# Patient Record
Sex: FEMALE | Employment: OTHER | URBAN - METROPOLITAN AREA
[De-identification: names, ages, dates, MRNs, and addresses within clinical notes are randomized per-mention and may not be internally consistent; named-entity substitution may affect disease eponyms.]

---

## 2018-01-01 ENCOUNTER — HOSPITAL ENCOUNTER (EMERGENCY)
Age: 63
Discharge: HOME | End: 2018-09-27
Attending: EMERGENCY MEDICINE
Payer: COMMERCIAL

## 2018-01-01 ENCOUNTER — HOME CARE VISIT (OUTPATIENT)
Dept: HOME HEALTH SERVICES | Facility: HOME HEALTH | Age: 63
End: 2018-01-01
Payer: COMMERCIAL

## 2018-01-01 ENCOUNTER — APPOINTMENT (OUTPATIENT)
Dept: VASCULAR SURGERY | Age: 63
DRG: 253 | End: 2018-01-01
Attending: PHYSICIAN ASSISTANT
Payer: COMMERCIAL

## 2018-01-01 ENCOUNTER — HOSPICE ADMISSION (OUTPATIENT)
Dept: HOSPICE | Facility: HOSPICE | Age: 63
End: 2018-01-01

## 2018-01-01 ENCOUNTER — HOME CARE VISIT (OUTPATIENT)
Dept: SCHEDULING | Facility: HOME HEALTH | Age: 63
End: 2018-01-01
Payer: COMMERCIAL

## 2018-01-01 ENCOUNTER — APPOINTMENT (OUTPATIENT)
Dept: GENERAL RADIOLOGY | Age: 63
DRG: 176 | End: 2018-01-01
Attending: INTERNAL MEDICINE
Payer: COMMERCIAL

## 2018-01-01 ENCOUNTER — APPOINTMENT (OUTPATIENT)
Dept: GENERAL RADIOLOGY | Age: 63
DRG: 253 | End: 2018-01-01
Attending: PHYSICIAN ASSISTANT
Payer: COMMERCIAL

## 2018-01-01 ENCOUNTER — APPOINTMENT (OUTPATIENT)
Dept: VASCULAR SURGERY | Age: 63
DRG: 175 | End: 2018-01-01
Attending: INTERNAL MEDICINE
Payer: COMMERCIAL

## 2018-01-01 ENCOUNTER — APPOINTMENT (OUTPATIENT)
Dept: NUCLEAR MEDICINE | Age: 63
DRG: 253 | End: 2018-01-01
Attending: INTERNAL MEDICINE
Payer: COMMERCIAL

## 2018-01-01 ENCOUNTER — APPOINTMENT (OUTPATIENT)
Dept: NON INVASIVE DIAGNOSTICS | Age: 63
DRG: 253 | End: 2018-01-01
Attending: INTERNAL MEDICINE
Payer: COMMERCIAL

## 2018-01-01 ENCOUNTER — HOME CARE VISIT (OUTPATIENT)
Dept: HOSPICE | Facility: HOSPICE | Age: 63
End: 2018-01-01

## 2018-01-01 ENCOUNTER — APPOINTMENT (OUTPATIENT)
Dept: GENERAL RADIOLOGY | Age: 63
End: 2018-01-01
Attending: EMERGENCY MEDICINE
Payer: COMMERCIAL

## 2018-01-01 ENCOUNTER — APPOINTMENT (OUTPATIENT)
Dept: GENERAL RADIOLOGY | Age: 63
DRG: 175 | End: 2018-01-01
Attending: EMERGENCY MEDICINE
Payer: COMMERCIAL

## 2018-01-01 ENCOUNTER — APPOINTMENT (OUTPATIENT)
Dept: NON INVASIVE DIAGNOSTICS | Age: 63
DRG: 175 | End: 2018-01-01
Attending: INTERNAL MEDICINE
Payer: COMMERCIAL

## 2018-01-01 ENCOUNTER — HOME HEALTH ADMISSION (OUTPATIENT)
Dept: HOME HEALTH SERVICES | Facility: HOME HEALTH | Age: 63
End: 2018-01-01
Payer: COMMERCIAL

## 2018-01-01 ENCOUNTER — APPOINTMENT (OUTPATIENT)
Dept: INTERVENTIONAL RADIOLOGY/VASCULAR | Age: 63
DRG: 253 | End: 2018-01-01
Attending: SURGERY
Payer: COMMERCIAL

## 2018-01-01 ENCOUNTER — HOSPITAL ENCOUNTER (OUTPATIENT)
Dept: CT IMAGING | Age: 63
Discharge: HOME | End: 2018-08-21
Attending: OBSTETRICS & GYNECOLOGY

## 2018-01-01 ENCOUNTER — TELEPHONE (OUTPATIENT)
Dept: PULMONOLOGY | Age: 63
End: 2018-01-01

## 2018-01-01 ENCOUNTER — APPOINTMENT (OUTPATIENT)
Dept: INTERVENTIONAL RADIOLOGY/VASCULAR | Age: 63
DRG: 176 | End: 2018-01-01
Attending: PHYSICIAN ASSISTANT
Payer: COMMERCIAL

## 2018-01-01 ENCOUNTER — APPOINTMENT (OUTPATIENT)
Dept: GENERAL RADIOLOGY | Age: 63
DRG: 176 | End: 2018-01-01
Attending: HOSPITALIST
Payer: COMMERCIAL

## 2018-01-01 ENCOUNTER — HOSPITAL ENCOUNTER (EMERGENCY)
Age: 63
Discharge: HOME | End: 2018-09-01
Attending: EMERGENCY MEDICINE
Payer: COMMERCIAL

## 2018-01-01 ENCOUNTER — APPOINTMENT (OUTPATIENT)
Dept: MRI IMAGING | Age: 63
DRG: 253 | End: 2018-01-01
Attending: INTERNAL MEDICINE
Payer: COMMERCIAL

## 2018-01-01 ENCOUNTER — APPOINTMENT (OUTPATIENT)
Dept: CT IMAGING | Age: 63
DRG: 175 | End: 2018-01-01
Attending: EMERGENCY MEDICINE
Payer: COMMERCIAL

## 2018-01-01 ENCOUNTER — APPOINTMENT (OUTPATIENT)
Dept: GENERAL RADIOLOGY | Age: 63
DRG: 175 | End: 2018-01-01
Attending: RADIOLOGY
Payer: COMMERCIAL

## 2018-01-01 ENCOUNTER — HOSPITAL ENCOUNTER (INPATIENT)
Age: 63
LOS: 12 days | Discharge: HOME HEALTH | DRG: 253 | End: 2018-08-28
Attending: EMERGENCY MEDICINE | Admitting: HOSPITALIST
Payer: COMMERCIAL

## 2018-01-01 ENCOUNTER — APPOINTMENT (OUTPATIENT)
Dept: CT IMAGING | Age: 63
DRG: 253 | End: 2018-01-01
Attending: INTERNAL MEDICINE
Payer: COMMERCIAL

## 2018-01-01 ENCOUNTER — APPOINTMENT (OUTPATIENT)
Dept: GENERAL RADIOLOGY | Age: 63
DRG: 253 | End: 2018-01-01
Attending: INTERNAL MEDICINE
Payer: COMMERCIAL

## 2018-01-01 ENCOUNTER — APPOINTMENT (OUTPATIENT)
Dept: INTERVENTIONAL RADIOLOGY/VASCULAR | Age: 63
DRG: 176 | End: 2018-01-01
Attending: HOSPITALIST
Payer: COMMERCIAL

## 2018-01-01 ENCOUNTER — HOSPITAL ENCOUNTER (INPATIENT)
Age: 63
LOS: 6 days | Discharge: HOME HEALTH | DRG: 176 | End: 2018-09-25
Attending: INTERNAL MEDICINE | Admitting: INTERNAL MEDICINE
Payer: COMMERCIAL

## 2018-01-01 ENCOUNTER — APPOINTMENT (OUTPATIENT)
Dept: CT IMAGING | Age: 63
DRG: 253 | End: 2018-01-01
Attending: SPECIALIST
Payer: COMMERCIAL

## 2018-01-01 ENCOUNTER — APPOINTMENT (OUTPATIENT)
Dept: GENERAL RADIOLOGY | Age: 63
DRG: 180 | End: 2018-01-01
Attending: EMERGENCY MEDICINE
Payer: COMMERCIAL

## 2018-01-01 ENCOUNTER — TELEPHONE (OUTPATIENT)
Dept: CARDIOTHORACIC SURGERY | Age: 63
End: 2018-01-01

## 2018-01-01 ENCOUNTER — HOSPITAL ENCOUNTER (EMERGENCY)
Age: 63
Discharge: ANOTHER HOSPITAL | End: 2018-10-02
Attending: EMERGENCY MEDICINE
Payer: COMMERCIAL

## 2018-01-01 ENCOUNTER — APPOINTMENT (OUTPATIENT)
Dept: CT IMAGING | Age: 63
DRG: 175 | End: 2018-01-01
Attending: INTERNAL MEDICINE
Payer: COMMERCIAL

## 2018-01-01 ENCOUNTER — HOSPITAL ENCOUNTER (INPATIENT)
Age: 63
LOS: 3 days | DRG: 180 | End: 2018-10-05
Attending: EMERGENCY MEDICINE | Admitting: INTERNAL MEDICINE
Payer: COMMERCIAL

## 2018-01-01 ENCOUNTER — APPOINTMENT (OUTPATIENT)
Dept: ULTRASOUND IMAGING | Age: 63
DRG: 175 | End: 2018-01-01
Attending: INTERNAL MEDICINE
Payer: COMMERCIAL

## 2018-01-01 ENCOUNTER — APPOINTMENT (OUTPATIENT)
Dept: GENERAL RADIOLOGY | Age: 63
DRG: 253 | End: 2018-01-01
Attending: HOSPITALIST
Payer: COMMERCIAL

## 2018-01-01 ENCOUNTER — APPOINTMENT (OUTPATIENT)
Dept: CT IMAGING | Age: 63
End: 2018-01-01
Attending: EMERGENCY MEDICINE
Payer: COMMERCIAL

## 2018-01-01 ENCOUNTER — HOSPITAL ENCOUNTER (INPATIENT)
Age: 63
LOS: 3 days | Discharge: ANOTHER HOSPITAL | DRG: 175 | End: 2018-09-19
Attending: EMERGENCY MEDICINE | Admitting: HOSPITALIST
Payer: COMMERCIAL

## 2018-01-01 VITALS
WEIGHT: 184 LBS | OXYGEN SATURATION: 97 % | DIASTOLIC BLOOD PRESSURE: 72 MMHG | RESPIRATION RATE: 21 BRPM | TEMPERATURE: 98 F | BODY MASS INDEX: 28.82 KG/M2 | HEART RATE: 114 BPM | SYSTOLIC BLOOD PRESSURE: 142 MMHG

## 2018-01-01 VITALS
OXYGEN SATURATION: 100 % | RESPIRATION RATE: 14 BRPM | DIASTOLIC BLOOD PRESSURE: 83 MMHG | HEART RATE: 112 BPM | SYSTOLIC BLOOD PRESSURE: 131 MMHG | TEMPERATURE: 97.3 F

## 2018-01-01 VITALS
DIASTOLIC BLOOD PRESSURE: 71 MMHG | BODY MASS INDEX: 26.21 KG/M2 | HEIGHT: 67 IN | WEIGHT: 167 LBS | HEART RATE: 125 BPM | OXYGEN SATURATION: 99 % | RESPIRATION RATE: 25 BRPM | SYSTOLIC BLOOD PRESSURE: 129 MMHG | TEMPERATURE: 97.4 F

## 2018-01-01 VITALS
TEMPERATURE: 97.8 F | OXYGEN SATURATION: 99 % | SYSTOLIC BLOOD PRESSURE: 104 MMHG | HEART RATE: 93 BPM | DIASTOLIC BLOOD PRESSURE: 54 MMHG | RESPIRATION RATE: 17 BRPM

## 2018-01-01 VITALS
OXYGEN SATURATION: 98 % | BODY MASS INDEX: 27.25 KG/M2 | DIASTOLIC BLOOD PRESSURE: 60 MMHG | RESPIRATION RATE: 24 BRPM | SYSTOLIC BLOOD PRESSURE: 107 MMHG | HEART RATE: 92 BPM | WEIGHT: 174 LBS | TEMPERATURE: 96.8 F

## 2018-01-01 VITALS
WEIGHT: 176 LBS | DIASTOLIC BLOOD PRESSURE: 60 MMHG | RESPIRATION RATE: 19 BRPM | SYSTOLIC BLOOD PRESSURE: 110 MMHG | HEIGHT: 67 IN | BODY MASS INDEX: 27.62 KG/M2 | TEMPERATURE: 98.9 F | HEART RATE: 100 BPM | OXYGEN SATURATION: 100 %

## 2018-01-01 VITALS
TEMPERATURE: 97.9 F | WEIGHT: 187.3 LBS | BODY MASS INDEX: 29.34 KG/M2 | DIASTOLIC BLOOD PRESSURE: 60 MMHG | RESPIRATION RATE: 27 BRPM | OXYGEN SATURATION: 92 % | HEART RATE: 94 BPM | SYSTOLIC BLOOD PRESSURE: 116 MMHG

## 2018-01-01 VITALS — HEART RATE: 99 BPM | OXYGEN SATURATION: 98 % | SYSTOLIC BLOOD PRESSURE: 130 MMHG | DIASTOLIC BLOOD PRESSURE: 78 MMHG

## 2018-01-01 VITALS
OXYGEN SATURATION: 93 % | HEART RATE: 117 BPM | RESPIRATION RATE: 16 BRPM | SYSTOLIC BLOOD PRESSURE: 118 MMHG | DIASTOLIC BLOOD PRESSURE: 70 MMHG | TEMPERATURE: 97.4 F

## 2018-01-01 VITALS
SYSTOLIC BLOOD PRESSURE: 146 MMHG | DIASTOLIC BLOOD PRESSURE: 75 MMHG | OXYGEN SATURATION: 99 % | HEIGHT: 67 IN | BODY MASS INDEX: 26.84 KG/M2 | HEART RATE: 121 BPM | TEMPERATURE: 96.3 F | WEIGHT: 171 LBS | RESPIRATION RATE: 25 BRPM

## 2018-01-01 VITALS
OXYGEN SATURATION: 96 % | TEMPERATURE: 98.3 F | DIASTOLIC BLOOD PRESSURE: 64 MMHG | SYSTOLIC BLOOD PRESSURE: 110 MMHG | HEART RATE: 110 BPM

## 2018-01-01 VITALS
HEART RATE: 72 BPM | TEMPERATURE: 96.9 F | RESPIRATION RATE: 16 BRPM | DIASTOLIC BLOOD PRESSURE: 80 MMHG | OXYGEN SATURATION: 96 % | SYSTOLIC BLOOD PRESSURE: 129 MMHG

## 2018-01-01 VITALS
HEART RATE: 100 BPM | OXYGEN SATURATION: 95 % | SYSTOLIC BLOOD PRESSURE: 122 MMHG | RESPIRATION RATE: 14 BRPM | DIASTOLIC BLOOD PRESSURE: 78 MMHG | TEMPERATURE: 98.8 F

## 2018-01-01 VITALS
SYSTOLIC BLOOD PRESSURE: 112 MMHG | TEMPERATURE: 96.9 F | HEART RATE: 109 BPM | OXYGEN SATURATION: 97 % | RESPIRATION RATE: 18 BRPM | DIASTOLIC BLOOD PRESSURE: 75 MMHG

## 2018-01-01 VITALS
BODY MASS INDEX: 30.55 KG/M2 | OXYGEN SATURATION: 96 % | RESPIRATION RATE: 18 BRPM | HEART RATE: 115 BPM | TEMPERATURE: 99.4 F | DIASTOLIC BLOOD PRESSURE: 53 MMHG | WEIGHT: 194.67 LBS | HEIGHT: 67 IN | SYSTOLIC BLOOD PRESSURE: 101 MMHG

## 2018-01-01 VITALS — SYSTOLIC BLOOD PRESSURE: 115 MMHG | HEART RATE: 102 BPM | DIASTOLIC BLOOD PRESSURE: 65 MMHG | OXYGEN SATURATION: 97 %

## 2018-01-01 VITALS
DIASTOLIC BLOOD PRESSURE: 63 MMHG | TEMPERATURE: 96.3 F | SYSTOLIC BLOOD PRESSURE: 107 MMHG | RESPIRATION RATE: 14 BRPM | HEART RATE: 108 BPM | OXYGEN SATURATION: 98 %

## 2018-01-01 VITALS — SYSTOLIC BLOOD PRESSURE: 120 MMHG | HEART RATE: 98 BPM | DIASTOLIC BLOOD PRESSURE: 80 MMHG

## 2018-01-01 VITALS — DIASTOLIC BLOOD PRESSURE: 78 MMHG | SYSTOLIC BLOOD PRESSURE: 128 MMHG

## 2018-01-01 DIAGNOSIS — C34.90 MALIGNANT NEOPLASM OF LUNG, UNSPECIFIED LATERALITY, UNSPECIFIED PART OF LUNG (HCC): Primary | ICD-10-CM

## 2018-01-01 DIAGNOSIS — R06.00 DYSPNEA, UNSPECIFIED TYPE: Primary | ICD-10-CM

## 2018-01-01 DIAGNOSIS — R06.02 SOB (SHORTNESS OF BREATH): Primary | ICD-10-CM

## 2018-01-01 DIAGNOSIS — J90 PLEURAL EFFUSION: ICD-10-CM

## 2018-01-01 DIAGNOSIS — C34.91 PRIMARY MALIGNANT NEOPLASM OF RIGHT LUNG METASTATIC TO OTHER SITE (HCC): ICD-10-CM

## 2018-01-01 DIAGNOSIS — I31.39 PERICARDIAL EFFUSION: ICD-10-CM

## 2018-01-01 DIAGNOSIS — C34.90 LUNG CANCER (HCC): ICD-10-CM

## 2018-01-01 DIAGNOSIS — D64.9 ANEMIA, UNSPECIFIED TYPE: ICD-10-CM

## 2018-01-01 DIAGNOSIS — N17.9 ACUTE RENAL INJURY (HCC): ICD-10-CM

## 2018-01-01 DIAGNOSIS — I73.9 PAD (PERIPHERAL ARTERY DISEASE) (HCC): ICD-10-CM

## 2018-01-01 DIAGNOSIS — C34.90 PRIMARY MALIGNANT NEOPLASM OF LUNG METASTATIC TO OTHER SITE, UNSPECIFIED LATERALITY (HCC): ICD-10-CM

## 2018-01-01 DIAGNOSIS — E86.0 DEHYDRATION: ICD-10-CM

## 2018-01-01 DIAGNOSIS — Z86.79 HISTORY OF PERIPHERAL ARTERIAL DISEASE: ICD-10-CM

## 2018-01-01 DIAGNOSIS — R64 CACHEXIA (HCC): Primary | ICD-10-CM

## 2018-01-01 DIAGNOSIS — Z71.89 ADVANCED CARE PLANNING/COUNSELING DISCUSSION: ICD-10-CM

## 2018-01-01 DIAGNOSIS — R06.02 SHORTNESS OF BREATH: Primary | ICD-10-CM

## 2018-01-01 DIAGNOSIS — E87.1 HYPONATREMIA: ICD-10-CM

## 2018-01-01 DIAGNOSIS — J90 PLEURAL EFFUSION: Primary | ICD-10-CM

## 2018-01-01 DIAGNOSIS — I82.4Z2 ACUTE VENOUS EMBOLISM AND THROMBOSIS OF DEEP VESSELS OF DISTAL END OF LEFT LOWER EXTREMITY (HCC): Primary | ICD-10-CM

## 2018-01-01 DIAGNOSIS — R06.02 SOB (SHORTNESS OF BREATH): ICD-10-CM

## 2018-01-01 DIAGNOSIS — J90 PLEURAL EFFUSION ON LEFT: ICD-10-CM

## 2018-01-01 DIAGNOSIS — J96.20 ACUTE ON CHRONIC RESPIRATORY FAILURE, UNSPECIFIED WHETHER WITH HYPOXIA OR HYPERCAPNIA (HCC): ICD-10-CM

## 2018-01-01 DIAGNOSIS — R53.81 DEBILITY: ICD-10-CM

## 2018-01-01 DIAGNOSIS — I26.99 OTHER ACUTE PULMONARY EMBOLISM WITHOUT ACUTE COR PULMONALE (HCC): ICD-10-CM

## 2018-01-01 DIAGNOSIS — I82.402 LEG DVT (DEEP VENOUS THROMBOEMBOLISM), ACUTE, LEFT (HCC): Primary | ICD-10-CM

## 2018-01-01 LAB
ABO + RH BLD: NORMAL
ADENOSINE DEAMINASE PLR-CCNC: NORMAL U/L
ALBUMIN SERPL-MCNC: 1.7 G/DL (ref 3.4–5)
ALBUMIN SERPL-MCNC: 1.9 G/DL (ref 3.4–5)
ALBUMIN SERPL-MCNC: 1.9 G/DL (ref 3.4–5)
ALBUMIN SERPL-MCNC: 2 G/DL (ref 3.4–5)
ALBUMIN SERPL-MCNC: 2.4 G/DL (ref 3.4–5)
ALBUMIN/GLOB SERPL: 0.3 {RATIO} (ref 0.8–1.7)
ALP SERPL-CCNC: 267 U/L (ref 45–117)
ALP SERPL-CCNC: 279 U/L (ref 45–117)
ALP SERPL-CCNC: 339 U/L (ref 45–117)
ALP SERPL-CCNC: 387 U/L (ref 45–117)
ALP SERPL-CCNC: 488 U/L (ref 45–117)
ALT SERPL-CCNC: 16 U/L (ref 13–56)
ALT SERPL-CCNC: 17 U/L (ref 13–56)
ALT SERPL-CCNC: 38 U/L (ref 13–56)
ALT SERPL-CCNC: 46 U/L (ref 13–56)
ALT SERPL-CCNC: 49 U/L (ref 13–56)
AMORPH CRY URNS QL MICRO: ABNORMAL
ANA SER QL: NEGATIVE
ANION GAP SERPL CALC-SCNC: 10 MMOL/L (ref 3–18)
ANION GAP SERPL CALC-SCNC: 11 MMOL/L (ref 3–18)
ANION GAP SERPL CALC-SCNC: 11 MMOL/L (ref 3–18)
ANION GAP SERPL CALC-SCNC: 12 MMOL/L (ref 3–18)
ANION GAP SERPL CALC-SCNC: 12 MMOL/L (ref 3–18)
ANION GAP SERPL CALC-SCNC: 13 MMOL/L (ref 3–18)
ANION GAP SERPL CALC-SCNC: 15 MMOL/L (ref 3–18)
ANION GAP SERPL CALC-SCNC: 6 MMOL/L (ref 3–18)
ANION GAP SERPL CALC-SCNC: 7 MMOL/L (ref 3–18)
ANION GAP SERPL CALC-SCNC: 7 MMOL/L (ref 3–18)
ANION GAP SERPL CALC-SCNC: 8 MMOL/L (ref 3–18)
ANION GAP SERPL CALC-SCNC: 9 MMOL/L (ref 3–18)
APPEARANCE FLD: ABNORMAL
APPEARANCE FLD: ABNORMAL
APPEARANCE UR: ABNORMAL
APPEARANCE UR: ABNORMAL
APPEARANCE UR: CLEAR
APTT PPP: 28.2 SEC (ref 23–36.4)
APTT PPP: 32.9 SEC (ref 23–36.4)
APTT PPP: 35.4 SEC (ref 23–36.4)
APTT PPP: 38.7 SEC (ref 23–36.4)
APTT PPP: 64.3 SEC (ref 23–36.4)
APTT PPP: 71.9 SEC (ref 23–36.4)
APTT PPP: 72 SEC (ref 23–36.4)
APTT PPP: 74.7 SEC (ref 23–36.4)
APTT PPP: 78.9 SEC (ref 23–36.4)
APTT PPP: 92.5 SEC (ref 23–36.4)
APTT PPP: 93.5 SEC (ref 23–36.4)
ARTERIAL PATENCY WRIST A: YES
ARTERIAL PATENCY WRIST A: YES
AST SERPL-CCNC: 23 U/L (ref 15–37)
AST SERPL-CCNC: 25 U/L (ref 15–37)
AST SERPL-CCNC: 33 U/L (ref 15–37)
AST SERPL-CCNC: 46 U/L (ref 15–37)
AST SERPL-CCNC: 50 U/L (ref 15–37)
ATRIAL RATE: 104 BPM
ATRIAL RATE: 115 BPM
ATRIAL RATE: 129 BPM
BACTERIA SPEC CULT: ABNORMAL
BACTERIA SPEC CULT: NORMAL
BACTERIA URNS QL MICRO: ABNORMAL /HPF
BASE DEFICIT BLD-SCNC: 5 MMOL/L
BASE EXCESS BLD CALC-SCNC: 8 MMOL/L
BASOPHILS # BLD: 0 K/UL (ref 0–0.06)
BASOPHILS # BLD: 0 K/UL (ref 0–0.1)
BASOPHILS # BLD: 0.1 K/UL (ref 0–0.1)
BASOPHILS NFR BLD: 0 % (ref 0–2)
BASOPHILS NFR BLD: 0 % (ref 0–3)
BASOPHILS NFR BLD: 1 % (ref 0–2)
BDY SITE: ABNORMAL
BDY SITE: ABNORMAL
BILIRUB SERPL-MCNC: 0.6 MG/DL (ref 0.2–1)
BILIRUB SERPL-MCNC: 0.7 MG/DL (ref 0.2–1)
BILIRUB SERPL-MCNC: 0.7 MG/DL (ref 0.2–1)
BILIRUB SERPL-MCNC: 0.8 MG/DL (ref 0.2–1)
BILIRUB SERPL-MCNC: 0.8 MG/DL (ref 0.2–1)
BILIRUB UR QL: ABNORMAL
BILIRUB UR QL: ABNORMAL
BILIRUB UR QL: NEGATIVE
BLD PROD TYP BPU: NORMAL
BLOOD GROUP ANTIBODIES SERPL: NORMAL
BNP SERPL-MCNC: 1278 PG/ML (ref 0–900)
BNP SERPL-MCNC: 1954 PG/ML (ref 0–900)
BPU ID: NORMAL
BUN SERPL-MCNC: 10 MG/DL (ref 7–18)
BUN SERPL-MCNC: 11 MG/DL (ref 7–18)
BUN SERPL-MCNC: 11 MG/DL (ref 7–18)
BUN SERPL-MCNC: 12 MG/DL (ref 7–18)
BUN SERPL-MCNC: 12 MG/DL (ref 7–18)
BUN SERPL-MCNC: 13 MG/DL (ref 7–18)
BUN SERPL-MCNC: 14 MG/DL (ref 7–18)
BUN SERPL-MCNC: 14 MG/DL (ref 7–18)
BUN SERPL-MCNC: 15 MG/DL (ref 7–18)
BUN SERPL-MCNC: 15 MG/DL (ref 7–18)
BUN SERPL-MCNC: 23 MG/DL (ref 7–18)
BUN SERPL-MCNC: 25 MG/DL (ref 7–18)
BUN SERPL-MCNC: 7 MG/DL (ref 7–18)
BUN SERPL-MCNC: 9 MG/DL (ref 7–18)
BUN/CREAT SERPL: 10 (ref 12–20)
BUN/CREAT SERPL: 11 (ref 12–20)
BUN/CREAT SERPL: 12 (ref 12–20)
BUN/CREAT SERPL: 13 (ref 12–20)
BUN/CREAT SERPL: 14 (ref 12–20)
BUN/CREAT SERPL: 14 (ref 12–20)
BUN/CREAT SERPL: 15 (ref 12–20)
BUN/CREAT SERPL: 16 (ref 12–20)
BUN/CREAT SERPL: 17 (ref 12–20)
BUN/CREAT SERPL: 18 (ref 12–20)
BUN/CREAT SERPL: 19 (ref 12–20)
BUN/CREAT SERPL: 20 (ref 12–20)
BUN/CREAT SERPL: 30 (ref 12–20)
CA-I SERPL-SCNC: 1.08 MMOL/L (ref 1.12–1.32)
CA-I SERPL-SCNC: 1.11 MMOL/L (ref 1.12–1.32)
CA-I SERPL-SCNC: 1.12 MMOL/L (ref 1.12–1.32)
CA-I SERPL-SCNC: 1.15 MMOL/L (ref 1.12–1.32)
CA-I SERPL-SCNC: 1.15 MMOL/L (ref 1.12–1.32)
CA-I SERPL-SCNC: 1.16 MMOL/L (ref 1.12–1.32)
CA-I SERPL-SCNC: 1.16 MMOL/L (ref 1.12–1.32)
CA-I SERPL-SCNC: 1.17 MMOL/L (ref 1.12–1.32)
CA-I SERPL-SCNC: 1.18 MMOL/L (ref 1.12–1.32)
CA-I SERPL-SCNC: 1.18 MMOL/L (ref 1.12–1.32)
CALCIUM SERPL-MCNC: 10.3 MG/DL (ref 8.5–10.1)
CALCIUM SERPL-MCNC: 8.4 MG/DL (ref 8.5–10.1)
CALCIUM SERPL-MCNC: 8.6 MG/DL (ref 8.5–10.1)
CALCIUM SERPL-MCNC: 8.7 MG/DL (ref 8.5–10.1)
CALCIUM SERPL-MCNC: 8.7 MG/DL (ref 8.5–10.1)
CALCIUM SERPL-MCNC: 8.8 MG/DL (ref 8.5–10.1)
CALCIUM SERPL-MCNC: 8.8 MG/DL (ref 8.5–10.1)
CALCIUM SERPL-MCNC: 8.9 MG/DL (ref 8.5–10.1)
CALCIUM SERPL-MCNC: 9 MG/DL (ref 8.5–10.1)
CALCIUM SERPL-MCNC: 9.1 MG/DL (ref 8.5–10.1)
CALCIUM SERPL-MCNC: 9.1 MG/DL (ref 8.5–10.1)
CALCIUM SERPL-MCNC: 9.2 MG/DL (ref 8.5–10.1)
CALCIUM SERPL-MCNC: 9.3 MG/DL (ref 8.5–10.1)
CALCIUM SERPL-MCNC: 9.7 MG/DL (ref 8.5–10.1)
CALCULATED P AXIS, ECG09: 50 DEGREES
CALCULATED P AXIS, ECG09: 55 DEGREES
CALCULATED P AXIS, ECG09: 57 DEGREES
CALCULATED R AXIS, ECG10: 24 DEGREES
CALCULATED R AXIS, ECG10: 4 DEGREES
CALCULATED R AXIS, ECG10: 48 DEGREES
CALCULATED T AXIS, ECG11: 48 DEGREES
CALCULATED T AXIS, ECG11: 64 DEGREES
CALCULATED T AXIS, ECG11: 80 DEGREES
CALLED TO:,BCALL1: NORMAL
CHLORIDE SERPL-SCNC: 100 MMOL/L (ref 100–108)
CHLORIDE SERPL-SCNC: 101 MMOL/L (ref 100–108)
CHLORIDE SERPL-SCNC: 102 MMOL/L (ref 100–108)
CHLORIDE SERPL-SCNC: 103 MMOL/L (ref 100–108)
CHLORIDE SERPL-SCNC: 91 MMOL/L (ref 100–108)
CHLORIDE SERPL-SCNC: 93 MMOL/L (ref 100–108)
CHLORIDE SERPL-SCNC: 94 MMOL/L (ref 100–108)
CHLORIDE SERPL-SCNC: 94 MMOL/L (ref 100–108)
CHLORIDE SERPL-SCNC: 95 MMOL/L (ref 100–108)
CHLORIDE SERPL-SCNC: 96 MMOL/L (ref 100–108)
CHLORIDE SERPL-SCNC: 97 MMOL/L (ref 100–108)
CHLORIDE SERPL-SCNC: 98 MMOL/L (ref 100–108)
CHLORIDE SERPL-SCNC: 99 MMOL/L (ref 100–108)
CK MB CFR SERPL CALC: NORMAL % (ref 0–4)
CK MB SERPL-MCNC: <1 NG/ML (ref 5–25)
CK SERPL-CCNC: 130 U/L (ref 26–192)
CK SERPL-CCNC: 136 U/L (ref 26–192)
CK SERPL-CCNC: 37 U/L (ref 26–192)
CK SERPL-CCNC: 39 U/L (ref 26–192)
CK SERPL-CCNC: 39 U/L (ref 26–192)
CK SERPL-CCNC: 44 U/L (ref 26–192)
CK SERPL-CCNC: 45 U/L (ref 26–192)
CK SERPL-CCNC: 49 U/L (ref 26–192)
CK SERPL-CCNC: 49 U/L (ref 26–192)
CK SERPL-CCNC: 60 U/L (ref 26–192)
CK SERPL-CCNC: 91 U/L (ref 26–192)
CO2 SERPL-SCNC: 19 MMOL/L (ref 21–32)
CO2 SERPL-SCNC: 21 MMOL/L (ref 21–32)
CO2 SERPL-SCNC: 22 MMOL/L (ref 21–32)
CO2 SERPL-SCNC: 23 MMOL/L (ref 21–32)
CO2 SERPL-SCNC: 25 MMOL/L (ref 21–32)
CO2 SERPL-SCNC: 26 MMOL/L (ref 21–32)
CO2 SERPL-SCNC: 27 MMOL/L (ref 21–32)
CO2 SERPL-SCNC: 28 MMOL/L (ref 21–32)
CO2 SERPL-SCNC: 29 MMOL/L (ref 21–32)
CO2 SERPL-SCNC: 30 MMOL/L (ref 21–32)
COLOR FLD: ABNORMAL
COLOR FLD: ABNORMAL
COLOR UR: ABNORMAL
COLOR UR: ABNORMAL
COLOR UR: YELLOW
CREAT SERPL-MCNC: 0.44 MG/DL (ref 0.6–1.3)
CREAT SERPL-MCNC: 0.46 MG/DL (ref 0.6–1.3)
CREAT SERPL-MCNC: 0.56 MG/DL (ref 0.6–1.3)
CREAT SERPL-MCNC: 0.56 MG/DL (ref 0.6–1.3)
CREAT SERPL-MCNC: 0.59 MG/DL (ref 0.6–1.3)
CREAT SERPL-MCNC: 0.63 MG/DL (ref 0.6–1.3)
CREAT SERPL-MCNC: 0.66 MG/DL (ref 0.6–1.3)
CREAT SERPL-MCNC: 0.75 MG/DL (ref 0.6–1.3)
CREAT SERPL-MCNC: 0.8 MG/DL (ref 0.6–1.3)
CREAT SERPL-MCNC: 0.81 MG/DL (ref 0.6–1.3)
CREAT SERPL-MCNC: 0.83 MG/DL (ref 0.6–1.3)
CREAT SERPL-MCNC: 0.84 MG/DL (ref 0.6–1.3)
CREAT SERPL-MCNC: 0.87 MG/DL (ref 0.6–1.3)
CREAT SERPL-MCNC: 0.89 MG/DL (ref 0.6–1.3)
CREAT SERPL-MCNC: 0.91 MG/DL (ref 0.6–1.3)
CREAT SERPL-MCNC: 0.92 MG/DL (ref 0.6–1.3)
CREAT SERPL-MCNC: 0.96 MG/DL (ref 0.6–1.3)
CREAT SERPL-MCNC: 0.98 MG/DL (ref 0.6–1.3)
CREAT SERPL-MCNC: 1.38 MG/DL (ref 0.6–1.3)
CREAT SERPL-MCNC: 1.55 MG/DL (ref 0.6–1.3)
CROSSMATCH RESULT,%XM: NORMAL
DIAGNOSIS, 93000: NORMAL
DIFFERENTIAL METHOD BLD: ABNORMAL
ECHO AO ARCH DIAM: 2.8 CM
ECHO AO ASC DIAM: 2.98 CM
ECHO AO ROOT DIAM: 2.5 CM
ECHO AO ROOT DIAM: 2.68 CM
ECHO AV AREA PEAK VELOCITY: 2.3 CM2
ECHO AV AREA PEAK VELOCITY: 2.8 CM2
ECHO AV AREA VTI: 2.2 CM2
ECHO AV AREA VTI: 2.6 CM2
ECHO AV AREA/BSA PEAK VELOCITY: 1.2 CM2/M2
ECHO AV AREA/BSA PEAK VELOCITY: 1.4 CM2/M2
ECHO AV AREA/BSA VTI: 1.2 CM2/M2
ECHO AV AREA/BSA VTI: 1.3 CM2/M2
ECHO AV MEAN GRADIENT: 3.3 MMHG
ECHO AV MEAN GRADIENT: 5.1 MMHG
ECHO AV PEAK GRADIENT: 6.4 MMHG
ECHO AV PEAK GRADIENT: 7.2 MMHG
ECHO AV PEAK VELOCITY: 126.78 CM/S
ECHO AV PEAK VELOCITY: 134.62 CM/S
ECHO AV VTI: 19.88 CM
ECHO AV VTI: 20.1 CM
ECHO IVC SNIFF: 2.66 CM
ECHO LA MAJOR AXIS: 2.86 CM
ECHO LA MAJOR AXIS: 2.99 CM
ECHO LA VOL 2C: 38.02 ML (ref 22–52)
ECHO LA VOL 4C: 22.31 ML (ref 22–52)
ECHO LA VOL 4C: 48.25 ML (ref 22–52)
ECHO LA VOL BP: 35.63 ML (ref 22–52)
ECHO LA VOL BP: 51 ML (ref 22–52)
ECHO LA VOL/BSA BIPLANE: 18.6 ML/M2
ECHO LA VOL/BSA BIPLANE: 25.66 ML/M2
ECHO LA VOLUME INDEX A2C: 19.85 ML/M2
ECHO LA VOLUME INDEX A4C: 11.65 ML/M2
ECHO LA VOLUME INDEX A4C: 24.28 ML/M2
ECHO LV E' LATERAL VELOCITY: 0.08 CM/S
ECHO LV E' SEPTAL VELOCITY: 0.1 CM/S
ECHO LV EDV A2C: 71.2 ML
ECHO LV EDV A2C: 82.8 ML
ECHO LV EDV A2C: 87.2 ML
ECHO LV EDV A4C: 52.7 ML
ECHO LV EDV A4C: 57.6 ML
ECHO LV EDV A4C: 81.4 ML
ECHO LV EDV BP: 67.9 ML (ref 56–104)
ECHO LV EDV BP: 68.2 ML (ref 56–104)
ECHO LV EDV BP: 83.4 ML (ref 56–104)
ECHO LV EDV INDEX A4C: 26.5 ML/M2
ECHO LV EDV INDEX A4C: 30.1 ML/M2
ECHO LV EDV INDEX A4C: 42.5 ML/M2
ECHO LV EDV INDEX BP: 34.2 ML/M2
ECHO LV EDV INDEX BP: 35.6 ML/M2
ECHO LV EDV INDEX BP: 43.5 ML/M2
ECHO LV EDV NDEX A2C: 37.2 ML/M2
ECHO LV EDV NDEX A2C: 43.2 ML/M2
ECHO LV EDV NDEX A2C: 43.9 ML/M2
ECHO LV EJECTION FRACTION A2C: 56 %
ECHO LV EJECTION FRACTION A2C: 61 %
ECHO LV EJECTION FRACTION A2C: 67 %
ECHO LV EJECTION FRACTION A4C: 60 %
ECHO LV EJECTION FRACTION A4C: 61 %
ECHO LV EJECTION FRACTION A4C: 67 %
ECHO LV EJECTION FRACTION BIPLANE: 58.6 % (ref 55–100)
ECHO LV EJECTION FRACTION BIPLANE: 64.1 % (ref 55–100)
ECHO LV EJECTION FRACTION BIPLANE: 65 % (ref 55–100)
ECHO LV ESV A2C: 23.8 ML
ECHO LV ESV A2C: 34.5 ML
ECHO LV ESV A2C: 36.6 ML
ECHO LV ESV A4C: 17.4 ML
ECHO LV ESV A4C: 22.8 ML
ECHO LV ESV A4C: 32.2 ML
ECHO LV ESV BP: 23.8 ML (ref 19–49)
ECHO LV ESV BP: 24.3 ML (ref 19–49)
ECHO LV ESV BP: 34.5 ML (ref 19–49)
ECHO LV ESV INDEX A2C: 12.4 ML/M2
ECHO LV ESV INDEX A2C: 17.4 ML/M2
ECHO LV ESV INDEX A2C: 19.1 ML/M2
ECHO LV ESV INDEX A4C: 11.9 ML/M2
ECHO LV ESV INDEX A4C: 16.8 ML/M2
ECHO LV ESV INDEX A4C: 8.8 ML/M2
ECHO LV ESV INDEX BP: 12.2 ML/M2
ECHO LV ESV INDEX BP: 12.4 ML/M2
ECHO LV ESV INDEX BP: 18 ML/M2
ECHO LV INTERNAL DIMENSION DIASTOLIC: 3.01 CM (ref 3.9–5.3)
ECHO LV INTERNAL DIMENSION DIASTOLIC: 3.94 CM (ref 3.9–5.3)
ECHO LV INTERNAL DIMENSION SYSTOLIC: 1.99 CM
ECHO LV INTERNAL DIMENSION SYSTOLIC: 2.84 CM
ECHO LV IVSD: 1.06 CM (ref 0.6–0.9)
ECHO LV IVSD: 1.2 CM (ref 0.6–0.9)
ECHO LV MASS 2D: 103.3 G (ref 67–162)
ECHO LV MASS 2D: 182.9 G (ref 67–162)
ECHO LV MASS INDEX 2D: 52 G/M2
ECHO LV MASS INDEX 2D: 95.5 G/M2
ECHO LV POSTERIOR WALL DIASTOLIC: 1.12 CM (ref 0.6–0.9)
ECHO LV POSTERIOR WALL DIASTOLIC: 1.16 CM (ref 0.6–0.9)
ECHO LVOT DIAM: 1.89 CM
ECHO LVOT DIAM: 1.97 CM
ECHO LVOT PEAK GRADIENT: 4.2 MMHG
ECHO LVOT PEAK GRADIENT: 6.1 MMHG
ECHO LVOT PEAK VELOCITY: 102.55 CM/S
ECHO LVOT PEAK VELOCITY: 123.39 CM/S
ECHO LVOT VTI: 16.09 CM
ECHO LVOT VTI: 16.92 CM
ECHO MV A VELOCITY: 84.76 CM/S
ECHO MV AREA PHT: 5.2 CM2
ECHO MV E DECELERATION TIME (DT): 146.8 MS
ECHO MV E VELOCITY: 0.64 CM/S
ECHO MV E/A RATIO: 0.01
ECHO MV E/E' LATERAL: 8
ECHO MV E/E' RATIO (AVERAGED): 7.2
ECHO MV E/E' SEPTAL: 6.4
ECHO MV PRESSURE HALF TIME (PHT): 42.6 MS
ECHO PV MAX VELOCITY: 106.8 CM/S
ECHO PV PEAK GRADIENT: 4.6 MMHG
ECHO RA AREA 4C: 11.06 CM2
ECHO RA AREA 4C: 17.5 CM2
ECHO RV INTERNAL DIMENSION: 3.28 CM
ECHO RV INTERNAL DIMENSION: 3.38 CM
ECHO RV TAPSE: 2 CM (ref 1.5–2)
ECHO RVOT PEAK VELOCITY: 57.6 CM/S
ECHO TV REGURGITANT MAX VELOCITY: 228.79 CM/S
ECHO TV REGURGITANT MAX VELOCITY: 250.19 CM/S
ECHO TV REGURGITANT MAX VELOCITY: 263.03 CM/S
ECHO TV REGURGITANT MAX VELOCITY: 320.3 CM/S
ECHO TV REGURGITANT PEAK GRADIENT: 20.9 MMHG
ECHO TV REGURGITANT PEAK GRADIENT: 25 MMHG
ECHO TV REGURGITANT PEAK GRADIENT: 27.7 MMHG
ECHO TV REGURGITANT PEAK GRADIENT: 41 MMHG
EOSINOPHIL # BLD: 0 K/UL (ref 0–0.4)
EOSINOPHIL # BLD: 0.1 K/UL (ref 0–0.4)
EOSINOPHIL # BLD: 0.1 K/UL (ref 0–0.4)
EOSINOPHIL # BLD: 0.2 K/UL (ref 0–0.4)
EOSINOPHIL # BLD: 0.3 K/UL (ref 0–0.4)
EOSINOPHIL # BLD: 0.4 K/UL (ref 0–0.4)
EOSINOPHIL # BLD: 0.4 K/UL (ref 0–0.4)
EOSINOPHIL NFR BLD: 0 % (ref 0–5)
EOSINOPHIL NFR BLD: 1 % (ref 0–5)
EOSINOPHIL NFR BLD: 2 % (ref 0–5)
EOSINOPHIL NFR BLD: 3 % (ref 0–5)
EOSINOPHIL NFR FLD MANUAL: 0 %
EOSINOPHIL NFR FLD MANUAL: 0 %
EPITH CASTS URNS QL MICRO: ABNORMAL /LPF (ref 0–5)
ERYTHROCYTE [DISTWIDTH] IN BLOOD BY AUTOMATED COUNT: 16.6 % (ref 11.6–14.5)
ERYTHROCYTE [DISTWIDTH] IN BLOOD BY AUTOMATED COUNT: 16.6 % (ref 11.6–14.5)
ERYTHROCYTE [DISTWIDTH] IN BLOOD BY AUTOMATED COUNT: 16.8 % (ref 11.6–14.5)
ERYTHROCYTE [DISTWIDTH] IN BLOOD BY AUTOMATED COUNT: 16.9 % (ref 11.6–14.5)
ERYTHROCYTE [DISTWIDTH] IN BLOOD BY AUTOMATED COUNT: 17.3 % (ref 11.6–14.5)
ERYTHROCYTE [DISTWIDTH] IN BLOOD BY AUTOMATED COUNT: 18.4 % (ref 11.6–14.5)
ERYTHROCYTE [DISTWIDTH] IN BLOOD BY AUTOMATED COUNT: 18.6 % (ref 11.6–14.5)
ERYTHROCYTE [DISTWIDTH] IN BLOOD BY AUTOMATED COUNT: 18.7 % (ref 11.6–14.5)
ERYTHROCYTE [DISTWIDTH] IN BLOOD BY AUTOMATED COUNT: 18.9 % (ref 11.6–14.5)
ERYTHROCYTE [DISTWIDTH] IN BLOOD BY AUTOMATED COUNT: 19 % (ref 11.6–14.5)
ERYTHROCYTE [DISTWIDTH] IN BLOOD BY AUTOMATED COUNT: 19.1 % (ref 11.6–14.5)
ERYTHROCYTE [DISTWIDTH] IN BLOOD BY AUTOMATED COUNT: 19.2 % (ref 11.6–14.5)
ERYTHROCYTE [DISTWIDTH] IN BLOOD BY AUTOMATED COUNT: 19.2 % (ref 11.6–14.5)
ERYTHROCYTE [DISTWIDTH] IN BLOOD BY AUTOMATED COUNT: 19.3 % (ref 11.6–14.5)
ERYTHROCYTE [DISTWIDTH] IN BLOOD BY AUTOMATED COUNT: 19.4 % (ref 11.6–14.5)
ERYTHROCYTE [DISTWIDTH] IN BLOOD BY AUTOMATED COUNT: 19.7 % (ref 11.6–14.5)
ERYTHROCYTE [DISTWIDTH] IN BLOOD BY AUTOMATED COUNT: 19.9 % (ref 11.6–14.5)
FOLATE SERPL-MCNC: 8.7 NG/ML (ref 3.1–17.5)
FUNGUS SMEAR,FNGSMR: NORMAL
GAS FLOW.O2 O2 DELIVERY SYS: ABNORMAL L/MIN
GAS FLOW.O2 O2 DELIVERY SYS: ABNORMAL L/MIN
GAS FLOW.O2 SETTING OXYMISER: 2 L/M
GLOBULIN SER CALC-MCNC: 5.4 G/DL (ref 2–4)
GLOBULIN SER CALC-MCNC: 5.8 G/DL (ref 2–4)
GLOBULIN SER CALC-MCNC: 5.9 G/DL (ref 2–4)
GLOBULIN SER CALC-MCNC: 6.4 G/DL (ref 2–4)
GLOBULIN SER CALC-MCNC: 7.1 G/DL (ref 2–4)
GLUCOSE BLD STRIP.AUTO-MCNC: 133 MG/DL (ref 70–110)
GLUCOSE FLD-MCNC: 42 MG/DL
GLUCOSE SERPL-MCNC: 100 MG/DL (ref 74–99)
GLUCOSE SERPL-MCNC: 102 MG/DL (ref 74–99)
GLUCOSE SERPL-MCNC: 102 MG/DL (ref 74–99)
GLUCOSE SERPL-MCNC: 104 MG/DL (ref 74–99)
GLUCOSE SERPL-MCNC: 106 MG/DL (ref 74–99)
GLUCOSE SERPL-MCNC: 106 MG/DL (ref 74–99)
GLUCOSE SERPL-MCNC: 107 MG/DL (ref 74–99)
GLUCOSE SERPL-MCNC: 127 MG/DL (ref 74–99)
GLUCOSE SERPL-MCNC: 81 MG/DL (ref 74–99)
GLUCOSE SERPL-MCNC: 82 MG/DL (ref 74–99)
GLUCOSE SERPL-MCNC: 83 MG/DL (ref 74–99)
GLUCOSE SERPL-MCNC: 87 MG/DL (ref 74–99)
GLUCOSE SERPL-MCNC: 88 MG/DL (ref 74–99)
GLUCOSE SERPL-MCNC: 89 MG/DL (ref 74–99)
GLUCOSE SERPL-MCNC: 90 MG/DL (ref 74–99)
GLUCOSE SERPL-MCNC: 90 MG/DL (ref 74–99)
GLUCOSE SERPL-MCNC: 91 MG/DL (ref 74–99)
GLUCOSE SERPL-MCNC: 93 MG/DL (ref 74–99)
GLUCOSE SERPL-MCNC: 95 MG/DL (ref 74–99)
GLUCOSE SERPL-MCNC: 95 MG/DL (ref 74–99)
GLUCOSE SERPL-MCNC: 97 MG/DL (ref 74–99)
GLUCOSE SERPL-MCNC: 98 MG/DL (ref 74–99)
GLUCOSE UR STRIP.AUTO-MCNC: NEGATIVE MG/DL
GRAM STN SPEC: NORMAL
HCO3 BLD-SCNC: 19.8 MMOL/L (ref 22–26)
HCO3 BLD-SCNC: 32.5 MMOL/L (ref 22–26)
HCT VFR BLD AUTO: 21.7 % (ref 35–45)
HCT VFR BLD AUTO: 21.8 % (ref 35–45)
HCT VFR BLD AUTO: 22.1 % (ref 35–45)
HCT VFR BLD AUTO: 22.8 % (ref 35–45)
HCT VFR BLD AUTO: 23.3 % (ref 35–45)
HCT VFR BLD AUTO: 24.2 % (ref 35–45)
HCT VFR BLD AUTO: 24.3 % (ref 35–45)
HCT VFR BLD AUTO: 24.6 % (ref 35–45)
HCT VFR BLD AUTO: 24.6 % (ref 35–45)
HCT VFR BLD AUTO: 24.7 % (ref 35–45)
HCT VFR BLD AUTO: 24.8 % (ref 35–45)
HCT VFR BLD AUTO: 25.3 % (ref 35–45)
HCT VFR BLD AUTO: 25.5 % (ref 35–45)
HCT VFR BLD AUTO: 25.7 % (ref 35–45)
HCT VFR BLD AUTO: 26.1 % (ref 35–45)
HCT VFR BLD AUTO: 26.6 % (ref 35–45)
HCT VFR BLD AUTO: 27.2 % (ref 35–45)
HCT VFR BLD AUTO: 27.5 % (ref 35–45)
HCT VFR BLD AUTO: 28.9 % (ref 35–45)
HCT VFR BLD AUTO: 29 % (ref 35–45)
HCT VFR BLD AUTO: 29.4 % (ref 35–45)
HCT VFR BLD AUTO: 29.5 % (ref 35–45)
HCT VFR BLD AUTO: 29.6 % (ref 35–45)
HCT VFR BLD AUTO: 30.2 % (ref 35–45)
HCT VFR BLD AUTO: 30.3 % (ref 35–45)
HCT VFR BLD AUTO: 31 % (ref 35–45)
HCT VFR BLD AUTO: 31.5 % (ref 35–45)
HCT VFR BLD AUTO: 31.8 % (ref 35–45)
HCT VFR BLD AUTO: 31.9 % (ref 35–45)
HCT VFR BLD AUTO: 32.9 % (ref 35–45)
HCT VFR BLD AUTO: 34 % (ref 35–45)
HCT VFR BLD AUTO: 34.2 % (ref 35–45)
HGB BLD-MCNC: 10.3 G/DL (ref 12–16)
HGB BLD-MCNC: 10.8 G/DL (ref 12–16)
HGB BLD-MCNC: 10.8 G/DL (ref 12–16)
HGB BLD-MCNC: 6.7 G/DL (ref 12–16)
HGB BLD-MCNC: 6.7 G/DL (ref 12–16)
HGB BLD-MCNC: 7 G/DL (ref 12–16)
HGB BLD-MCNC: 7.1 G/DL (ref 12–16)
HGB BLD-MCNC: 7.3 G/DL (ref 12–16)
HGB BLD-MCNC: 7.4 G/DL (ref 12–16)
HGB BLD-MCNC: 7.5 G/DL (ref 12–16)
HGB BLD-MCNC: 7.5 G/DL (ref 12–16)
HGB BLD-MCNC: 7.6 G/DL (ref 12–16)
HGB BLD-MCNC: 7.7 G/DL (ref 12–16)
HGB BLD-MCNC: 7.8 G/DL (ref 12–16)
HGB BLD-MCNC: 8.4 G/DL (ref 12–16)
HGB BLD-MCNC: 8.5 G/DL (ref 12–16)
HGB BLD-MCNC: 8.5 G/DL (ref 12–16)
HGB BLD-MCNC: 8.6 G/DL (ref 12–16)
HGB BLD-MCNC: 8.8 G/DL (ref 12–16)
HGB BLD-MCNC: 9.1 G/DL (ref 12–16)
HGB BLD-MCNC: 9.3 G/DL (ref 12–16)
HGB BLD-MCNC: 9.3 G/DL (ref 12–16)
HGB BLD-MCNC: 9.6 G/DL (ref 12–16)
HGB BLD-MCNC: 9.7 G/DL (ref 12–16)
HGB BLD-MCNC: 9.8 G/DL (ref 12–16)
HGB UR QL STRIP: NEGATIVE
HYALINE CASTS URNS QL MICRO: ABNORMAL /LPF (ref 0–2)
HYALINE CASTS URNS QL MICRO: ABNORMAL /LPF (ref 0–2)
INR PPP: 1.1 (ref 0.8–1.2)
INR PPP: 1.2 (ref 0.8–1.2)
INR PPP: 1.3 (ref 0.8–1.2)
IRON SATN MFR SERPL: 8 %
IRON SERPL-MCNC: 16 UG/DL (ref 50–175)
KETONES UR QL STRIP.AUTO: ABNORMAL MG/DL
KETONES UR QL STRIP.AUTO: ABNORMAL MG/DL
KETONES UR QL STRIP.AUTO: NEGATIVE MG/DL
LACTATE SERPL-SCNC: 1.7 MMOL/L (ref 0.4–2)
LACTATE SERPL-SCNC: 2.1 MMOL/L (ref 0.4–2)
LACTATE SERPL-SCNC: 2.1 MMOL/L (ref 0.4–2)
LACTATE SERPL-SCNC: 2.3 MMOL/L (ref 0.4–2)
LACTATE SERPL-SCNC: 2.5 MMOL/L (ref 0.4–2)
LACTATE SERPL-SCNC: 2.6 MMOL/L (ref 0.4–2)
LACTATE SERPL-SCNC: 3.4 MMOL/L (ref 0.4–2)
LACTATE SERPL-SCNC: 4.7 MMOL/L (ref 0.4–2)
LDH FLD L TO P-CCNC: 1046 U/L
LDH FLD L TO P-CCNC: 2170 U/L
LDH SERPL L TO P-CCNC: 482 U/L (ref 81–234)
LEFT CFA DIST SYS PSV: 42.81 CM/S
LEFT MID ATA VELOCITY: 26.81 CM/S
LEFT PERONEAL MID SYS PSV: 0 CM/S
LEFT POPLITEAL DIST SYS PSV: 67.67 CM/S
LEFT POPLITEAL PROX SYS PSV: 57.98 CM/S
LEFT PROX PFA A PSV: 101.62 CM/S
LEFT PROX PTA PSV: 33.94 CM/S
LEFT PTA MID SYS PSV: 10.9 CM/S
LEFT SFA MID VEL RATIO: 1.04
LEFT SFA PROX VEL RATIO: 2.26
LEFT SUPER FEMORAL MID SYS PSV: 100 CM/S
LEFT SUPER FEMORAL PROX SYS PSV: 96.61 CM/S
LEUKOCYTE ESTERASE UR QL STRIP.AUTO: ABNORMAL
LEUKOCYTE ESTERASE UR QL STRIP.AUTO: NEGATIVE
LEUKOCYTE ESTERASE UR QL STRIP.AUTO: NEGATIVE
LYMPHOCYTES # BLD: 1.1 K/UL (ref 0.9–3.6)
LYMPHOCYTES # BLD: 1.3 K/UL (ref 0.9–3.6)
LYMPHOCYTES # BLD: 1.3 K/UL (ref 0.9–3.6)
LYMPHOCYTES # BLD: 1.4 K/UL (ref 0.8–3.5)
LYMPHOCYTES # BLD: 1.4 K/UL (ref 0.9–3.6)
LYMPHOCYTES # BLD: 1.6 K/UL (ref 0.8–3.5)
LYMPHOCYTES # BLD: 1.7 K/UL (ref 0.9–3.6)
LYMPHOCYTES # BLD: 1.8 K/UL (ref 0.8–3.5)
LYMPHOCYTES # BLD: 1.8 K/UL (ref 0.8–3.5)
LYMPHOCYTES # BLD: 1.8 K/UL (ref 0.9–3.6)
LYMPHOCYTES # BLD: 1.8 K/UL (ref 0.9–3.6)
LYMPHOCYTES # BLD: 1.9 K/UL (ref 0.8–3.5)
LYMPHOCYTES # BLD: 2 K/UL (ref 0.9–3.6)
LYMPHOCYTES # BLD: 2.1 K/UL (ref 0.8–3.5)
LYMPHOCYTES NFR BLD: 11 % (ref 21–52)
LYMPHOCYTES NFR BLD: 12 % (ref 20–51)
LYMPHOCYTES NFR BLD: 12 % (ref 20–51)
LYMPHOCYTES NFR BLD: 12 % (ref 21–52)
LYMPHOCYTES NFR BLD: 12 % (ref 21–52)
LYMPHOCYTES NFR BLD: 13 % (ref 21–52)
LYMPHOCYTES NFR BLD: 13 % (ref 21–52)
LYMPHOCYTES NFR BLD: 14 % (ref 20–51)
LYMPHOCYTES NFR BLD: 14 % (ref 20–51)
LYMPHOCYTES NFR BLD: 14 % (ref 21–52)
LYMPHOCYTES NFR BLD: 14 % (ref 21–52)
LYMPHOCYTES NFR BLD: 15 % (ref 20–51)
LYMPHOCYTES NFR BLD: 15 % (ref 21–52)
LYMPHOCYTES NFR BLD: 15 % (ref 21–52)
LYMPHOCYTES NFR BLD: 16 % (ref 21–52)
LYMPHOCYTES NFR BLD: 19 % (ref 20–51)
LYMPHOCYTES NFR BLD: 9 % (ref 21–52)
LYMPHOCYTES NFR FLD: 74 %
LYMPHOCYTES NFR FLD: 84 %
MACROPHAGES NFR FLD: 5 %
MACROPHAGES NFR FLD: 5 %
MAGNESIUM SERPL-MCNC: 1.9 MG/DL (ref 1.6–2.6)
MAGNESIUM SERPL-MCNC: 2 MG/DL (ref 1.6–2.6)
MAGNESIUM SERPL-MCNC: 2.1 MG/DL (ref 1.6–2.6)
MAGNESIUM SERPL-MCNC: 2.2 MG/DL (ref 1.6–2.6)
MCH RBC QN AUTO: 23.8 PG (ref 24–34)
MCH RBC QN AUTO: 23.9 PG (ref 24–34)
MCH RBC QN AUTO: 24.2 PG (ref 24–34)
MCH RBC QN AUTO: 24.3 PG (ref 24–34)
MCH RBC QN AUTO: 24.3 PG (ref 24–34)
MCH RBC QN AUTO: 24.5 PG (ref 24–34)
MCH RBC QN AUTO: 24.5 PG (ref 24–34)
MCH RBC QN AUTO: 24.6 PG (ref 24–34)
MCH RBC QN AUTO: 24.7 PG (ref 24–34)
MCH RBC QN AUTO: 24.9 PG (ref 24–34)
MCH RBC QN AUTO: 25 PG (ref 24–34)
MCH RBC QN AUTO: 25.1 PG (ref 24–34)
MCH RBC QN AUTO: 25.2 PG (ref 24–34)
MCH RBC QN AUTO: 25.3 PG (ref 24–34)
MCH RBC QN AUTO: 25.6 PG (ref 24–34)
MCH RBC QN AUTO: 25.6 PG (ref 24–34)
MCHC RBC AUTO-ENTMCNC: 29.2 G/DL (ref 31–37)
MCHC RBC AUTO-ENTMCNC: 29.4 G/DL (ref 31–37)
MCHC RBC AUTO-ENTMCNC: 29.6 G/DL (ref 31–37)
MCHC RBC AUTO-ENTMCNC: 29.8 G/DL (ref 31–37)
MCHC RBC AUTO-ENTMCNC: 29.9 G/DL (ref 31–37)
MCHC RBC AUTO-ENTMCNC: 30.1 G/DL (ref 31–37)
MCHC RBC AUTO-ENTMCNC: 30.1 G/DL (ref 31–37)
MCHC RBC AUTO-ENTMCNC: 30.2 G/DL (ref 31–37)
MCHC RBC AUTO-ENTMCNC: 30.4 G/DL (ref 31–37)
MCHC RBC AUTO-ENTMCNC: 30.4 G/DL (ref 31–37)
MCHC RBC AUTO-ENTMCNC: 30.7 G/DL (ref 31–37)
MCHC RBC AUTO-ENTMCNC: 30.8 G/DL (ref 31–37)
MCHC RBC AUTO-ENTMCNC: 30.9 G/DL (ref 31–37)
MCHC RBC AUTO-ENTMCNC: 30.9 G/DL (ref 31–37)
MCHC RBC AUTO-ENTMCNC: 31.1 G/DL (ref 31–37)
MCHC RBC AUTO-ENTMCNC: 31.3 G/DL (ref 31–37)
MCHC RBC AUTO-ENTMCNC: 31.3 G/DL (ref 31–37)
MCHC RBC AUTO-ENTMCNC: 31.6 G/DL (ref 31–37)
MCHC RBC AUTO-ENTMCNC: 31.6 G/DL (ref 31–37)
MCHC RBC AUTO-ENTMCNC: 31.7 G/DL (ref 31–37)
MCHC RBC AUTO-ENTMCNC: 31.8 G/DL (ref 31–37)
MCHC RBC AUTO-ENTMCNC: 32.1 G/DL (ref 31–37)
MCV RBC AUTO: 76.5 FL (ref 74–97)
MCV RBC AUTO: 76.6 FL (ref 74–97)
MCV RBC AUTO: 77.5 FL (ref 74–97)
MCV RBC AUTO: 78.1 FL (ref 74–97)
MCV RBC AUTO: 79.7 FL (ref 74–97)
MCV RBC AUTO: 79.9 FL (ref 74–97)
MCV RBC AUTO: 80.1 FL (ref 74–97)
MCV RBC AUTO: 80.3 FL (ref 74–97)
MCV RBC AUTO: 80.4 FL (ref 74–97)
MCV RBC AUTO: 80.6 FL (ref 74–97)
MCV RBC AUTO: 80.9 FL (ref 74–97)
MCV RBC AUTO: 80.9 FL (ref 74–97)
MCV RBC AUTO: 81.4 FL (ref 74–97)
MCV RBC AUTO: 81.5 FL (ref 74–97)
MCV RBC AUTO: 81.9 FL (ref 74–97)
MCV RBC AUTO: 82 FL (ref 74–97)
MCV RBC AUTO: 82.1 FL (ref 74–97)
MCV RBC AUTO: 82.4 FL (ref 74–97)
MCV RBC AUTO: 82.6 FL (ref 74–97)
MCV RBC AUTO: 83.3 FL (ref 74–97)
MCV RBC AUTO: 84.1 FL (ref 74–97)
MCV RBC AUTO: 84.3 FL (ref 74–97)
METAMYELOCYTES NFR BLD MANUAL: 1 %
MONOCYTES # BLD: 0.6 K/UL (ref 0–1)
MONOCYTES # BLD: 0.6 K/UL (ref 0–1)
MONOCYTES # BLD: 0.7 K/UL (ref 0–1)
MONOCYTES # BLD: 0.7 K/UL (ref 0–1)
MONOCYTES # BLD: 0.8 K/UL (ref 0.05–1.2)
MONOCYTES # BLD: 0.9 K/UL (ref 0.05–1.2)
MONOCYTES # BLD: 0.9 K/UL (ref 0–1)
MONOCYTES # BLD: 1 K/UL (ref 0.05–1.2)
MONOCYTES # BLD: 1.1 K/UL (ref 0.05–1.2)
MONOCYTES # BLD: 1.1 K/UL (ref 0.05–1.2)
MONOCYTES # BLD: 1.3 K/UL (ref 0–1)
MONOCYTES NFR BLD: 11 % (ref 2–9)
MONOCYTES NFR BLD: 5 % (ref 2–9)
MONOCYTES NFR BLD: 6 % (ref 2–9)
MONOCYTES NFR BLD: 6 % (ref 3–10)
MONOCYTES NFR BLD: 7 % (ref 2–9)
MONOCYTES NFR BLD: 7 % (ref 3–10)
MONOCYTES NFR BLD: 7 % (ref 3–10)
MONOCYTES NFR BLD: 8 % (ref 3–10)
MONOCYTES NFR BLD: 9 % (ref 3–10)
MONOCYTES NFR BLD: 9 % (ref 3–10)
MONOCYTES NFR FLD: 1 %
MONOCYTES NFR FLD: 1 %
MUCOUS THREADS URNS QL MICRO: ABNORMAL /LPF
MUCOUS THREADS URNS QL MICRO: ABNORMAL /LPF
MUCOUS THREADS URNS QL MICRO: NEGATIVE /LPF
MYELOCYTES NFR BLD MANUAL: 1 %
NEUTROPHILS NFR FLD: 10 %
NEUTROPHILS NFR FLD: 20 %
NEUTS BAND # FLD: 0 %
NEUTS BAND # FLD: 0 %
NEUTS BAND NFR BLD MANUAL: 2 % (ref 0–5)
NEUTS BAND NFR BLD MANUAL: 2 % (ref 0–5)
NEUTS BAND NFR BLD MANUAL: 5 % (ref 0–5)
NEUTS BAND NFR BLD MANUAL: 5 % (ref 0–5)
NEUTS BAND NFR BLD MANUAL: 6 % (ref 0–5)
NEUTS SEG # BLD: 10 K/UL (ref 1.8–8)
NEUTS SEG # BLD: 10.1 K/UL (ref 1.8–8)
NEUTS SEG # BLD: 10.4 K/UL (ref 1.8–8)
NEUTS SEG # BLD: 10.9 K/UL (ref 1.8–8)
NEUTS SEG # BLD: 8.4 K/UL (ref 1.8–8)
NEUTS SEG # BLD: 8.5 K/UL (ref 1.8–8)
NEUTS SEG # BLD: 8.6 K/UL (ref 1.8–8)
NEUTS SEG # BLD: 8.7 K/UL (ref 1.8–8)
NEUTS SEG # BLD: 8.8 K/UL (ref 1.8–8)
NEUTS SEG # BLD: 9.4 K/UL (ref 1.8–8)
NEUTS SEG # BLD: 9.6 K/UL (ref 1.8–8)
NEUTS SEG # BLD: 9.7 K/UL (ref 1.8–8)
NEUTS SEG # BLD: 9.8 K/UL (ref 1.8–8)
NEUTS SEG NFR BLD: 69 % (ref 42–75)
NEUTS SEG NFR BLD: 70 % (ref 42–75)
NEUTS SEG NFR BLD: 74 % (ref 42–75)
NEUTS SEG NFR BLD: 75 % (ref 40–73)
NEUTS SEG NFR BLD: 75 % (ref 40–73)
NEUTS SEG NFR BLD: 76 % (ref 40–73)
NEUTS SEG NFR BLD: 76 % (ref 42–75)
NEUTS SEG NFR BLD: 77 % (ref 40–73)
NEUTS SEG NFR BLD: 78 % (ref 40–73)
NEUTS SEG NFR BLD: 79 % (ref 40–73)
NEUTS SEG NFR BLD: 80 % (ref 40–73)
NEUTS SEG NFR BLD: 80 % (ref 40–73)
NITRITE UR QL STRIP.AUTO: NEGATIVE
NRBC BLD-RTO: 2 PER 100 WBC
NUC CELL # FLD: 4750 /CU MM
NUC CELL # FLD: 6500 /CU MM
O2/TOTAL GAS SETTING VFR VENT: 100 %
O2/TOTAL GAS SETTING VFR VENT: 28 %
OSMOLALITY SERPL: 277 MOSM/KG H2O (ref 280–301)
OSMOLALITY SERPL: 278 MOSM/KG H2O (ref 280–301)
OSMOLALITY UR: 497 MOSM/KG H2O (ref 50–1400)
OSMOLALITY UR: 661 MOSM/KG H2O (ref 50–1400)
P-R INTERVAL, ECG05: 114 MS
P-R INTERVAL, ECG05: 142 MS
P-R INTERVAL, ECG05: 146 MS
PCO2 BLD: 30.8 MMHG (ref 35–45)
PCO2 BLD: 49.2 MMHG (ref 35–45)
PH BLD: 7.42 [PH] (ref 7.35–7.45)
PH BLD: 7.43 [PH] (ref 7.35–7.45)
PH UR STRIP: 5 [PH] (ref 5–8)
PHOSPHATE SERPL-MCNC: 2.3 MG/DL (ref 2.5–4.9)
PHOSPHATE SERPL-MCNC: 2.4 MG/DL (ref 2.5–4.9)
PHOSPHATE SERPL-MCNC: 3.1 MG/DL (ref 2.5–4.9)
PHOSPHATE SERPL-MCNC: 3.2 MG/DL (ref 2.5–4.9)
PHOSPHATE SERPL-MCNC: 3.3 MG/DL (ref 2.5–4.9)
PHOSPHATE SERPL-MCNC: 3.3 MG/DL (ref 2.5–4.9)
PHOSPHATE SERPL-MCNC: 3.7 MG/DL (ref 2.5–4.9)
PHOSPHATE SERPL-MCNC: 4 MG/DL (ref 2.5–4.9)
PHOSPHATE SERPL-MCNC: 4.3 MG/DL (ref 2.5–4.9)
PLATELET # BLD AUTO: 195 K/UL (ref 135–420)
PLATELET # BLD AUTO: 197 K/UL (ref 135–420)
PLATELET # BLD AUTO: 199 K/UL (ref 135–420)
PLATELET # BLD AUTO: 201 K/UL (ref 135–420)
PLATELET # BLD AUTO: 211 K/UL (ref 135–420)
PLATELET # BLD AUTO: 221 K/UL (ref 135–420)
PLATELET # BLD AUTO: 234 K/UL (ref 135–420)
PLATELET # BLD AUTO: 250 K/UL (ref 135–420)
PLATELET # BLD AUTO: 255 K/UL (ref 135–420)
PLATELET # BLD AUTO: 262 K/UL (ref 135–420)
PLATELET # BLD AUTO: 291 K/UL (ref 135–420)
PLATELET # BLD AUTO: 304 K/UL (ref 135–420)
PLATELET # BLD AUTO: 305 K/UL (ref 135–420)
PLATELET # BLD AUTO: 308 K/UL (ref 135–420)
PLATELET # BLD AUTO: 310 K/UL (ref 135–420)
PLATELET # BLD AUTO: 320 K/UL (ref 135–420)
PLATELET # BLD AUTO: 323 K/UL (ref 135–420)
PLATELET # BLD AUTO: 327 K/UL (ref 135–420)
PLATELET # BLD AUTO: 332 K/UL (ref 135–420)
PLATELET # BLD AUTO: 335 K/UL (ref 135–420)
PLATELET # BLD AUTO: 346 K/UL (ref 135–420)
PLATELET # BLD AUTO: 355 K/UL (ref 135–420)
PLATELET COMMENTS,PCOM: ABNORMAL
PMV BLD AUTO: 8.4 FL (ref 9.2–11.8)
PMV BLD AUTO: 8.5 FL (ref 9.2–11.8)
PMV BLD AUTO: 8.6 FL (ref 9.2–11.8)
PMV BLD AUTO: 8.7 FL (ref 9.2–11.8)
PMV BLD AUTO: 8.8 FL (ref 9.2–11.8)
PMV BLD AUTO: 8.9 FL (ref 9.2–11.8)
PMV BLD AUTO: 9 FL (ref 9.2–11.8)
PMV BLD AUTO: 9.1 FL (ref 9.2–11.8)
PMV BLD AUTO: 9.1 FL (ref 9.2–11.8)
PMV BLD AUTO: 9.2 FL (ref 9.2–11.8)
PMV BLD AUTO: 9.3 FL (ref 9.2–11.8)
PMV BLD AUTO: 9.5 FL (ref 9.2–11.8)
PO2 BLD: 261 MMHG (ref 80–100)
PO2 BLD: 90 MMHG (ref 80–100)
POTASSIUM SERPL-SCNC: 4.3 MMOL/L (ref 3.5–5.5)
POTASSIUM SERPL-SCNC: 4.3 MMOL/L (ref 3.5–5.5)
POTASSIUM SERPL-SCNC: 4.4 MMOL/L (ref 3.5–5.5)
POTASSIUM SERPL-SCNC: 4.5 MMOL/L (ref 3.5–5.5)
POTASSIUM SERPL-SCNC: 4.5 MMOL/L (ref 3.5–5.5)
POTASSIUM SERPL-SCNC: 4.6 MMOL/L (ref 3.5–5.5)
POTASSIUM SERPL-SCNC: 4.7 MMOL/L (ref 3.5–5.5)
POTASSIUM SERPL-SCNC: 4.8 MMOL/L (ref 3.5–5.5)
POTASSIUM SERPL-SCNC: 4.9 MMOL/L (ref 3.5–5.5)
POTASSIUM SERPL-SCNC: 5.1 MMOL/L (ref 3.5–5.5)
POTASSIUM SERPL-SCNC: 5.5 MMOL/L (ref 3.5–5.5)
POTASSIUM SERPL-SCNC: 5.5 MMOL/L (ref 3.5–5.5)
POTASSIUM SERPL-SCNC: 5.6 MMOL/L (ref 3.5–5.5)
PROT FLD-MCNC: 5.2 G/DL
PROT FLD-MCNC: 6.3 G/DL
PROT SERPL-MCNC: 7.1 G/DL (ref 6.4–8.2)
PROT SERPL-MCNC: 7.7 G/DL (ref 6.4–8.2)
PROT SERPL-MCNC: 7.8 G/DL (ref 6.4–8.2)
PROT SERPL-MCNC: 8.4 G/DL (ref 6.4–8.2)
PROT SERPL-MCNC: 9.5 G/DL (ref 6.4–8.2)
PROT UR STRIP-MCNC: 30 MG/DL
PROT UR STRIP-MCNC: ABNORMAL MG/DL
PROT UR STRIP-MCNC: ABNORMAL MG/DL
PROTHROMBIN TIME: 13.7 SEC (ref 11.5–15.2)
PROTHROMBIN TIME: 14.5 SEC (ref 11.5–15.2)
PROTHROMBIN TIME: 15.2 SEC (ref 11.5–15.2)
PROTHROMBIN TIME: 15.4 SEC (ref 11.5–15.2)
PROTHROMBIN TIME: 15.5 SEC (ref 11.5–15.2)
Q-T INTERVAL, ECG07: 296 MS
Q-T INTERVAL, ECG07: 318 MS
Q-T INTERVAL, ECG07: 370 MS
QRS DURATION, ECG06: 74 MS
QRS DURATION, ECG06: 78 MS
QRS DURATION, ECG06: 80 MS
QTC CALCULATION (BEZET), ECG08: 433 MS
QTC CALCULATION (BEZET), ECG08: 439 MS
QTC CALCULATION (BEZET), ECG08: 486 MS
RBC # BLD AUTO: 2.8 M/UL (ref 4.2–5.3)
RBC # BLD AUTO: 2.83 M/UL (ref 4.2–5.3)
RBC # BLD AUTO: 2.98 M/UL (ref 4.2–5.3)
RBC # BLD AUTO: 2.99 M/UL (ref 4.2–5.3)
RBC # BLD AUTO: 3.04 M/UL (ref 4.2–5.3)
RBC # BLD AUTO: 3.04 M/UL (ref 4.2–5.3)
RBC # BLD AUTO: 3.05 M/UL (ref 4.2–5.3)
RBC # BLD AUTO: 3.09 M/UL (ref 4.2–5.3)
RBC # BLD AUTO: 3.13 M/UL (ref 4.2–5.3)
RBC # BLD AUTO: 3.18 M/UL (ref 4.2–5.3)
RBC # BLD AUTO: 3.42 M/UL (ref 4.2–5.3)
RBC # BLD AUTO: 3.47 M/UL (ref 4.2–5.3)
RBC # BLD AUTO: 3.55 M/UL (ref 4.2–5.3)
RBC # BLD AUTO: 3.58 M/UL (ref 4.2–5.3)
RBC # BLD AUTO: 3.78 M/UL (ref 4.2–5.3)
RBC # BLD AUTO: 3.79 M/UL (ref 4.2–5.3)
RBC # BLD AUTO: 3.86 M/UL (ref 4.2–5.3)
RBC # BLD AUTO: 3.87 M/UL (ref 4.2–5.3)
RBC # BLD AUTO: 3.89 M/UL (ref 4.2–5.3)
RBC # BLD AUTO: 4.12 M/UL (ref 4.2–5.3)
RBC # BLD AUTO: 4.22 M/UL (ref 4.2–5.3)
RBC # BLD AUTO: 4.27 M/UL (ref 4.2–5.3)
RBC # FLD: ABNORMAL /CU MM
RBC # FLD: ABNORMAL /CU MM
RBC #/AREA URNS HPF: ABNORMAL /HPF (ref 0–5)
RBC #/AREA URNS HPF: NEGATIVE /HPF (ref 0–5)
RBC #/AREA URNS HPF: NEGATIVE /HPF (ref 0–5)
RBC MORPH BLD: ABNORMAL
RETICS/RBC NFR AUTO: 2 % (ref 0.5–2.3)
RHEUMATOID FACT SER QL LA: NEGATIVE
RHEUMATOID FACT SER QL LA: NEGATIVE
RHEUMATOID FACT TITR SNV AGGL: NORMAL {TITER}
SAO2 % BLD: 100 % (ref 92–97)
SAO2 % BLD: 97 % (ref 92–97)
SEE BELOW:, 164879: NORMAL
SERVICE CMNT-IMP: ABNORMAL
SERVICE CMNT-IMP: NORMAL
SODIUM SERPL-SCNC: 127 MMOL/L (ref 136–145)
SODIUM SERPL-SCNC: 127 MMOL/L (ref 136–145)
SODIUM SERPL-SCNC: 128 MMOL/L (ref 136–145)
SODIUM SERPL-SCNC: 129 MMOL/L (ref 136–145)
SODIUM SERPL-SCNC: 130 MMOL/L (ref 136–145)
SODIUM SERPL-SCNC: 131 MMOL/L (ref 136–145)
SODIUM SERPL-SCNC: 131 MMOL/L (ref 136–145)
SODIUM SERPL-SCNC: 132 MMOL/L (ref 136–145)
SODIUM SERPL-SCNC: 132 MMOL/L (ref 136–145)
SODIUM SERPL-SCNC: 133 MMOL/L (ref 136–145)
SODIUM SERPL-SCNC: 134 MMOL/L (ref 136–145)
SODIUM SERPL-SCNC: 135 MMOL/L (ref 136–145)
SODIUM SERPL-SCNC: 136 MMOL/L (ref 136–145)
SODIUM SERPL-SCNC: 137 MMOL/L (ref 136–145)
SODIUM SERPL-SCNC: 137 MMOL/L (ref 136–145)
SODIUM UR-SCNC: 23 MMOL/L (ref 20–110)
SODIUM UR-SCNC: 7 MMOL/L (ref 20–110)
SODIUM UR-SCNC: 9 MMOL/L (ref 20–110)
SP GR UR REFRACTOMETRY: 1.02 (ref 1–1.03)
SP GR UR REFRACTOMETRY: 1.03 (ref 1–1.03)
SP GR UR REFRACTOMETRY: >1.03 (ref 1–1.03)
SPECIMEN EXP DATE BLD: NORMAL
SPECIMEN SOURCE FLD: ABNORMAL
SPECIMEN SOURCE FLD: ABNORMAL
SPECIMEN SOURCE FLD: NORMAL
SPECIMEN SOURCE: NORMAL
SPECIMEN SOURCE: NORMAL
SPECIMEN TYPE: ABNORMAL
SPECIMEN TYPE: ABNORMAL
STATUS OF UNIT,%ST: NORMAL
T3FREE SERPL-MCNC: 1.1 PG/ML (ref 2.18–3.98)
T4 FREE SERPL-MCNC: 0.8 NG/DL (ref 0.7–1.5)
TIBC SERPL-MCNC: 203 UG/DL (ref 250–450)
TOTAL RESP. RATE, ITRR: 24
TOTAL RESP. RATE, ITRR: 28
TROPONIN I SERPL-MCNC: <0.02 NG/ML (ref 0–0.06)
TSH SERPL DL<=0.05 MIU/L-ACNC: 60 UIU/ML (ref 0.36–3.74)
UNIT DIVISION, %UDIV: 0
URATE CRY URNS QL MICRO: ABNORMAL
UROBILINOGEN UR QL STRIP.AUTO: 1 EU/DL (ref 0.2–1)
VANCOMYCIN TROUGH SERPL-MCNC: 14.9 UG/ML (ref 10–20)
VENTRICULAR RATE, ECG03: 104 BPM
VENTRICULAR RATE, ECG03: 115 BPM
VENTRICULAR RATE, ECG03: 129 BPM
VIT B12 SERPL-MCNC: 1872 PG/ML (ref 211–911)
WBC # BLD AUTO: 10.5 K/UL (ref 4.6–13.2)
WBC # BLD AUTO: 11 K/UL (ref 4.6–13.2)
WBC # BLD AUTO: 11.1 K/UL (ref 4.6–13.2)
WBC # BLD AUTO: 11.3 K/UL (ref 4.6–13.2)
WBC # BLD AUTO: 11.3 K/UL (ref 4.6–13.2)
WBC # BLD AUTO: 11.4 K/UL (ref 4.6–13.2)
WBC # BLD AUTO: 11.4 K/UL (ref 4.6–13.2)
WBC # BLD AUTO: 11.5 K/UL (ref 4.6–13.2)
WBC # BLD AUTO: 11.6 K/UL (ref 4.6–13.2)
WBC # BLD AUTO: 11.6 K/UL (ref 4.6–13.2)
WBC # BLD AUTO: 11.7 K/UL (ref 4.6–13.2)
WBC # BLD AUTO: 11.9 K/UL (ref 4.6–13.2)
WBC # BLD AUTO: 12.4 K/UL (ref 4.6–13.2)
WBC # BLD AUTO: 12.5 K/UL (ref 4.6–13.2)
WBC # BLD AUTO: 12.6 K/UL (ref 4.6–13.2)
WBC # BLD AUTO: 12.8 K/UL (ref 4.6–13.2)
WBC # BLD AUTO: 12.9 K/UL (ref 4.6–13.2)
WBC # BLD AUTO: 12.9 K/UL (ref 4.6–13.2)
WBC # BLD AUTO: 13.1 K/UL (ref 4.6–13.2)
WBC # BLD AUTO: 13.1 K/UL (ref 4.6–13.2)
WBC # BLD AUTO: 13.5 K/UL (ref 4.6–13.2)
WBC # BLD AUTO: 13.5 K/UL (ref 4.6–13.2)
WBC MORPH BLD: ABNORMAL
WBC MORPH BLD: ABNORMAL
WBC URNS QL MICRO: ABNORMAL /HPF (ref 0–4)
WBC URNS QL MICRO: ABNORMAL /HPF (ref 0–5)
WBC URNS QL MICRO: ABNORMAL /HPF (ref 0–5)

## 2018-01-01 PROCEDURE — 85027 COMPLETE CBC AUTOMATED: CPT | Performed by: HOSPITALIST

## 2018-01-01 PROCEDURE — 74011250636 HC RX REV CODE- 250/636: Performed by: PHYSICIAN ASSISTANT

## 2018-01-01 PROCEDURE — 88305 TISSUE EXAM BY PATHOLOGIST: CPT | Performed by: HOSPITALIST

## 2018-01-01 PROCEDURE — 89051 BODY FLUID CELL COUNT: CPT | Performed by: INTERNAL MEDICINE

## 2018-01-01 PROCEDURE — 82330 ASSAY OF CALCIUM: CPT | Performed by: INTERNAL MEDICINE

## 2018-01-01 PROCEDURE — 88333 PATH CONSLTJ SURG CYTO XM 1: CPT | Performed by: HOSPITALIST

## 2018-01-01 PROCEDURE — G0155 HHCP-SVS OF CSW,EA 15 MIN: HCPCS

## 2018-01-01 PROCEDURE — 74011250637 HC RX REV CODE- 250/637: Performed by: HOSPITALIST

## 2018-01-01 PROCEDURE — 74011250637 HC RX REV CODE- 250/637: Performed by: INTERNAL MEDICINE

## 2018-01-01 PROCEDURE — 85025 COMPLETE CBC W/AUTO DIFF WBC: CPT | Performed by: HOSPITALIST

## 2018-01-01 PROCEDURE — 74011250636 HC RX REV CODE- 250/636: Performed by: HOSPITALIST

## 2018-01-01 PROCEDURE — 65610000006 HC RM INTENSIVE CARE

## 2018-01-01 PROCEDURE — 93308 TTE F-UP OR LMTD: CPT

## 2018-01-01 PROCEDURE — 65660000000 HC RM CCU STEPDOWN

## 2018-01-01 PROCEDURE — 71045 X-RAY EXAM CHEST 1 VIEW: CPT

## 2018-01-01 PROCEDURE — 97166 OT EVAL MOD COMPLEX 45 MIN: CPT

## 2018-01-01 PROCEDURE — 97530 THERAPEUTIC ACTIVITIES: CPT

## 2018-01-01 PROCEDURE — 96374 THER/PROPH/DIAG INJ IV PUSH: CPT

## 2018-01-01 PROCEDURE — 80048 BASIC METABOLIC PNL TOTAL CA: CPT | Performed by: INTERNAL MEDICINE

## 2018-01-01 PROCEDURE — 93306 TTE W/DOPPLER COMPLETE: CPT

## 2018-01-01 PROCEDURE — 85025 COMPLETE CBC W/AUTO DIFF WBC: CPT | Performed by: INTERNAL MEDICINE

## 2018-01-01 PROCEDURE — 83880 ASSAY OF NATRIURETIC PEPTIDE: CPT | Performed by: EMERGENCY MEDICINE

## 2018-01-01 PROCEDURE — C1769 GUIDE WIRE: HCPCS | Performed by: INTERNAL MEDICINE

## 2018-01-01 PROCEDURE — 93321 DOPPLER ECHO F-UP/LMTD STD: CPT

## 2018-01-01 PROCEDURE — 33010 HC PERICARDIOCENTESIS INITIAL: CPT | Performed by: INTERNAL MEDICINE

## 2018-01-01 PROCEDURE — 93005 ELECTROCARDIOGRAM TRACING: CPT

## 2018-01-01 PROCEDURE — 77030013687 US THORACENTESIS LT NDL W IMAGE

## 2018-01-01 PROCEDURE — 85018 HEMOGLOBIN: CPT | Performed by: INTERNAL MEDICINE

## 2018-01-01 PROCEDURE — 77010033678 HC OXYGEN DAILY

## 2018-01-01 PROCEDURE — 74011000250 HC RX REV CODE- 250: Performed by: INTERNAL MEDICINE

## 2018-01-01 PROCEDURE — A9503 TC99M MEDRONATE: HCPCS

## 2018-01-01 PROCEDURE — 74011250636 HC RX REV CODE- 250/636: Performed by: INTERNAL MEDICINE

## 2018-01-01 PROCEDURE — 36415 COLL VENOUS BLD VENIPUNCTURE: CPT | Performed by: SURGERY

## 2018-01-01 PROCEDURE — 85014 HEMATOCRIT: CPT | Performed by: SURGERY

## 2018-01-01 PROCEDURE — 36415 COLL VENOUS BLD VENIPUNCTURE: CPT | Performed by: HOSPITALIST

## 2018-01-01 PROCEDURE — 36415 COLL VENOUS BLD VENIPUNCTURE: CPT | Performed by: INTERNAL MEDICINE

## 2018-01-01 PROCEDURE — 84100 ASSAY OF PHOSPHORUS: CPT | Performed by: HOSPITALIST

## 2018-01-01 PROCEDURE — 82550 ASSAY OF CK (CPK): CPT | Performed by: HOSPITALIST

## 2018-01-01 PROCEDURE — 83735 ASSAY OF MAGNESIUM: CPT | Performed by: INTERNAL MEDICINE

## 2018-01-01 PROCEDURE — 74011636320 HC RX REV CODE- 636/320: Performed by: EMERGENCY MEDICINE

## 2018-01-01 PROCEDURE — 99152 MOD SED SAME PHYS/QHP 5/>YRS: CPT

## 2018-01-01 PROCEDURE — 77030038269 HC DRN EXT URIN PURWCK BARD -A

## 2018-01-01 PROCEDURE — 82550 ASSAY OF CK (CPK): CPT | Performed by: INTERNAL MEDICINE

## 2018-01-01 PROCEDURE — 84100 ASSAY OF PHOSPHORUS: CPT | Performed by: INTERNAL MEDICINE

## 2018-01-01 PROCEDURE — 65660000004 HC RM CVT STEPDOWN

## 2018-01-01 PROCEDURE — 76937 US GUIDE VASCULAR ACCESS: CPT | Performed by: INTERNAL MEDICINE

## 2018-01-01 PROCEDURE — 85730 THROMBOPLASTIN TIME PARTIAL: CPT | Performed by: INTERNAL MEDICINE

## 2018-01-01 PROCEDURE — 99285 EMERGENCY DEPT VISIT HI MDM: CPT

## 2018-01-01 PROCEDURE — 74011000258 HC RX REV CODE- 258: Performed by: HOSPITALIST

## 2018-01-01 PROCEDURE — 88341 IMHCHEM/IMCYTCHM EA ADD ANTB: CPT | Performed by: INTERNAL MEDICINE

## 2018-01-01 PROCEDURE — 99284 EMERGENCY DEPT VISIT MOD MDM: CPT

## 2018-01-01 PROCEDURE — 77030005538 HC CATH URETH FOL44 BARD -B

## 2018-01-01 PROCEDURE — 85025 COMPLETE CBC W/AUTO DIFF WBC: CPT | Performed by: EMERGENCY MEDICINE

## 2018-01-01 PROCEDURE — 80053 COMPREHEN METABOLIC PANEL: CPT | Performed by: EMERGENCY MEDICINE

## 2018-01-01 PROCEDURE — 71275 CT ANGIOGRAPHY CHEST: CPT

## 2018-01-01 PROCEDURE — 83735 ASSAY OF MAGNESIUM: CPT | Performed by: HOSPITALIST

## 2018-01-01 PROCEDURE — 83930 ASSAY OF BLOOD OSMOLALITY: CPT | Performed by: INTERNAL MEDICINE

## 2018-01-01 PROCEDURE — 74011000250 HC RX REV CODE- 250: Performed by: RADIOLOGY

## 2018-01-01 PROCEDURE — 88112 CYTOPATH CELL ENHANCE TECH: CPT | Performed by: INTERNAL MEDICINE

## 2018-01-01 PROCEDURE — 82330 ASSAY OF CALCIUM: CPT | Performed by: HOSPITALIST

## 2018-01-01 PROCEDURE — 93971 EXTREMITY STUDY: CPT

## 2018-01-01 PROCEDURE — 84157 ASSAY OF PROTEIN OTHER: CPT | Performed by: INTERNAL MEDICINE

## 2018-01-01 PROCEDURE — G0152 HHCP-SERV OF OT,EA 15 MIN: HCPCS

## 2018-01-01 PROCEDURE — 83605 ASSAY OF LACTIC ACID: CPT | Performed by: INTERNAL MEDICINE

## 2018-01-01 PROCEDURE — 85730 THROMBOPLASTIN TIME PARTIAL: CPT | Performed by: HOSPITALIST

## 2018-01-01 PROCEDURE — G0495 RN CARE TRAIN/EDU IN HH: HCPCS

## 2018-01-01 PROCEDURE — 97535 SELF CARE MNGMENT TRAINING: CPT

## 2018-01-01 PROCEDURE — 77030008543 HC TBNG MON PRSS MRTM -A: Performed by: INTERNAL MEDICINE

## 2018-01-01 PROCEDURE — G0299 HHS/HOSPICE OF RN EA 15 MIN: HCPCS

## 2018-01-01 PROCEDURE — C1729 CATH, DRAINAGE: HCPCS | Performed by: INTERNAL MEDICINE

## 2018-01-01 PROCEDURE — 83605 ASSAY OF LACTIC ACID: CPT | Performed by: EMERGENCY MEDICINE

## 2018-01-01 PROCEDURE — 0PBB3ZX EXCISION OF LEFT CLAVICLE, PERCUTANEOUS APPROACH, DIAGNOSTIC: ICD-10-PCS | Performed by: RADIOLOGY

## 2018-01-01 PROCEDURE — 70450 CT HEAD/BRAIN W/O DYE: CPT

## 2018-01-01 PROCEDURE — 83930 ASSAY OF BLOOD OSMOLALITY: CPT

## 2018-01-01 PROCEDURE — 06H03DZ INSERTION OF INTRALUMINAL DEVICE INTO INFERIOR VENA CAVA, PERCUTANEOUS APPROACH: ICD-10-PCS | Performed by: SURGERY

## 2018-01-01 PROCEDURE — 74011250637 HC RX REV CODE- 250/637: Performed by: PHYSICIAN ASSISTANT

## 2018-01-01 PROCEDURE — 74011250636 HC RX REV CODE- 250/636: Performed by: NURSE PRACTITIONER

## 2018-01-01 PROCEDURE — 74011000258 HC RX REV CODE- 258: Performed by: INTERNAL MEDICINE

## 2018-01-01 PROCEDURE — 70553 MRI BRAIN STEM W/O & W/DYE: CPT

## 2018-01-01 PROCEDURE — 86038 ANTINUCLEAR ANTIBODIES: CPT | Performed by: SURGERY

## 2018-01-01 PROCEDURE — 85610 PROTHROMBIN TIME: CPT | Performed by: INTERNAL MEDICINE

## 2018-01-01 PROCEDURE — 84300 ASSAY OF URINE SODIUM: CPT

## 2018-01-01 PROCEDURE — 97116 GAIT TRAINING THERAPY: CPT

## 2018-01-01 PROCEDURE — 85045 AUTOMATED RETICULOCYTE COUNT: CPT | Performed by: INTERNAL MEDICINE

## 2018-01-01 PROCEDURE — 86900 BLOOD TYPING SEROLOGIC ABO: CPT | Performed by: INTERNAL MEDICINE

## 2018-01-01 PROCEDURE — 94640 AIRWAY INHALATION TREATMENT: CPT

## 2018-01-01 PROCEDURE — 74011250637 HC RX REV CODE- 250/637: Performed by: NURSE PRACTITIONER

## 2018-01-01 PROCEDURE — 81001 URINALYSIS AUTO W/SCOPE: CPT | Performed by: PHYSICIAN ASSISTANT

## 2018-01-01 PROCEDURE — 83935 ASSAY OF URINE OSMOLALITY: CPT

## 2018-01-01 PROCEDURE — 81235 EGFR GENE COM VARIANTS: CPT | Performed by: HOSPITALIST

## 2018-01-01 PROCEDURE — 86430 RHEUMATOID FACTOR TEST QUAL: CPT | Performed by: HOSPITALIST

## 2018-01-01 PROCEDURE — 65270000029 HC RM PRIVATE

## 2018-01-01 PROCEDURE — 74011000250 HC RX REV CODE- 250: Performed by: FAMILY MEDICINE

## 2018-01-01 PROCEDURE — 85025 COMPLETE CBC W/AUTO DIFF WBC: CPT | Performed by: PHYSICIAN ASSISTANT

## 2018-01-01 PROCEDURE — 96375 TX/PRO/DX INJ NEW DRUG ADDON: CPT

## 2018-01-01 PROCEDURE — G0157 HHC PT ASSISTANT EA 15: HCPCS

## 2018-01-01 PROCEDURE — 77030031139 HC SUT VCRL2 J&J -A

## 2018-01-01 PROCEDURE — 82550 ASSAY OF CK (CPK): CPT | Performed by: SURGERY

## 2018-01-01 PROCEDURE — 86920 COMPATIBILITY TEST SPIN: CPT | Performed by: INTERNAL MEDICINE

## 2018-01-01 PROCEDURE — P9016 RBC LEUKOCYTES REDUCED: HCPCS | Performed by: INTERNAL MEDICINE

## 2018-01-01 PROCEDURE — 85730 THROMBOPLASTIN TIME PARTIAL: CPT | Performed by: PHYSICIAN ASSISTANT

## 2018-01-01 PROCEDURE — 77030022559 CT BX BONE SUPER

## 2018-01-01 PROCEDURE — C1788 PORT, INDWELLING, IMP: HCPCS

## 2018-01-01 PROCEDURE — 83935 ASSAY OF URINE OSMOLALITY: CPT | Performed by: INTERNAL MEDICINE

## 2018-01-01 PROCEDURE — C1769 GUIDE WIRE: HCPCS

## 2018-01-01 PROCEDURE — 80048 BASIC METABOLIC PNL TOTAL CA: CPT | Performed by: HOSPITALIST

## 2018-01-01 PROCEDURE — 81479 UNLISTED MOLECULAR PATHOLOGY: CPT | Performed by: HOSPITALIST

## 2018-01-01 PROCEDURE — 82803 BLOOD GASES ANY COMBINATION: CPT

## 2018-01-01 PROCEDURE — 87070 CULTURE OTHR SPECIMN AEROBIC: CPT | Performed by: INTERNAL MEDICINE

## 2018-01-01 PROCEDURE — 94762 N-INVAS EAR/PLS OXIMTRY CONT: CPT

## 2018-01-01 PROCEDURE — 83615 LACTATE (LD) (LDH) ENZYME: CPT | Performed by: INTERNAL MEDICINE

## 2018-01-01 PROCEDURE — 82550 ASSAY OF CK (CPK): CPT | Performed by: EMERGENCY MEDICINE

## 2018-01-01 PROCEDURE — 36591 DRAW BLOOD OFF VENOUS DEVICE: CPT

## 2018-01-01 PROCEDURE — 82945 GLUCOSE OTHER FLUID: CPT | Performed by: INTERNAL MEDICINE

## 2018-01-01 PROCEDURE — 74011250636 HC RX REV CODE- 250/636: Performed by: RADIOLOGY

## 2018-01-01 PROCEDURE — 83605 ASSAY OF LACTIC ACID: CPT | Performed by: PHYSICIAN ASSISTANT

## 2018-01-01 PROCEDURE — 76450000000

## 2018-01-01 PROCEDURE — 77010033678 HC OXYGEN DAILY: Performed by: INTERNAL MEDICINE

## 2018-01-01 PROCEDURE — 74177 CT ABD & PELVIS W/CONTRAST: CPT

## 2018-01-01 PROCEDURE — 74011250636 HC RX REV CODE- 250/636: Performed by: EMERGENCY MEDICINE

## 2018-01-01 PROCEDURE — 85730 THROMBOPLASTIN TIME PARTIAL: CPT | Performed by: SURGERY

## 2018-01-01 PROCEDURE — 97162 PT EVAL MOD COMPLEX 30 MIN: CPT

## 2018-01-01 PROCEDURE — 81210 BRAF GENE: CPT | Performed by: HOSPITALIST

## 2018-01-01 PROCEDURE — G0151 HHCP-SERV OF PT,EA 15 MIN: HCPCS

## 2018-01-01 PROCEDURE — 74011250637 HC RX REV CODE- 250/637: Performed by: EMERGENCY MEDICINE

## 2018-01-01 PROCEDURE — 88342 IMHCHEM/IMCYTCHM 1ST ANTB: CPT | Performed by: INTERNAL MEDICINE

## 2018-01-01 PROCEDURE — 85027 COMPLETE CBC AUTOMATED: CPT | Performed by: SURGERY

## 2018-01-01 PROCEDURE — 36600 WITHDRAWAL OF ARTERIAL BLOOD: CPT

## 2018-01-01 PROCEDURE — 96365 THER/PROPH/DIAG IV INF INIT: CPT

## 2018-01-01 PROCEDURE — 02HV33Z INSERTION OF INFUSION DEVICE INTO SUPERIOR VENA CAVA, PERCUTANEOUS APPROACH: ICD-10-PCS | Performed by: RADIOLOGY

## 2018-01-01 PROCEDURE — 76000 FLUOROSCOPY <1 HR PHYS/QHP: CPT

## 2018-01-01 PROCEDURE — 400013 HH SOC

## 2018-01-01 PROCEDURE — 74011636320 HC RX REV CODE- 636/320: Performed by: SURGERY

## 2018-01-01 PROCEDURE — 77030028127 HC DRN KT PLEURX SYS BD -D

## 2018-01-01 PROCEDURE — 36592 COLLECT BLOOD FROM PICC: CPT

## 2018-01-01 PROCEDURE — 88307 TISSUE EXAM BY PATHOLOGIST: CPT | Performed by: HOSPITALIST

## 2018-01-01 PROCEDURE — 88342 IMHCHEM/IMCYTCHM 1ST ANTB: CPT | Performed by: HOSPITALIST

## 2018-01-01 PROCEDURE — 74011636320 HC RX REV CODE- 636/320: Performed by: HOSPITALIST

## 2018-01-01 PROCEDURE — 77030019938 HC TBNG IV PCA ICUM -A

## 2018-01-01 PROCEDURE — 86431 RHEUMATOID FACTOR QUANT: CPT | Performed by: INTERNAL MEDICINE

## 2018-01-01 PROCEDURE — P9040 RBC LEUKOREDUCED IRRADIATED: HCPCS | Performed by: INTERNAL MEDICINE

## 2018-01-01 PROCEDURE — 0JH60WZ INSERTION OF TOTALLY IMPLANTABLE VASCULAR ACCESS DEVICE INTO CHEST SUBCUTANEOUS TISSUE AND FASCIA, OPEN APPROACH: ICD-10-PCS | Performed by: RADIOLOGY

## 2018-01-01 PROCEDURE — 80053 COMPREHEN METABOLIC PANEL: CPT | Performed by: PHYSICIAN ASSISTANT

## 2018-01-01 PROCEDURE — 87086 URINE CULTURE/COLONY COUNT: CPT | Performed by: PHYSICIAN ASSISTANT

## 2018-01-01 PROCEDURE — 85610 PROTHROMBIN TIME: CPT | Performed by: PHYSICIAN ASSISTANT

## 2018-01-01 PROCEDURE — 94760 N-INVAS EAR/PLS OXIMETRY 1: CPT

## 2018-01-01 PROCEDURE — 80202 ASSAY OF VANCOMYCIN: CPT | Performed by: HOSPITALIST

## 2018-01-01 PROCEDURE — C1887 CATHETER, GUIDING: HCPCS

## 2018-01-01 PROCEDURE — 88112 CYTOPATH CELL ENHANCE TECH: CPT | Performed by: HOSPITALIST

## 2018-01-01 PROCEDURE — 0W9D3ZZ DRAINAGE OF PERICARDIAL CAVITY, PERCUTANEOUS APPROACH: ICD-10-PCS | Performed by: INTERNAL MEDICINE

## 2018-01-01 PROCEDURE — 85018 HEMOGLOBIN: CPT | Performed by: HOSPITALIST

## 2018-01-01 PROCEDURE — 84300 ASSAY OF URINE SODIUM: CPT | Performed by: INTERNAL MEDICINE

## 2018-01-01 PROCEDURE — 87106 FUNGI IDENTIFICATION YEAST: CPT | Performed by: EMERGENCY MEDICINE

## 2018-01-01 PROCEDURE — 0W9B3ZZ DRAINAGE OF LEFT PLEURAL CAVITY, PERCUTANEOUS APPROACH: ICD-10-PCS | Performed by: RADIOLOGY

## 2018-01-01 PROCEDURE — 0W9B30Z DRAINAGE OF LEFT PLEURAL CAVITY WITH DRAINAGE DEVICE, PERCUTANEOUS APPROACH: ICD-10-PCS | Performed by: RADIOLOGY

## 2018-01-01 PROCEDURE — 71046 X-RAY EXAM CHEST 2 VIEWS: CPT

## 2018-01-01 PROCEDURE — 84439 ASSAY OF FREE THYROXINE: CPT | Performed by: SURGERY

## 2018-01-01 PROCEDURE — 36430 TRANSFUSION BLD/BLD COMPNT: CPT

## 2018-01-01 PROCEDURE — 88377 M/PHMTRC ALYS ISHQUANT/SEMIQ: CPT | Performed by: HOSPITALIST

## 2018-01-01 PROCEDURE — 84132 ASSAY OF SERUM POTASSIUM: CPT | Performed by: HOSPITALIST

## 2018-01-01 PROCEDURE — 77030020186 HC BOOT HL PROTCT SAGE -B

## 2018-01-01 PROCEDURE — 82607 VITAMIN B-12: CPT | Performed by: INTERNAL MEDICINE

## 2018-01-01 PROCEDURE — 87040 BLOOD CULTURE FOR BACTERIA: CPT | Performed by: EMERGENCY MEDICINE

## 2018-01-01 PROCEDURE — 77030013687 IR IVC FILTER PLACEMENT PERCUTANEOUS

## 2018-01-01 PROCEDURE — A9575 INJ GADOTERATE MEGLUMI 0.1ML: HCPCS | Performed by: HOSPITALIST

## 2018-01-01 PROCEDURE — 97161 PT EVAL LOW COMPLEX 20 MIN: CPT

## 2018-01-01 PROCEDURE — C1729 CATH, DRAINAGE: HCPCS

## 2018-01-01 PROCEDURE — 77001 FLUOROGUIDE FOR VEIN DEVICE: CPT

## 2018-01-01 PROCEDURE — 80053 COMPREHEN METABOLIC PANEL: CPT | Performed by: SURGERY

## 2018-01-01 PROCEDURE — 84481 FREE ASSAY (FT-3): CPT | Performed by: SURGERY

## 2018-01-01 PROCEDURE — 74011250636 HC RX REV CODE- 250/636: Performed by: SURGERY

## 2018-01-01 PROCEDURE — 77030022017 HC DRSG HEMO QCLOT ZMED -A

## 2018-01-01 PROCEDURE — 83735 ASSAY OF MAGNESIUM: CPT | Performed by: PHYSICIAN ASSISTANT

## 2018-01-01 PROCEDURE — 83605 ASSAY OF LACTIC ACID: CPT | Performed by: HOSPITALIST

## 2018-01-01 PROCEDURE — 83540 ASSAY OF IRON: CPT | Performed by: INTERNAL MEDICINE

## 2018-01-01 PROCEDURE — C1893 INTRO/SHEATH, FIXED,NON-PEEL: HCPCS

## 2018-01-01 PROCEDURE — 77030032490 HC SLV COMPR SCD KNE COVD -B

## 2018-01-01 PROCEDURE — 88305 TISSUE EXAM BY PATHOLOGIST: CPT | Performed by: INTERNAL MEDICINE

## 2018-01-01 PROCEDURE — 74011250636 HC RX REV CODE- 250/636

## 2018-01-01 PROCEDURE — 81001 URINALYSIS AUTO W/SCOPE: CPT | Performed by: EMERGENCY MEDICINE

## 2018-01-01 PROCEDURE — 87102 FUNGUS ISOLATION CULTURE: CPT | Performed by: INTERNAL MEDICINE

## 2018-01-01 PROCEDURE — 77030037875 HC DRSG MEPILEX <16IN BORD MOLN -A

## 2018-01-01 PROCEDURE — 87040 BLOOD CULTURE FOR BACTERIA: CPT | Performed by: PHYSICIAN ASSISTANT

## 2018-01-01 PROCEDURE — 84311 SPECTROPHOTOMETRY: CPT | Performed by: INTERNAL MEDICINE

## 2018-01-01 PROCEDURE — 84443 ASSAY THYROID STIM HORMONE: CPT | Performed by: INTERNAL MEDICINE

## 2018-01-01 PROCEDURE — 93926 LOWER EXTREMITY STUDY: CPT

## 2018-01-01 PROCEDURE — 30233N1 TRANSFUSION OF NONAUTOLOGOUS RED BLOOD CELLS INTO PERIPHERAL VEIN, PERCUTANEOUS APPROACH: ICD-10-PCS | Performed by: HOSPITALIST

## 2018-01-01 PROCEDURE — 82550 ASSAY OF CK (CPK): CPT | Performed by: PHYSICIAN ASSISTANT

## 2018-01-01 PROCEDURE — 93970 EXTREMITY STUDY: CPT

## 2018-01-01 PROCEDURE — 82962 GLUCOSE BLOOD TEST: CPT

## 2018-01-01 PROCEDURE — 87086 URINE CULTURE/COLONY COUNT: CPT | Performed by: EMERGENCY MEDICINE

## 2018-01-01 RX ORDER — LEVOFLOXACIN 5 MG/ML
750 INJECTION, SOLUTION INTRAVENOUS EVERY 24 HOURS
Status: COMPLETED | OUTPATIENT
Start: 2018-01-01 | End: 2018-01-01

## 2018-01-01 RX ORDER — HEPARIN SODIUM 5000 [USP'U]/ML
5000 INJECTION, SOLUTION INTRAVENOUS; SUBCUTANEOUS EVERY 8 HOURS
Status: DISCONTINUED | OUTPATIENT
Start: 2018-01-01 | End: 2018-01-01

## 2018-01-01 RX ORDER — MORPHINE SULFATE 2 MG/ML
1 INJECTION, SOLUTION INTRAMUSCULAR; INTRAVENOUS
Status: DISCONTINUED | OUTPATIENT
Start: 2018-01-01 | End: 2018-01-01

## 2018-01-01 RX ORDER — ONDANSETRON 2 MG/ML
2-4 INJECTION INTRAMUSCULAR; INTRAVENOUS
Status: DISCONTINUED | OUTPATIENT
Start: 2018-01-01 | End: 2018-01-01 | Stop reason: HOSPADM

## 2018-01-01 RX ORDER — ONDANSETRON 4 MG/1
4 TABLET, ORALLY DISINTEGRATING ORAL
Status: COMPLETED | OUTPATIENT
Start: 2018-01-01 | End: 2018-01-01

## 2018-01-01 RX ORDER — NALOXONE HYDROCHLORIDE 0.4 MG/ML
0.4 INJECTION, SOLUTION INTRAMUSCULAR; INTRAVENOUS; SUBCUTANEOUS AS NEEDED
Status: DISCONTINUED | OUTPATIENT
Start: 2018-01-01 | End: 2018-01-01 | Stop reason: HOSPADM

## 2018-01-01 RX ORDER — HEPARIN SODIUM 1000 [USP'U]/ML
40 INJECTION, SOLUTION INTRAVENOUS; SUBCUTANEOUS ONCE
Status: COMPLETED | OUTPATIENT
Start: 2018-01-01 | End: 2018-01-01

## 2018-01-01 RX ORDER — SODIUM CHLORIDE TAB 1 GM 1 G
1 TAB MISCELLANEOUS 2 TIMES DAILY WITH MEALS
Status: DISCONTINUED | OUTPATIENT
Start: 2018-01-01 | End: 2018-01-01 | Stop reason: HOSPADM

## 2018-01-01 RX ORDER — ACETAMINOPHEN 500 MG
500 TABLET ORAL
Status: DISCONTINUED | OUTPATIENT
Start: 2018-01-01 | End: 2018-01-01 | Stop reason: HOSPADM

## 2018-01-01 RX ORDER — ADENOSINE 3 MG/ML
.06-.12 INJECTION, SOLUTION INTRAVENOUS
Status: DISCONTINUED | OUTPATIENT
Start: 2018-01-01 | End: 2018-01-01 | Stop reason: HOSPADM

## 2018-01-01 RX ORDER — DIPHENHYDRAMINE HYDROCHLORIDE 50 MG/ML
12.5 INJECTION, SOLUTION INTRAMUSCULAR; INTRAVENOUS
Status: DISCONTINUED | OUTPATIENT
Start: 2018-01-01 | End: 2018-01-01 | Stop reason: HOSPADM

## 2018-01-01 RX ORDER — IPRATROPIUM BROMIDE AND ALBUTEROL SULFATE 2.5; .5 MG/3ML; MG/3ML
3 SOLUTION RESPIRATORY (INHALATION)
Status: DISCONTINUED | OUTPATIENT
Start: 2018-01-01 | End: 2018-01-01 | Stop reason: HOSPADM

## 2018-01-01 RX ORDER — VANCOMYCIN/0.9 % SOD CHLORIDE 1.5G/250ML
1500 PLASTIC BAG, INJECTION (ML) INTRAVENOUS ONCE
Status: COMPLETED | OUTPATIENT
Start: 2018-01-01 | End: 2018-01-01

## 2018-01-01 RX ORDER — ACETAMINOPHEN 325 MG/1
650 TABLET ORAL 4 TIMES DAILY
Status: DISCONTINUED | OUTPATIENT
Start: 2018-01-01 | End: 2018-01-01 | Stop reason: HOSPADM

## 2018-01-01 RX ORDER — IPRATROPIUM BROMIDE AND ALBUTEROL SULFATE 2.5; .5 MG/3ML; MG/3ML
3 SOLUTION RESPIRATORY (INHALATION)
Status: COMPLETED | OUTPATIENT
Start: 2018-01-01 | End: 2018-01-01

## 2018-01-01 RX ORDER — SODIUM CHLORIDE 9 MG/ML
INJECTION, SOLUTION INTRAVENOUS
Status: DISCONTINUED | OUTPATIENT
Start: 2018-01-01 | End: 2018-01-01 | Stop reason: HOSPADM

## 2018-01-01 RX ORDER — ONDANSETRON 2 MG/ML
4 INJECTION INTRAMUSCULAR; INTRAVENOUS
Status: COMPLETED | OUTPATIENT
Start: 2018-01-01 | End: 2018-01-01

## 2018-01-01 RX ORDER — CYCLOBENZAPRINE HCL 10 MG
10 TABLET ORAL
COMMUNITY
End: 2018-01-01

## 2018-01-01 RX ORDER — FACIAL-BODY WIPES
10 EACH TOPICAL DAILY PRN
Status: DISCONTINUED | OUTPATIENT
Start: 2018-01-01 | End: 2018-01-01 | Stop reason: HOSPADM

## 2018-01-01 RX ORDER — HYDROCODONE BITARTRATE AND ACETAMINOPHEN 5; 325 MG/1; MG/1
1 TABLET ORAL
Status: COMPLETED | OUTPATIENT
Start: 2018-01-01 | End: 2018-01-01

## 2018-01-01 RX ORDER — MIDAZOLAM HYDROCHLORIDE 1 MG/ML
.5-4 INJECTION, SOLUTION INTRAMUSCULAR; INTRAVENOUS
Status: DISCONTINUED | OUTPATIENT
Start: 2018-01-01 | End: 2018-01-01

## 2018-01-01 RX ORDER — MORPHINE SULFATE 2 MG/ML
2 INJECTION, SOLUTION INTRAMUSCULAR; INTRAVENOUS
Status: DISCONTINUED | OUTPATIENT
Start: 2018-01-01 | End: 2018-01-01 | Stop reason: HOSPADM

## 2018-01-01 RX ORDER — FENTANYL CITRATE 50 UG/ML
25-200 INJECTION, SOLUTION INTRAMUSCULAR; INTRAVENOUS
Status: DISCONTINUED | OUTPATIENT
Start: 2018-01-01 | End: 2018-01-01

## 2018-01-01 RX ORDER — SODIUM CHLORIDE 0.9 % (FLUSH) 0.9 %
5-10 SYRINGE (ML) INJECTION EVERY 8 HOURS
Status: DISCONTINUED | OUTPATIENT
Start: 2018-01-01 | End: 2018-01-01 | Stop reason: HOSPADM

## 2018-01-01 RX ORDER — OXYCODONE AND ACETAMINOPHEN 5; 325 MG/1; MG/1
1 TABLET ORAL
Status: DISCONTINUED | OUTPATIENT
Start: 2018-01-01 | End: 2018-01-01 | Stop reason: HOSPADM

## 2018-01-01 RX ORDER — OXYCODONE AND ACETAMINOPHEN 5; 325 MG/1; MG/1
1 TABLET ORAL
Qty: 25 TAB | Refills: 0 | Status: SHIPPED | OUTPATIENT
Start: 2018-01-01

## 2018-01-01 RX ORDER — HEPARIN SODIUM 1000 [USP'U]/ML
1000-5000 INJECTION, SOLUTION INTRAVENOUS; SUBCUTANEOUS
Status: DISCONTINUED | OUTPATIENT
Start: 2018-01-01 | End: 2018-01-01 | Stop reason: HOSPADM

## 2018-01-01 RX ORDER — MORPHINE SULFATE 2 MG/ML
2 INJECTION, SOLUTION INTRAMUSCULAR; INTRAVENOUS
Status: DISCONTINUED | OUTPATIENT
Start: 2018-01-01 | End: 2018-01-01

## 2018-01-01 RX ORDER — OXYCODONE HCL 10 MG/1
10 TABLET, FILM COATED, EXTENDED RELEASE ORAL ONCE
Status: COMPLETED | OUTPATIENT
Start: 2018-01-01 | End: 2018-01-01

## 2018-01-01 RX ORDER — AMOXICILLIN 250 MG
1 CAPSULE ORAL DAILY
Status: DISCONTINUED | OUTPATIENT
Start: 2018-01-01 | End: 2018-01-01 | Stop reason: HOSPADM

## 2018-01-01 RX ORDER — HEPARIN SODIUM 1000 [USP'U]/ML
80 INJECTION, SOLUTION INTRAVENOUS; SUBCUTANEOUS ONCE
Status: COMPLETED | OUTPATIENT
Start: 2018-01-01 | End: 2018-01-01

## 2018-01-01 RX ORDER — SODIUM CHLORIDE 9 MG/ML
250 INJECTION, SOLUTION INTRAVENOUS AS NEEDED
Status: DISCONTINUED | OUTPATIENT
Start: 2018-01-01 | End: 2018-01-01 | Stop reason: HOSPADM

## 2018-01-01 RX ORDER — LIDOCAINE HYDROCHLORIDE 10 MG/ML
1-10 INJECTION INFILTRATION; PERINEURAL
Status: COMPLETED | OUTPATIENT
Start: 2018-01-01 | End: 2018-01-01

## 2018-01-01 RX ORDER — TRAMADOL HYDROCHLORIDE 50 MG/1
50 TABLET ORAL
Status: DISCONTINUED | OUTPATIENT
Start: 2018-01-01 | End: 2018-01-01 | Stop reason: HOSPADM

## 2018-01-01 RX ORDER — AMOXICILLIN AND CLAVULANATE POTASSIUM 500; 125 MG/1; MG/1
1 TABLET, FILM COATED ORAL EVERY 12 HOURS
Qty: 10 TAB | Refills: 0 | Status: SHIPPED | OUTPATIENT
Start: 2018-01-01 | End: 2018-01-01

## 2018-01-01 RX ORDER — METOPROLOL TARTRATE 25 MG/1
25 TABLET, FILM COATED ORAL ONCE
Status: COMPLETED | OUTPATIENT
Start: 2018-01-01 | End: 2018-01-01

## 2018-01-01 RX ORDER — MIDAZOLAM HYDROCHLORIDE 1 MG/ML
1 INJECTION, SOLUTION INTRAMUSCULAR; INTRAVENOUS
Status: DISCONTINUED | OUTPATIENT
Start: 2018-01-01 | End: 2018-01-01

## 2018-01-01 RX ORDER — ONDANSETRON 2 MG/ML
4 INJECTION INTRAMUSCULAR; INTRAVENOUS
Status: DISCONTINUED | OUTPATIENT
Start: 2018-01-01 | End: 2018-01-01

## 2018-01-01 RX ORDER — SODIUM CHLORIDE 0.9 % (FLUSH) 0.9 %
5-10 SYRINGE (ML) INJECTION AS NEEDED
Status: DISCONTINUED | OUTPATIENT
Start: 2018-01-01 | End: 2018-01-01 | Stop reason: HOSPADM

## 2018-01-01 RX ORDER — LEVOTHYROXINE SODIUM 75 UG/1
75 TABLET ORAL
Status: DISCONTINUED | OUTPATIENT
Start: 2018-01-01 | End: 2018-01-01 | Stop reason: HOSPADM

## 2018-01-01 RX ORDER — LEVOFLOXACIN 750 MG/1
750 TABLET ORAL EVERY 24 HOURS
Status: DISCONTINUED | OUTPATIENT
Start: 2018-01-01 | End: 2018-01-01

## 2018-01-01 RX ORDER — FLUMAZENIL 0.1 MG/ML
0.2 INJECTION INTRAVENOUS
Status: DISCONTINUED | OUTPATIENT
Start: 2018-01-01 | End: 2018-01-01

## 2018-01-01 RX ORDER — POLYETHYLENE GLYCOL 3350 17 G/17G
17 POWDER, FOR SOLUTION ORAL DAILY
Status: DISCONTINUED | OUTPATIENT
Start: 2018-01-01 | End: 2018-01-01 | Stop reason: HOSPADM

## 2018-01-01 RX ORDER — MORPHINE SULFATE 4 MG/ML
4 INJECTION INTRAVENOUS
Status: COMPLETED | OUTPATIENT
Start: 2018-01-01 | End: 2018-01-01

## 2018-01-01 RX ORDER — NALOXONE HYDROCHLORIDE 0.4 MG/ML
0.2 INJECTION, SOLUTION INTRAMUSCULAR; INTRAVENOUS; SUBCUTANEOUS AS NEEDED
Status: DISCONTINUED | OUTPATIENT
Start: 2018-01-01 | End: 2018-01-01

## 2018-01-01 RX ORDER — HEPARIN SODIUM 10000 [USP'U]/100ML
18-36 INJECTION, SOLUTION INTRAVENOUS
Status: DISCONTINUED | OUTPATIENT
Start: 2018-01-01 | End: 2018-01-01

## 2018-01-01 RX ORDER — NAPROXEN 500 MG/1
500 TABLET ORAL 2 TIMES DAILY WITH MEALS
COMMUNITY
End: 2018-01-01

## 2018-01-01 RX ORDER — HEPARIN SODIUM 200 [USP'U]/100ML
500 INJECTION, SOLUTION INTRAVENOUS
Status: DISCONTINUED | OUTPATIENT
Start: 2018-01-01 | End: 2018-01-01 | Stop reason: HOSPADM

## 2018-01-01 RX ORDER — SODIUM CHLORIDE 9 MG/ML
25 INJECTION, SOLUTION INTRAVENOUS CONTINUOUS
Status: DISCONTINUED | OUTPATIENT
Start: 2018-01-01 | End: 2018-01-01

## 2018-01-01 RX ORDER — TRAMADOL HYDROCHLORIDE 50 MG/1
50 TABLET ORAL
COMMUNITY
End: 2018-01-01

## 2018-01-01 RX ORDER — SODIUM CHLORIDE 9 MG/ML
75 INJECTION, SOLUTION INTRAVENOUS CONTINUOUS
Status: DISCONTINUED | OUTPATIENT
Start: 2018-01-01 | End: 2018-01-01

## 2018-01-01 RX ORDER — DOCUSATE SODIUM 100 MG/1
100 CAPSULE, LIQUID FILLED ORAL 2 TIMES DAILY
Status: DISCONTINUED | OUTPATIENT
Start: 2018-01-01 | End: 2018-01-01 | Stop reason: HOSPADM

## 2018-01-01 RX ORDER — AMOXICILLIN 250 MG
2 CAPSULE ORAL
Status: DISCONTINUED | OUTPATIENT
Start: 2018-01-01 | End: 2018-01-01 | Stop reason: HOSPADM

## 2018-01-01 RX ORDER — VERAPAMIL HYDROCHLORIDE 2.5 MG/ML
2.5 INJECTION, SOLUTION INTRAVENOUS
Status: DISCONTINUED | OUTPATIENT
Start: 2018-01-01 | End: 2018-01-01 | Stop reason: HOSPADM

## 2018-01-01 RX ORDER — HEPARIN SODIUM (PORCINE) LOCK FLUSH IV SOLN 100 UNIT/ML 100 UNIT/ML
500 SOLUTION INTRAVENOUS AS NEEDED
Status: DISCONTINUED | OUTPATIENT
Start: 2018-01-01 | End: 2018-01-01 | Stop reason: HOSPADM

## 2018-01-01 RX ORDER — AMOXICILLIN 250 MG
2 CAPSULE ORAL DAILY
Qty: 30 TAB | Refills: 0 | Status: SHIPPED | OUTPATIENT
Start: 2018-01-01

## 2018-01-01 RX ORDER — FACIAL-BODY WIPES
10 EACH TOPICAL
Qty: 20 EACH | Refills: 0 | Status: SHIPPED | OUTPATIENT
Start: 2018-01-01

## 2018-01-01 RX ORDER — LIDOCAINE HYDROCHLORIDE 10 MG/ML
10 INJECTION, SOLUTION EPIDURAL; INFILTRATION; INTRACAUDAL; PERINEURAL
Status: COMPLETED | OUTPATIENT
Start: 2018-01-01 | End: 2018-01-01

## 2018-01-01 RX ORDER — HEPARIN SODIUM 200 [USP'U]/100ML
500 INJECTION, SOLUTION INTRAVENOUS
Status: COMPLETED | OUTPATIENT
Start: 2018-01-01 | End: 2018-01-01

## 2018-01-01 RX ORDER — LEVOTHYROXINE SODIUM 75 UG/1
75 TABLET ORAL
Qty: 30 TAB | Refills: 0 | Status: SHIPPED | OUTPATIENT
Start: 2018-01-01

## 2018-01-01 RX ORDER — LORAZEPAM 2 MG/ML
1 CONCENTRATE ORAL
Status: DISCONTINUED | OUTPATIENT
Start: 2018-01-01 | End: 2018-01-01 | Stop reason: HOSPADM

## 2018-01-01 RX ORDER — MORPHINE SULFATE 2 MG/ML
1 INJECTION, SOLUTION INTRAMUSCULAR; INTRAVENOUS
Status: DISCONTINUED | OUTPATIENT
Start: 2018-01-01 | End: 2018-01-01 | Stop reason: HOSPADM

## 2018-01-01 RX ORDER — METOPROLOL TARTRATE 25 MG/1
12.5 TABLET, FILM COATED ORAL 2 TIMES DAILY
Status: DISCONTINUED | OUTPATIENT
Start: 2018-01-01 | End: 2018-01-01 | Stop reason: HOSPADM

## 2018-01-01 RX ORDER — METOPROLOL TARTRATE 25 MG/1
12.5 TABLET, FILM COATED ORAL EVERY 12 HOURS
Status: DISCONTINUED | OUTPATIENT
Start: 2018-01-01 | End: 2018-01-01 | Stop reason: HOSPADM

## 2018-01-01 RX ORDER — MORPHINE SULFATE 5 MG/ML
INJECTION, SOLUTION INTRAVENOUS
Status: DISCONTINUED | OUTPATIENT
Start: 2018-01-01 | End: 2018-01-01 | Stop reason: HOSPADM

## 2018-01-01 RX ORDER — POLYETHYLENE GLYCOL 3350 17 G/17G
17 POWDER, FOR SOLUTION ORAL DAILY
Qty: 30 PACKET | Refills: 2 | Status: SHIPPED | OUTPATIENT
Start: 2018-01-01

## 2018-01-01 RX ORDER — LORAZEPAM 2 MG/ML
0.5 CONCENTRATE ORAL
Status: DISCONTINUED | OUTPATIENT
Start: 2018-01-01 | End: 2018-01-01

## 2018-01-01 RX ORDER — DRONABINOL 2.5 MG/1
2.5 CAPSULE ORAL 2 TIMES DAILY
Status: DISCONTINUED | OUTPATIENT
Start: 2018-01-01 | End: 2018-01-01 | Stop reason: HOSPADM

## 2018-01-01 RX ORDER — ONDANSETRON 2 MG/ML
4 INJECTION INTRAMUSCULAR; INTRAVENOUS
Status: DISCONTINUED | OUTPATIENT
Start: 2018-01-01 | End: 2018-01-01 | Stop reason: HOSPADM

## 2018-01-01 RX ORDER — POLYETHYLENE GLYCOL 3350 17 G/17G
17 POWDER, FOR SOLUTION ORAL
Status: DISCONTINUED | OUTPATIENT
Start: 2018-01-01 | End: 2018-01-01 | Stop reason: HOSPADM

## 2018-01-01 RX ORDER — CEFAZOLIN SODIUM 2 G/50ML
2 SOLUTION INTRAVENOUS
Status: COMPLETED | OUTPATIENT
Start: 2018-01-01 | End: 2018-01-01

## 2018-01-01 RX ORDER — SODIUM CHLORIDE 9 MG/ML
100 INJECTION, SOLUTION INTRAVENOUS CONTINUOUS
Status: DISPENSED | OUTPATIENT
Start: 2018-01-01 | End: 2018-01-01

## 2018-01-01 RX ORDER — OXYCODONE AND ACETAMINOPHEN 5; 325 MG/1; MG/1
1 TABLET ORAL
Qty: 15 TAB | Refills: 0 | Status: ON HOLD | OUTPATIENT
Start: 2018-01-01 | End: 2018-01-01

## 2018-01-01 RX ORDER — OXYCODONE HCL 10 MG/1
10 TABLET, FILM COATED, EXTENDED RELEASE ORAL EVERY 12 HOURS
Qty: 30 TAB | Refills: 0 | Status: SHIPPED | OUTPATIENT
Start: 2018-01-01

## 2018-01-01 RX ORDER — OXYCODONE AND ACETAMINOPHEN 5; 325 MG/1; MG/1
1 TABLET ORAL
Status: DISCONTINUED | OUTPATIENT
Start: 2018-01-01 | End: 2018-01-01

## 2018-01-01 RX ORDER — ACETAMINOPHEN 650 MG/1
325 SUPPOSITORY RECTAL
Status: DISCONTINUED | OUTPATIENT
Start: 2018-01-01 | End: 2018-01-01 | Stop reason: HOSPADM

## 2018-01-01 RX ORDER — MIDAZOLAM HYDROCHLORIDE 1 MG/ML
.5-2 INJECTION, SOLUTION INTRAMUSCULAR; INTRAVENOUS
Status: DISCONTINUED | OUTPATIENT
Start: 2018-01-01 | End: 2018-01-01 | Stop reason: HOSPADM

## 2018-01-01 RX ORDER — LORAZEPAM 1 MG/1
1 TABLET ORAL
Status: DISCONTINUED | OUTPATIENT
Start: 2018-01-01 | End: 2018-01-01

## 2018-01-01 RX ORDER — OXYCODONE HCL 10 MG/1
10 TABLET, FILM COATED, EXTENDED RELEASE ORAL EVERY 12 HOURS
Qty: 30 TAB | Refills: 0 | Status: ON HOLD | OUTPATIENT
Start: 2018-01-01 | End: 2018-01-01

## 2018-01-01 RX ORDER — ACETAMINOPHEN 325 MG/1
650 TABLET ORAL
Status: DISCONTINUED | OUTPATIENT
Start: 2018-01-01 | End: 2018-01-01

## 2018-01-01 RX ORDER — SODIUM,POTASSIUM PHOSPHATES 280-250MG
1 POWDER IN PACKET (EA) ORAL 2 TIMES DAILY
Status: COMPLETED | OUTPATIENT
Start: 2018-01-01 | End: 2018-01-01

## 2018-01-01 RX ORDER — AMOXICILLIN AND CLAVULANATE POTASSIUM 500; 125 MG/1; MG/1
1 TABLET, FILM COATED ORAL EVERY 12 HOURS
Status: DISCONTINUED | OUTPATIENT
Start: 2018-01-01 | End: 2018-01-01 | Stop reason: HOSPADM

## 2018-01-01 RX ORDER — METOPROLOL TARTRATE 25 MG/1
12.5 TABLET, FILM COATED ORAL 2 TIMES DAILY
Qty: 60 TAB | Refills: 0 | Status: SHIPPED | OUTPATIENT
Start: 2018-01-01

## 2018-01-01 RX ORDER — HEPARIN 100 UNIT/ML
40 SYRINGE INTRAVENOUS AS NEEDED
Status: DISCONTINUED | OUTPATIENT
Start: 2018-01-01 | End: 2018-01-01

## 2018-01-01 RX ORDER — FENTANYL CITRATE 50 UG/ML
25-200 INJECTION, SOLUTION INTRAMUSCULAR; INTRAVENOUS
Status: DISCONTINUED | OUTPATIENT
Start: 2018-01-01 | End: 2018-01-01 | Stop reason: HOSPADM

## 2018-01-01 RX ORDER — FAMOTIDINE 20 MG/1
20 TABLET, FILM COATED ORAL 2 TIMES DAILY
Status: DISCONTINUED | OUTPATIENT
Start: 2018-01-01 | End: 2018-01-01 | Stop reason: HOSPADM

## 2018-01-01 RX ORDER — SODIUM,POTASSIUM PHOSPHATES 280-250MG
2 POWDER IN PACKET (EA) ORAL
Status: COMPLETED | OUTPATIENT
Start: 2018-01-01 | End: 2018-01-01

## 2018-01-01 RX ORDER — DIPHENHYDRAMINE HYDROCHLORIDE 50 MG/ML
12.5 INJECTION, SOLUTION INTRAMUSCULAR; INTRAVENOUS
Status: DISCONTINUED | OUTPATIENT
Start: 2018-01-01 | End: 2018-01-01

## 2018-01-01 RX ORDER — FACIAL-BODY WIPES
10 EACH TOPICAL
Status: DISPENSED | OUTPATIENT
Start: 2018-01-01 | End: 2018-01-01

## 2018-01-01 RX ORDER — OXYCODONE AND ACETAMINOPHEN 5; 325 MG/1; MG/1
1 TABLET ORAL
Status: COMPLETED | OUTPATIENT
Start: 2018-01-01 | End: 2018-01-01

## 2018-01-01 RX ORDER — MORPHINE SULFATE 4 MG/ML
2 INJECTION, SOLUTION INTRAMUSCULAR; INTRAVENOUS ONCE
Status: COMPLETED | OUTPATIENT
Start: 2018-01-01 | End: 2018-01-01

## 2018-01-01 RX ORDER — DRONABINOL 2.5 MG/1
2.5 CAPSULE ORAL 2 TIMES DAILY
Qty: 60 CAP | Refills: 0 | Status: SHIPPED | OUTPATIENT
Start: 2018-01-01

## 2018-01-01 RX ORDER — NALOXONE HYDROCHLORIDE 4 MG/.1ML
SPRAY NASAL
Qty: 2 EACH | Refills: 1 | Status: SHIPPED | OUTPATIENT
Start: 2018-01-01

## 2018-01-01 RX ORDER — METOPROLOL TARTRATE 25 MG/1
12.5 TABLET, FILM COATED ORAL 2 TIMES DAILY
Status: DISCONTINUED | OUTPATIENT
Start: 2018-01-01 | End: 2018-01-01

## 2018-01-01 RX ORDER — FENTANYL CITRATE 50 UG/ML
25-200 INJECTION, SOLUTION INTRAMUSCULAR; INTRAVENOUS
Status: DISCONTINUED | OUTPATIENT
Start: 2018-01-01 | End: 2018-01-01 | Stop reason: ALTCHOICE

## 2018-01-01 RX ORDER — LORAZEPAM 2 MG/ML
0.5 INJECTION INTRAMUSCULAR ONCE
Status: COMPLETED | OUTPATIENT
Start: 2018-01-01 | End: 2018-01-01

## 2018-01-01 RX ORDER — AMOXICILLIN 250 MG
1 CAPSULE ORAL DAILY
Qty: 30 TAB | Refills: 0 | Status: ON HOLD | OUTPATIENT
Start: 2018-01-01 | End: 2018-01-01

## 2018-01-01 RX ORDER — LEVOTHYROXINE SODIUM 50 UG/1
75 TABLET ORAL ONCE
Status: COMPLETED | OUTPATIENT
Start: 2018-01-01 | End: 2018-01-01

## 2018-01-01 RX ORDER — FAMOTIDINE 20 MG/1
20 TABLET, FILM COATED ORAL 2 TIMES DAILY
Status: DISCONTINUED | OUTPATIENT
Start: 2018-01-01 | End: 2018-01-01

## 2018-01-01 RX ORDER — OXYCODONE HCL 10 MG/1
10 TABLET, FILM COATED, EXTENDED RELEASE ORAL EVERY 12 HOURS
Status: DISCONTINUED | OUTPATIENT
Start: 2018-01-01 | End: 2018-01-01 | Stop reason: HOSPADM

## 2018-01-01 RX ORDER — MIDAZOLAM HYDROCHLORIDE 1 MG/ML
.5-4 INJECTION, SOLUTION INTRAMUSCULAR; INTRAVENOUS
Status: DISCONTINUED | OUTPATIENT
Start: 2018-01-01 | End: 2018-01-01 | Stop reason: ALTCHOICE

## 2018-01-01 RX ORDER — LIDOCAINE HYDROCHLORIDE 10 MG/ML
1-20 INJECTION INFILTRATION; PERINEURAL
Status: DISCONTINUED | OUTPATIENT
Start: 2018-01-01 | End: 2018-01-01

## 2018-01-01 RX ORDER — FACIAL-BODY WIPES
10 EACH TOPICAL
Status: DISCONTINUED | OUTPATIENT
Start: 2018-01-01 | End: 2018-01-01 | Stop reason: HOSPADM

## 2018-01-01 RX ORDER — LIDOCAINE HYDROCHLORIDE 10 MG/ML
3-20 INJECTION INFILTRATION; PERINEURAL
Status: DISCONTINUED | OUTPATIENT
Start: 2018-01-01 | End: 2018-01-01 | Stop reason: HOSPADM

## 2018-01-01 RX ORDER — DOXYCYCLINE HYCLATE 100 MG
100 TABLET ORAL 2 TIMES DAILY
Qty: 14 TAB | Refills: 0 | Status: SHIPPED | OUTPATIENT
Start: 2018-01-01 | End: 2018-01-01

## 2018-01-01 RX ORDER — FACIAL-BODY WIPES
10 EACH TOPICAL ONCE
Status: COMPLETED | OUTPATIENT
Start: 2018-01-01 | End: 2018-01-01

## 2018-01-01 RX ORDER — ACETAMINOPHEN 325 MG/1
650 TABLET ORAL
Status: DISCONTINUED | OUTPATIENT
Start: 2018-01-01 | End: 2018-01-01 | Stop reason: HOSPADM

## 2018-01-01 RX ORDER — SODIUM CHLORIDE 9 MG/ML
25 INJECTION, SOLUTION INTRAVENOUS CONTINUOUS
Status: DISCONTINUED | OUTPATIENT
Start: 2018-01-01 | End: 2018-01-01 | Stop reason: HOSPADM

## 2018-01-01 RX ORDER — FENTANYL CITRATE 50 UG/ML
12.5-5 INJECTION, SOLUTION INTRAMUSCULAR; INTRAVENOUS
Status: DISCONTINUED | OUTPATIENT
Start: 2018-01-01 | End: 2018-01-01

## 2018-01-01 RX ORDER — OXYCODONE AND ACETAMINOPHEN 5; 325 MG/1; MG/1
1 TABLET ORAL ONCE
Status: COMPLETED | OUTPATIENT
Start: 2018-01-01 | End: 2018-01-01

## 2018-01-01 RX ORDER — MORPHINE SULFATE 2 MG/ML
1 INJECTION, SOLUTION INTRAMUSCULAR; INTRAVENOUS
Status: DISCONTINUED | OUTPATIENT
Start: 2018-01-01 | End: 2018-01-01 | Stop reason: SDUPTHER

## 2018-01-01 RX ADMIN — METOPROLOL TARTRATE 12.5 MG: 25 TABLET ORAL at 09:27

## 2018-01-01 RX ADMIN — FAMOTIDINE 20 MG: 20 TABLET ORAL at 21:44

## 2018-01-01 RX ADMIN — MORPHINE SULFATE 2 MG: 2 INJECTION, SOLUTION INTRAMUSCULAR; INTRAVENOUS at 22:34

## 2018-01-01 RX ADMIN — ACETAMINOPHEN 650 MG: 325 TABLET ORAL at 21:40

## 2018-01-01 RX ADMIN — Medication 10 ML: at 03:09

## 2018-01-01 RX ADMIN — METOPROLOL TARTRATE 12.5 MG: 25 TABLET ORAL at 17:40

## 2018-01-01 RX ADMIN — METOPROLOL TARTRATE 12.5 MG: 25 TABLET ORAL at 09:19

## 2018-01-01 RX ADMIN — METOPROLOL TARTRATE 12.5 MG: 25 TABLET ORAL at 08:02

## 2018-01-01 RX ADMIN — METOPROLOL TARTRATE 12.5 MG: 25 TABLET ORAL at 20:47

## 2018-01-01 RX ADMIN — SODIUM CHLORIDE 75 ML/HR: 900 INJECTION, SOLUTION INTRAVENOUS at 07:38

## 2018-01-01 RX ADMIN — POLYETHYLENE GLYCOL 3350 17 G: 17 POWDER, FOR SOLUTION ORAL at 09:19

## 2018-01-01 RX ADMIN — METOPROLOL TARTRATE 12.5 MG: 25 TABLET ORAL at 23:46

## 2018-01-01 RX ADMIN — ACETAMINOPHEN 650 MG: 325 TABLET ORAL at 08:45

## 2018-01-01 RX ADMIN — ACETAMINOPHEN 650 MG: 325 TABLET ORAL at 18:07

## 2018-01-01 RX ADMIN — AMOXICILLIN AND CLAVULANATE POTASSIUM 1 TABLET: 500; 125 TABLET, FILM COATED ORAL at 09:19

## 2018-01-01 RX ADMIN — MORPHINE SULFATE 2 MG: 2 INJECTION, SOLUTION INTRAMUSCULAR; INTRAVENOUS at 08:59

## 2018-01-01 RX ADMIN — OXYCODONE HYDROCHLORIDE AND ACETAMINOPHEN 1 TABLET: 5; 325 TABLET ORAL at 06:15

## 2018-01-01 RX ADMIN — LEVOTHYROXINE SODIUM 75 MCG: 75 TABLET ORAL at 07:50

## 2018-01-01 RX ADMIN — ACETAMINOPHEN 650 MG: 325 TABLET ORAL at 13:36

## 2018-01-01 RX ADMIN — HEPARIN SODIUM 3490 UNITS: 1000 INJECTION INTRAVENOUS; SUBCUTANEOUS at 02:11

## 2018-01-01 RX ADMIN — SODIUM CHLORIDE TAB 1 GM 1 G: 1 TAB at 09:09

## 2018-01-01 RX ADMIN — TRAMADOL HYDROCHLORIDE 50 MG: 50 TABLET, FILM COATED ORAL at 12:33

## 2018-01-01 RX ADMIN — FENTANYL CITRATE 50 MCG: 50 INJECTION, SOLUTION INTRAMUSCULAR; INTRAVENOUS at 08:57

## 2018-01-01 RX ADMIN — ACETAMINOPHEN 650 MG: 325 TABLET ORAL at 13:25

## 2018-01-01 RX ADMIN — POLYETHYLENE GLYCOL 3350 17 G: 17 POWDER, FOR SOLUTION ORAL at 08:34

## 2018-01-01 RX ADMIN — SODIUM CHLORIDE TAB 1 GM 1 G: 1 TAB at 09:20

## 2018-01-01 RX ADMIN — LEVOTHYROXINE SODIUM 75 MCG: 75 TABLET ORAL at 09:12

## 2018-01-01 RX ADMIN — MORPHINE SULFATE 2 MG: 2 INJECTION, SOLUTION INTRAMUSCULAR; INTRAVENOUS at 02:56

## 2018-01-01 RX ADMIN — MIDAZOLAM HYDROCHLORIDE 0.5 MG: 1 INJECTION, SOLUTION INTRAMUSCULAR; INTRAVENOUS at 15:15

## 2018-01-01 RX ADMIN — LEVOTHYROXINE SODIUM 75 MCG: 75 TABLET ORAL at 06:42

## 2018-01-01 RX ADMIN — ACETAMINOPHEN 650 MG: 325 TABLET ORAL at 17:13

## 2018-01-01 RX ADMIN — TRAMADOL HYDROCHLORIDE 50 MG: 50 TABLET, FILM COATED ORAL at 15:50

## 2018-01-01 RX ADMIN — Medication 2 MG: at 03:54

## 2018-01-01 RX ADMIN — HEPARIN SODIUM AND DEXTROSE 24 UNITS/KG/HR: 10000; 5 INJECTION INTRAVENOUS at 22:58

## 2018-01-01 RX ADMIN — Medication 10 ML: at 12:34

## 2018-01-01 RX ADMIN — METOPROLOL TARTRATE 12.5 MG: 25 TABLET ORAL at 08:34

## 2018-01-01 RX ADMIN — ACETAMINOPHEN 650 MG: 325 TABLET ORAL at 21:08

## 2018-01-01 RX ADMIN — SODIUM CHLORIDE 25 ML/HR: 900 INJECTION, SOLUTION INTRAVENOUS at 08:55

## 2018-01-01 RX ADMIN — PIPERACILLIN SODIUM,TAZOBACTAM SODIUM 3.38 G: 3; .375 INJECTION, POWDER, FOR SOLUTION INTRAVENOUS at 17:05

## 2018-01-01 RX ADMIN — METOPROLOL TARTRATE 12.5 MG: 25 TABLET ORAL at 08:13

## 2018-01-01 RX ADMIN — OXYCODONE HYDROCHLORIDE 10 MG: 10 TABLET, FILM COATED, EXTENDED RELEASE ORAL at 09:15

## 2018-01-01 RX ADMIN — SODIUM CHLORIDE 1000 ML: 900 INJECTION, SOLUTION INTRAVENOUS at 17:15

## 2018-01-01 RX ADMIN — Medication 10 ML: at 13:51

## 2018-01-01 RX ADMIN — MORPHINE SULFATE 2 MG: 2 INJECTION, SOLUTION INTRAMUSCULAR; INTRAVENOUS at 13:12

## 2018-01-01 RX ADMIN — DRONABINOL 2.5 MG: 2.5 CAPSULE ORAL at 17:09

## 2018-01-01 RX ADMIN — DOCUSATE SODIUM 100 MG: 100 CAPSULE, LIQUID FILLED ORAL at 17:40

## 2018-01-01 RX ADMIN — ACETAMINOPHEN 650 MG: 325 TABLET ORAL at 18:11

## 2018-01-01 RX ADMIN — FAMOTIDINE 20 MG: 20 TABLET ORAL at 23:31

## 2018-01-01 RX ADMIN — METOPROLOL TARTRATE 12.5 MG: 25 TABLET ORAL at 21:04

## 2018-01-01 RX ADMIN — LEVOTHYROXINE SODIUM 75 MCG: 75 TABLET ORAL at 09:18

## 2018-01-01 RX ADMIN — DOCUSATE SODIUM 100 MG: 100 CAPSULE, LIQUID FILLED ORAL at 21:38

## 2018-01-01 RX ADMIN — IPRATROPIUM BROMIDE AND ALBUTEROL SULFATE 3 ML: .5; 3 SOLUTION RESPIRATORY (INHALATION) at 02:18

## 2018-01-01 RX ADMIN — HEPARIN SODIUM 5000 UNITS: 5000 INJECTION INTRAVENOUS; SUBCUTANEOUS at 09:17

## 2018-01-01 RX ADMIN — OXYCODONE HYDROCHLORIDE 10 MG: 10 TABLET, FILM COATED, EXTENDED RELEASE ORAL at 08:34

## 2018-01-01 RX ADMIN — MORPHINE SULFATE 2 MG: 2 INJECTION, SOLUTION INTRAMUSCULAR; INTRAVENOUS at 04:04

## 2018-01-01 RX ADMIN — MORPHINE SULFATE 2 MG: 2 INJECTION, SOLUTION INTRAMUSCULAR; INTRAVENOUS at 00:43

## 2018-01-01 RX ADMIN — FAMOTIDINE 20 MG: 20 TABLET ORAL at 09:09

## 2018-01-01 RX ADMIN — MIDAZOLAM HYDROCHLORIDE 0.5 MG: 1 INJECTION, SOLUTION INTRAMUSCULAR; INTRAVENOUS at 14:55

## 2018-01-01 RX ADMIN — MORPHINE SULFATE 1 MG: 2 INJECTION, SOLUTION INTRAMUSCULAR; INTRAVENOUS at 14:31

## 2018-01-01 RX ADMIN — LORAZEPAM 1 MG: 2 SOLUTION, CONCENTRATE ORAL at 22:12

## 2018-01-01 RX ADMIN — DOCUSATE SODIUM 100 MG: 100 CAPSULE, LIQUID FILLED ORAL at 08:01

## 2018-01-01 RX ADMIN — OXYCODONE HYDROCHLORIDE 10 MG: 10 TABLET, FILM COATED, EXTENDED RELEASE ORAL at 09:20

## 2018-01-01 RX ADMIN — DOCUSATE SODIUM 100 MG: 100 CAPSULE, LIQUID FILLED ORAL at 21:02

## 2018-01-01 RX ADMIN — SODIUM CHLORIDE 125 ML/HR: 900 INJECTION, SOLUTION INTRAVENOUS at 05:25

## 2018-01-01 RX ADMIN — MORPHINE SULFATE 2 MG: 2 INJECTION, SOLUTION INTRAMUSCULAR; INTRAVENOUS at 09:02

## 2018-01-01 RX ADMIN — IPRATROPIUM BROMIDE AND ALBUTEROL SULFATE 3 ML: .5; 3 SOLUTION RESPIRATORY (INHALATION) at 23:52

## 2018-01-01 RX ADMIN — OXYCODONE HYDROCHLORIDE 10 MG: 10 TABLET, FILM COATED, EXTENDED RELEASE ORAL at 23:03

## 2018-01-01 RX ADMIN — PIPERACILLIN SODIUM,TAZOBACTAM SODIUM 3.38 G: 3; .375 INJECTION, POWDER, FOR SOLUTION INTRAVENOUS at 10:45

## 2018-01-01 RX ADMIN — MORPHINE SULFATE 2 MG: 2 INJECTION, SOLUTION INTRAMUSCULAR; INTRAVENOUS at 13:35

## 2018-01-01 RX ADMIN — OXYCODONE HYDROCHLORIDE AND ACETAMINOPHEN 1 TABLET: 5; 325 TABLET ORAL at 11:54

## 2018-01-01 RX ADMIN — HEPARIN SODIUM 3490 UNITS: 1000 INJECTION, SOLUTION INTRAVENOUS; SUBCUTANEOUS at 07:39

## 2018-01-01 RX ADMIN — POLYETHYLENE GLYCOL 3350 17 G: 17 POWDER, FOR SOLUTION ORAL at 09:15

## 2018-01-01 RX ADMIN — FENTANYL CITRATE 25 MCG: 50 INJECTION INTRAMUSCULAR; INTRAVENOUS at 14:50

## 2018-01-01 RX ADMIN — SODIUM CHLORIDE TAB 1 GM 1 G: 1 TAB at 16:53

## 2018-01-01 RX ADMIN — OXYCODONE HYDROCHLORIDE 10 MG: 10 TABLET, FILM COATED, EXTENDED RELEASE ORAL at 08:55

## 2018-01-01 RX ADMIN — PIPERACILLIN SODIUM,TAZOBACTAM SODIUM 3.38 G: 3; .375 INJECTION, POWDER, FOR SOLUTION INTRAVENOUS at 16:25

## 2018-01-01 RX ADMIN — HEPARIN SODIUM 5000 UNITS: 5000 INJECTION INTRAVENOUS; SUBCUTANEOUS at 21:03

## 2018-01-01 RX ADMIN — DOCUSATE SODIUM 100 MG: 100 CAPSULE, LIQUID FILLED ORAL at 08:14

## 2018-01-01 RX ADMIN — METOPROLOL TARTRATE 12.5 MG: 25 TABLET ORAL at 17:52

## 2018-01-01 RX ADMIN — MORPHINE SULFATE 2 MG: 2 INJECTION, SOLUTION INTRAMUSCULAR; INTRAVENOUS at 03:09

## 2018-01-01 RX ADMIN — PIPERACILLIN SODIUM,TAZOBACTAM SODIUM 3.38 G: 3; .375 INJECTION, POWDER, FOR SOLUTION INTRAVENOUS at 03:13

## 2018-01-01 RX ADMIN — SODIUM CHLORIDE TAB 1 GM 1 G: 1 TAB at 18:11

## 2018-01-01 RX ADMIN — DRONABINOL 2.5 MG: 2.5 CAPSULE ORAL at 20:47

## 2018-01-01 RX ADMIN — Medication 10 ML: at 22:16

## 2018-01-01 RX ADMIN — ACETAMINOPHEN 650 MG: 325 TABLET ORAL at 21:23

## 2018-01-01 RX ADMIN — METOPROLOL TARTRATE 12.5 MG: 25 TABLET ORAL at 13:47

## 2018-01-01 RX ADMIN — ACETAMINOPHEN 650 MG: 325 TABLET ORAL at 23:31

## 2018-01-01 RX ADMIN — MORPHINE SULFATE 2 MG: 2 INJECTION, SOLUTION INTRAMUSCULAR; INTRAVENOUS at 05:58

## 2018-01-01 RX ADMIN — MORPHINE SULFATE 2 MG: 2 INJECTION, SOLUTION INTRAMUSCULAR; INTRAVENOUS at 20:34

## 2018-01-01 RX ADMIN — OXYCODONE HYDROCHLORIDE 10 MG: 10 TABLET, FILM COATED, EXTENDED RELEASE ORAL at 09:19

## 2018-01-01 RX ADMIN — MORPHINE SULFATE 2 MG: 2 INJECTION, SOLUTION INTRAMUSCULAR; INTRAVENOUS at 05:13

## 2018-01-01 RX ADMIN — PIPERACILLIN SODIUM,TAZOBACTAM SODIUM 3.38 G: 3; .375 INJECTION, POWDER, FOR SOLUTION INTRAVENOUS at 04:12

## 2018-01-01 RX ADMIN — LEVOTHYROXINE SODIUM 75 MCG: 75 TABLET ORAL at 08:02

## 2018-01-01 RX ADMIN — Medication 2 MG: at 05:30

## 2018-01-01 RX ADMIN — Medication 2 MG: at 09:34

## 2018-01-01 RX ADMIN — LEVOTHYROXINE SODIUM 75 MCG: 75 TABLET ORAL at 06:56

## 2018-01-01 RX ADMIN — DRONABINOL 2.5 MG: 2.5 CAPSULE ORAL at 11:59

## 2018-01-01 RX ADMIN — ACETAMINOPHEN 650 MG: 325 TABLET ORAL at 17:02

## 2018-01-01 RX ADMIN — LEVOTHYROXINE SODIUM 75 MCG: 75 TABLET ORAL at 07:47

## 2018-01-01 RX ADMIN — LORAZEPAM 0.5 MG: 2 SOLUTION, CONCENTRATE ORAL at 02:55

## 2018-01-01 RX ADMIN — DOCUSATE SODIUM 100 MG: 100 CAPSULE, LIQUID FILLED ORAL at 09:15

## 2018-01-01 RX ADMIN — MORPHINE SULFATE 2 MG: 2 INJECTION, SOLUTION INTRAMUSCULAR; INTRAVENOUS at 04:55

## 2018-01-01 RX ADMIN — LORAZEPAM 0.5 MG: 2 SOLUTION, CONCENTRATE ORAL at 21:25

## 2018-01-01 RX ADMIN — MIDAZOLAM HYDROCHLORIDE 0.5 MG: 1 INJECTION, SOLUTION INTRAMUSCULAR; INTRAVENOUS at 14:50

## 2018-01-01 RX ADMIN — OXYCODONE HYDROCHLORIDE AND ACETAMINOPHEN 1 TABLET: 5; 325 TABLET ORAL at 06:04

## 2018-01-01 RX ADMIN — ACETAMINOPHEN 650 MG: 325 TABLET ORAL at 08:59

## 2018-01-01 RX ADMIN — Medication 2 MG: at 20:56

## 2018-01-01 RX ADMIN — LORAZEPAM 0.5 MG: 2 SOLUTION, CONCENTRATE ORAL at 01:23

## 2018-01-01 RX ADMIN — HEPARIN SODIUM 5000 UNITS: 5000 INJECTION INTRAVENOUS; SUBCUTANEOUS at 00:09

## 2018-01-01 RX ADMIN — Medication 10 ML: at 21:52

## 2018-01-01 RX ADMIN — ACETAMINOPHEN 650 MG: 325 TABLET ORAL at 13:31

## 2018-01-01 RX ADMIN — METOPROLOL TARTRATE: 25 TABLET ORAL at 17:09

## 2018-01-01 RX ADMIN — OXYCODONE HYDROCHLORIDE AND ACETAMINOPHEN 1 TABLET: 5; 325 TABLET ORAL at 16:55

## 2018-01-01 RX ADMIN — MORPHINE SULFATE: 5 INJECTION, SOLUTION INTRAVENOUS at 13:09

## 2018-01-01 RX ADMIN — Medication 2 MG: at 00:44

## 2018-01-01 RX ADMIN — METOPROLOL TARTRATE 12.5 MG: 25 TABLET ORAL at 09:28

## 2018-01-01 RX ADMIN — IOPAMIDOL 100 ML: 612 INJECTION, SOLUTION INTRAVENOUS at 12:29

## 2018-01-01 RX ADMIN — MORPHINE SULFATE 2 MG: 2 INJECTION, SOLUTION INTRAMUSCULAR; INTRAVENOUS at 07:51

## 2018-01-01 RX ADMIN — ACETAMINOPHEN 650 MG: 325 TABLET ORAL at 14:38

## 2018-01-01 RX ADMIN — LEVOTHYROXINE SODIUM 75 MCG: 75 TABLET ORAL at 07:22

## 2018-01-01 RX ADMIN — SODIUM CHLORIDE TAB 1 GM 1 G: 1 TAB at 09:27

## 2018-01-01 RX ADMIN — DOCUSATE SODIUM 100 MG: 100 CAPSULE, LIQUID FILLED ORAL at 17:52

## 2018-01-01 RX ADMIN — LEVOTHYROXINE SODIUM 75 MCG: 75 TABLET ORAL at 08:45

## 2018-01-01 RX ADMIN — DRONABINOL 2.5 MG: 2.5 CAPSULE ORAL at 19:48

## 2018-01-01 RX ADMIN — SENNOSIDES AND DOCUSATE SODIUM 1 TABLET: 8.6; 5 TABLET ORAL at 18:54

## 2018-01-01 RX ADMIN — IPRATROPIUM BROMIDE AND ALBUTEROL SULFATE 3 ML: .5; 3 SOLUTION RESPIRATORY (INHALATION) at 07:16

## 2018-01-01 RX ADMIN — OXYCODONE HYDROCHLORIDE AND ACETAMINOPHEN 1 TABLET: 5; 325 TABLET ORAL at 16:13

## 2018-01-01 RX ADMIN — SODIUM CHLORIDE TAB 1 GM 1 G: 1 TAB at 17:14

## 2018-01-01 RX ADMIN — SENNOSIDES AND DOCUSATE SODIUM 1 TABLET: 8.6; 5 TABLET ORAL at 09:09

## 2018-01-01 RX ADMIN — LEVOTHYROXINE SODIUM 75 MCG: 75 TABLET ORAL at 07:08

## 2018-01-01 RX ADMIN — HEPARIN SODIUM 5000 UNITS: 5000 INJECTION INTRAVENOUS; SUBCUTANEOUS at 23:47

## 2018-01-01 RX ADMIN — HEPARIN SODIUM 5000 UNITS: 5000 INJECTION INTRAVENOUS; SUBCUTANEOUS at 16:00

## 2018-01-01 RX ADMIN — METOPROLOL TARTRATE 12.5 MG: 25 TABLET ORAL at 21:08

## 2018-01-01 RX ADMIN — Medication 10 ML: at 14:38

## 2018-01-01 RX ADMIN — SODIUM CHLORIDE TAB 1 GM 1 G: 1 TAB at 17:02

## 2018-01-01 RX ADMIN — DIPHENHYDRAMINE HYDROCHLORIDE 12.5 MG: 50 INJECTION, SOLUTION INTRAMUSCULAR; INTRAVENOUS at 00:07

## 2018-01-01 RX ADMIN — MORPHINE SULFATE 2 MG: 2 INJECTION, SOLUTION INTRAMUSCULAR; INTRAVENOUS at 19:59

## 2018-01-01 RX ADMIN — MORPHINE SULFATE 1 MG: 2 INJECTION, SOLUTION INTRAMUSCULAR; INTRAVENOUS at 11:48

## 2018-01-01 RX ADMIN — TRAMADOL HYDROCHLORIDE 50 MG: 50 TABLET, FILM COATED ORAL at 02:43

## 2018-01-01 RX ADMIN — DRONABINOL 2.5 MG: 2.5 CAPSULE ORAL at 08:55

## 2018-01-01 RX ADMIN — SODIUM CHLORIDE TAB 1 GM 1 G: 1 TAB at 08:59

## 2018-01-01 RX ADMIN — OXYCODONE HYDROCHLORIDE 10 MG: 10 TABLET, FILM COATED, EXTENDED RELEASE ORAL at 09:09

## 2018-01-01 RX ADMIN — PIPERACILLIN SODIUM,TAZOBACTAM SODIUM 3.38 G: 3; .375 INJECTION, POWDER, FOR SOLUTION INTRAVENOUS at 05:30

## 2018-01-01 RX ADMIN — OXYCODONE HYDROCHLORIDE 10 MG: 10 TABLET, FILM COATED, EXTENDED RELEASE ORAL at 21:01

## 2018-01-01 RX ADMIN — METOPROLOL TARTRATE 12.5 MG: 25 TABLET ORAL at 09:09

## 2018-01-01 RX ADMIN — MORPHINE SULFATE 2 MG: 2 INJECTION, SOLUTION INTRAMUSCULAR; INTRAVENOUS at 06:53

## 2018-01-01 RX ADMIN — PIPERACILLIN SODIUM,TAZOBACTAM SODIUM 3.38 G: 3; .375 INJECTION, POWDER, FOR SOLUTION INTRAVENOUS at 10:55

## 2018-01-01 RX ADMIN — METOPROLOL TARTRATE 12.5 MG: 25 TABLET ORAL at 19:48

## 2018-01-01 RX ADMIN — POTASSIUM & SODIUM PHOSPHATES POWDER PACK 280-160-250 MG 2 PACKET: 280-160-250 PACK at 07:11

## 2018-01-01 RX ADMIN — FAMOTIDINE 20 MG: 20 TABLET ORAL at 08:51

## 2018-01-01 RX ADMIN — MORPHINE SULFATE 2 MG: 2 INJECTION, SOLUTION INTRAMUSCULAR; INTRAVENOUS at 21:26

## 2018-01-01 RX ADMIN — TRAMADOL HYDROCHLORIDE 50 MG: 50 TABLET, FILM COATED ORAL at 01:05

## 2018-01-01 RX ADMIN — PIPERACILLIN SODIUM,TAZOBACTAM SODIUM 3.38 G: 3; .375 INJECTION, POWDER, FOR SOLUTION INTRAVENOUS at 23:05

## 2018-01-01 RX ADMIN — FENTANYL CITRATE 25 MCG: 50 INJECTION, SOLUTION INTRAMUSCULAR; INTRAVENOUS at 15:38

## 2018-01-01 RX ADMIN — LEVOFLOXACIN 750 MG: 5 INJECTION, SOLUTION INTRAVENOUS at 15:05

## 2018-01-01 RX ADMIN — FAMOTIDINE 20 MG: 10 INJECTION, SOLUTION INTRAVENOUS at 21:24

## 2018-01-01 RX ADMIN — ACETAMINOPHEN 650 MG: 325 TABLET ORAL at 00:20

## 2018-01-01 RX ADMIN — POLYETHYLENE GLYCOL 3350 17 G: 17 POWDER, FOR SOLUTION ORAL at 08:58

## 2018-01-01 RX ADMIN — ACETAMINOPHEN 650 MG: 325 TABLET ORAL at 08:51

## 2018-01-01 RX ADMIN — TRAMADOL HYDROCHLORIDE 50 MG: 50 TABLET, FILM COATED ORAL at 23:43

## 2018-01-01 RX ADMIN — PIPERACILLIN SODIUM,TAZOBACTAM SODIUM 3.38 G: 3; .375 INJECTION, POWDER, FOR SOLUTION INTRAVENOUS at 09:22

## 2018-01-01 RX ADMIN — OXYCODONE HYDROCHLORIDE 10 MG: 10 TABLET, FILM COATED, EXTENDED RELEASE ORAL at 12:09

## 2018-01-01 RX ADMIN — MORPHINE SULFATE 2 MG: 2 INJECTION, SOLUTION INTRAMUSCULAR; INTRAVENOUS at 23:03

## 2018-01-01 RX ADMIN — MORPHINE SULFATE 2 MG: 2 INJECTION, SOLUTION INTRAMUSCULAR; INTRAVENOUS at 21:05

## 2018-01-01 RX ADMIN — DIPHENHYDRAMINE HYDROCHLORIDE 12.5 MG: 50 INJECTION, SOLUTION INTRAMUSCULAR; INTRAVENOUS at 04:13

## 2018-01-01 RX ADMIN — DOCUSATE SODIUM 100 MG: 100 CAPSULE, LIQUID FILLED ORAL at 08:34

## 2018-01-01 RX ADMIN — FAMOTIDINE 20 MG: 20 TABLET ORAL at 20:53

## 2018-01-01 RX ADMIN — MIDAZOLAM HYDROCHLORIDE 1 MG: 1 INJECTION, SOLUTION INTRAMUSCULAR; INTRAVENOUS at 14:30

## 2018-01-01 RX ADMIN — ACETAMINOPHEN 650 MG: 325 TABLET ORAL at 14:37

## 2018-01-01 RX ADMIN — ACETAMINOPHEN 650 MG: 325 TABLET ORAL at 16:53

## 2018-01-01 RX ADMIN — DOCUSATE SODIUM 100 MG: 100 CAPSULE, LIQUID FILLED ORAL at 09:19

## 2018-01-01 RX ADMIN — FAMOTIDINE 20 MG: 20 TABLET ORAL at 09:26

## 2018-01-01 RX ADMIN — IOPAMIDOL 10 ML: 612 INJECTION, SOLUTION INTRAVENOUS at 15:40

## 2018-01-01 RX ADMIN — LORAZEPAM 0.5 MG: 2 SOLUTION, CONCENTRATE ORAL at 16:00

## 2018-01-01 RX ADMIN — PIPERACILLIN SODIUM,TAZOBACTAM SODIUM 3.38 G: 3; .375 INJECTION, POWDER, FOR SOLUTION INTRAVENOUS at 21:19

## 2018-01-01 RX ADMIN — DRONABINOL 2.5 MG: 2.5 CAPSULE ORAL at 19:07

## 2018-01-01 RX ADMIN — ACETAMINOPHEN 650 MG: 325 TABLET ORAL at 14:14

## 2018-01-01 RX ADMIN — TRAMADOL HYDROCHLORIDE 50 MG: 50 TABLET, FILM COATED ORAL at 11:38

## 2018-01-01 RX ADMIN — LORAZEPAM 1 MG: 1 TABLET ORAL at 15:25

## 2018-01-01 RX ADMIN — HYDROCODONE BITARTRATE AND ACETAMINOPHEN 1 TABLET: 5; 325 TABLET ORAL at 19:18

## 2018-01-01 RX ADMIN — METOPROLOL TARTRATE 12.5 MG: 25 TABLET ORAL at 21:38

## 2018-01-01 RX ADMIN — MORPHINE SULFATE 2 MG: 4 INJECTION, SOLUTION INTRAMUSCULAR; INTRAVENOUS at 05:38

## 2018-01-01 RX ADMIN — Medication 10 ML: at 20:54

## 2018-01-01 RX ADMIN — LORAZEPAM 0.5 MG: 2 SOLUTION, CONCENTRATE ORAL at 09:14

## 2018-01-01 RX ADMIN — FAMOTIDINE 20 MG: 20 TABLET ORAL at 09:19

## 2018-01-01 RX ADMIN — TRAMADOL HYDROCHLORIDE 50 MG: 50 TABLET, FILM COATED ORAL at 21:08

## 2018-01-01 RX ADMIN — SODIUM CHLORIDE 1000 MG: 900 INJECTION, SOLUTION INTRAVENOUS at 12:30

## 2018-01-01 RX ADMIN — SODIUM CHLORIDE TAB 1 GM 1 G: 1 TAB at 09:40

## 2018-01-01 RX ADMIN — MORPHINE SULFATE 1 MG: 2 INJECTION, SOLUTION INTRAMUSCULAR; INTRAVENOUS at 13:26

## 2018-01-01 RX ADMIN — OXYCODONE HYDROCHLORIDE AND ACETAMINOPHEN 1 TABLET: 5; 325 TABLET ORAL at 04:11

## 2018-01-01 RX ADMIN — HEPARIN SODIUM AND DEXTROSE 18 UNITS/KG/HR: 10000; 5 INJECTION INTRAVENOUS at 17:30

## 2018-01-01 RX ADMIN — ACETAMINOPHEN 650 MG: 325 TABLET ORAL at 20:53

## 2018-01-01 RX ADMIN — PIPERACILLIN SODIUM,TAZOBACTAM SODIUM 3.38 G: 3; .375 INJECTION, POWDER, FOR SOLUTION INTRAVENOUS at 04:11

## 2018-01-01 RX ADMIN — ACETAMINOPHEN 650 MG: 325 TABLET ORAL at 09:20

## 2018-01-01 RX ADMIN — MORPHINE SULFATE 1 MG: 2 INJECTION, SOLUTION INTRAMUSCULAR; INTRAVENOUS at 00:07

## 2018-01-01 RX ADMIN — OXYCODONE HYDROCHLORIDE 10 MG: 10 TABLET, FILM COATED, EXTENDED RELEASE ORAL at 08:36

## 2018-01-01 RX ADMIN — Medication 10 ML: at 14:00

## 2018-01-01 RX ADMIN — METOPROLOL TARTRATE 12.5 MG: 25 TABLET ORAL at 08:55

## 2018-01-01 RX ADMIN — MIDAZOLAM HYDROCHLORIDE 0.5 MG: 1 INJECTION, SOLUTION INTRAMUSCULAR; INTRAVENOUS at 14:40

## 2018-01-01 RX ADMIN — DRONABINOL 2.5 MG: 2.5 CAPSULE ORAL at 09:19

## 2018-01-01 RX ADMIN — MORPHINE SULFATE 2 MG: 2 INJECTION, SOLUTION INTRAMUSCULAR; INTRAVENOUS at 21:44

## 2018-01-01 RX ADMIN — AMOXICILLIN AND CLAVULANATE POTASSIUM 1 TABLET: 500; 125 TABLET, FILM COATED ORAL at 09:14

## 2018-01-01 RX ADMIN — MORPHINE SULFATE 2 MG: 2 INJECTION, SOLUTION INTRAMUSCULAR; INTRAVENOUS at 09:26

## 2018-01-01 RX ADMIN — OXYCODONE HYDROCHLORIDE AND ACETAMINOPHEN 1 TABLET: 5; 325 TABLET ORAL at 17:09

## 2018-01-01 RX ADMIN — SODIUM CHLORIDE 500 ML: 900 INJECTION, SOLUTION INTRAVENOUS at 21:14

## 2018-01-01 RX ADMIN — PIPERACILLIN SODIUM,TAZOBACTAM SODIUM 3.38 G: 3; .375 INJECTION, POWDER, FOR SOLUTION INTRAVENOUS at 06:01

## 2018-01-01 RX ADMIN — AMOXICILLIN AND CLAVULANATE POTASSIUM 1 TABLET: 500; 125 TABLET, FILM COATED ORAL at 21:01

## 2018-01-01 RX ADMIN — SODIUM CHLORIDE TAB 1 GM 1 G: 1 TAB at 08:51

## 2018-01-01 RX ADMIN — OXYCODONE AND ACETAMINOPHEN 1 TABLET: 5; 325 TABLET ORAL at 09:28

## 2018-01-01 RX ADMIN — Medication 10 ML: at 21:05

## 2018-01-01 RX ADMIN — ACETAMINOPHEN 650 MG: 325 TABLET ORAL at 13:41

## 2018-01-01 RX ADMIN — PIPERACILLIN SODIUM,TAZOBACTAM SODIUM 3.38 G: 3; .375 INJECTION, POWDER, FOR SOLUTION INTRAVENOUS at 23:06

## 2018-01-01 RX ADMIN — ACETAMINOPHEN 650 MG: 325 TABLET ORAL at 21:05

## 2018-01-01 RX ADMIN — Medication 10 ML: at 17:11

## 2018-01-01 RX ADMIN — MORPHINE SULFATE 2 MG: 2 INJECTION, SOLUTION INTRAMUSCULAR; INTRAVENOUS at 15:45

## 2018-01-01 RX ADMIN — ONDANSETRON 4 MG: 2 INJECTION INTRAMUSCULAR; INTRAVENOUS at 03:07

## 2018-01-01 RX ADMIN — MORPHINE SULFATE 2 MG: 2 INJECTION, SOLUTION INTRAMUSCULAR; INTRAVENOUS at 11:20

## 2018-01-01 RX ADMIN — LEVOTHYROXINE SODIUM 75 MCG: 75 TABLET ORAL at 06:44

## 2018-01-01 RX ADMIN — LEVOFLOXACIN 750 MG: 5 INJECTION, SOLUTION INTRAVENOUS at 13:26

## 2018-01-01 RX ADMIN — MORPHINE SULFATE 1 MG: 2 INJECTION, SOLUTION INTRAMUSCULAR; INTRAVENOUS at 04:13

## 2018-01-01 RX ADMIN — MORPHINE SULFATE 2 MG: 2 INJECTION, SOLUTION INTRAMUSCULAR; INTRAVENOUS at 15:25

## 2018-01-01 RX ADMIN — SODIUM CHLORIDE TAB 1 GM 1 G: 1 TAB at 18:05

## 2018-01-01 RX ADMIN — Medication 10 ML: at 21:10

## 2018-01-01 RX ADMIN — LORAZEPAM 1 MG: 1 TABLET ORAL at 22:15

## 2018-01-01 RX ADMIN — LIDOCAINE HYDROCHLORIDE 300 MG: 10; .005 INJECTION, SOLUTION EPIDURAL; INFILTRATION; INTRACAUDAL; PERINEURAL at 14:30

## 2018-01-01 RX ADMIN — SENNOSIDES AND DOCUSATE SODIUM 1 TABLET: 8.6; 5 TABLET ORAL at 09:27

## 2018-01-01 RX ADMIN — OXYCODONE HYDROCHLORIDE 10 MG: 10 TABLET, FILM COATED, EXTENDED RELEASE ORAL at 22:18

## 2018-01-01 RX ADMIN — PIPERACILLIN SODIUM,TAZOBACTAM SODIUM 3.38 G: 3; .375 INJECTION, POWDER, FOR SOLUTION INTRAVENOUS at 05:42

## 2018-01-01 RX ADMIN — ACETAMINOPHEN 650 MG: 325 TABLET ORAL at 22:28

## 2018-01-01 RX ADMIN — SODIUM CHLORIDE 1000 MG: 900 INJECTION, SOLUTION INTRAVENOUS at 12:13

## 2018-01-01 RX ADMIN — SODIUM CHLORIDE 1000 ML: 900 INJECTION, SOLUTION INTRAVENOUS at 18:25

## 2018-01-01 RX ADMIN — CEFAZOLIN SODIUM 2 G: 2 SOLUTION INTRAVENOUS at 14:20

## 2018-01-01 RX ADMIN — MORPHINE SULFATE 2 MG: 2 INJECTION, SOLUTION INTRAMUSCULAR; INTRAVENOUS at 12:24

## 2018-01-01 RX ADMIN — HEPARIN SODIUM IN SODIUM CHLORIDE 1000 UNITS: 200 INJECTION INTRAVENOUS at 15:37

## 2018-01-01 RX ADMIN — PIPERACILLIN SODIUM,TAZOBACTAM SODIUM 3.38 G: 3; .375 INJECTION, POWDER, FOR SOLUTION INTRAVENOUS at 21:29

## 2018-01-01 RX ADMIN — OXYCODONE HYDROCHLORIDE AND ACETAMINOPHEN 1 TABLET: 5; 325 TABLET ORAL at 02:48

## 2018-01-01 RX ADMIN — MORPHINE SULFATE 2 MG: 2 INJECTION, SOLUTION INTRAMUSCULAR; INTRAVENOUS at 20:54

## 2018-01-01 RX ADMIN — METOPROLOL TARTRATE 12.5 MG: 25 TABLET ORAL at 09:21

## 2018-01-01 RX ADMIN — ACETAMINOPHEN 650 MG: 325 TABLET, FILM COATED ORAL at 21:04

## 2018-01-01 RX ADMIN — MIDAZOLAM HYDROCHLORIDE 1 MG: 1 INJECTION, SOLUTION INTRAMUSCULAR; INTRAVENOUS at 14:25

## 2018-01-01 RX ADMIN — MORPHINE SULFATE 2 MG: 2 INJECTION, SOLUTION INTRAMUSCULAR; INTRAVENOUS at 18:54

## 2018-01-01 RX ADMIN — OXYCODONE HYDROCHLORIDE 10 MG: 10 TABLET, FILM COATED, EXTENDED RELEASE ORAL at 08:01

## 2018-01-01 RX ADMIN — SENNOSIDES AND DOCUSATE SODIUM 1 TABLET: 8.6; 5 TABLET ORAL at 09:20

## 2018-01-01 RX ADMIN — DIPHENHYDRAMINE HYDROCHLORIDE 12.5 MG: 50 INJECTION, SOLUTION INTRAMUSCULAR; INTRAVENOUS at 19:47

## 2018-01-01 RX ADMIN — LEVOTHYROXINE SODIUM 75 MCG: 75 TABLET ORAL at 06:53

## 2018-01-01 RX ADMIN — LEVOTHYROXINE SODIUM 75 MCG: 75 TABLET ORAL at 06:32

## 2018-01-01 RX ADMIN — LORAZEPAM 0.5 MG: 2 SOLUTION, CONCENTRATE ORAL at 18:35

## 2018-01-01 RX ADMIN — HEPARIN SODIUM 5000 UNITS: 5000 INJECTION INTRAVENOUS; SUBCUTANEOUS at 06:12

## 2018-01-01 RX ADMIN — SODIUM CHLORIDE TAB 1 GM 1 G: 1 TAB at 16:55

## 2018-01-01 RX ADMIN — DRONABINOL 2.5 MG: 2.5 CAPSULE ORAL at 08:34

## 2018-01-01 RX ADMIN — SODIUM CHLORIDE 1000 MG: 900 INJECTION, SOLUTION INTRAVENOUS at 00:09

## 2018-01-01 RX ADMIN — Medication 1 CAPSULE: at 09:14

## 2018-01-01 RX ADMIN — Medication 10 ML: at 13:12

## 2018-01-01 RX ADMIN — Medication 10 ML: at 06:42

## 2018-01-01 RX ADMIN — ACETAMINOPHEN 650 MG: 325 TABLET ORAL at 09:26

## 2018-01-01 RX ADMIN — OXYCODONE HYDROCHLORIDE 10 MG: 10 TABLET, FILM COATED, EXTENDED RELEASE ORAL at 21:03

## 2018-01-01 RX ADMIN — POTASSIUM & SODIUM PHOSPHATES POWDER PACK 280-160-250 MG 1 PACKET: 280-160-250 PACK at 20:51

## 2018-01-01 RX ADMIN — MORPHINE SULFATE 2 MG: 2 INJECTION, SOLUTION INTRAMUSCULAR; INTRAVENOUS at 21:33

## 2018-01-01 RX ADMIN — Medication 10 ML: at 06:37

## 2018-01-01 RX ADMIN — ACETAMINOPHEN 650 MG: 325 TABLET ORAL at 13:12

## 2018-01-01 RX ADMIN — ACETAMINOPHEN 650 MG: 325 TABLET ORAL at 10:05

## 2018-01-01 RX ADMIN — SODIUM CHLORIDE 25 ML/HR: 900 INJECTION, SOLUTION INTRAVENOUS at 13:08

## 2018-01-01 RX ADMIN — Medication 1 CAPSULE: at 09:19

## 2018-01-01 RX ADMIN — LEVOFLOXACIN 750 MG: 5 INJECTION, SOLUTION INTRAVENOUS at 13:51

## 2018-01-01 RX ADMIN — OXYCODONE HYDROCHLORIDE 10 MG: 10 TABLET, FILM COATED, EXTENDED RELEASE ORAL at 20:50

## 2018-01-01 RX ADMIN — SODIUM CHLORIDE 125 ML/HR: 900 INJECTION, SOLUTION INTRAVENOUS at 07:27

## 2018-01-01 RX ADMIN — PIPERACILLIN SODIUM,TAZOBACTAM SODIUM 3.38 G: 3; .375 INJECTION, POWDER, FOR SOLUTION INTRAVENOUS at 14:39

## 2018-01-01 RX ADMIN — ACETAMINOPHEN 650 MG: 325 TABLET ORAL at 09:19

## 2018-01-01 RX ADMIN — SODIUM CHLORIDE TAB 1 GM 1 G: 1 TAB at 10:05

## 2018-01-01 RX ADMIN — OXYCODONE HYDROCHLORIDE 10 MG: 10 TABLET, FILM COATED, EXTENDED RELEASE ORAL at 20:53

## 2018-01-01 RX ADMIN — ACETAMINOPHEN 650 MG: 325 TABLET ORAL at 12:34

## 2018-01-01 RX ADMIN — OXYCODONE AND ACETAMINOPHEN 1 TABLET: 5; 325 TABLET ORAL at 19:15

## 2018-01-01 RX ADMIN — Medication 10 ML: at 22:39

## 2018-01-01 RX ADMIN — PIPERACILLIN SODIUM,TAZOBACTAM SODIUM 3.38 G: 3; .375 INJECTION, POWDER, FOR SOLUTION INTRAVENOUS at 12:46

## 2018-01-01 RX ADMIN — LEVOTHYROXINE SODIUM 75 MCG: 75 TABLET ORAL at 09:19

## 2018-01-01 RX ADMIN — METOPROLOL TARTRATE 25 MG: 25 TABLET ORAL at 11:54

## 2018-01-01 RX ADMIN — MORPHINE SULFATE 1 MG: 2 INJECTION, SOLUTION INTRAMUSCULAR; INTRAVENOUS at 13:04

## 2018-01-01 RX ADMIN — ACETAMINOPHEN 650 MG: 325 TABLET ORAL at 17:14

## 2018-01-01 RX ADMIN — OXYCODONE HYDROCHLORIDE 10 MG: 10 TABLET, FILM COATED, EXTENDED RELEASE ORAL at 21:02

## 2018-01-01 RX ADMIN — METOPROLOL TARTRATE 12.5 MG: 25 TABLET ORAL at 09:18

## 2018-01-01 RX ADMIN — LEVOFLOXACIN 750 MG: 5 INJECTION, SOLUTION INTRAVENOUS at 13:40

## 2018-01-01 RX ADMIN — IPRATROPIUM BROMIDE AND ALBUTEROL SULFATE 3 ML: .5; 3 SOLUTION RESPIRATORY (INHALATION) at 19:29

## 2018-01-01 RX ADMIN — DOCUSATE SODIUM 100 MG: 100 CAPSULE, LIQUID FILLED ORAL at 23:46

## 2018-01-01 RX ADMIN — SODIUM CHLORIDE TAB 1 GM 1 G: 1 TAB at 17:11

## 2018-01-01 RX ADMIN — DOCUSATE SODIUM 100 MG: 100 CAPSULE, LIQUID FILLED ORAL at 08:57

## 2018-01-01 RX ADMIN — SODIUM CHLORIDE 75 ML/HR: 900 INJECTION, SOLUTION INTRAVENOUS at 20:44

## 2018-01-01 RX ADMIN — Medication 10 ML: at 21:00

## 2018-01-01 RX ADMIN — DOCUSATE SODIUM 100 MG: 100 CAPSULE, LIQUID FILLED ORAL at 08:55

## 2018-01-01 RX ADMIN — SODIUM CHLORIDE TAB 1 GM 1 G: 1 TAB at 18:07

## 2018-01-01 RX ADMIN — MORPHINE SULFATE 4 MG: 4 INJECTION INTRAVENOUS at 03:07

## 2018-01-01 RX ADMIN — TRAMADOL HYDROCHLORIDE 50 MG: 50 TABLET, FILM COATED ORAL at 07:47

## 2018-01-01 RX ADMIN — METOPROLOL TARTRATE 12.5 MG: 25 TABLET ORAL at 17:24

## 2018-01-01 RX ADMIN — HEPARIN SODIUM AND DEXTROSE 22 UNITS/KG/HR: 10000; 5 INJECTION INTRAVENOUS at 05:18

## 2018-01-01 RX ADMIN — LEVOTHYROXINE SODIUM 75 MCG: 50 TABLET ORAL at 11:54

## 2018-01-01 RX ADMIN — Medication 2 MG: at 19:47

## 2018-01-01 RX ADMIN — MORPHINE SULFATE 2 MG: 2 INJECTION, SOLUTION INTRAMUSCULAR; INTRAVENOUS at 09:20

## 2018-01-01 RX ADMIN — IOPAMIDOL 100 ML: 755 INJECTION, SOLUTION INTRAVENOUS at 20:46

## 2018-01-01 RX ADMIN — PIPERACILLIN SODIUM,TAZOBACTAM SODIUM 3.38 G: 3; .375 INJECTION, POWDER, FOR SOLUTION INTRAVENOUS at 23:03

## 2018-01-01 RX ADMIN — OXYCODONE HYDROCHLORIDE AND ACETAMINOPHEN 1 TABLET: 5; 325 TABLET ORAL at 21:30

## 2018-01-01 RX ADMIN — POTASSIUM & SODIUM PHOSPHATES POWDER PACK 280-160-250 MG 1 PACKET: 280-160-250 PACK at 08:01

## 2018-01-01 RX ADMIN — ACETAMINOPHEN 650 MG: 325 TABLET ORAL at 17:38

## 2018-01-01 RX ADMIN — POLYETHYLENE GLYCOL 3350 17 G: 17 POWDER, FOR SOLUTION ORAL at 17:00

## 2018-01-01 RX ADMIN — FENTANYL CITRATE 50 MCG: 50 INJECTION INTRAMUSCULAR; INTRAVENOUS at 14:30

## 2018-01-01 RX ADMIN — MORPHINE SULFATE 2 MG: 2 INJECTION, SOLUTION INTRAMUSCULAR; INTRAVENOUS at 16:24

## 2018-01-01 RX ADMIN — LORAZEPAM 0.5 MG: 2 INJECTION, SOLUTION INTRAMUSCULAR; INTRAVENOUS at 08:57

## 2018-01-01 RX ADMIN — MORPHINE SULFATE 2 MG: 2 INJECTION, SOLUTION INTRAMUSCULAR; INTRAVENOUS at 02:47

## 2018-01-01 RX ADMIN — ACETAMINOPHEN 650 MG: 325 TABLET ORAL at 22:12

## 2018-01-01 RX ADMIN — Medication 10 ML: at 14:15

## 2018-01-01 RX ADMIN — ACETAMINOPHEN 650 MG: 325 TABLET ORAL at 18:05

## 2018-01-01 RX ADMIN — CALCIUM GLUCONATE 2 G: 94 INJECTION, SOLUTION INTRAVENOUS at 07:12

## 2018-01-01 RX ADMIN — LIDOCAINE HYDROCHLORIDE 5 ML: 10 INJECTION, SOLUTION INFILTRATION; PERINEURAL at 15:39

## 2018-01-01 RX ADMIN — MORPHINE SULFATE 2 MG: 2 INJECTION, SOLUTION INTRAMUSCULAR; INTRAVENOUS at 04:41

## 2018-01-01 RX ADMIN — Medication 10 ML: at 21:33

## 2018-01-01 RX ADMIN — FAMOTIDINE 20 MG: 20 TABLET ORAL at 22:28

## 2018-01-01 RX ADMIN — METOPROLOL TARTRATE 12.5 MG: 25 TABLET ORAL at 09:11

## 2018-01-01 RX ADMIN — OXYCODONE HYDROCHLORIDE 10 MG: 10 TABLET, FILM COATED, EXTENDED RELEASE ORAL at 08:14

## 2018-01-01 RX ADMIN — HEPARIN SODIUM 3490 UNITS: 1000 INJECTION INTRAVENOUS; SUBCUTANEOUS at 11:04

## 2018-01-01 RX ADMIN — LEVOTHYROXINE SODIUM 75 MCG: 75 TABLET ORAL at 07:06

## 2018-01-01 RX ADMIN — METOPROLOL TARTRATE 12.5 MG: 25 TABLET ORAL at 08:57

## 2018-01-01 RX ADMIN — PIPERACILLIN SODIUM,TAZOBACTAM SODIUM 3.38 G: 3; .375 INJECTION, POWDER, FOR SOLUTION INTRAVENOUS at 12:11

## 2018-01-01 RX ADMIN — LORAZEPAM 0.5 MG: 2 SOLUTION, CONCENTRATE ORAL at 07:51

## 2018-01-01 RX ADMIN — IOPAMIDOL 100 ML: 612 INJECTION, SOLUTION INTRAVENOUS at 09:40

## 2018-01-01 RX ADMIN — DRONABINOL 2.5 MG: 2.5 CAPSULE ORAL at 08:01

## 2018-01-01 RX ADMIN — FAMOTIDINE 20 MG: 10 INJECTION, SOLUTION INTRAVENOUS at 09:41

## 2018-01-01 RX ADMIN — OXYCODONE HYDROCHLORIDE 10 MG: 10 TABLET, FILM COATED, EXTENDED RELEASE ORAL at 19:49

## 2018-01-01 RX ADMIN — METOPROLOL TARTRATE 12.5 MG: 25 TABLET ORAL at 21:44

## 2018-01-01 RX ADMIN — OXYCODONE AND ACETAMINOPHEN 1 TABLET: 5; 325 TABLET ORAL at 02:23

## 2018-01-01 RX ADMIN — MORPHINE SULFATE 1 MG: 2 INJECTION, SOLUTION INTRAMUSCULAR; INTRAVENOUS at 15:19

## 2018-01-01 RX ADMIN — POTASSIUM & SODIUM PHOSPHATES POWDER PACK 280-160-250 MG 2 PACKET: 280-160-250 PACK at 09:15

## 2018-01-01 RX ADMIN — LORAZEPAM 1 MG: 2 SOLUTION, CONCENTRATE ORAL at 12:19

## 2018-01-01 RX ADMIN — ACETAMINOPHEN 650 MG: 325 TABLET ORAL at 12:33

## 2018-01-01 RX ADMIN — MIDAZOLAM HYDROCHLORIDE 1 MG: 1 INJECTION, SOLUTION INTRAMUSCULAR; INTRAVENOUS at 08:57

## 2018-01-01 RX ADMIN — SODIUM CHLORIDE 125 ML/HR: 900 INJECTION, SOLUTION INTRAVENOUS at 21:14

## 2018-01-01 RX ADMIN — OXYCODONE HYDROCHLORIDE AND ACETAMINOPHEN 1 TABLET: 5; 325 TABLET ORAL at 22:30

## 2018-01-01 RX ADMIN — LEVOTHYROXINE SODIUM 75 MCG: 75 TABLET ORAL at 06:05

## 2018-01-01 RX ADMIN — SODIUM CHLORIDE 25 ML/HR: 900 INJECTION, SOLUTION INTRAVENOUS at 17:13

## 2018-01-01 RX ADMIN — DRONABINOL 2.5 MG: 2.5 CAPSULE ORAL at 17:40

## 2018-01-01 RX ADMIN — DRONABINOL 2.5 MG: 2.5 CAPSULE ORAL at 08:36

## 2018-01-01 RX ADMIN — BISACODYL 10 MG: 10 SUPPOSITORY RECTAL at 20:54

## 2018-01-01 RX ADMIN — METOPROLOL TARTRATE 12.5 MG: 25 TABLET ORAL at 20:55

## 2018-01-01 RX ADMIN — LEVOTHYROXINE SODIUM 75 MCG: 75 TABLET ORAL at 07:44

## 2018-01-01 RX ADMIN — METOPROLOL TARTRATE 12.5 MG: 25 TABLET ORAL at 22:28

## 2018-01-01 RX ADMIN — HEPARIN SODIUM AND DEXTROSE 22 UNITS/KG/HR: 10000; 5 INJECTION INTRAVENOUS at 19:23

## 2018-01-01 RX ADMIN — METOPROLOL TARTRATE 12.5 MG: 25 TABLET ORAL at 21:22

## 2018-01-01 RX ADMIN — MORPHINE SULFATE 2 MG: 2 INJECTION, SOLUTION INTRAMUSCULAR; INTRAVENOUS at 11:29

## 2018-01-01 RX ADMIN — VANCOMYCIN HYDROCHLORIDE 1500 MG: 10 INJECTION, POWDER, LYOPHILIZED, FOR SOLUTION INTRAVENOUS at 22:32

## 2018-01-01 RX ADMIN — FENTANYL CITRATE 50 MCG: 50 INJECTION INTRAMUSCULAR; INTRAVENOUS at 14:25

## 2018-01-01 RX ADMIN — FENTANYL CITRATE 25 MCG: 50 INJECTION INTRAMUSCULAR; INTRAVENOUS at 14:55

## 2018-01-01 RX ADMIN — LEVOTHYROXINE SODIUM 75 MCG: 75 TABLET ORAL at 09:13

## 2018-01-01 RX ADMIN — LORAZEPAM 0.5 MG: 2 SOLUTION, CONCENTRATE ORAL at 19:48

## 2018-01-01 RX ADMIN — MORPHINE SULFATE 2 MG: 2 INJECTION, SOLUTION INTRAMUSCULAR; INTRAVENOUS at 08:51

## 2018-01-01 RX ADMIN — HEPARIN SODIUM 6210 UNITS: 1000 INJECTION, SOLUTION INTRAVENOUS; SUBCUTANEOUS at 17:29

## 2018-01-01 RX ADMIN — Medication 10 ML: at 15:07

## 2018-01-01 RX ADMIN — PIPERACILLIN SODIUM,TAZOBACTAM SODIUM 3.38 G: 3; .375 INJECTION, POWDER, FOR SOLUTION INTRAVENOUS at 16:55

## 2018-01-01 RX ADMIN — Medication 10 ML: at 17:13

## 2018-01-01 RX ADMIN — Medication 10 ML: at 06:57

## 2018-01-01 RX ADMIN — SODIUM CHLORIDE 1000 MG: 900 INJECTION, SOLUTION INTRAVENOUS at 12:25

## 2018-01-01 RX ADMIN — OXYCODONE HYDROCHLORIDE 10 MG: 10 TABLET, FILM COATED, EXTENDED RELEASE ORAL at 08:57

## 2018-01-01 RX ADMIN — FENTANYL CITRATE 25 MCG: 50 INJECTION, SOLUTION INTRAMUSCULAR; INTRAVENOUS at 15:43

## 2018-01-01 RX ADMIN — LEVOTHYROXINE SODIUM 75 MCG: 75 TABLET ORAL at 06:36

## 2018-01-01 RX ADMIN — DRONABINOL 2.5 MG: 2.5 CAPSULE ORAL at 13:25

## 2018-01-01 RX ADMIN — TRAMADOL HYDROCHLORIDE 50 MG: 50 TABLET, FILM COATED ORAL at 00:02

## 2018-01-01 RX ADMIN — FAMOTIDINE 20 MG: 20 TABLET ORAL at 21:09

## 2018-01-01 RX ADMIN — SODIUM CHLORIDE TAB 1 GM 1 G: 1 TAB at 17:13

## 2018-01-01 RX ADMIN — GADOTERATE MEGLUMINE 20 ML: 376.9 INJECTION INTRAVENOUS at 10:08

## 2018-01-01 RX ADMIN — OXYCODONE HYDROCHLORIDE AND ACETAMINOPHEN 1 TABLET: 5; 325 TABLET ORAL at 05:52

## 2018-01-01 RX ADMIN — PIPERACILLIN SODIUM,TAZOBACTAM SODIUM 3.38 G: 3; .375 INJECTION, POWDER, FOR SOLUTION INTRAVENOUS at 19:49

## 2018-01-01 RX ADMIN — POTASSIUM & SODIUM PHOSPHATES POWDER PACK 280-160-250 MG 1 PACKET: 280-160-250 PACK at 17:24

## 2018-01-01 RX ADMIN — LEVOTHYROXINE SODIUM 75 MCG: 75 TABLET ORAL at 09:29

## 2018-01-01 RX ADMIN — MORPHINE SULFATE 2 MG: 2 INJECTION, SOLUTION INTRAMUSCULAR; INTRAVENOUS at 19:05

## 2018-01-01 RX ADMIN — MORPHINE SULFATE 2 MG: 2 INJECTION, SOLUTION INTRAMUSCULAR; INTRAVENOUS at 13:37

## 2018-01-01 RX ADMIN — SODIUM CHLORIDE 100 ML/HR: 900 INJECTION, SOLUTION INTRAVENOUS at 15:11

## 2018-01-01 RX ADMIN — PIPERACILLIN SODIUM,TAZOBACTAM SODIUM 3.38 G: 3; .375 INJECTION, POWDER, FOR SOLUTION INTRAVENOUS at 04:50

## 2018-01-01 RX ADMIN — DRONABINOL 2.5 MG: 2.5 CAPSULE ORAL at 09:28

## 2018-01-01 RX ADMIN — MORPHINE SULFATE 2 MG: 2 INJECTION, SOLUTION INTRAMUSCULAR; INTRAVENOUS at 00:55

## 2018-01-01 RX ADMIN — ACETAMINOPHEN 650 MG: 325 TABLET ORAL at 17:11

## 2018-01-01 RX ADMIN — Medication 10 ML: at 07:06

## 2018-01-01 RX ADMIN — ONDANSETRON 4 MG: 4 TABLET, ORALLY DISINTEGRATING ORAL at 21:17

## 2018-01-01 RX ADMIN — DRONABINOL 2.5 MG: 2.5 CAPSULE ORAL at 09:12

## 2018-01-01 RX ADMIN — MORPHINE SULFATE 2 MG: 2 INJECTION, SOLUTION INTRAMUSCULAR; INTRAVENOUS at 00:08

## 2018-01-01 RX ADMIN — Medication 10 ML: at 23:31

## 2018-01-01 RX ADMIN — AMOXICILLIN AND CLAVULANATE POTASSIUM 1 TABLET: 500; 125 TABLET, FILM COATED ORAL at 22:18

## 2018-01-01 RX ADMIN — OXYCODONE HYDROCHLORIDE 10 MG: 10 TABLET, FILM COATED, EXTENDED RELEASE ORAL at 23:46

## 2018-01-01 RX ADMIN — MORPHINE SULFATE 2 MG: 2 INJECTION, SOLUTION INTRAMUSCULAR; INTRAVENOUS at 06:23

## 2018-01-01 RX ADMIN — Medication 10 ML: at 06:00

## 2018-01-01 RX ADMIN — DOCUSATE SODIUM 100 MG: 100 CAPSULE, LIQUID FILLED ORAL at 17:09

## 2018-01-01 RX ADMIN — PIPERACILLIN SODIUM,TAZOBACTAM SODIUM 3.38 G: 3; .375 INJECTION, POWDER, FOR SOLUTION INTRAVENOUS at 21:38

## 2018-01-01 RX ADMIN — ACETAMINOPHEN 650 MG: 325 TABLET ORAL at 09:40

## 2018-01-01 RX ADMIN — MORPHINE SULFATE 2 MG: 2 INJECTION, SOLUTION INTRAMUSCULAR; INTRAVENOUS at 16:53

## 2018-01-01 RX ADMIN — HEPARIN SODIUM AND DEXTROSE 24 UNITS/KG/HR: 10000; 5 INJECTION INTRAVENOUS at 09:32

## 2018-01-01 RX ADMIN — MORPHINE SULFATE 2 MG: 2 INJECTION, SOLUTION INTRAMUSCULAR; INTRAVENOUS at 17:02

## 2018-01-01 RX ADMIN — ACETAMINOPHEN 650 MG: 325 TABLET ORAL at 09:09

## 2018-01-01 RX ADMIN — FENTANYL CITRATE 25 MCG: 50 INJECTION INTRAMUSCULAR; INTRAVENOUS at 14:40

## 2018-01-01 RX ADMIN — SODIUM CHLORIDE 125 ML/HR: 900 INJECTION, SOLUTION INTRAVENOUS at 16:29

## 2018-01-01 RX ADMIN — ACETAMINOPHEN 650 MG: 325 TABLET ORAL at 17:32

## 2018-01-01 RX ADMIN — PIPERACILLIN SODIUM,TAZOBACTAM SODIUM 3.38 G: 3; .375 INJECTION, POWDER, FOR SOLUTION INTRAVENOUS at 17:40

## 2018-01-01 RX ADMIN — FENTANYL CITRATE 25 MCG: 50 INJECTION INTRAMUSCULAR; INTRAVENOUS at 15:10

## 2018-01-01 RX ADMIN — LEVOFLOXACIN 750 MG: 5 INJECTION, SOLUTION INTRAVENOUS at 13:48

## 2018-01-01 RX ADMIN — DRONABINOL 2.5 MG: 2.5 CAPSULE ORAL at 17:52

## 2018-01-01 RX ADMIN — SODIUM CHLORIDE 1000 MG: 900 INJECTION, SOLUTION INTRAVENOUS at 00:43

## 2018-01-01 RX ADMIN — MIDAZOLAM HYDROCHLORIDE 1 MG: 1 INJECTION, SOLUTION INTRAMUSCULAR; INTRAVENOUS at 15:38

## 2018-01-01 RX ADMIN — PIPERACILLIN SODIUM,TAZOBACTAM SODIUM 3.38 G: 3; .375 INJECTION, POWDER, FOR SOLUTION INTRAVENOUS at 08:15

## 2018-01-01 RX ADMIN — METOPROLOL TARTRATE 12.5 MG: 25 TABLET ORAL at 09:14

## 2018-01-01 RX ADMIN — Medication 10 ML: at 15:12

## 2018-01-01 RX ADMIN — FAMOTIDINE 20 MG: 10 INJECTION, SOLUTION INTRAVENOUS at 08:46

## 2018-01-01 RX ADMIN — DRONABINOL 2.5 MG: 2.5 CAPSULE ORAL at 17:24

## 2018-01-01 RX ADMIN — SODIUM CHLORIDE TAB 1 GM 1 G: 1 TAB at 09:19

## 2018-01-01 RX ADMIN — ACETAMINOPHEN 650 MG: 325 TABLET ORAL at 13:50

## 2018-01-01 RX ADMIN — OXYCODONE HYDROCHLORIDE 10 MG: 10 TABLET, FILM COATED, EXTENDED RELEASE ORAL at 21:38

## 2018-01-01 RX ADMIN — MORPHINE SULFATE 2 MG: 2 INJECTION, SOLUTION INTRAMUSCULAR; INTRAVENOUS at 22:57

## 2018-01-01 RX ADMIN — LIDOCAINE HYDROCHLORIDE 7 ML: 10 INJECTION, SOLUTION INFILTRATION; PERINEURAL at 09:11

## 2018-01-01 RX ADMIN — METOPROLOL TARTRATE 12.5 MG: 25 TABLET ORAL at 08:36

## 2018-01-01 RX ADMIN — MORPHINE SULFATE 2 MG: 2 INJECTION, SOLUTION INTRAMUSCULAR; INTRAVENOUS at 18:20

## 2018-01-01 RX ADMIN — HEPARIN SODIUM (PORCINE) LOCK FLUSH IV SOLN 100 UNIT/ML 500 UNITS: 100 SOLUTION at 14:54

## 2018-01-01 RX ADMIN — TRAMADOL HYDROCHLORIDE 50 MG: 50 TABLET, FILM COATED ORAL at 00:11

## 2018-01-01 RX ADMIN — FAMOTIDINE 20 MG: 20 TABLET ORAL at 09:21

## 2018-01-01 RX ADMIN — PIPERACILLIN SODIUM,TAZOBACTAM SODIUM 3.38 G: 3; .375 INJECTION, POWDER, FOR SOLUTION INTRAVENOUS at 00:44

## 2018-01-01 RX ADMIN — DRONABINOL 2.5 MG: 2.5 CAPSULE ORAL at 21:02

## 2018-01-01 RX ADMIN — POLYETHYLENE GLYCOL 3350 17 G: 17 POWDER, FOR SOLUTION ORAL at 16:53

## 2018-01-01 RX ADMIN — LEVOTHYROXINE SODIUM 75 MCG: 75 TABLET ORAL at 08:36

## 2018-01-01 RX ADMIN — SODIUM CHLORIDE TAB 1 GM 1 G: 1 TAB at 08:45

## 2018-01-01 RX ADMIN — IPRATROPIUM BROMIDE AND ALBUTEROL SULFATE 3 ML: .5; 3 SOLUTION RESPIRATORY (INHALATION) at 12:56

## 2018-01-01 RX ADMIN — SODIUM CHLORIDE 125 ML/HR: 900 INJECTION, SOLUTION INTRAVENOUS at 23:06

## 2018-01-01 RX ADMIN — DOCUSATE SODIUM 100 MG: 100 CAPSULE, LIQUID FILLED ORAL at 08:36

## 2018-01-01 RX ADMIN — LEVOTHYROXINE SODIUM 75 MCG: 75 TABLET ORAL at 08:34

## 2018-01-01 RX ADMIN — DOCUSATE SODIUM 100 MG: 100 CAPSULE, LIQUID FILLED ORAL at 19:48

## 2018-01-01 RX ADMIN — LIDOCAINE HYDROCHLORIDE 9 ML: 10 INJECTION, SOLUTION EPIDURAL; INFILTRATION; INTRACAUDAL; PERINEURAL at 15:00

## 2018-01-01 RX ADMIN — Medication 2 MG: at 21:42

## 2018-08-16 PROBLEM — N17.9 ACUTE RENAL INJURY DUE TO HYPOVOLEMIA (HCC): Status: ACTIVE | Noted: 2018-01-01

## 2018-08-16 PROBLEM — C34.90 METASTATIC LUNG CANCER (METASTASIS FROM LUNG TO OTHER SITE) (HCC): Status: ACTIVE | Noted: 2018-01-01

## 2018-08-16 PROBLEM — I73.9 PAD (PERIPHERAL ARTERY DISEASE) (HCC): Status: ACTIVE | Noted: 2018-01-01

## 2018-08-16 PROBLEM — E87.1 HYPONATREMIA: Status: ACTIVE | Noted: 2018-01-01

## 2018-08-16 PROBLEM — E86.0 DEHYDRATION: Status: ACTIVE | Noted: 2018-01-01

## 2018-08-16 PROBLEM — I82.402 LEG DVT (DEEP VENOUS THROMBOEMBOLISM), ACUTE, LEFT (HCC): Status: ACTIVE | Noted: 2018-01-01

## 2018-08-16 PROBLEM — E86.1 ACUTE RENAL INJURY DUE TO HYPOVOLEMIA (HCC): Status: ACTIVE | Noted: 2018-01-01

## 2018-08-16 NOTE — PROGRESS NOTES
PT aware of NPO status. PT aware of need to hold anticoagulants per protocol. PT aware of potential for sedation administration and need for  at discharge. PT aware of arrival time pre procedure, 0930. Pt states no questions at this time.

## 2018-08-16 NOTE — ED PROVIDER NOTES
EMERGENCY DEPARTMENT HISTORY AND PHYSICAL EXAM    Date: 8/16/2018  Patient Name: Alfie Onofre    History of Presenting Illness     Chief Complaint   Patient presents with    Leg Pain         History Provided By: Patient    Chief Complaint: Leg pain  Duration: 18 Hours  Timing:  Constant  Location: Left leg  Quality: Tightness and \"pins and needles sensation\"  Modifying Factors: Current cancer patient, denies any medication use at present  Associated Symptoms:  difficulty ambulating, left leg numbness, \"needles and pins\" sensation    Additional History (Context):   2:49 PM  Alfie Onofre is a 66 y.o. female with PMHX of active lung cancer (awaiting staging with [facility], Dr. Jluia Padron) who presents to the emergency department C/O tight left leg pain extending from her left hip down to her foot, onset last night. Associated sxs include difficulty ambulating, left leg \"needles and pins\" sensation throughout her left leg. Confirms that she is followed by Dr. Julia Padron with [facility] for a mass on her lung. States that she has multiple scans ordered tomorrow for cancer staging at this hospital. Pt has PAD that caused her right leg to cramp w/ ambulation, for which she had femoral/popiltearl bypass surgery performed by [facility] 3-4 years ago. Stopped tobacco use last month; states that she had been smoking 1 to 1/4 PPD since teenage years. Denies blood thinner use. Denies any medication use currently. Pt denies SOB, CP, hx of blood clots, and any other sxs or complaints. PCP: None        Past History     Past Medical History:  Past Medical History:   Diagnosis Date    Cancer ([facility] [address]     lung cancer    PAD (peripheral artery disease) ([facility] [address]        Past Surgical History:  Past Surgical History:   Procedure Laterality Date    VASCULAR SURGERY PROCEDURE UNLIST Right     opened up vessels       Family History:  History reviewed. No pertinent family history.     Social History:  Social History Substance Use Topics    Smoking status: Former Smoker    Smokeless tobacco: Never Used    Alcohol use Yes      Comment: occasionally       Allergies:  No Known Allergies      Review of Systems   Review of Systems   Constitutional: Positive for activity change (sleeping alot per brother) and fatigue. Negative for chills and fever. Respiratory: Positive for shortness of breath. Negative for cough. Cardiovascular: Positive for leg swelling. Gastrointestinal: Negative for blood in stool, nausea and vomiting. Genitourinary: Negative for difficulty urinating, dysuria and frequency. Musculoskeletal: Positive for myalgias (Left leg w/ tightness). Negative for arthralgias and joint swelling. Skin: Negative for color change. Neurological: Positive for weakness (generalized) and numbness (tingling to LLE). Negative for dizziness, light-headedness and headaches. Hematological: Does not bruise/bleed easily. All other systems reviewed and are negative. Physical Exam     Vitals:    08/16/18 1718 08/16/18 1810 08/16/18 1850 08/16/18 1918   BP: 112/63 108/53  125/66   Pulse: (!) 121 (!) 120 (!) 110 (!) 112   Resp: 20 24 27    Temp: 98.6 °F (37 °C)   98.7 °F (37.1 °C)   SpO2: 100% 99%  98%   Weight:       Height:         Physical Exam   Constitutional: She is oriented to person, place, and time. She appears well-developed and well-nourished. She has a sickly appearance. AA female that appears sick. Alert. Clinically dehydrated. Family at bedside. HENT:   Head: Normocephalic and atraumatic. Right Ear: External ear normal. No swelling or tenderness. Tympanic membrane is not perforated, not erythematous and not bulging. Left Ear: External ear normal. No swelling or tenderness. Tympanic membrane is not perforated, not erythematous and not bulging. Nose: Nose normal. No mucosal edema or rhinorrhea.    Mouth/Throat: Uvula is midline, oropharynx is clear and moist and mucous membranes are normal. No oral lesions. No trismus in the jaw. No dental abscesses or uvula swelling. No oropharyngeal exudate, posterior oropharyngeal edema, posterior oropharyngeal erythema or tonsillar abscesses. Eyes: Conjunctivae are normal. Right eye exhibits no discharge. Left eye exhibits no discharge. No scleral icterus. Neck: Normal range of motion. Neck supple. Cardiovascular: Regular rhythm, normal heart sounds and intact distal pulses. Tachycardia present. Exam reveals no gallop and no friction rub. No murmur heard. Tachy at 130. Doppler present at left PT pulses. No left DP doppler heard. Pulmonary/Chest: Effort normal and breath sounds normal. No accessory muscle usage. No tachypnea. No respiratory distress. She has no decreased breath sounds. She has no wheezes. She has no rhonchi. She has no rales. Abdominal: Soft. Musculoskeletal: Normal range of motion. Right upper leg: She exhibits no tenderness, no bony tenderness and no swelling. Left upper leg: She exhibits no tenderness. Right lower leg: She exhibits no tenderness, no bony tenderness and no swelling. Left lower leg: She exhibits no tenderness. Legs:       Right foot: There is normal range of motion, no tenderness and no bony tenderness. Left foot: There is no tenderness and no bony tenderness. BLE feet are cool to touch. FROM bilaterally. Sensation intact bilaterally   Lymphadenopathy:     She has no cervical adenopathy. Neurological: She is alert and oriented to person, place, and time. Skin: Skin is warm and dry. No rash noted. She is not diaphoretic. No erythema. Psychiatric: She has a normal mood and affect. Judgment normal.   Nursing note and vitals reviewed.         Diagnostic Study Results     Labs -     Recent Results (from the past 12 hour(s))   EKG, 12 LEAD, INITIAL    Collection Time: 08/16/18  3:11 PM   Result Value Ref Range    Ventricular Rate 129 BPM    Atrial Rate 129 BPM    P-R Interval 114 ms    QRS Duration 78 ms    Q-T Interval 296 ms    QTC Calculation (Bezet) 433 ms    Calculated P Axis 57 degrees    Calculated R Axis 48 degrees    Calculated T Axis 64 degrees    Diagnosis       Sinus tachycardia  Nonspecific T wave abnormality  Abnormal ECG  No previous ECGs available     CBC WITH AUTOMATED DIFF    Collection Time: 08/16/18  3:23 PM   Result Value Ref Range    WBC 13.1 4.6 - 13.2 K/uL    RBC 3.55 (L) 4.20 - 5.30 M/uL    HGB 8.6 (L) 12.0 - 16.0 g/dL    HCT 27.2 (L) 35.0 - 45.0 %    MCV 76.6 74.0 - 97.0 FL    MCH 24.2 24.0 - 34.0 PG    MCHC 31.6 31.0 - 37.0 g/dL    RDW 16.6 (H) 11.6 - 14.5 %    PLATELET 920 192 - 743 K/uL    MPV 9.2 9.2 - 11.8 FL    NEUTROPHILS 80 (H) 40 - 73 %    LYMPHOCYTES 13 (L) 21 - 52 %    MONOCYTES 6 3 - 10 %    EOSINOPHILS 1 0 - 5 %    BASOPHILS 0 0 - 2 %    ABS. NEUTROPHILS 10.4 (H) 1.8 - 8.0 K/UL    ABS. LYMPHOCYTES 1.7 0.9 - 3.6 K/UL    ABS. MONOCYTES 0.8 0.05 - 1.2 K/UL    ABS. EOSINOPHILS 0.2 0.0 - 0.4 K/UL    ABS. BASOPHILS 0.0 0.0 - 0.1 K/UL    DF AUTOMATED     METABOLIC PANEL, COMPREHENSIVE    Collection Time: 08/16/18  3:23 PM   Result Value Ref Range    Sodium 127 (L) 136 - 145 mmol/L    Potassium 5.5 3.5 - 5.5 mmol/L    Chloride 91 (L) 100 - 108 mmol/L    CO2 23 21 - 32 mmol/L    Anion gap 13 3.0 - 18 mmol/L    Glucose 106 (H) 74 - 99 mg/dL    BUN 25 (H) 7.0 - 18 MG/DL    Creatinine 1.55 (H) 0.6 - 1.3 MG/DL    BUN/Creatinine ratio 16 12 - 20      GFR est AA 41 (L) >60 ml/min/1.73m2    GFR est non-AA 34 (L) >60 ml/min/1.73m2    Calcium 10.3 (H) 8.5 - 10.1 MG/DL    Bilirubin, total 0.8 0.2 - 1.0 MG/DL    ALT (SGPT) 38 13 - 56 U/L    AST (SGOT) 33 15 - 37 U/L    Alk.  phosphatase 339 (H) 45 - 117 U/L    Protein, total 9.5 (H) 6.4 - 8.2 g/dL    Albumin 2.4 (L) 3.4 - 5.0 g/dL    Globulin 7.1 (H) 2.0 - 4.0 g/dL    A-G Ratio 0.3 (L) 0.8 - 1.7     MAGNESIUM    Collection Time: 08/16/18  3:23 PM   Result Value Ref Range    Magnesium 2.2 1.6 - 2.6 mg/dL CARDIAC PANEL,(CK, CKMB & TROPONIN)    Collection Time: 08/16/18  3:23 PM   Result Value Ref Range     26 - 192 U/L    CK - MB <1.0 <3.6 ng/ml    CK-MB Index  0.0 - 4.0 %     CALCULATION NOT PERFORMED WHEN RESULT IS BELOW LINEAR LIMIT    Troponin-I, Qt. <0.02 0.00 - 0.06 NG/ML   PROTHROMBIN TIME + INR    Collection Time: 08/16/18  3:23 PM   Result Value Ref Range    Prothrombin time 15.2 11.5 - 15.2 sec    INR 1.2 0.8 - 1.2     PTT    Collection Time: 08/16/18  3:23 PM   Result Value Ref Range    aPTT 35.4 23.0 - 36.4 SEC   LACTIC ACID    Collection Time: 08/16/18  3:23 PM   Result Value Ref Range    Lactic acid 2.6 (HH) 0.4 - 2.0 MMOL/L   DUPLEX LOWER EXT ARTERY LEFT    Collection Time: 08/16/18  4:45 PM   Result Value Ref Range    Left CFA dist sys PSV 42.81 cm/s    Left super femoral prox sys PSV 96.61 cm/s    Left Prox PFA A .62 cm/s    Left popliteal dist sys PSV 67.67 cm/s    Left popliteal prox sys PSV 57.98 cm/s    Left mid PTA sys PSV 10.90 cm/s    Left Prox PTA PSV 33.94 cm/s    Left mid peroneal sys PSV 0.00 cm/s    Left Mid AMBAR Velocity 26.81 cm/s    Left SFA Mid Dillon Ratio 1.04     Left super femoral mid sys .0 cm/s       Radiologic Studies -   XR CHEST PORT   Final Result   IMPRESSION:     Right upper lobe mass is present with associated atelectasis. Underlying  consolidation related to secondary pneumonia cannot be excluded. Right paratracheal lymphadenopathy is present. Mild cardiomegaly. As read by the radiologist.     CT Results  (Last 48 hours)    None        CXR Results  (Last 48 hours)               08/16/18 1746  XR CHEST PORT Final result    Impression:  IMPRESSION:        Right upper lobe mass is present with associated atelectasis. Underlying   consolidation related to secondary pneumonia cannot be excluded. Right paratracheal lymphadenopathy is present. Mild cardiomegaly.        Narrative:  EXAM: Chest x-ray single AP view       INDICATION: Bilateral lower extremity swelling. Shortness of breath. Known right   lung cancer. COMPARISON: None.       _____________       FINDINGS:        Right upper lobe mass is present with associated partial atelectasis of the   right upper lobe. Superimposed right upper lobe pneumonia cannot be excluded. Right paratracheal lymphadenopathy is present. Mild cardiomegaly is present. No pleural effusion. No acute osseus abnormality. _____________                 Medications given in the ED-  Medications   heparin 25,000 units in D5W 250 ml infusion (18 Units/kg/hr × 77.6 kg IntraVENous New Bag 8/16/18 1730)   0.9% sodium chloride infusion (not administered)   ondansetron (ZOFRAN ODT) tablet 4 mg (not administered)   sodium chloride 0.9 % bolus infusion 1,000 mL (0 mL IntraVENous IV Completed 8/16/18 1825)   sodium chloride 0.9 % bolus infusion 1,000 mL (1,000 mL IntraVENous New Bag 8/16/18 1825)   heparin (porcine) 1,000 unit/mL injection 6,210 Units (6,210 Units IntraVENous Given 8/16/18 1729)   HYDROcodone-acetaminophen (NORCO) 5-325 mg per tablet 1 Tab (1 Tab Oral Given 8/16/18 1918)         Medical Decision Making   I am the first provider for this patient. I reviewed the vital signs, available nursing notes, past medical history, past surgical history, family history and social history. Vital Signs-Reviewed the patient's vital signs. Pulse Oximetry Analysis - 100% on RA     EKG interpretation: (Preliminary)  3:11 PM   129 bpm. Sinus tachycardia. No NIRMAL.  EKG read by Prasanth Crain PA-C at 3:11 PM    Records Reviewed: Nursing Notes and Old Medical Records   CT CHEST w/contrast from 08/04/2018 at [facility] ED  Impression:  1.  Chest: Right upper lobe mass with maximal dimension over 7 cm (T3).  2.  Metastatic disease involving ipsilateral hilar and mediastinal lymph nodes (N2).  3.  Metastatic disease involving the adrenal glands (partially imaged) and the left clavicle (M1b).   4. Patient may benefit from imaging of the abdomen and pelvis nonemergently.  This will allow for establishing baseline extent of disease. 5. The preliminary interpretation of this exam was provided by [facility].     Result Narrative   Indication: Intercostal pain    Comparison: None available. Technique: Without the use of intravenous contrast, 6.37 mm helical images were obtained from the thoracic inlet through the lung bases. DLP mGy-cm. Findings:   Support devices: None. Lungs: Within the right upper lobe there is a solid mass posterolaterally measuring 6.1 x 5.7 x 7.2 cm.  The mass abuts them major and minor fissure. Anteriorly in the right upper lobe there is a small subpleural nodule measuring 10 mm. Tracheobronchial tree: Mild narrowing from mass effect at the posterior segmental bronchus. Pleural space: No pneumothorax or pleural effusion. Lymph nodes: Right hilar lymph node (level 10R) measures 4.3 x 4.3 cm.  Spanning the right upper and lower paratracheal lymph node station is a 3.5 x 4.5 cm lymph node (levels 2R, 4R). Heart / Mediastinum: Normal size heart.  No significant pericardial effusion. Upper abdomen: Partially imaged left adrenal gland contains a mass measuring 2.4 x 2.4 cm.  There is possible involvement of the right adrenal gland was not imaged in its entirety.  It is at least 17 mm transverse. Musculoskeletal and Chest Wall: Lytic bone lesion of the anterior left clavicle is present associated with a soft tissue lesion.  There is cortical breakthrough of the anterior cortical margin.  The soft tissue component is estimated at 3.1 x 2.8 cm. Provider Notes (Medical Decision Making): DVT, ischemic limb, PAD, PE, dehydration, metabolic derangement. Consider sepsis. Procedures:  Procedures    ED Course:   2:49 PM Initial assessment performed.  The patients presenting problems have been discussed, and they are in agreement with the care plan formulated and outlined with them. I have encouraged them to ask questions as they arise throughout their visit. FACE-TO-FACE PROGRESS NOTE:  3:31 PM   The patient presents with left leg pain. Hx of PVD. Hx of current active lung cancer. My exam shows a prominent left supraclavicular lymph node. A bilaterally cool lower extremities with PT pulses present by doppler but absent DP pulses. Tachycardic rate, rapid rhythm. Imp/plan: leg pain, cancer, PVD, recommended arterial and venous duplexes of BLE and CTA of chest.   I have personally seen and examined the patient, reviewed the JANNET's note and agree with findings and plan. Written by Yesica Vu ED Scribe, as dictated by Brody Maza MD.    3:31 PM No palpable pulses in the PT/DP vessels. Was able to hear Doppler in the left PT, but not DP pulses. Discussed with attending. CONSULT NOTE:   4:51 PM  Prasanth Crain PA-C spoke with Brody Maza MD   Specialty: ED Medicine  Discussed pt's hx, disposition, and available diagnostic and imaging results  in person. Reviewed care plans. Consulting physician agrees with plans as outlined. Recommends speaking with Oncology so will consult with Corrie Puente. CONSULT NOTE:   5:00 PM  Prasanth Crain PA-C spoke with Dr. Mario Camarena [facility] oncologist)  Specialty: Oncology  Discussed pt's hx, disposition, and available diagnostic and imaging results  over the telephone. Reviewed care plans. Consulting physician agrees with plans as outlined. Familiar w/ pt. Discussed need for admission with which he agrees. He agrees that hydration and performing scans tomorrow is appropriate to protect pt's kidneys as there is evidence of dehydration with acute renal injury. Agrees with Heparin drip. Agrees with plan. 5:36 PM Pt has peripheral access so there was a delay in getting IV. IV is now established in [facility]. Heparin drip is infusing and fluids have just been started.  Pt has known active lung CA that is awaiting scans for staging, has acute DVTs in LLE. Has evidence of dehydration, both clinically and on labs. While pt meets crit for SIRS and her lactate is 2.6, there is no source of infection and it is felt that these values are related to the above and not sepsis. 6:12 PM  I have spent 38 minutes of critical care time involved in lab review, consultations with specialist, family decision-making, and documentation. During this entire length of time I was immediately available to the patient. Critical Care: The reason for providing this level of medical care for this critically ill patient was due a critical illness that impaired one or more vital organ systems such that there was a high probability of imminent or life threatening deterioration in the patients condition. This care involved high complexity decision making to assess, manipulate, and support vital system functions, to treat this degreee vital organ system failure and to prevent further life threatening deterioration of the patients condition. Diagnosis and Disposition     CONSULT NOTE:   6:07 PM  Prasanth Crain PA-C spoke with Elgin Becker MD   Specialty: Hospitalist  Discussed pt's hx, disposition, and available diagnostic and imaging results. Reviewed care plans. Consulting physician agrees with plans as outlined. Agrees to admit to Telemetry. Written by Randolph Sinclair ED Scribe, as dictated by Prasanth Crain PA-C    ADMISSION NOTE:  6:07 PM  Patient is being admitted to the hospital by Elgin Becker MD. The results of their tests and reasons for their admission have been discussed with them and/or available family. They convey agreement and understanding for the need to be admitted and for their admission diagnosis. Written by Randolph Sinclair ED Scribe, as dictated by Prasanth Crain PA-C.    CONDITIONS ON ADMISSION:  Sepsis is not present at the time of admission. Deep Vein Thrombosis is present at the time of admission.  Thrombosis is present at the time of admission. Urinary Tract Infection is not present at the time of admission. Pneumonia is not present at the time of admission. MRSA is not present at the time of admission. Wound infection is not present at the time of admission. Pressure Ulcer is not present at the time of admission. CLINICAL IMPRESSION    1. Acute venous embolism and thrombosis of deep vessels of distal end of left lower extremity ([address]    2. Hyponatremia    3. Dehydration    4. Acute renal injury ([facility] [address]    5. Primary malignant neoplasm of lung metastatic to other site, unspecified laterality ([facility] [address]    6. History of peripheral arterial disease    7. Anemia, unspecified type      _______________________________    Attestations: This note is prepared by Yulissa Hernandez, acting as Scribe for Fabrice Suarez PA-C. Fabrice Suarez PA-C:  The scribe's documentation has been prepared under my direction and personally reviewed by me in its entirety. I confirm that the note above accurately reflects all work, treatment, procedures, and medical decision making performed by me. This note is prepared by Joshua Jean-Baptiste, acting as Scribe for Amelia Wright MD.    Amelia Wright MD:  The scribe's documentation has been prepared under my direction and personally reviewed by me in its entirety.   I confirm that the note above accurately reflects all work, treatment, procedures, and medical decision making performed by me.  _______________________________

## 2018-08-16 NOTE — Clinical Note
TRANSFER - OUT REPORT:  
 
Verbal report given to: Huma MELGAR. Report consisted of patient's Situation, Background, Assessment and  
Recommendations(SBAR). Opportunity for questions and clarification was provided. Patient transported with a Cardiac Cath Tech / Patient Care Tech. Patient transported to: ICU 10.

## 2018-08-16 NOTE — Clinical Note
Sheath #1: Dressed using 4 X 4, bacteriostatic and transparent dressing. Site: clean, dry, & intact, no bleeding and no hematoma.

## 2018-08-16 NOTE — Clinical Note
Right groin and bilateral chest clipped, prepped with ChloraPrep and draped. Wet prep solution applied at: 1135. Wet prep solution dried at: 1140. Wet prep elapsed drying time: 5 mins.

## 2018-08-16 NOTE — H&P
History & Physical    Patient: Tracy Trent MRN: [id number]  CSN: [id number]    YOB: 1955  Age: 66 y.o. Sex: female      DOA: 8/16/2018  Primary Care Provider:  None      Assessment/Plan     Patient Active Problem List   Diagnosis Code    Dehydration E86.0    Hyponatremia E87.1    Metastatic lung cancer (metastasis from lung to other site) [facility]) C34.90    Leg DVT (deep venous thromboembolism), acute, left (HCC) I82.402    Acute renal injury due to hypovolemia (HCC) N17.9, E86.1    PAD (peripheral artery disease) ([facility]) I73.9       Admit to telemetry    Acute DVT - left leg, started on heparin drip. Given tachycardia and SOB on exertion, she will need CTA of chest. She has CT chest, abdomen and pelvis ordered. By heme/onc. If renal function improves then can get the CT scan tomorrow. Metastatic lung ca - followed by heme/onc, need CT/PET scan for staging. Heme/onc consulted by ER. Unlikely sepsis - tachycardia likely secondary to PE. Wbc normal, no fever. No obvious source of infection. DARRION - pre renal, started on IVF,follow renal function. Hyponatremia - hypotonic, euvolemic, IVF, follow sodium levels. PAD -     DVT prophylaxis on heparin drip    Estimated length of stay : 3-4 days    CC: left leg pain       HPI:     Tracy Trent is a 66 y.o. female who has past history of PAD, recent diagnosis of lung ca presents to ER with concerns of left leg pain that started last night, patient also notes pain on putting weight on her left leg along with numbness. She reports that she was recently diagnosed with lung ca and was seen by Dr. Grady Bedolla. She has history of PAD and is s/p fem/pop bypass about 3-4 years ago at [facility]. She has been smoking since she was 15 yrs old and quit a month ago. History of HTN but not taking medications. In ER her LE doppler showed DVT in left leg. Her BUN/Cr 25/1.55, CTA chest not done. Her sodium at 127. Lactic acid 2.6.  She was noted to be dehydrated and her pulse above 120. She received fluid bolus in ER. She was started on heparin drip. Past Medical History:   Diagnosis Date    Cancer [facility])     lung cancer    PAD (peripheral artery disease) ([facility] [address]        Past Surgical History:   Procedure Laterality Date    VASCULAR SURGERY PROCEDURE UNLIST Right     opened up vessels       History reviewed. No pertinent family history. Social History     Social History    Marital status:      Spouse name: N/A    Number of children: N/A    Years of education: N/A     Social History Main Topics    Smoking status: Former Smoker    Smokeless tobacco: Never Used    Alcohol use Yes      Comment: occasionally    Drug use: Yes     Special: Marijuana    Sexual activity: Not Asked     Other Topics Concern    None     Social History Narrative       Prior to Admission medications    Medication Sig Start Date End Date Taking? Authorizing Provider   traMADol (ULTRAM) 50 mg tablet Take 50 mg by mouth every six (6) hours as needed for Pain. Historical Provider   cyclobenzaprine (FLEXERIL) 10 mg tablet Take 10 mg by mouth three (3) times daily as needed for Muscle Spasm(s). Historical Provider   naproxen (NAPROSYN) 500 mg tablet Take 500 mg by mouth two (2) times daily (with meals). Historical Provider       No Known Allergies    Review of Systems  Gen: No fever, chills, malaise, weight loss/gain. Heent: No headache, rhinorrhea, epistaxis, ear pain, hearing loss, sinus pain, neck pain/stiffness, sore throat. Heart: No  pnd, or orthopnea. See above  Resp: see above. GI: No nausea, vomiting, diarrhea, constipation, melena or hematochezia. : No urinary obstruction, dysuria or hematuria. Derm: No rash, new skin lesion or pruritis. Musc/skeletal: see above  Vasc: No edema, cyanosis or claudication. Endo: No heat/cold intolerance, no polyuria,polydipsia or polyphagia. Neuro: No unilateral weakness, numbness, tingling. No seizures. Heme: No easy bruising or bleeding. Physical Exam:     Physical Exam:  Visit Vitals    /53 (BP 1 Location: Left arm, BP Patient Position: At rest)    Pulse (!) 120    Temp 98.6 °F (37 °C)    Resp 24    Ht 5' 7\" (1.702 m)    Wt 77.6 kg (171 lb)    SpO2 99%    BMI 26.78 kg/m2      O2 Device: Room air    Temp (24hrs), Av.9 °F (37.2 °C), Min:98.6 °F (37 °C), Max:99.1 °F (37.3 °C)             General:  Awake, cooperative, no distress. Head:  Normocephalic, without obvious abnormality, atraumatic. Eyes:  Conjunctivae/corneas clear, sclera anicteric, PERRL, EOMs intact. Nose: Nares normal. No drainage or sinus tenderness. Throat: Lips, mucosa, and tongue normal.    Neck: Supple, symmetrical, trachea midline, no adenopathy. Lungs:   Clear to auscultation bilaterally. Heart:  Regular rate and rhythm, S1, S2 normal, no murmur, click, rub or gallop. Abdomen: Soft, non-tender. Bowel sounds normal. No masses,  No organomegaly. Extremities: Left leg TTP   Pulses: Unable to palpate distal pulses left leg, right leg very weak. Skin: Skin color pink, turgor normal. No rashes or lesions   Neurologic: CNII-XII intact. No focal motor or sensory deficit.        Labs Reviewed:    CMP:   Lab Results   Component Value Date/Time     (L) 2018 03:23 PM    K 5.5 2018 03:23 PM    CL 91 (L) 2018 03:23 PM    CO2 23 2018 03:23 PM    AGAP 13 2018 03:23 PM     (H) 2018 03:23 PM    BUN 25 (H) 2018 03:23 PM    CREA 1.55 (H) 2018 03:23 PM    GFRAA 41 (L) 2018 03:23 PM    GFRNA 34 (L) 2018 03:23 PM    CA 10.3 (H) 2018 03:23 PM    MG 2.2 2018 03:23 PM    ALB 2.4 (L) 2018 03:23 PM    TP 9.5 (H) 2018 03:23 PM    GLOB 7.1 (H) 2018 03:23 PM    AGRAT 0.3 (L) 2018 03:23 PM    SGOT 33 2018 03:23 PM    ALT 38 2018 03:23 PM     CBC:   Lab Results   Component Value Date/Time    WBC 13.1 08/16/2018 03:23 PM    HGB 8.6 (L) 08/16/2018 03:23 PM    HCT 27.2 (L) 08/16/2018 03:23 PM     08/16/2018 03:23 PM     All Cardiac Markers in the last 24 hours:   Lab Results   Component Value Date/Time     08/16/2018 03:23 PM    CKMB <1.0 08/16/2018 03:23 PM    CKND1  08/16/2018 03:23 PM     CALCULATION NOT PERFORMED WHEN RESULT IS BELOW LINEAR LIMIT    Trino Bookbinder <0.02 08/16/2018 03:23 PM         Procedures/imaging: see electronic medical records for all procedures/Xrays and details which were not copied into this note but were reviewed prior to creation of Plan        CC: None

## 2018-08-16 NOTE — IP AVS SNAPSHOT
Doug Antonn 
 
 
 [address] [facility] [address] 
[phone] Patient: Nahed Jimenez MRN: [id number] KFF:[id number] About your hospitalization You were admitted on:  August 16, 2018 You last received care in the:  [address] [place] [address] You were discharged on:  August 28, 2018 Why you were hospitalized Your primary diagnosis was:  Pericardial Effusion Your diagnoses also included:  Dehydration, Hyponatremia, Metastatic Lung Cancer (Metastasis From Lung To Other Site) (Hcc), Leg Dvt (Deep Venous Thromboembolism), Acute, Left (Hcc), Acute Renal Injury Due To Hypovolemia (Hcc), Pad (Peripheral Artery Disease) (Hcc) Follow-up Information Follow up With Details Comments Contact Info [address] [facility] to continue managing your healthcare needs. [phone] Micaela Hebert NP On 9/10/2018 Follow up appointment @ 12:45pm  (This is the earliest appointment available) [facility] [address] [facility] [address] [facility] [address] [facility], [address] [place] [address] Street 
[phone] Jeaneth Torres DO On 9/5/2018 Follow-up appointment @ 1:30 p.m. [address] [facility] [address] 
[phone] [address] [facility]  [phone] Discharge Orders None A check johnathon indicates which time of day the medication should be taken. My Medications START taking these medications Instructions Each Dose to Equal  
 Morning Noon Evening Bedtime  
 levothyroxine 75 mcg tablet Commonly known as:  SYNTHROID Start taking on:  8/29/2018 Your last dose was: Your next dose is: Take 1 Tab by mouth Daily (before breakfast). 75 mcg  
    
   
   
   
  
 metoprolol tartrate 25 mg tablet Commonly known as:  LOPRESSOR Your last dose was: Your next dose is: Take 0.5 Tabs by mouth two (2) times a day.   
 12.5 mg  
    
 oxyCODONE ER 10 mg ER tablet Commonly known as:  OxyCONTIN Your last dose was: Your next dose is: Take 1 Tab by mouth every twelve (12) hours. Max Daily Amount: 20 mg.  
 10 mg  
    
   
   
   
  
 oxyCODONE-acetaminophen 5-325 mg per tablet Commonly known as:  PERCOCET Your last dose was: Your next dose is: Take 1 Tab by mouth every six (6) hours as needed. Max Daily Amount: 4 Tabs. 1 Tab  
    
   
   
   
  
 senna-docusate 8.6-50 mg per tablet Commonly known as:  Miryam  Start taking on:  8/29/2018 Your last dose was: Your next dose is: Take 1 Tab by mouth daily. 1 Tab STOP taking these medications   
 cyclobenzaprine 10 mg tablet Commonly known as:  FLEXERIL  
   
  
 NAPROSYN 500 mg tablet Generic drug:  naproxen  
   
  
 traMADol 50 mg tablet Commonly known as:  ULTRAM  
   
  
  
  
Where to Get Your Medications Information on where to get these meds will be given to you by the nurse or doctor. ! Ask your nurse or doctor about these medications  
  levothyroxine 75 mcg tablet  
 metoprolol tartrate 25 mg tablet  
 oxyCODONE ER 10 mg ER tablet  
 oxyCODONE-acetaminophen 5-325 mg per tablet  
 senna-docusate 8.6-50 mg per tablet Opioid Education Prescription Opioids: What You Need to Know: 
 
 
 
F-face looks uneven A-arms unable to move or move unevenly S-speech slurred or non-existent T-time-call 911 as soon as signs and symptoms begin-DO NOT go Back to bed or wait to see if you get better-TIME IS BRAIN. Warning Signs of HEART ATTACK Call 911 if you have these symptoms: 
? Chest discomfort. Most heart attacks involve discomfort in the center of the chest that lasts more than a few minutes, or that goes away and comes back. It can feel like uncomfortable pressure, squeezing, fullness, or pain. ? Discomfort in other areas of the upper body. Symptoms can include pain or discomfort in one or both arms, the back, neck, jaw, or stomach. ? Shortness of breath with or without chest discomfort. ? Other signs may include breaking out in a cold sweat, nausea, or lightheadedness. Don't wait more than five minutes to call [address]! Fast action can save your life. Calling 911 is almost always the fastest way to get lifesaving treatment. Emergency Medical Services staff can begin treatment when they arrive  up to an hour sooner than if someone gets to the hospital by car. The discharge information has been reviewed with the patient. The patient verbalized understanding. Discharge medications reviewed with the patient and appropriate educational materials and side effects teaching were provided. ___________________________________________________________________________________________________________________________________ Deep Vein Thrombosis: Care Instructions Your Care Instructions A deep vein thrombosis (DVT) is a blood clot in certain veins of the legs, pelvis, or arms. Blood clots in these veins need to be treated because they can get bigger, break loose, and travel through the bloodstream to the lungs. A blood clot in a lung can be life-threatening. The doctor may have given you a blood thinner (anticoagulant).  A blood thinner can stop the blood clot from growing larger and prevent new clots from forming. You will need to take a blood thinner for 3 to 6 months or longer. The doctor has checked you carefully, but problems can develop later. If you notice any problems or new symptoms, get medical treatment right away. Follow-up care is a key part of your treatment and safety. Be sure to make and go to all appointments, and call your doctor if you are having problems. It's also a good idea to know your test results and keep a list of the medicines you take. How can you care for yourself at home? · Take your medicines exactly as prescribed. Call your doctor if you think you are having a problem with your medicine. · If you are taking a blood thinner, be sure you get instructions about how to take your medicine safely. Blood thinners can cause serious bleeding problems. · Wear compression stockings if your doctor recommends them. These stockings are tighter at the feet than on the legs. They may reduce pain and swelling in your legs. But there are different types of stockings, and they need to fit right. So your doctor will recommend what you need. · When you sit, use a pillow to raise the arm or leg that has the blood clot. Try to keep it above the level of your heart. When should you call for help? Call 911 anytime you think you may need emergency care. For example, call if: 
  · You passed out (lost consciousness).  
  · You have symptoms of a blood clot in your lung (called a pulmonary embolism). These include: 
¨ Sudden chest pain. ¨ Trouble breathing. ¨ Coughing up blood.  
 Call your doctor now or seek immediate medical care if: 
  · You have new or worse trouble breathing.  
  · You are dizzy or lightheaded, or you feel like you may faint.  
  · You have symptoms of a blood clot in your arm or leg. These may include: 
¨ Pain in the arm, calf, back of the knee, thigh, or groin. ¨ Redness and swelling in the arm, leg, or groin.  Watch closely for changes in your health, and be sure to contact your doctor if: 
  · You do not get better as expected. Where can you learn more? Go to http://denis-wyatt.info/. Enter I589 in the search box to learn more about \"Deep Vein Thrombosis: Care Instructions. \" Current as of: November 21, 2017 Content Version: 11.7 © 5566-0502 [employer]. Care instructions adapted under license by [employer] (which disclaims liability or warranty for this information). If you have questions about a medical condition or this instruction, always ask your healthcare professional. [address] any warranty or liability for your use of this information. Patient armband removed and shredded Introducing [facility]! Blair Hyman introduces [employer] patient portal. Now you can access parts of your medical record, email your doctor's office, and request medication refills online. 1. In your internet browser, go to [email or web address] 2. Click on the First Time User? Click Here link in the Sign In box. You will see the New Member Sign Up page. 3. Enter your [employer] Access Code exactly as it appears below. You will not need to use this code after youve completed the sign-up process. If you do not sign up before the expiration date, you must request a new code. · [employer] Access Code: [email or web address] Expires: 11/13/2018 12:21 PM 
 
4. Enter the last four digits of your Social Security Number (xxxx) and Date of Birth (mm/dd/yyyy) as indicated and click Submit. You will be taken to the next sign-up page. 5. Create a [employer] ID. This will be your [employer] login ID and cannot be changed, so think of one that is secure and easy to remember. 6. Create a [employer] password. You can change your password at any time. 7. Enter your Password Reset Question and Answer.  This can be used at a later time if you forget your password. 8. Enter your e-mail address. You will receive e-mail notification when new information is available in [address] 9. Click Sign Up. You can now view and download portions of your medical record. 10. Click the Download Summary menu link to download a portable copy of your medical information. If you have questions, please visit the Frequently Asked Questions section of the [employer] website. Remember, Sina is NOT to be used for urgent needs. For medical emergencies, dial 911. Now available from your iPhone and Android! Introducing Kobi Shin As a [facility] patient, I wanted to make you aware of our electronic visit tool called Kobi Shin. [facility] 24/7 allows you to connect within minutes with a medical provider 24 hours a day, seven days a week via a mobile device or tablet or logging into a secure website from your computer. You can access Kobi Juniortawandafin from anywhere in the [place]. A virtual visit might be right for you when you have a simple condition and feel like you just dont want to get out of bed, or cant get away from work for an appointment, when your regular [facility] provider is not available (evenings, weekends or holidays), or when youre out of town and need minor care. Electronic visits cost only $49 and if the [facility] 24/7 provider determines a prescription is needed to treat your condition, one can be electronically transmitted to a nearby pharmacy*. Please take a moment to enroll today if you have not already done so. The enrollment process is free and takes just a few minutes. To enroll, please download the [facility] 24/imedo eddie to your tablet or phone, or visit [email or web address] to enroll on your computer.    
And, as an [address] [place] [address] patient with a Un[employer] account, the results of your visits will be scanned into your electronic medical record and your primary care provider will be able to view the scanned results. We urge you to continue to see your regular [facility] provider for your ongoing medical care. And while your primary care provider may not be the one available when you seek a SVXR virtual visit, the peace of mind you get from getting a real diagnosis real time can be priceless. For more information on SVXR, view our Frequently Asked Questions (FAQs) at [email or web address]. Sincerely, 
 
Ira Michel MD 
Chief Medical Officer Nona Florez *:  certain medications cannot be prescribed via SVXR Unresulted Labs-Please follow up with your PCP about these lab tests Order Current Status IR IVC FILTER PLACEMENT PERCUTANEOUS In process CULTURE, FUNGUS Preliminary result EKG, 12 LEAD, INITIAL Preliminary result EKG, 12 LEAD, SUBSEQUENT Preliminary result Providers Seen During Your Hospitalization Provider Specialty Primary office phone Franci Santa MD Emergency Medicine [phone] Caren Funes MD [facility] [phone] Your Primary Care Physician (PCP) Primary Care Physician Office Phone Office Fax Rajatyossi Starkey [phone] You are allergic to the following No active allergies Recent Documentation Height Weight Breastfeeding? BMI Smoking Status 1.702 m 88.3 kg No 30.49 kg/m2 Former Smoker Emergency Contacts Name Discharge Info Relation Home Work Mobile Anselmo Meza DISCHARGE CAREGIVER [3] Son [22] [phone] Patient Belongings The following personal items are in your possession at time of discharge: 
     Visual Aid: None             Clothing: Undergarments    Other Valuables: At bedside, Other (comment) (tj)  Personal Items Sent to Safe: none Please provide this summary of care documentation to your next provider. Signatures-by signing, you are acknowledging that this After Visit Summary has been reviewed with you and you have received a copy. Patient Signature:  ____________________________________________________________ Date:  ____________________________________________________________  
  
Dudley Sport Provider Signature:  ____________________________________________________________ Date:  ____________________________________________________________

## 2018-08-16 NOTE — Clinical Note
TRANSFER - IN REPORT:  
 
Verbal report received from: CHARLY MELGAR. Report consisted of patient's Situation, Background, Assessment and  
Recommendations(SBAR). Opportunity for questions and clarification was provided. Assessment completed upon patient's arrival to unit and care assumed. Patient transported with a Registered Nurse and Oxygen.

## 2018-08-16 NOTE — ED TRIAGE NOTES
Pt with left leg pain since last night. Pt c/o numbness and tightness. Pt having difficulty ambulating.

## 2018-08-17 NOTE — PROGRESS NOTES
Hospitalist Progress Note    Patient: Edison Araya MRN: [id number]  CSN: [id number]    YOB: 1955  Age: 66 y.o. Sex: female    DOA: 8/16/2018 LOS:  LOS: 1 day            Assessment/Plan     1. Acute DVT  2. Metastatic lung cancer  3. Dehydration  4. Hyponatremia- dehydration v SIADH  5. Sinus tachycardia  6. Renal insufficiency  7. Back pain  8. Elevated lactic acid, no evidence of infection, normalized  9. Anemia, with out evidence of bleeding    Plan:  - continue heparin gtt  - IV fluids  - stop lactic acid  - obtain urine sodium, osm, TSH  - MRi brain/ bone scan  - needs biopsy, discussed w IR who wishes to wait on moderate sedation for biopsy of clavicular head until she improves more hemodynamically, will plan on Monday AM, hold heparin 4 hours prior to procedure  - analgesia        Patient Active Problem List   Diagnosis Code    Dehydration E86.0    Hyponatremia E87.1    Metastatic lung cancer (metastasis from lung to other site) [facility]) C34.90    Leg DVT (deep venous thromboembolism), acute, left ([facility]) I82.402    Acute renal injury due to hypovolemia ([facility]) N17.9, E86.1    PAD (peripheral artery disease) ([facility]) I73.9               Subjective:    cc: leg/ back pain  Pt complains of crampy calf pain as well as back pain  Denies dyspnea, palpitations, nausea, vomiting       REVIEW OF SYSTEMS:  General: No fevers or chills. Cardiovascular: No chest pain or pressure. No palpitations. Pulmonary: No shortness of breath. Gastrointestinal: No nausea, vomiting.      Objective:        Vital signs/Intake and Output:  Visit Vitals    /59 (BP 1 Location: Left arm, BP Patient Position: At rest;Supine)    Pulse (!) 115    Temp 99.1 °F (37.3 °C)    Resp 16    Ht 5' 7\" (1.702 m)    Wt 87.3 kg (192 lb 7.4 oz)    SpO2 98%    Breastfeeding No    BMI 30.14 kg/m2     Current Shift:     Last three shifts:  08/15 1901 - 08/17 0700  In: 0   Out: 860 [Urine:860]    Body mass index is 30.14 kg/(m^2). Physical Exam:  GEN: Alert and oriented times three NAD  EYES: conjunctiva normal, lids with out lesions  HEENT: MMM, No thyromegaly, no lymphadenopathy  HEART: RRR +S1 +S2, no JVD, pulses 2+ distally  LUNGS: CTA B/L no wheezes, rales or rhonchi + palpable mass left clavicular head  ABDOMEN: + BS, soft NT/ND no organomegaly,  No rebound  EXTREMITIES: No edema cyanosis, cap refill normal   SKIN: no rashes or skin breakdown, no nodules, normal turgor  Current Facility-Administered Medications   Medication Dose Route Frequency    heparin 25,000 units in D5W 250 ml infusion  18-36 Units/kg/hr IntraVENous TITRATE    traMADol (ULTRAM) tablet 50 mg  50 mg Oral Q6H PRN    0.9% sodium chloride infusion  125 mL/hr IntraVENous CONTINUOUS         All the patient's labs over the past 24 hours were reviewed both during my initial daily workflow process and at the time notated as \"note time\" in [employer]. (It is not time stamped separately in this workflow.)  Select labs are listed below.         Labs: Results:       Chemistry Recent Labs      08/17/18   0030  08/16/18   1523   GLU  102*  106*   NA  128*  127*   K  4.8  5.5   CL  94*  91*   CO2  23  23   BUN  23*  25*   CREA  1.38*  1.55*   CA  8.6  10.3*   AGAP  11  13   BUCR  17  16   AP   --   339*   TP   --   9.5*   ALB   --   2.4*   GLOB   --   7.1*   AGRAT   --   0.3*      CBC w/Diff Recent Labs      08/17/18   0030  08/16/18   1523   WBC  11.4  13.1   RBC  2.98*  3.55*   HGB  7.1*  8.6*   HCT  22.8*  27.2*   PLT  255  310   GRANS   --   80*   LYMPH   --   13*   EOS   --   1      Cardiac Enzymes Recent Labs      08/16/18   1523   CPK  130   CKND1  CALCULATION NOT PERFORMED WHEN RESULT IS BELOW LINEAR LIMIT      Coagulation Recent Labs      08/17/18   0030  08/16/18   1523   PTP   --   15.2   INR   --   1.2   APTT  71.9*  35.4               Liver Enzymes Recent Labs      08/16/18   1523   TP  9.5*   ALB  2.4*   AP  339*   SGOT  33 Procedures/imaging: see electronic medical records for all procedures/Xrays and details which were not copied into this note but were reviewed prior to creation of Roberto Truong DO  Internal Medicine/Geriatrics

## 2018-08-17 NOTE — PROGRESS NOTES
Problem: Falls - Risk of  Goal: *Absence of Falls  Document Yobany Fall Risk and appropriate interventions in the flowsheet.   Outcome: Progressing Towards Goal  Fall Risk Interventions:  Mobility Interventions: Communicate number of staff needed for ambulation/transfer, Patient to call before getting OOB         Medication Interventions: Patient to call before getting OOB, Teach patient to arise slowly    Elimination Interventions: Call light in reach, Patient to call for help with toileting needs, Toilet paper/wipes in reach

## 2018-08-17 NOTE — PROGRESS NOTES
Received pt from ED via stretcher accompanied by family members, pt on Heparin drip infusing at 18 units/kg/hr. Pt educated on heparin, informed to notify caregiver of any signs of bleeding, she verbalized understanding. Lactic acid 4.7, Dr. Kam Rosales notified, RN to continue to monitor lactic acid level and notify MD if rate goes up. 0012: PRN pain medication administered, see flowsheets. 2753: Dr. Babs Rivero informed about the changes in pt's hemoglobin, order received for repeat H & H and type & screen. 0542:New orders received, pt has not eating or drink water since midnight.  0625: MRI form completed and signed. Pt remain NPO, oncoming nurse informed. Bedside shift change report given to MARC Amezcua (oncoming nurse) by Mariam Mark RN (offgoing nurse). Report included the following information SBAR, OR Summary, Procedure Summary, Intake/Output, MAR, Recent Results and Cardiac Rhythm ST, .

## 2018-08-17 NOTE — PROGRESS NOTES
Demetrice Bergeron 38 patient care from off going nurse Jyoti Booker RN. Patient is alert x 4 resting in bed and appears to be in no sign of distress. Heparin drip rate verified with off going nurse and PTT ordered. Patient instructed to call for assistance.

## 2018-08-17 NOTE — PROGRESS NOTES
Reason for Admission:   Ray Briceno is a 66 y.o. female with PMHX of active lung cancer (awaiting staging with [facility], Dr. Evita Weston) who presents to the emergency department C/O tight left leg pain extending from her left hip down to her foot, onset last night. Associated sxs include difficulty ambulating, left leg \"needles and pins\" sensation throughout her left leg. Confirms that she is followed by Dr. Evita Weston with [facility] for a mass on her lung. States that she has multiple scans ordered tomorrow for cancer staging at this hospital. Pt has PAD that caused her right leg to cramp w/ ambulation, for which she had femoral/popiltearl bypass surgery performed by [facility] 3-4 years ago. Stopped tobacco use last month; states that she had been smoking 1 to 1/4 PPD since teenage years. Denies blood thinner use. Denies any medication use currently. Pt denies SOB, CP, hx of blood clots, and any other sxs or complaints. Patient admitted to telemetry unit for DVT left                      RRAT Score:     13             Do you (patient/family) have any concerns for transition/discharge? Brother at bedside              Plan for utilizing home health:     tbd    Likelihood of readmission?   tbd            Transition of Care Plan:      Cm has attempted several times t meet with patien she is again off the floor. Brother at bedside. States amairani lives alone and has been independent until recent illness. States she was working. Reports she does not use DME and has [employer] for insurance.  Cm will continue to follow

## 2018-08-17 NOTE — PROGRESS NOTES
Call to [facility] RN to inform her that the biopsy ordered will be on Monday in an attempt to stabilize sodium levels prior to sedation. Dr. Damien Smyth has spoken with Dr. Abigail Ayers on status.

## 2018-08-17 NOTE — PROGRESS NOTES
Pt state pain as 8/10. Pt received medication as per MAR. Potential medication side effects explained to patient, patient verbalizes understanding, opportunities for questions provided. Patient stable, no apparent distress at this time, bed in locked position, call bell within reach.

## 2018-08-17 NOTE — CONSULTS
Phone: [phone]  Paging : [phone]      Hematology / Oncology Initial Consult Note    Admit Date: 8/16/2018    Reason for Consult:    Requesting Physician:    Assessment:     Sadie Maldonado is a 66 y.o., [address] female, who I have been asked to see for metastatic cancer    Principal Problem:    Leg DVT (deep venous thromboembolism), acute, left ([address] (8/16/2018)    Active Problems:    Dehydration (8/16/2018)      Hyponatremia (8/16/2018)      Metastatic lung cancer (metastasis from lung to other site) ([facility] [address] (8/16/2018)      Acute renal injury due to hypovolemia ([facility] [address] (8/16/2018)      PAD (peripheral artery disease) ([facility] [address] (8/16/2018)        Plan:     1. Agree to anticoaguation treatment for DVT. Transition to lovenox vs oral anti Xa agent when stable. 2. IVF and electrolytes repletion as doing  3. need biopsy of the left supraclavicular nodule. Will arrange inpatient. Will need core biopsy to get as much tissue as possible  4. Will need bone scan and CT scan of the abdomen and pelvis, MRI of brain and port placement. Will schedule in house due to transportation issue. Not urgent  For invasive procedures, if needed heparin can be held temporarily for 4-6 hours prior to procedure and resume asap. History of Present Illness: Sadie Maldonado is a 66 y.o. female who is under Dr. Phillips Filler care for a newly identified metastatic cancer most likely lung origin. She was going to start workup with biopsy and staging scans, and presented to ED yesterday with LLE pain and dehydration and weakness. PVL showed  · No evidence of deep vein thrombosis in the right lower extremity. · Deep venous thrombus present in the left external iliac vein, saphenofemoral junction, common femoral, femoral , popliteal, posterior tibial and peroneal veins. She was found to have low Na 127, Cr 1.53, and anemia Hb 7.1. She was started on heparin and IVF.  No pulm symptoms to indicate PE  Oncology is consulted for this oncology history. She is feeling better overnight with IVF and a/c. LE no longer in pain. onc history:    Ms. Marjorie Grayson is seen today as a new patient. She was seen in the ER on August 4, 2018. She says she does not have a PCP due to insurance changes. She had left sided chest pain and also seen on July 9, July 29 for this also. She has a large supraclavicular node on the left. This is 1.5 x 2 inches on measurement in the room on exam..  She said this came up on July 9th. CT scan showed right upper lobe mass measuring 7 cm with epsilateral hilar and mediastinal lymph nodes and involvement of the left clavicle and adrenal gland. She had left shoulder x-rays. Exact size of masses are  6.1 x 5.7 x 7.2 cm on CT scan  and there was narrowing of the tracheal bronchial tree. The right hilar node measured 4.3 x 4.3 cm and the lower parateracheal node measured 3.5 x 4.5 cm. The left adrenal  mass was 2.4 x 2.4 cm. Lytic bone lesion of the left clavicle with soft tissue mass and measures 3.1 x 2.8 cm. We discussed we would need to do biopsies and probable radiation therapy and chemotherapy. She says she is not eating well at all and is very weak and fatigued. ROS:    Constitutional: No fever, chills, diaphoresis, activity change, appetite change, + fatigue + unexpected weight change. HEENT: No sore throat, rhinorrhea,or visual disturbance. Respiratory: No cough, chest tightness, shortness of breath. Cardiovascular:No chest pain, palpitations. Gastrointestinal:No nausea, vomiting, diarrhea, constipation, early satiety, abdominal distention, abdominal pain. Genitourinary: No dysuria, urgency, frequency, hematuria, flank pain, difficulty urinating. Neurological: No headache, dizziness, weakness, tingling and numbness or fall. Musculoskeletal: No bone pain or back pain. No arthralgia or myalgia. No bruise/bleed easily. LL extremity swelling.       Past Medical History:   Diagnosis Date    Cancer [facility])     lung cancer    PAD (peripheral artery disease) ([facility]        Past Surgical History:   Procedure Laterality Date    VASCULAR SURGERY PROCEDURE UNLIST Right     opened up vessels       History reviewed. No pertinent family history. Social History     Social History    Marital status:      Spouse name: N/A    Number of children: N/A    Years of education: N/A     Social History Main Topics    Smoking status: Former Smoker    Smokeless tobacco: Never Used    Alcohol use Yes      Comment: occasionally    Drug use: Yes     Special: Marijuana    Sexual activity: Not Asked     Other Topics Concern    None     Social History Narrative       Current Facility-Administered Medications   Medication Dose Route Frequency    heparin 25,000 units in D5W 250 ml infusion  18-36 Units/kg/hr IntraVENous TITRATE    traMADol (ULTRAM) tablet 50 mg  50 mg Oral Q6H PRN    0.9% sodium chloride infusion  125 mL/hr IntraVENous CONTINUOUS       Prior to Admission medications    Medication Sig Start Date End Date Taking? Authorizing Provider   naproxen (NAPROSYN) 500 mg tablet Take 500 mg by mouth two (2) times daily (with meals). Yes Historical Provider   traMADol (ULTRAM) 50 mg tablet Take 50 mg by mouth every six (6) hours as needed for Pain. Historical Provider   cyclobenzaprine (FLEXERIL) 10 mg tablet Take 10 mg by mouth three (3) times daily as needed for Muscle Spasm(s). Historical Provider       No Known Allergies    ECOG PS:    Physical Exam:    Temp (24hrs), Av.7 °F (37.1 °C), Min:98.1 °F (36.7 °C), Max:99.1 °F (37.3 °C)  VSIP  Intake/Output Summary (Last 24 hours) at 18 0429  Last data filed at 18 0006   Gross per 24 hour   Intake                0 ml   Output              560 ml   Net             -560 ml   RESULTRCNT(24h)Xr Chest Port    Result Date: 2018  EXAM: Chest x-ray single AP view INDICATION: Bilateral lower extremity swelling. Shortness of breath. Known right lung cancer. COMPARISON: None. _____________ FINDINGS: Right upper lobe mass is present with associated partial atelectasis of the right upper lobe. Superimposed right upper lobe pneumonia cannot be excluded. Right paratracheal lymphadenopathy is present. Mild cardiomegaly is present. No pleural effusion. No acute osseus abnormality. _____________     IMPRESSION: Right upper lobe mass is present with associated atelectasis. Underlying consolidation related to secondary pneumonia cannot be excluded. Right paratracheal lymphadenopathy is present. Mild cardiomegaly.         General: Alert , Oriented, in no distress  HEENT: no pallor, anicteric sclera, oral pharynx without lesion   No cervical, supraclavicular, axillary and inguinal lymphadenopathy palpated  Heart: regular rate, and rhythm, without murmur, gallop or rubbing  Lungs:breathing comfortably on room air, clear to auscultation and percussion bilaterally  ABD: bowel sound present, soft, nondistended, nontender, no hepatosplenomegaly or mass  Extremities: warm, well perfused, no edema  MSK: no tenderness along the spine or long bones  Skin: No rash  Neuro: non-focal  L medial clavicular nodule    Recent Results (from the past 24 hour(s))   EKG, 12 LEAD, INITIAL    Collection Time: 08/16/18  3:11 PM   Result Value Ref Range    Ventricular Rate 129 BPM    Atrial Rate 129 BPM    P-R Interval 114 ms    QRS Duration 78 ms    Q-T Interval 296 ms    QTC Calculation (Bezet) 433 ms    Calculated P Axis 57 degrees    Calculated R Axis 48 degrees    Calculated T Axis 64 degrees    Diagnosis       Sinus tachycardia  Nonspecific T wave abnormality  Abnormal ECG  No previous ECGs available     CBC WITH AUTOMATED DIFF    Collection Time: 08/16/18  3:23 PM   Result Value Ref Range    WBC 13.1 4.6 - 13.2 K/uL    RBC 3.55 (L) 4.20 - 5.30 M/uL    HGB 8.6 (L) 12.0 - 16.0 g/dL    HCT 27.2 (L) 35.0 - 45.0 %    MCV 76.6 74.0 - 97.0 FL    MCH 24.2 24.0 - 34.0 PG    MCHC 31.6 31.0 - 37.0 g/dL    RDW 16.6 (H) 11.6 - 14.5 %    PLATELET 954 768 - 262 K/uL    MPV 9.2 9.2 - 11.8 FL    NEUTROPHILS 80 (H) 40 - 73 %    LYMPHOCYTES 13 (L) 21 - 52 %    MONOCYTES 6 3 - 10 %    EOSINOPHILS 1 0 - 5 %    BASOPHILS 0 0 - 2 %    ABS. NEUTROPHILS 10.4 (H) 1.8 - 8.0 K/UL    ABS. LYMPHOCYTES 1.7 0.9 - 3.6 K/UL    ABS. MONOCYTES 0.8 0.05 - 1.2 K/UL    ABS. EOSINOPHILS 0.2 0.0 - 0.4 K/UL    ABS. BASOPHILS 0.0 0.0 - 0.1 K/UL    DF AUTOMATED     METABOLIC PANEL, COMPREHENSIVE    Collection Time: 08/16/18  3:23 PM   Result Value Ref Range    Sodium 127 (L) 136 - 145 mmol/L    Potassium 5.5 3.5 - 5.5 mmol/L    Chloride 91 (L) 100 - 108 mmol/L    CO2 23 21 - 32 mmol/L    Anion gap 13 3.0 - 18 mmol/L    Glucose 106 (H) 74 - 99 mg/dL    BUN 25 (H) 7.0 - 18 MG/DL    Creatinine 1.55 (H) 0.6 - 1.3 MG/DL    BUN/Creatinine ratio 16 12 - 20      GFR est AA 41 (L) >60 ml/min/1.73m2    GFR est non-AA 34 (L) >60 ml/min/1.73m2    Calcium 10.3 (H) 8.5 - 10.1 MG/DL    Bilirubin, total 0.8 0.2 - 1.0 MG/DL    ALT (SGPT) 38 13 - 56 U/L    AST (SGOT) 33 15 - 37 U/L    Alk.  phosphatase 339 (H) 45 - 117 U/L    Protein, total 9.5 (H) 6.4 - 8.2 g/dL    Albumin 2.4 (L) 3.4 - 5.0 g/dL    Globulin 7.1 (H) 2.0 - 4.0 g/dL    A-G Ratio 0.3 (L) 0.8 - 1.7     MAGNESIUM    Collection Time: 08/16/18  3:23 PM   Result Value Ref Range    Magnesium 2.2 1.6 - 2.6 mg/dL   CARDIAC PANEL,(CK, CKMB & TROPONIN)    Collection Time: 08/16/18  3:23 PM   Result Value Ref Range     26 - 192 U/L    CK - MB <1.0 <3.6 ng/ml    CK-MB Index  0.0 - 4.0 %     CALCULATION NOT PERFORMED WHEN RESULT IS BELOW LINEAR LIMIT    Troponin-I, Qt. <0.02 0.00 - 0.06 NG/ML   PROTHROMBIN TIME + INR    Collection Time: 08/16/18  3:23 PM   Result Value Ref Range    Prothrombin time 15.2 11.5 - 15.2 sec    INR 1.2 0.8 - 1.2     PTT    Collection Time: 08/16/18  3:23 PM   Result Value Ref Range    aPTT 35.4 23.0 - 36.4 SEC   LACTIC ACID    Collection Time: 08/16/18  3:23 PM   Result Value Ref Range    Lactic acid 2.6 (HH) 0.4 - 2.0 MMOL/L   DUPLEX LOWER EXT ARTERY LEFT    Collection Time: 08/16/18  4:45 PM   Result Value Ref Range    Left CFA dist sys PSV 42.81 cm/s    Left super femoral prox sys PSV 96.61 cm/s    Left Prox PFA A .62 cm/s    Left popliteal dist sys PSV 67.67 cm/s    Left popliteal prox sys PSV 57.98 cm/s    Left mid PTA sys PSV 10.90 cm/s    Left Prox PTA PSV 33.94 cm/s    Left mid peroneal sys PSV 0.00 cm/s    Left Mid AMBAR Velocity 26.81 cm/s    Left SFA Mid Dillon Ratio 1.04     Left super femoral mid sys .0 cm/s   LACTIC ACID    Collection Time: 08/16/18  7:55 PM   Result Value Ref Range    Lactic acid 4.7 (HH) 0.4 - 2.0 MMOL/L   URINALYSIS W/ RFLX MICROSCOPIC    Collection Time: 08/16/18  9:50 PM   Result Value Ref Range    Color DARK YELLOW      Appearance CLOUDY      Specific gravity 1.022 1.005 - 1.030      pH (UA) 5.0 5.0 - 8.0      Protein TRACE (A) NEG mg/dL    Glucose NEGATIVE  NEG mg/dL    Ketone NEGATIVE  NEG mg/dL    Bilirubin SMALL (A) NEG      Blood NEGATIVE  NEG      Urobilinogen 1.0 0.2 - 1.0 EU/dL    Nitrites NEGATIVE  NEG      Leukocyte Esterase SMALL (A) NEG     URINE MICROSCOPIC ONLY    Collection Time: 08/16/18  9:50 PM   Result Value Ref Range    WBC 4 to 10 0 - 5 /hpf    RBC NEGATIVE  0 - 5 /hpf    Epithelial cells 2+ 0 - 5 /lpf    Bacteria 2+ (A) NEG /hpf    Mucus 2+ (A) NEG /lpf    Hyaline cast 4 to 10 0 - 2 /lpf   METABOLIC PANEL, BASIC    Collection Time: 08/17/18 12:30 AM   Result Value Ref Range    Sodium 128 (L) 136 - 145 mmol/L    Potassium 4.8 3.5 - 5.5 mmol/L    Chloride 94 (L) 100 - 108 mmol/L    CO2 23 21 - 32 mmol/L    Anion gap 11 3.0 - 18 mmol/L    Glucose 102 (H) 74 - 99 mg/dL    BUN 23 (H) 7.0 - 18 MG/DL    Creatinine 1.38 (H) 0.6 - 1.3 MG/DL    BUN/Creatinine ratio 17 12 - 20      GFR est AA 47 (L) >60 ml/min/1.73m2    GFR est non-AA 39 (L) >60 ml/min/1.73m2    Calcium 8.6 8.5 - 10.1 MG/DL CBC W/O DIFF    Collection Time: 08/17/18 12:30 AM   Result Value Ref Range    WBC 11.4 4.6 - 13.2 K/uL    RBC 2.98 (L) 4.20 - 5.30 M/uL    HGB 7.1 (L) 12.0 - 16.0 g/dL    HCT 22.8 (L) 35.0 - 45.0 %    MCV 76.5 74.0 - 97.0 FL    MCH 23.8 (L) 24.0 - 34.0 PG    MCHC 31.1 31.0 - 37.0 g/dL    RDW 16.6 (H) 11.6 - 14.5 %    PLATELET 355 125 - 916 K/uL    MPV 8.8 (L) 9.2 - 11.8 FL   LACTIC ACID    Collection Time: 08/17/18 12:30 AM   Result Value Ref Range    Lactic acid 2.3 (HH) 0.4 - 2.0 MMOL/L   PTT    Collection Time: 08/17/18 12:30 AM   Result Value Ref Range    aPTT 71.9 (H) 23.0 - 36.4 SEC   [id number] Chest Port    Result Date: 8/16/2018  EXAM: Chest x-ray single AP view INDICATION: Bilateral lower extremity swelling. Shortness of breath. Known right lung cancer. COMPARISON: None. _____________ FINDINGS: Right upper lobe mass is present with associated partial atelectasis of the right upper lobe. Superimposed right upper lobe pneumonia cannot be excluded. Right paratracheal lymphadenopathy is present. Mild cardiomegaly is present. No pleural effusion. No acute osseus abnormality. _____________     IMPRESSION: Right upper lobe mass is present with associated atelectasis. Underlying consolidation related to secondary pneumonia cannot be excluded. Right paratracheal lymphadenopathy is present. Mild cardiomegaly.         Augusta Aguero MD, PhD  Office: [phone]  Cell: [phone]

## 2018-08-17 NOTE — ROUTINE PROCESS
Bedside and Verbal shift change report given to Evette Lauren RN (oncoming nurse) by MARC Samuel (offgoing nurse). Report included the following information SBAR, Kardex, Intake/Output and MAR.

## 2018-08-18 NOTE — PROGRESS NOTES
Pt not evaluated currently due to:  []  Nausea/vomiting  []  Eating  []  Pain  []  Pt lethargic  [x]  Nrs stating pt is on bed rest d/t LLE DVT. Spoke with Peyton Carr and Genaro Coombs who informed PT of orders at 1110  Will f/u later as schedule allows. Thank you.   Donald Tenorio

## 2018-08-18 NOTE — PROGRESS NOTES
Shift Progress Note:  Assumed care of patient in bed awake, alert medicated x2 for c/o pain, with relief noted. Remains on room air, no change in status or change in heparin drip rate. Uneventful rate, call bell within rate. Call bell within reach. Remains on telemetry.   Patient Vitals for the past 12 hrs:   Temp Pulse Resp BP SpO2   08/18/18 0407 98.1 °F (36.7 °C) 79 18 119/58 99 %   08/17/18 2300 98.3 °F (36.8 °C) 95 18 110/66 100 %   08/17/18 1937 98.6 °F (37 °C) 81 18 112/61 100 %

## 2018-08-18 NOTE — ROUTINE PROCESS
0715:Received report from Michael. 1030:Paused blood transfusion because pt generated a mew score of 4. Alerted Artanikacalista Fu to make him aware and  He wants to continue with transfusion. 1033:Restarted transfusion. Will continue to monitor. 14:37:Alerted  about pts mew score of 4. He said to just continue to monitor the pt. Gave telephone order for post transfusion HGT & HCT.  1705: Pt complained of shortness of breath when being transferred from the bedside commode back to the bed. Upon assessment pt had clear lung fields and vital signs WDL. 1710: Pt reported that she no longer was short of breath after resting  For a few minutes. Shift uneventful.

## 2018-08-18 NOTE — ROUTINE PROCESS
Bedside and Verbal shift change report given to Rossy MELGAR (oncoming nurse) by Yoseph Jackson RN   (offgoing nurse). Report included the following information SBAR, Kardex, MAR and Recent Results.

## 2018-08-18 NOTE — CONSULTS
Assessment    1. Hyponatremia  2. Anemia  3. Hypochloremia  4. Hypocalcemia  5. Acute DVT  6. Metastatic lung cancer  7. RF     Plan      · Serum osm  · Urine osm   · Urine sodium   · Salt tabs  · Small dose NS      History of the present illness:     [facility] is a 66 y.o. female  Admitted and found to e dv no anemia getting blood sodium improving and creatinine improving      ROS:  12 Systems reviewed and are negative other than per HPI    Past Medical History :  Past Medical History:   Diagnosis Date    Cancer [facility] [address]     lung cancer    PAD (peripheral artery disease) ([facility] [address]        Past Surgical History :  Past Surgical History:   Procedure Laterality Date    VASCULAR SURGERY PROCEDURE UNLIST Right     opened up vessels       Social History :  Social History     Social History    Marital status:      Spouse name: N/A    Number of children: N/A    Years of education: N/A     Social History Main Topics    Smoking status: Former Smoker    Smokeless tobacco: Never Used    Alcohol use Yes      Comment: occasionally    Drug use: Yes     Special: Marijuana    Sexual activity: Not Asked     Other Topics Concern    None     Social History Narrative       Family History :  History reviewed. No pertinent family history.     Allergy :  No Known Allergies    Medications :   Current Medications  :     Current Facility-Administered Medications   Medication Dose Route Frequency Provider Last Rate Last Dose    levothyroxine (SYNTHROID) tablet 75 mcg  75 mcg Oral ACB Donneta Cirri, DO   75 mcg at 08/18/18 0747    0.9% sodium chloride infusion 250 mL  250 mL IntraVENous PRN Donneta Cirri, DO        acetaminophen (TYLENOL) tablet 650 mg  650 mg Oral QID Donneta Cirri, DO   650 mg at 08/18/18 1331    sodium chloride tablet 1 g  1 g Oral BID WITH MEALS Amy Cunha MD        morphine injection 2 mg  2 mg IntraVENous Q4H PRN Donneta Cirri, DO   2 mg at 08/18/18 1335    heparin 25,000 units in D5W 250 ml infusion  18-36 Units/kg/hr IntraVENous TITRATE Guillermo Mike PA-C 17.1 mL/hr at 08/18/18 0728 22 Units/kg/hr at 08/18/18 0728    traMADol (ULTRAM) tablet 50 mg  50 mg Oral Q6H PRN Bhargavi Lewis MD   50 mg at 08/18/18 1550    0.9% sodium chloride infusion  75 mL/hr IntraVENous CONTINUOUS Josue Hayden  mL/hr at 08/18/18 1244 125 mL/hr at 08/18/18 1244       Home Medications :   [unfilled]      Physical examination :    Visit Vitals    /62 (BP 1 Location: Right arm, BP Patient Position: Lying left side)    Pulse (!) 103    Temp 98 °F (36.7 °C)    Resp 21    Ht 5' 7\" (1.702 m)    Wt 87.3 kg (192 lb 7.4 oz)    SpO2 92%    Breastfeeding No    BMI 30.14 kg/m2       · General : No apparent distress. · HEENT : Normocephalic and atraumatic  · Lungs : Clear to auscultation bilaterally  · Cardiovascular : Regular rate and rhythm, no rubs or gallops  · Abdomen : Soft , nontender, nondistended with positive bowel sounds. · Extremities : No lower extremity edema  · Neurology : Nonfocal.   · Skin : Warm and dry    Laboratory and imaging data:     Pertinent laboratory testing and imaging data reviewed    Checklist    Code status :  No Order      Diet :    DIET REGULAR  DIET NPO Past Midnight  DIET NUTRITIONAL SUPPLEMENTS All Meals;  Ensure [employer]    Treatment Team :  Treatment Team: Attending Provider: Bhargavi Lewis MD; Consulting Provider: Davida Bhatia MD; Consulting Provider: Bhargavi Lewis MD; Utilization Review: Crissy Flores RN; Consulting Provider: MD Jamal Alegria MD   Cell Phone : [phone]

## 2018-08-18 NOTE — PROGRESS NOTES
PT IP Eval not performed due to:  []  Nausea/vomiting  []  Eating  []  Pain  []  Pt lethargic  [x]  Pt on bed rest per nrs, Rossy at 1450. This is second attempt. Will evaluate tomorrow if appropriate. Will f/u later as schedule allows. Thank you.   Claritza Daniels

## 2018-08-18 NOTE — PROGRESS NOTES
Hospitalist Progress Note    Patient: Sadie Maldonado MRN: [id number]  CSN: [id number]    YOB: 1955  Age: 66 y.o. Sex: female    DOA: 8/16/2018 LOS:  LOS: 2 days            Assessment/Plan   1. Acute DVT  2. Metastatic lung cancer  3. Dehydration  4. Hyponatremia- dehydration v SIADH  5. Sinus tachycardia  6. Renal insufficiency- improving  7. hypothyroidism  8. Back pain  9. Elevated lactic acid, no evidence of infection, normalized  10. Anemia, with out evidence of bleeding       Plan:  - continue heparin gtt  - transfuse 1 unit prbc  - continue IV fluids  - start synthroid  - consult nephrology re: hyponatremia, urine studies ordered yesterday pending  - heme/onc following, for biopsy Monday, hopefully arrange port at same time w holding heparin gtt  - start scheduled tylenol, tramadol/morphine prn      Patient Active Problem List   Diagnosis Code    Dehydration E86.0    Hyponatremia E87.1    Metastatic lung cancer (metastasis from lung to other site) [facility]) C34.90    Leg DVT (deep venous thromboembolism), acute, left (HCC) I82.402    Acute renal injury due to hypovolemia ([facility] [address] N17.9, E86.1    PAD (peripheral artery disease) ([facility] [address] I73.9               Subjective:    cc: pain  Pt complains of pain in clavicular heard & back  Rates 6-7/10, better w morphine, worse when wears off  Denies bleeding, melena, nasuea or vomiting  No dyspnea, cough or chest pain      REVIEW OF SYSTEMS:  General: No fevers or chills. Cardiovascular: No chest pain or pressure. No palpitations. Pulmonary: No shortness of breath. Gastrointestinal: No nausea, vomiting.      Objective:        Vital signs/Intake and Output:  Visit Vitals    /58 (BP 1 Location: Left arm, BP Patient Position: At rest)    Pulse 79    Temp 98.1 °F (36.7 °C)    Resp 18    Ht 5' 7\" (1.702 m)    Wt 87.3 kg (192 lb 7.4 oz)    SpO2 99%    Breastfeeding No    BMI 30.14 kg/m2     Current Shift:     Last three shifts:  08/16 1901 - 08/18 0700  In: 120 [P.O.:120]  Out: 1410 [Urine:1410]    Body mass index is 30.14 kg/(m^2). Physical Exam:  GEN: Alert and oriented times three NAD  EYES: conjunctiva normal, lids with out lesions  HEENT: MMM, No thyromegaly, no lymphadenopathy  HEART: RRR +S1 +S2, no JVD, pulses 2+ distally, + nodule on L clavicular head  LUNGS: CTA B/L no wheezes, rales or rhonchi  ABDOMEN: + BS, soft NT/ND no organomegaly,  No rebound  EXTREMITIES: No edema cyanosis, cap refill normal   SKIN: no rashes or skin breakdown, no nodules, normal turgor  Current Facility-Administered Medications   Medication Dose Route Frequency    levothyroxine (SYNTHROID) tablet 75 mcg  75 mcg Oral ACB    0.9% sodium chloride infusion 250 mL  250 mL IntraVENous PRN    acetaminophen (TYLENOL) tablet 650 mg  650 mg Oral QID    morphine injection 2 mg  2 mg IntraVENous Q4H PRN    heparin 25,000 units in D5W 250 ml infusion  18-36 Units/kg/hr IntraVENous TITRATE    traMADol (ULTRAM) tablet 50 mg  50 mg Oral Q6H PRN    0.9% sodium chloride infusion  125 mL/hr IntraVENous CONTINUOUS         All the patient's labs over the past 24 hours were reviewed both during my initial daily workflow process and at the time notated as \"note time\" in [facility]. (It is not time stamped separately in this workflow.)  Select labs are listed below.         Labs: Results:       Chemistry Recent Labs      08/18/18   0334  08/17/18   0030  08/16/18   1523   GLU  97  102*  106*   NA  129*  128*  127*   K  4.8  4.8  5.5   CL  98*  94*  91*   CO2  21  23  23   BUN  15  23*  25*   CREA  0.98  1.38*  1.55*   CA  8.4*  8.6  10.3*   AGAP  10  11  13   BUCR  15  17  16   AP   --    --   339*   TP   --    --   9.5*   ALB   --    --   2.4*   GLOB   --    --   7.1*   AGRAT   --    --   0.3*      CBC w/Diff Recent Labs      08/18/18   0334  08/17/18   0805  08/17/18   0030  08/16/18   1523   WBC  10.5   --   11.4  13.1   RBC  2.80*   --   2.98* 3.55*   HGB  6.7*  6.7*  7.1*  8.6*   HCT  21.7*  21.8*  22.8*  27.2*   PLT  308   --   255  310   GRANS   --    --    --   80*   LYMPH   --    --    --   13*   EOS   --    --    --   1      Cardiac Enzymes Recent Labs      08/16/18   1523   CPK  130   CKND1  CALCULATION NOT PERFORMED WHEN RESULT IS BELOW LINEAR LIMIT      Coagulation Recent Labs      08/18/18   0334  08/17/18   1712   08/16/18   1523   PTP   --    --    --   15.2   INR   --    --    --   1.2   APTT  74.7*  93.5*   < >  35.4    < > = values in this interval not displayed.                Liver Enzymes Recent Labs      08/16/18   1523   TP  9.5*   ALB  2.4*   AP  339*   SGOT  33      Thyroid Studies Lab Results   Component Value Date/Time    TSH 60.00 (H) 08/17/2018 08:29 AM        Procedures/imaging: see electronic medical records for all procedures/Xrays and details which were not copied into this note but were reviewed prior to creation of Plan    Imaging personally reviewed:  MRI brain: no lesion  Bone scan: ? Uptake in L spine, not identified on CT ab/pelvis in [address]25 Michelle Rodriguez, DO  Internal Medicine/Geriatrics

## 2018-08-19 NOTE — PROGRESS NOTES
1930 Assume patient care. Patient resting on bed in low position. 2025 Shift assessment done and major findings recorded in flow sheet. Patient complain of SOB and pain 7/10 so morphine 2 mg given IV    2315 Patient again complained of increasing SOB and looked anxious. Patient was kept on 2 lit/min oxygen and Dr notified.  provided telephone order for Chest X ray and ABG and both done. 7522 Reassessment done and patient improved and slept well after the intervention.     0430 Reassessment done and charted     0715 Patient  aPTT is 64.3 so heparin rate changed and IV bolus administered as per protocol

## 2018-08-19 NOTE — ROUTINE PROCESS
Bedside and Verbal shift change report given to MARIBEL Levy RN (oncoming nurse) by PRECIOUS Strickland RN (offgoing nurse). Report included the following information SBAR, Kardex, Intake/Output, MAR, Recent Results and Med Rec Status.

## 2018-08-19 NOTE — PROGRESS NOTES
Patient Active Problem List   Diagnosis Code    Dehydration E86.0    Hyponatremia E87.1    Metastatic lung [facility] (metastasis [facility] lung to other site) [facility]) C34.90    Leg DVT (deep venous thromboembolism), acute, left (HCC) I82.402    Acute renal injury due to hypovolemia (HCC) N17.9, E86.1    PAD (peripheral artery disease) ([facility]) I73.9         Assessment and Plan :      Dehydration hydrate gently  DARRION resolving  Hyponatremia gradually improving         Subjective :     · No new complaints   · No chest pain or SOB   · No nausea vomiting or diarrhea  · No fever or chills    Objective :     Visit Vitals    /77    Pulse (!) 102    Temp 97.4 °F (36.3 °C)    Resp 20    Ht 5' 7\" (1.702 m)    Wt 87.3 kg (192 lb 7.7 oz)    SpO2 100%    Breastfeeding No    BMI 30.15 kg/m2       [unfilled]    · General : No apparent distress. · Extremities : No lower extremity edema    Laboratory and imaging data:     Pertinent laboratory testing and imaging data reviewed    Checklist    Code status :  No Order    DVT Prophylaxis :  [unfilled]    Tubes/Lines :  [unfilled]    Antibiotics :   [unfilled]    Diet :    DIET REGULAR  DIET NPO Past Midnight  DIET NUTRITIONAL SUPPLEMENTS All Meals;  E[employer]ure [employer]    Treatment Team :  Treatment Team: Attending Provider: Johnnie Torres MD; Consulting Provider: Robson Donahue MD; Consulting Provider: Johnnie Torres MD; Utilization Review: Aldo Vaughn RN; Consulting Provider: Claudio Argueta MD    Medications :   Current Medications  :     Current Facility-Administered Medications   Medication Dose Route Frequency Provider Last Rate Last Dose    0.9% sodium chloride infusion 250 mL  250 mL IntraVENous PRN Hollace Clunes, DO        LORazepam (ATIVAN) tablet 1 mg  1 mg Oral Q4H PRN Hollace Clunes, DO   1 mg at 08/19/18 1525    albuterol-ipratropium (DUO-NEB) 2.5 MG-0.5 MG/3 ML  3 mL Nebulization Q4H PRN Hollace Clunes, DO   3 mL at 08/19/18 1256    levothyroxine (SYNTHROID) tablet 75 mcg  75 mcg Oral ACB Jo Gaffney, DO   75 mcg at 08/19/18 0744    0.9% sodium chloride infusion 250 mL  250 mL IntraVENous PRN Jo Pleasant, DO        acetaminophen (TYLENOL) tablet 650 mg  650 mg Oral QID Jo Pleasant, DO   650 mg at 08/19/18 1437    sodium chloride tablet 1 g  1 g Oral BID WITH MEALS Amy Cunha MD   1 g at 08/19/18 0859    morphine injection 2 mg  2 mg IntraVENous Q4H PRN Jo Gaffney, DO   2 mg at 08/19/18 1525    heparin 25,000 units in D5W 250 ml infusion  18-36 Units/kg/hr IntraVENous TITRATE Jonathon Cervantes PA-C 18.6 mL/hr at 08/19/18 0932 24 Units/kg/hr at 08/19/18 0932    traMADol (ULTRAM) tablet 50 mg  50 mg Oral Q6H PRN Hien Allen MD   50 mg at 08/18/18 1550    0.9% sodium chloride infusion  75 mL/hr IntraVENous CONTINUOUS Natalie Barbosa MD 75 mL/hr at 08/19/18 0738 75 mL/hr at 08/19/18 0738       Home Medications :   [unfilled]

## 2018-08-19 NOTE — PROGRESS NOTES
0730 Assumed responsibility for patient from Rishabh Buckner RN    1100 Patient MEWS score 4. Upon entering room;patient was dyspneic at rest.  Patient was not wearing O2. Reapplied nasal cannula at 2 lpm.  Will continue to monitor. 1250 Patient SOB. Called respiratory to have breathing tx prn    1345 Blood transfusion started    1525 Patient MEWS score 4 due to tachycardia. Patient very anxious and in some pain. Morphine and ativan administered. 1600 Patient MEWS score back down to 2    Bedside shift change report given to Kaycee Bianchi RN (oncoming nurse) by Haseeb Sandy RN (offgoing nurse). Report included the following information SBAR, Kardex and MAR.

## 2018-08-19 NOTE — CDMP QUERY
Please clarify if this patient is being treated/managed for:    =>Anemia and type with decrease H&H      Acute blood loss anemia      Chronic anemia       Iron deficiency anemia      anemia from neoplasm   =>Other Explanation of clinical findings  =>Unable to Determine (no explanation of clinical findings)    The medical record reflects the following:    Risk:    Clinical Indicators: 8-16- H&H 8.6/27.2   - 8-17 H&H 7.1/22. 8-  8-17/18 H&H 6.7/21.7    Treatment: Unit PRBC    Please clarify and document your clinical opinion in the progress notes and discharge summary including the definitive and/or presumptive diagnosis, (suspected or probable), related to the above clinical findings. Please include clinical findings supporting your diagnosis. If you DECLINE this query or would like to communicate with [facility], please utilize the \"[employer] message box\" at the TOP of the Progress Note on the right.       Thank you,  Eliza Reynolds RN [facility] [phone]

## 2018-08-19 NOTE — ROUTINE PROCESS
Bedside and Verbal shift change report given to Marleny Crook  (oncoming nurse) by Yahir Willson (offgoing nurse). Report included the following information SBAR, Kardex, Intake/Output, MAR and Recent Results.

## 2018-08-19 NOTE — PROGRESS NOTES
Called to pt's room for STAT ABG. RN reports pt is anxious. C/O pain and SOB. Pt is currently resting comfortably, in no respiratory distress. 98% on 2L NC. No AMS noted. Alert and oriented x3.  ABG results were as follows: 7.41, 30.8, 90, 5, 19.8, 97%

## 2018-08-19 NOTE — PROGRESS NOTES
Problem: Mobility Impaired (Adult and Pediatric)  Goal: *Acute Goals and Plan of Care (Insert Text)  Physical Therapy Goals  Initiated 8/19/2018 and to be accomplished within 7 day(s)  1. Patient will move from supine to sit and sit to supine  in bed with modified independence. 2.  Patient will transfer from bed to chair and chair to bed with modified independence using the least restrictive device. 3.  Patient will perform sit to stand with modified independence. 4.  Patient will ambulate with modified independence for 150 feet with the least restrictive device. Outcome: Progressing Towards Goal  physical Therapy EVALUATION    Patient: Mynor Landa (49 y.o. female)  Date: 8/19/2018  Primary Diagnosis: Leg DVT (deep venous thromboembolism), acute, left (HCC)  Hyponatremia  Acute renal injury due to hypovolemia [facility])  Dehydration  Metastatic lung cancer (metastasis from lung to other site) [facility])        Precautions:   Fall    ASSESSMENT :  Based on the objective data described below, the patient presents with mobility impairments regarding bed mobility, transfers, gait, balance, and activity tolerance. The patient is a 67 y/o female admitted for LLE DVT. PMH includes Lung CA and CAD. Patient presented supine in bed, agreeable to therapy. Patient sat EOB with Min A. Prior to standing, patient was given pain medication with RN. Patient then stood up to RW with CGA. Upon standing patient reported feeling dizzy and unsteady. Returned the patient back to seated in bed. Patient stated performance was limited by the pain medication, which caused the patient to experience a \"warm, flushed\" sensation with dizziness. Patient stated no longer being comfortable ambulating as a result. Returned patient to bed. Will continue PT for further assessment and to maximize functional independence. Anticipate patient's performance to improve when patient's pain improves and when not on IV pain medication.      Patient will benefit from skilled intervention to address the above impairments. Patients rehabilitation potential is considered to be Good  Factors which may influence rehabilitation potential include:   []         None noted  []         Mental ability/status  [x]         Medical condition  []         Home/family situation and support systems  []         Safety awareness  [x]         Pain tolerance/management  []         Other:      PLAN :  Recommendations and Planned Interventions:  [x]           Bed Mobility Training             []    Neuromuscular Re-Education  [x]           Transfer Training                   []    Orthotic/Prosthetic Training  [x]           Gait Training                          []    Modalities  [x]           Therapeutic Exercises          []    Edema Management/Control  [x]           Therapeutic Activities            [x]    Patient and Family Training/Education  []           Other (comment):    Frequency/Duration: Patient will be followed by physical therapy 1-2 times per day to address goals. Discharge Recommendations: To Be Determined  Further Equipment Recommendations for Discharge: N/A     SUBJECTIVE:   Patient stated The pain medication gets me very drowsy. I don't think I can do more.     OBJECTIVE DATA SUMMARY:     Past Medical History:   Diagnosis Date    Cancer ([facility]     lung cancer    PAD (peripheral artery disease) ([facility]      Past Surgical History:   Procedure Laterality Date    VASCULAR SURGERY PROCEDURE UNLIST Right     opened up vessels     Barriers to Learning/Limitations: None  Compensate with: N/A  Prior Level of Function/Home Situation: Lives alone in senior living. Performs all ADLs and does not use AD to ambulate. Reports brother can assist when needed.   Home Situation  Home Environment: Apartment (Senior living)  One/Two Story Residence: One story  Living Alone: Yes  Support Systems: Family member(s)  Patient Expects to be Discharged to[de-identified] Private residence  Current DME Used/Available at Home: Walker, rolling, Grab bars  Tub or Shower Type: Tub  Critical Behavior:  Neurologic State: Alert; Appropriate for age  Orientation Level: Oriented X4  Cognition: Appropriate decision making; Appropriate for age attention/concentration; Appropriate safety awareness; Follows commands     Psychosocial  Patient Behaviors: Calm; Cooperative  Purposeful Interaction: Yes  Pt Identified Daily Priority: Clinical issues (comment)  Caritas Process: Establish trust  Caring Interventions: Reassure  Reassure: Informing  Skin Condition/Temp: Warm;Dry  Skin Integrity: Abrasion  Skin Integumentary  Skin Color: Appropriate for ethnicity; Ecchymosis (comment)  Skin Condition/Temp: Warm;Dry  Skin Integrity: Abrasion  Turgor: Shiney/hard  Hair Growth: Present  Varicosities: Absent  Strength:    Strength: Generally decreased, functional (LLE impaired due to pain, functional)  Tone & Sensation:   Tone: Normal  Sensation: Impaired (Reports numbness in LLE from thigh to her toes. )    Range Of Motion:  AROM: Within functional limits  PROM: Within functional limits  Functional Mobility:  Bed Mobility:  Rolling: Supervision  Supine to Sit: Minimum assistance  Sit to Supine: Minimum assistance  Scooting: Stand-by assistance  Transfers:  Sit to Stand: Contact guard assistance;Minimum assistance  Stand to Sit: Contact guard assistance  Balance:   Sitting: Intact  Standing: Impaired; With support  Standing - Static: Good  Standing - Dynamic : Fair  Ambulation/Gait Training:  Distance (ft): 3 Feet (ft)  Assistive Device: Gait belt;Walker, rolling  Ambulation - Level of Assistance: Contact guard assistance  Gait Description (WDL): Exceptions to WDL  Gait Abnormalities: Decreased step clearance; Step to gait; Antalgic  Base of Support: Shift to right  Stance: Left decreased  Speed/Irma: Slow  Step Length: Right shortened;Left shortened  Swing Pattern: Left asymmetrical    Pain:  Pain Scale 1: Numeric (0 - 10)  Pain Intensity 1: 0  Pain Location 1: Shoulder  Pain Orientation 1: Anterior  Pain Description 1: Throbbing  Pain Intervention(s) 1: Medication (see MAR)  Activity Tolerance:   Poor  Please refer to the flowsheet for vital signs taken during this treatment. After treatment:   []         Patient left in no apparent distress sitting up in chair  [x]         Patient left in no apparent distress in bed  [x]         Call bell left within reach  [x]         Nursing notified  []         Caregiver present  []         Bed alarm activated    COMMUNICATION/EDUCATION:   [x]         Fall prevention education was provided and the patient/caregiver indicated understanding. [x]         Patient/family have participated as able in goal setting and plan of care. [x]         Patient/family agree to work toward stated goals and plan of care. []         Patient understands intent and goals of therapy, but is neutral about his/her participation. []         Patient is unable to participate in goal setting and plan of care. Thank you for this referral.  Keith Roe   Time Calculation: 28 mins     G-codes  Mobility  Current  CK= 40-59%   Goal  CI= 1-19%. The severity rating is based on the Level of Assistance required for Functional Mobility and ADLs.     Eval Complexity: History: HIGH Complexity :3+ comorbidities / personal factors will impact the outcome/ POC Exam:MEDIUM Complexity : 3 Standardized tests and measures addressing body structure, function, activity limitation and / or participation in recreation  Presentation: MEDIUM Complexity : Evolving with changing characteristics  Clinical Decision Making:Low Complexity   Overall Complexity:LOW

## 2018-08-19 NOTE — PROGRESS NOTES
Hospitalist Progress Note    Patient: Yisel Mitchell MRN: [id number]  CSN: [id number]    YOB: 1955  Age: 66 y.o. Sex: female    DOA: 8/16/2018 LOS:  LOS: 3 days            Assessment/Plan     1. Acute DVT  2. Metastatic lung cancer  3. Dehydration  4. Hyponatremia- dehydration v SIADH  5. Sinus tachycardia  6. Renal insufficiency- improving  7. hypothyroidism  8. Back pain  9. Elevated lactic acid, no evidence of infection, normalized  10. Anemia, with out evidence of bleeding     Plan:  - continue heparin gtt  - transfuse prbc this AM  - decrease IV fluid rate  - CT ab/pelvis  - analgesia, anxiolytics  - for biopsy/ port placement tomorrow      Patient Active Problem List   Diagnosis Code    Dehydration E86.0    Hyponatremia E87.1    Metastatic lung cancer (metastasis from lung to other site) [facility]) C34.90    Leg DVT (deep venous thromboembolism), acute, left (HCC) I82.402    Acute renal injury due to hypovolemia ([facility]) N17.9, E86.1    PAD (peripheral artery disease) ([facility]) I73.9               Subjective:    cc: shortnes of breath  Pt w dyspnea overnight while ambulating to bathroom,CXR with out changes, ABG OK, ersolved  Denies pain    REVIEW OF SYSTEMS:  General: No fevers or chills. Cardiovascular: No chest pain or pressure. No palpitations. Pulmonary: No shortness of breath. Gastrointestinal: No nausea, vomiting. Objective:        Vital signs/Intake and Output:  Visit Vitals    /86 (BP 1 Location: Left arm, BP Patient Position: At rest)    Pulse (!) 109    Temp 97.6 °F (36.4 °C)    Resp 18    Ht 5' 7\" (1.702 m)    Wt 87.3 kg (192 lb 7.7 oz)    SpO2 98%    Breastfeeding No    BMI 30.15 kg/m2     Current Shift:     Last three shifts:  08/17 1901 - 08/19 0700  In: 292.5   Out: 600 [Urine:600]    Body mass index is 30.15 kg/(m^2).     Physical Exam:  GEN: Alert and oriented times three NAD  EYES: conjunctiva normal, lids with out lesions  HEENT: MMM, No thyromegaly, no lymphadenopathy  HEART: RRR +S1 +S2, no JVD, pulses 2+ distally  LUNGS: CTA B/L no wheezes, rales or rhonchi  ABDOMEN: + BS, soft NT/ND no organomegaly,  No rebound  EXTREMITIES: No edema cyanosis, cap refill normal   SKIN: no rashes or skin breakdown, no nodules, normal turgor  Current Facility-Administered Medications   Medication Dose Route Frequency    0.9% sodium chloride infusion 250 mL  250 mL IntraVENous PRN    LORazepam (ATIVAN) tablet 1 mg  1 mg Oral Q4H PRN    albuterol-ipratropium (DUO-NEB) 2.5 MG-0.5 MG/3 ML  3 mL Nebulization Q4H PRN    levothyroxine (SYNTHROID) tablet 75 mcg  75 mcg Oral ACB    0.9% sodium chloride infusion 250 mL  250 mL IntraVENous PRN    acetaminophen (TYLENOL) tablet 650 mg  650 mg Oral QID    sodium chloride tablet 1 g  1 g Oral BID WITH MEALS    morphine injection 2 mg  2 mg IntraVENous Q4H PRN    heparin 25,000 units in D5W 250 ml infusion  18-36 Units/kg/hr IntraVENous TITRATE    traMADol (ULTRAM) tablet 50 mg  50 mg Oral Q6H PRN    0.9% sodium chloride infusion  75 mL/hr IntraVENous CONTINUOUS         All the patient's labs over the past 24 hours were reviewed both during my initial daily workflow process and at the time notated as \"note time\" in [facility]. (It is not time stamped separately in this workflow.)  Select labs are listed below.         Labs: Results:       Chemistry Recent Labs      08/19/18   0244  08/18/18   0334  08/17/18   0030  08/16/18   1523   GLU  104*  97  102*  106*   NA  130*  129*  128*  127*   K  4.5  4.8  4.8  5.5   CL  98*  98*  94*  91*   CO2  23  21  23  23   BUN  14  15  23*  25*   CREA  0.91  0.98  1.38*  1.55*   CA  8.7  8.4*  8.6  10.3*   AGAP  9  10  11  13   BUCR  15  15  17  16   AP   --    --    --   339*   TP   --    --    --   9.5*   ALB   --    --    --   2.4*   GLOB   --    --    --   7.1*   AGRAT   --    --    --   0.3*      CBC w/Diff Recent Labs      08/19/18   0244  08/18/18   1935  08/18/18 1522  08/18/18   0334   08/17/18   0030  08/16/18   1523   WBC  11.5   --    --   10.5   --   11.4  13.1   RBC  2.83*   --    --   2.80*   --   2.98*  3.55*   HGB  7.0*  7.7*  7.3*  6.7*   < >  7.1*  8.6*   HCT  22.1*  24.2*  23.3*  21.7*   < >  22.8*  27.2*   PLT  327   --    --   308   --   255  310   GRANS   --    --    --    --    --    --   80*   LYMPH   --    --    --    --    --    --   13*   EOS   --    --    --    --    --    --   1    < > = values in this interval not displayed. Cardiac Enzymes Recent Labs      08/16/18   1523   CPK  130   CKND1  CALCULATION NOT PERFORMED WHEN RESULT IS BELOW LINEAR LIMIT      Coagulation Recent Labs      08/19/18   0244  08/18/18   0334   08/16/18   1523   PTP   --    --    --   15.2   INR   --    --    --   1.2   APTT  64.3*  74.7*   < >  35.4    < > = values in this interval not displayed.                Liver Enzymes Recent Labs      08/16/18   1523   TP  9.5*   ALB  2.4*   AP  339*   SGOT  33      Thyroid Studies Lab Results   Component Value Date/Time    TSH 60.00 (H) 08/17/2018 08:29 AM        Procedures/imaging: see electronic medical records for all procedures/Xrays and details which were not copied into this note but were reviewed prior to creation of Roberto Truong DO  Internal Medicine/Geriatrics

## 2018-08-20 PROBLEM — I31.39 PERICARDIAL EFFUSION: Status: ACTIVE | Noted: 2018-01-01

## 2018-08-20 NOTE — PROGRESS NOTES
Hospitalist Progress Note    Patient: Shanel Andre MRN: [id number]  CSN: [id number]    YOB: 1955  Age: 66 y.o. Sex: female    DOA: 8/16/2018 LOS:  LOS: 4 days            Assessment/Plan     1. Acute DVT  On heparin gtt  2. Large pericaridal effusion- she is hemodynamically stable at this time  3. Sinus tachycardia  4.renal insufficiency  5. Hypothyroidism  6. Anemia unclear blood loss ? Hemorrhagic effusion      Plan:  - hold off planned biopsy  - obtain stat 2d echo  - transfer to ICU  - cardiology & intensivist consulted  - may require transfer to [facility]      Addendum 10:10 AM  2d echo with pericardial tamponade, discussed w Dr Paradise Kumar noted for drain placement as well as IVC filter    Updated son, all questions answered    Patient Active Problem List   Diagnosis Code    Dehydration E86.0    Hyponatremia E87.1    Metastatic lung cancer (metastasis from lung to other site) [facility]) C34.90    Leg DVT (deep venous thromboembolism), acute, left ([address] I82.402    Acute renal injury due to hypovolemia ([address] N17.9, E86.1    PAD (peripheral artery disease) ([facility] [address] I73.9               Subjective:    cc: shortness of breath  Pt w worsening shortness of breath overnight with increased oxygen requirements      REVIEW OF SYSTEMS:  General: No fevers or chills. Cardiovascular: No chest pain or pressure. No palpitations. Pulmonary: + shortness of breath. Gastrointestinal: No nausea, vomiting. Objective:        Vital signs/Intake and Output:  Visit Vitals    /72 (BP 1 Location: Left arm, BP Patient Position: At rest;Supine)    Pulse (!) 107    Temp 97.6 °F (36.4 °C)    Resp 24    Ht 5' 7\" (1.702 m)    Wt 87.3 kg (192 lb 7.7 oz)    SpO2 100%    Breastfeeding No    BMI 30.15 kg/m2     Current Shift:     Last three shifts:  08/18 1901 - 08/20 0700  In: 2019.7 [I.V.:1452.2]  Out: 1100 [Urine:1100]    Body mass index is 30.15 kg/(m^2).     Physical Exam:  GEN: Alert and oriented times three NAD  EYES: conjunctiva normal, lids with out lesions  HEENT: MMM, No thyromegaly, no lymphadenopathy+ JVD  HEART: RRR +S1 +S2, no JVD, pulses 2+ distally  LUNGS: CTA B/L no wheezes, rales or rhonchi  ABDOMEN: + BS, soft NT/ND no organomegaly,  No rebound  EXTREMITIES: + LLE edema cyanosis, cap refill normal   SKIN: no rashes or skin breakdown, no nodules, normal turgor  Current Facility-Administered Medications   Medication Dose Route Frequency    0.9% sodium chloride infusion 250 mL  250 mL IntraVENous PRN    LORazepam (ATIVAN) tablet 1 mg  1 mg Oral Q4H PRN    albuterol-ipratropium (DUO-NEB) 2.5 MG-0.5 MG/3 ML  3 mL Nebulization Q4H PRN    levothyroxine (SYNTHROID) tablet 75 mcg  75 mcg Oral ACB    0.9% sodium chloride infusion 250 mL  250 mL IntraVENous PRN    acetaminophen (TYLENOL) tablet 650 mg  650 mg Oral QID    sodium chloride tablet 1 g  1 g Oral BID WITH MEALS    morphine injection 2 mg  2 mg IntraVENous Q4H PRN    heparin 25,000 units in D5W 250 ml infusion  18-36 Units/kg/hr IntraVENous TITRATE    traMADol (ULTRAM) tablet 50 mg  50 mg Oral Q6H PRN    0.9% sodium chloride infusion  75 mL/hr IntraVENous CONTINUOUS         All the patient's labs over the past 24 hours were reviewed both during my initial daily workflow process and at the time notated as \"note time\" in [facility]. (It is not time stamped separately in this workflow.)  Select labs are listed below.         Labs: Results:       Chemistry Recent Labs      08/19/18   0244  08/18/18   0334   GLU  104*  97   NA  130*  129*   K  4.5  4.8   CL  98*  98*   CO2  23  21   BUN  14  15   CREA  0.91  0.98   CA  8.7  8.4*   AGAP  9  10   BUCR  15  15      CBC w/Diff Recent Labs      08/20/18   0310  08/19/18   1720  08/19/18   0940  08/19/18   0244   08/18/18   0334   WBC   --    --    --   11.5   --   10.5   RBC   --    --    --   2.83*   --   2.80*   HGB  9.1*  8.4*  7.5*  7.0*   < >  6.7*   HCT 29.0*  26.6*  24.3*  22.1*   < >  21.7*   PLT   --    --    --   327   --   308    < > = values in this interval not displayed.           Coagulation Recent Labs      08/20/18   0310  08/19/18   1720   PTP  14.5   --    INR  1.2   --    APTT  78.9*  92.5*                   Thyroid Studies Lab Results   Component Value Date/Time    TSH 60.00 (H) 08/17/2018 08:29 AM        Procedures/imaging: see electronic medical records for all procedures/Xrays and details which were not copied into this note but were reviewed prior to creation of Roberto Truong DO  Internal Medicine/Geriatrics

## 2018-08-20 NOTE — INTERVAL H&P NOTE
H&P Update:  Mynor Landa was seen and examined. History and physical has been reviewed. Significant clinical changes have occurred as noted:  Discussed risks and benefits to patient of the procedure including usual bleeding, infection, filter fracture and embolization. All explained specific risks of worsening bilateral DVTs, phelgmasia and potential limb loss and caval thrombosis. Ms. Casillas Level voices understanding of this and wishes to proceed.       Signed By: Ava Haddad MD     August 20, 2018 3:30 PM

## 2018-08-20 NOTE — H&P (VIEW-ONLY)
Surgery Consult      Patient: Cam Mann MRN: [id number]  CSN: [id number]      YOB: 1955    Age: 66 y.o. Sex: female      DOA: 8/16/2018       HPI:     Cam Mann is a 66 y.o. female with a history of lung cancer and CAD with a very complex medical situation. On admission, she was found to have an occlusive acute left leg DVT extending to the left external iliac vein. She was on a heparin gtt but developed a hemorrhagic pericardial effusion, which was drained by cardiology today. She is currently undergoing extensive workup for malignancy. Vascular surgery was consulted for possible IVC filter placement. Past Medical History:   Diagnosis Date    Cancer [facility])     lung cancer    PAD (peripheral artery disease) ([facility] [address]        Past Surgical History:   Procedure Laterality Date    VASCULAR SURGERY PROCEDURE UNLIST Right     opened up vessels       History reviewed. No pertinent family history. Social History     Social History    Marital status:      Spouse name: N/A    Number of children: N/A    Years of education: N/A     Social History Main Topics    Smoking status: Former Smoker    Smokeless tobacco: Never Used    Alcohol use Yes      Comment: occasionally    Drug use: Yes     Special: Marijuana    Sexual activity: Not Asked     Other Topics Concern    None     Social History Narrative       Prior to Admission medications    Medication Sig Start Date End Date Taking? Authorizing Provider   naproxen (NAPROSYN) 500 mg tablet Take 500 mg by mouth two (2) times daily (with meals). Yes Historical Provider   traMADol (ULTRAM) 50 mg tablet Take 50 mg by mouth every six (6) hours as needed for Pain. Historical Provider   cyclobenzaprine (FLEXERIL) 10 mg tablet Take 10 mg by mouth three (3) times daily as needed for Muscle Spasm(s).     Historical Provider       No Known Allergies    Physical Exam:      Visit Vitals    BP (!) 160/91 (BP 1 Location: Left arm, BP Patient Position: At rest;Supine)    Pulse (!) 115    Temp 97.8 °F (36.6 °C)    Resp 23    Ht 5' 7\" (1.702 m)    Wt 192 lb (87.1 kg)    SpO2 100%    Breastfeeding No    BMI 30.07 kg/m2       GENERAL: alert, cooperative, no distress, appears stated age, LUNG: clear to auscultation bilaterally, HEART: regular rate and rhythm, tachycardic, pigtail catheter in place ABDOMEN: soft, non-tender. Bowel sounds normal, EXTREMITIES:  2+ edema to left leg up to thigh, no palpable pedal pulses, +sensory/+motor function, SKIN: no rash or abnormalities    ROS:  Constitutional: negative for fevers, chills and sweats  Respiratory: negative for cough, sputum or wheezing  Cardiovascular: negative except for pain at pigtail catheter placement site  Gastrointestinal: negative for nausea and vomiting  Unless otherwise mentioned in the HPI. Data Review:    CBC:   Lab Results   Component Value Date/Time    WBC 11.5 08/19/2018 02:44 AM    RBC 2.83 (L) 08/19/2018 02:44 AM    HGB 9.3 (L) 08/20/2018 10:51 AM    HCT 29.4 (L) 08/20/2018 10:51 AM    PLATELET 955 97/83/9485 02:44 AM      BMP:   Lab Results   Component Value Date/Time    Glucose 104 (H) 08/19/2018 02:44 AM    Sodium 130 (L) 08/19/2018 02:44 AM    Potassium 4.5 08/19/2018 02:44 AM    Chloride 98 (L) 08/19/2018 02:44 AM    CO2 23 08/19/2018 02:44 AM    BUN 14 08/19/2018 02:44 AM    Creatinine 0.91 08/19/2018 02:44 AM    Calcium 8.7 08/19/2018 02:44 AM     Coagulation:   Lab Results   Component Value Date/Time    Prothrombin time 13.7 08/20/2018 10:51 AM    INR 1.1 08/20/2018 10:51 AM    aPTT 28.2 08/20/2018 10:51 AM         Assessment/Plan     Patient is a candidate for an IVC filter as she is not a candidate for anticoagulation given her hemorrhagic pericardial effusion. I had a conversation with the patient and family regarding the options for treatment including IVC filter placement and no treatment.   Patient is high risk for filter/IVC occlusion due to the extension of the thrombus in to the infrarenal IVC. If no treatment, patient is high risk for embolus and stroke. Risks and benefits of IVC filter placement vs no treatment discussed with patient and family, they wish to proceed with IVC filter placement. Plan for IVC filter placement this afternoon.     Principal Problem:    Pericardial effusion (8/16/2018)      Overview: Added automatically from request for surgery [id number]    Active Problems:    Dehydration (8/16/2018)      Hyponatremia (8/16/2018)      Metastatic lung cancer (metastasis from lung to other site) [facility]) (8/16/2018)      Leg DVT (deep venous thromboembolism), acute, left ([address] (8/16/2018)      Acute renal injury due to hypovolemia ([address] (8/16/2018)      PAD (peripheral artery disease) ([address] (8/16/2018)        Eder Herrera NP  August 20, 2018

## 2018-08-20 NOTE — PROGRESS NOTES
Shift Progress note:  Assumed care of patient with family @ bedside. Patient appeared anxious, respirations 28, and breath sounds diminished. Respiratory therapists paged to assess and give neb treatment. 2000  Nebulizer treatment given, wheeze now audible but improved aeration. Saturations remain in the 90's. MD Alejandro Wick notified of patient status. No new orders @ present. 2130  MD Carlos up to assess patient, U/S of left leg ordered. Patient more relaxed. No wheezing @ present. Continue to monitor. 2215  Patient restless, ativan given, preparing for second blood transfusion, continue to monitor. 2230 second unit of blood initiated, no change in status, family left for the evening, remains on telemetry, continue to monitor. Patient Vitals for the past 4 hrs:   Temp Pulse Resp BP SpO2   08/19/18 2229 97.8 °F (36.6 °C) (!) 118 20 (!) 146/97 100 %   08/19/18 2207 98 °F (36.7 °C) (!) 117 22 (!) 141/92 99 %   08/19/18 2045 - (!) 116 22 - -   08/19/18 2009 97.7 °F (36.5 °C) (!) 117 18 152/76 97 %   08/19/18 1930 - (!) 113 28 - -     2:14 AM  Patient completed second unit of blood Patient resting but continues with  tachypnea  and tachycardia. Remains on 3 lpm by nasal cannula with saturations 100%. Mews score 4. MD Carlos notified. Informed that patient had CT of abd/pelvis that showed moderate/large pericardial effusion and trace bilateral pleural effusions. Read some of CT results. Stat breathing treatment ordered, Continue to monitor. [place] 3:55 AM  Patient resting without complaints No wheezing @ present however MEWS score remains 4. MD Carlos notified. Visit Vitals    BP (!) 145/93 (BP 1 Location: Left arm)    Pulse (!) 117    Temp 97.4 °F (36.3 °C)    Resp 24    Ht 5' 7\" (1.702 m)    Wt 87.3 kg (192 lb 7.7 oz)    SpO2 100%    Breastfeeding No    BMI 30.15 kg/m2   4:10 AM  MD Carlos aware of patient status, no new orders @ present, continue to monitor.   2938:  Patient pulled self off monitor. Upon entering room patient had taken off oxygen and pulled out both PIV. Blood was dripping down her arm and patient stated \" I can't take this anymore\" Patient replaced on oxygen, cleansed and attempted IV access x1 without success. Nebulizer treatment given by respiratory. Patient remains tachypnic but is now having periods of confusion. Lovette Osgood now @ bedside to notify Physician due to no consent for biopsy, no order for implanted port. Radiology called and stated there is no order and they have no one to put implanted port in today along with concern about her being able to lie flat on table for biopsy procedure. Son left phone number for any concerns.

## 2018-08-20 NOTE — PROGRESS NOTES
Spoke with MD Manjit Johnson about patient status throughout the night. MD looking @ CT results. Stated he would move patient to ICU bed.

## 2018-08-20 NOTE — PROGRESS NOTES
1300- Received from cath lab via icu bed ,post pericardiocenthesis. AAOX4. Initial assessment completed. C/o of chest pain & prn med to be given. On n/c @ 2l/m w/out sob. Monitor shows inus tach w/out ectopy. Afebrile. Orders aknowledged. Family visited. 1400- Voided w/ 500cc clear citlaly urine. VS stable. PA of Dr Cherelle Sanchez visited & spoke w/ pt & family. Dr Jose Haywood also spoke w/ pt & fami;y.    1515- To IR for IVC filter placement via icu bed. Family in the waiting room. 1600- Back from IR sleeping & arrousable. See flowsheet for VS. Afebrile. Rt jagular dressings dry & intact. 1800- Remained sleepy & easily arrousable & follow command by squeezing hands. VS stable. Ace bandaged applied on lle. 200- Dr Supriya Russo visited & aspirated pericardial catheter w/out drainage. Rest of assessment no changed. 1900-Bedside and Verbal shift change report given to [address] (oncoming nurse) by  Tasha Padgett RN (offgoing nurse). Report included the following information SBAR, Kardex, ED Summary, Procedure Summary, Intake/Output, MAR and Recent Results.

## 2018-08-20 NOTE — PROGRESS NOTES
MD kris vásquez up to see patient, aware of status, continue to monitor. Preparing for second unit of blood.

## 2018-08-20 NOTE — PROCEDURES
Pericardiocentesis done under fluoroscope and Echocardiographic guidance. 800 cc of hemorrhagic fluid drained. Fluid sent to lab. She has Left lower extremity extensive DVT which extends in to IVC. Could not able to get access to right femoral vein and patient wanted to get off the table due to back pain and procedure stopped. Discussed with patient, family member, hospital medicine and pulmonary critical care. Patient will need IVC filter. Advised to call vascular surgery. Transfer to ICU  NPO for now.

## 2018-08-20 NOTE — PROGRESS NOTES
Spoke to patient's nurse at 8:20am to obtain clarification of order. Informed him that we need a specific area of concern for exam. He stated he would need to speak to the physician. Ultrasound department asked him to please let us know what the indication was once he spoke to the doctor or have the order cancelled. Patient has since been moved to ICU. Ultrasound attempted to speak to nurse at 1400, she was not available. Will try again.

## 2018-08-20 NOTE — PROGRESS NOTES
Chart reviewed, pt off floor for procedure, will be transitioning to ICU afterwards. CM will continue to follow for needs at discharge. Pt admitted from home, lives alone per brother, has lung CA, followed by [facility]. Noted in MD progress note, pt may need transfer to tertiary center depending on outcome of procedure. Renetta Perez RN CM given hand off for continuity of care. Care Management Interventions  Transition of Care Consult (CM Consult): Discharge Planning  Physical Therapy Consult: Yes  Current Support Network: Family Lives [place], Lives Alone  Confirm Follow Up Transport: Family  Plan discussed with Pt/Family/Caregiver:  Yes

## 2018-08-20 NOTE — CONSULTS
Surgery Consult      Patient: Roxana Martínez MRN: [id number]  CSN: [id number]      YOB: 1955    Age: 66 y.o. Sex: female      DOA: 8/16/2018       HPI:     Roxana Martínez is a 66 y.o. female with a history of lung cancer and CAD with a very complex medical situation. On admission, she was found to have an occlusive acute left leg DVT extending to the left external iliac vein. She was on a heparin gtt but developed a hemorrhagic pericardial effusion, which was drained by cardiology today. She is currently undergoing extensive workup for malignancy. Vascular surgery was consulted for possible IVC filter placement. Past Medical History:   Diagnosis Date    Cancer [facility])     lung cancer    [facility]        Past Surgical History:   Procedure Laterality Date    VASCULAR SURGERY PROCEDURE UNLIST Right     opened up vessels       History reviewed. No pertinent family history. Social History     Social History    Marital status:      Spouse name: N/A    Number of children: N/A    Years of education: N/A     Social History Main Topics    Smoking status: Former Smoker    Smokeless tobacco: Never Used    Alcohol use Yes      Comment: occasionally    Drug use: Yes     Special: Marijuana    Sexual activity: Not Asked     Other Topics Concern    None     Social History Narrative       Prior to Admission medications    Medication Sig Start Date End Date Taking? Authorizing Provider   naproxen (NAPROSYN) 500 mg tablet Take 500 mg by mouth two (2) times daily (with meals). Yes Historical Provider   traMADol (ULTRAM) 50 mg tablet Take 50 mg by mouth every six (6) hours as needed for Pain. Historical Provider   cyclobenzaprine (FLEXERIL) 10 mg tablet Take 10 mg by mouth three (3) times daily as needed for Muscle Spasm(s).     Historical Provider       No Known Allergies    Physical Exam:      Visit Vitals    BP (!) 160/91 (BP 1 Location: Left arm, BP Patient Position: At rest;Supine)    Pulse (!) 115    Temp 97.8 °F (36.6 °C)    Resp 23    Ht 5' 7\" (1.702 m)    Wt 192 lb (87.1 kg)    SpO2 100%    Breastfeeding No    BMI 30.07 kg/m2       GENERAL: alert, cooperative, no distress, appears stated age, LUNG: clear to auscultation bilaterally, HEART: regular rate and rhythm, tachycardic, pigtail catheter in place ABDOMEN: soft, non-tender. Bowel sounds normal, EXTREMITIES:  2+ edema to left leg up to thigh, no palpable pedal pulses, +sensory/+motor function, SKIN: no rash or abnormalities    ROS:  Constitutional: negative for fevers, chills and sweats  Respiratory: negative for cough, sputum or wheezing  Cardiovascular: negative except for pain at pigtail catheter placement site  Gastrointestinal: negative for nausea and vomiting  Unless otherwise mentioned in the HPI. Data Review:    CBC:   Lab Results   Component Value Date/Time    WBC 11.5 08/19/2018 02:44 AM    RBC 2.83 (L) 08/19/2018 02:44 AM    HGB 9.3 (L) 08/20/2018 10:51 AM    HCT 29.4 (L) 08/20/2018 10:51 AM    PLATELET 944 80/05/8163 02:44 AM      BMP:   Lab Results   Component Value Date/Time    Glucose 104 (H) 08/19/2018 02:44 AM    Sodium 130 (L) 08/19/2018 02:44 AM    Potassium 4.5 08/19/2018 02:44 AM    Chloride 98 (L) 08/19/2018 02:44 AM    CO2 23 08/19/2018 02:44 AM    BUN 14 08/19/2018 02:44 AM    Creatinine 0.91 08/19/2018 02:44 AM    Calcium 8.7 08/19/2018 02:44 AM     Coagulation:   Lab Results   Component Value Date/Time    Prothrombin time 13.7 08/20/2018 10:51 AM    INR 1.1 08/20/2018 10:51 AM    aPTT 28.2 08/20/2018 10:51 AM         Assessment/Plan     Patient is a candidate for an IVC filter as she is not a candidate for anticoagulation given her hemorrhagic pericardial effusion. I had a conversation with the patient and family regarding the options for treatment including IVC filter placement and no treatment.   Patient is high risk for filter/IVC occlusion due to the extension of the thrombus in to the infrarenal IVC. If no treatment, patient is high risk for embolus and stroke. Risks and benefits of IVC filter placement vs no treatment discussed with patient and family, they wish to proceed with IVC filter placement. Plan for IVC filter placement this afternoon.     Principal Problem:    Pericardial effusion (8/16/2018)      Overview: Added automatically from request for surgery [id number]    Active Problems:    Dehydration (8/16/2018)      Hyponatremia (8/16/2018)      Metastatic lung cancer (metastasis from lung to other site) [facility]) (8/16/2018)      Leg DVT (deep venous thromboembolism), acute, left ([address] (8/16/2018)      Acute renal injury due to hypovolemia ([address] (8/16/2018)      PAD (peripheral artery disease) ([address] (8/16/2018)        Sophie Carter NP  August 20, 2018

## 2018-08-20 NOTE — PROGRESS NOTES
[address] responsibility for patient from Mariella Gallegos RN.    0800 Patient MEWS score 3. Patient has tachycardia. Resting comfortably will continue to monitor. 0830 Dr. Gwendolyn Hi order to transfer to ICU. Physician to call and update son.     0930 Report given to TALIA Finn in ICU    1015 Cath lab placing stat pt inr lab prior to transfer    1720 [facility] patient down to cath lab for procedure

## 2018-08-20 NOTE — PROGRESS NOTES
Attempted to see pt for PT session. Pt being transferred to ICU and going to cardiac cath lab. Spoke with MD in IDR who recommended hold PT session for today. Will follow up tomorrow if medically appropriate.

## 2018-08-20 NOTE — ROUTINE PROCESS
Bedside and Verbal shift change report given to Ophelia Cnotreras RN (oncoming nurse) by rBian Velarde RN   (offgoing nurse).  Report included the following information SBAR, Kardex, MAR, Recent Results and Cardiac Rhythm ST.

## 2018-08-20 NOTE — H&P
Date of Surgery Update:  Monica Feliz was seen and examined. History and physical has been reviewed. The patient has been examined. There have been no significant clinical changes since the completion of the originally dated History and Physical.      Patient assessed and is candidate for moderate sedation.       Signed By: Ramu Garcia MD     August 20, 2018 11:34 AM

## 2018-08-20 NOTE — PROGRESS NOTES
TRANSFER - OUT REPORT:    Verbal report given to [employer]) on [employer]  being transferred to ICU(unit) for ordered procedure       Report consisted of patients Situation, Background, Assessment and   Recommendations(SBAR). Information from the following report(s) SBAR, Kardex and MAR was reviewed with the receiving nurse. Lines:   Peripheral IV 08/20/18 Left Antecubital (Active)   Site Assessment Clean, dry, & intact 8/20/2018  1:30 PM   Phlebitis Assessment 0 8/20/2018  1:30 PM   Infiltration Assessment 0 8/20/2018  1:30 PM   Dressing Status Clean, dry, & intact 8/20/2018  1:30 PM   Dressing Type Transparent 8/20/2018  1:30 PM   Hub Color/Line Status Blue; Infusing 8/20/2018  1:30 PM   Action Taken Open ports on tubing capped 8/20/2018  1:30 PM   Alcohol Cap Used Yes 8/20/2018  1:30 PM        Opportunity for questions and clarification was provided.       Patient transported with:   Registered Nurse

## 2018-08-20 NOTE — CONSULTS
[facility]  Pulmonary, Critical Care, and Sleep Medicine    Name: Nicki Agrawal MRN: [id number]   : 1955 Hospital: [facility] [place]   Date: 2018        Pulmonary Critical Care Initial Patient Consult    IMPRESSION:   · Principal Problem:  ·   Pericardial effusion (2018)  ·     Overview: Added automatically from request for surgery [id number]  ·   · Active Problems:  ·   Dehydration (2018)  ·   ·   Hyponatremia (2018)  ·   ·   Metastatic lung cancer (metastasis from lung to other site) ([facility] (2018)  ·   ·   Leg DVT (deep venous thromboembolism), acute, left ([facility] (2018)  ·   ·   Acute renal injury due to hypovolemia ([facility] (2018)  ·   ·   PAD (peripheral artery disease) ([facility] (2018)  ·   · Anemia from blood loss  · Hypothyroidism, on supplement   RECOMMENDATIONS:   Compensated respiratory status. Supplemental O2 via NC, titrate flow for goal SPO2> 90%  Aspiration prevention bundle, head of the bed at 30' all times, pulmonary hygiene care  Monitor percidial drain output. Pericardial fluid cultures will be followed. Follow up pericardial fluid analysis  Fluids: NS @ 100 cc/hr and adjust per nephrology  Monitor hemodynamics, UO  Monitor Hgb Q6, transfuse for Hgb < 7  Follow up ECHO; pericardial drain management per cardiology  Stress ulcer prophylaxis  Vascular surgery consulted. Monitor off of anticoagulation  AM labs. Diet as tolerated  Palliative care consult    Will defer respective systems problem management to primary and other consultant and follow patient in ICU with primary and other medical team  Further recommendations will be based on the patient's response to recommended treatment and results of the investigation ordered. Quality Care: PPI, DVT prophylaxis, HOB elevated, Infection control all reviewed and addressed.   PAIN CONTROL: morphine, ultram  · Skin/Wound: pericardial drain site care per nursing prtocol  · Nutrition: diet when cleared by cardiology, vascular surgery  · Prophylaxis: GI Prophylaxis reviewed. · Restraints: none  · PT/OT eval and treat: as needed   · Lines/Tubes: lines PIV, howard none, Ventilator none  ADVANCE DIRECTIVE: Full code. Palliative care consulted  FAMILY DISCUSSION: spoke and updated the patient  Events and notes from last 24 hours reviewed. Care plan discussed with nursing      Subjective/History:   Ms. Carla Dias has been seen and evaluated as Dr. Frantz Turner and Kelsey Bliss requested now for assisting in the ICU management. Information provided by patient. Patient is a 66 y.o. female with PMHx for PAD who had recent dx of lung mass and following with Dr. David Figueroa pending work up with CT imaging, presented with acute extensive DVT of left leg. Admitted for treatment with Heparin and work up. She was consulted by Oncology for continuing DVT treatment and lung mass work up; nephrology for management of hyponatremia. Over the weekend patient developed anemia, hypoxia requiring supplemental O2 and progressive SOB leading to further work up that showed pericardial effusion on CT abd/pelvis and cardiac tamponade on ECHO. Heparin been stopped since midnight per staff, no abnormal bruising or abnormal bleeding from any body orifice. Remained with stable BP but sinus tachycardia. Underwent today pericardial effusion drainage with removal of 800 cc bloody drainage. Patient post-procedure, reports SOB is significantly better, has discomfort at drain insertion site, no fever, chills, cough, wheezing, hemoptysis, palpitations, syncope, orthopnea, or paroxysmal nocturnal dyspnea. Review of Systems:  Pertinent items are noted in HPI.               Latest lactic acid:   Lactic acid   Date Value Ref Range Status   08/17/2018 1.7 0.4 - 2.0 MMOL/L Final   08/17/2018 2.3 (HH) 0.4 - 2.0 MMOL/L Final     Comment:     CALLED TO AND CORRECTLY REPEATED BY:  Walter Denton, RN, 3N ON 08/17/2018 AT 0137 TO JDA     08/16/2018 4.7 (HH) 0.4 - 2.0 MMOL/L Final     Comment:     CALLED TO AND CORRECTLY REPEATED BY:  Mikey Stoddard RN AT  ON 18 BY JOEL. Past Medical History:  Past Medical History:   Diagnosis Date    [facility] ([facility]     lung cancer    PAD (peripheral artery disease) ([facility]         Past Surgical History:  Past Surgical History:   Procedure Laterality Date    VASCULAR SURGERY PROCEDURE UNLIST Right     opened up vessels        Medications:  Prior to Admission medications    Medication Sig Start Date End Date Taking? Authorizing Provider   naproxen (NAPROSYN) 500 mg tablet Take 500 mg by mouth two (2) times daily (with meals). Yes Historical Provider   traMADol (ULTRAM) 50 mg tablet Take 50 mg by mouth every six (6) hours as needed for Pain. Historical Provider   cyclobenzaprine (FLEXERIL) 10 mg tablet Take 10 mg by mouth three (3) times daily as needed for Muscle Spasm(s). Historical Provider       Current Facility-Administered Medications   Medication Dose Route Frequency    sodium chloride (NS) flush 5-10 mL  5-10 mL IntraVENous Q8H    0.9% sodium chloride infusion  100 mL/hr IntraVENous CONTINUOUS    levothyroxine (SYNTHROID) tablet 75 mcg  75 mcg Oral ACB    acetaminophen (TYLENOL) tablet 650 mg  650 mg Oral QID    sodium chloride tablet 1 g  1 g Oral BID WITH MEALS       Allergy:  No Known Allergies     Social History:  Social History   Substance Use Topics    Smoking status: Former Smoker    Smokeless tobacco: Never Used    Alcohol use Yes      Comment: occasionally        Family History:  History reviewed. No family history for lung cancer. Objective:   Vital Signs:    Blood pressure (!) 160/91, pulse (!) 115, temperature 97.8 °F (36.6 °C), resp. rate 23, height 5' 7\" (1.702 m), weight 87.1 kg (192 lb), SpO2 100 %, not currently breastfeeding. Body mass index is 30.07 kg/(m^2).    O2 Device: Nasal cannula   O2 Flow Rate (L/min): 4 l/min   Temp (24hrs), Av.8 °F (36.6 °C), Min:97.4 °F (36.3 °C), Max:98.4 °F (36.9 °C)       Intake/Output:   Last shift:      08/20 0701 - 08/20 1900  In: -   Out: 800   Last 3 shifts: 08/18 1901 - 08/20 0700  In: 2019.7 [I.V.:1452.2]  Out: 1100 [Urine:1100]    Intake/Output Summary (Last 24 hours) at 08/20/18 1337  Last data filed at 08/20/18 1212   Gross per 24 hour   Intake           931.25 ml   Output             1600 ml   Net          -668.75 ml       Physical Exam:  General: Alert and oriented to all spheres, in no respiratory distress and acyanotic, cooperative, no distress, appears older than stated age  [de-identified]: PERRLA, EOMI, fundi benign, throat normal without erythema or exudate  Neck: No abnormally enlarged lymph nodes or thyroid, supple  Chest: normal, pericardial drain in place  Lungs: moderate air entry, breathing normal , clear to auscultation bilaterally, normal percussion bilaterally, no tenderness/ rash  Heart: Regular rate and rhythm, S1S2 present or without murmur or extra heart sounds  Abdomen: protuberant, bowel sounds normoactive, tympanic, abdomen is soft without significant tenderness, masses, organomegaly or guarding, rigidity, rebound  Extremity: 1-2+ and left > right leg edema, no cyanosis, clubbing  Capillary refill: normal bl  Neuro: alert, oriented x 3, no defects noted in general exam.  Skin: Skin color, texture, turgor fair.  Skin dry, diaphoretic    Data:     Recent Results (from the past 24 hour(s))   PTT    Collection Time: 08/19/18  5:20 PM   Result Value Ref Range    aPTT 92.5 (H) 23.0 - 36.4 SEC   HGB & HCT    Collection Time: 08/19/18  5:20 PM   Result Value Ref Range    HGB 8.4 (L) 12.0 - 16.0 g/dL    HCT 26.6 (L) 35.0 - 45.0 %   HGB & HCT    Collection Time: 08/20/18  3:10 AM   Result Value Ref Range    HGB 9.1 (L) 12.0 - 16.0 g/dL    HCT 29.0 (L) 35.0 - 45.0 %   IRON PROFILE    Collection Time: 08/20/18  3:10 AM   Result Value Ref Range    Iron 16 (L) 50 - 175 ug/dL    TIBC 203 (L) 250 - 450 ug/dL    Iron % saturation 8 %   VITAMIN B12 & FOLATE    Collection Time: 08/20/18  3:10 AM   Result Value Ref Range    Vitamin B12 1872 (H) 211 - 911 pg/mL    Folate 8.7 3.10 - 17.50 ng/mL   RETICULOCYTE COUNT    Collection Time: 08/20/18  3:10 AM   Result Value Ref Range    Reticulocyte count 2.0 0.5 - 2.3 %   LD    Collection Time: 08/20/18  3:10 AM   Result Value Ref Range     (H) 81 - 234 U/L   PTT    Collection Time: 08/20/18  3:10 AM   Result Value Ref Range    aPTT 78.9 (H) 23.0 - 36.4 SEC   PROTHROMBIN TIME + INR    Collection Time: 08/20/18  3:10 AM   Result Value Ref Range    Prothrombin time 14.5 11.5 - 15.2 sec    INR 1.2 0.8 - 1.2     ECHO ADULT COMPLETE    Collection Time: 08/20/18  9:45 AM   Result Value Ref Range    Right Atrial Area 4C 17.50 cm2    Ao Root D 2.68 cm    AO ASC D 2.98 cm    AO ARCH D 2.80 cm    Aortic Valve Systolic Peak Velocity 353.17 cm/s    AoV VTI 19.88 cm    Aortic Valve Area by Continuity of Peak Velocity 2.8 cm2    Aortic Valve Area by Continuity of VTI 2.6 cm2    AoV PG 7.2 mmHg    LVIDd 3.01 (A) 3.9 - 5.3 cm    LVPWd 1.12 (A) 0.6 - 0.9 cm    LVIDs 1.99 cm    IVSd 1.06 (A) 0.6 - 0.9 cm    LVOT d 1.97 cm    LVOT Peak Velocity 123.39 cm/s    LVOT Peak Gradient 6.1 mmHg    LVOT VTI 16.92 cm    RVIDd 3.38 cm    Aortic Valve Systolic Mean Gradient 5.1 mmHg    Inferior Vena Cava Diameter Sniffing 2.66 cm    LA Vol 4C 48.25 22 - 52 mL    LV Mass .3 67 - 162 g    LV Mass AL Index 52.0 g/m2    Left Atrium Major Axis 2.86 cm    Triscuspid Valve Regurgitation Peak Gradient 25.0 mmHg    TR Max Velocity 250.19 cm/s    LA Vol Index 24.28 ml/m2    LYNN/BSA Pk Dillon 1.4 cm2/m2    LYNN/BSA VTI 1.3 cm2/m2    LA Volume 51.0 22 - 52 mL    LA Vol Index 25.66 ml/m2   HGB & HCT    Collection Time: 08/20/18 10:51 AM   Result Value Ref Range    HGB 9.3 (L) 12.0 - 16.0 g/dL    HCT 29.4 (L) 35.0 - 45.0 %   PROTHROMBIN TIME + INR    Collection Time: 08/20/18 10:51 AM   Result Value Ref Range    Prothrombin time 13.7 11.5 - 15.2 sec    INR 1.1 0.8 - 1.2     PTT    Collection Time: 08/20/18 10:51 AM   Result Value Ref Range    aPTT 28.2 23.0 - 36.4 SEC           Recent Labs      08/19/18   0054   FIO2I  28   HCO3I  19.8*   PCO2I  30.8*   PHI  7.417   PO2I  90       All Micro Results     Procedure Component Value Units Date/Time    CULTURE, FUNGUS [[id number]] Collected:  08/20/18 1211    Order Status:  Completed Specimen:  Pericardial Fluid Updated:  08/20/18 1244    CULTURE, BODY FLUID [[id number]] Collected:  08/20/18 1211    Order Status:  Completed Specimen:  Pericardial Fluid Updated:  08/20/18 1243    AFB CULTURE + SMEAR W/RFLX ID FROM CULTURE [[id number]] Collected:  08/20/18 1230    Order Status:  Canceled     CULTURE, BLOOD [[id number]] Collected:  08/16/18 1955    Order Status:  Completed Specimen:  Blood from Blood Updated:  08/20/18 0702     Special Requests: NO SPECIAL REQUESTS        Culture result: NO GROWTH 4 DAYS       CULTURE, BLOOD [[id number]] Collected:  08/16/18 1523    Order Status:  Completed Specimen:  Blood from Blood Updated:  08/20/18 0702     Special Requests: NO SPECIAL REQUESTS        Culture result: NO GROWTH 4 DAYS       CULTURE, URINE [[id number]] Collected:  08/16/18 2150    Order Status:  Completed Specimen:  Urine from Clean catch Updated:  08/18/18 1020     Special Requests: NO SPECIAL REQUESTS        Culture result:       [id number]  COLONIES/mL  MIXED GRAM POSITIVE WILBERT, PROBABLE SKIN/GENITAL CONTAMINATION. Telemetry: normal sinus rhythm    8/20/18 ECHO  · Large circumferential pericardial effusion measuring 34 mm. Hemodynamic compromise consistent with cardiac tamponade. · Estimated left ventricular ejection fraction is 56 - 60%. Left ventricular cavity size is decreased. Left ventricular concentric borderline hypertrophy. · Mild tricuspid valve regurgitation is present. Pulmonary arterial systolic pressure is 40 mmHg. Mild pulmonary hypertension is present.   · Severely elevated central venous pressure 8/16/18  DVT study  · No evidence of deep vein thrombosis in the right lower extremity. · Deep venous thrombus present in the left external iliac vein, saphenofemoral junction, common femoral, femoral , popliteal, posterior tibial and peroneal veins    8/17/18 NM Bone scan  Impression:   1.  Focal scintigraphic uptake involving the distal medial left clavicle, in keeping with lytic lesion demonstrated on prior CT. 2. Focal and indeterminate uptake within the sacrum. Correlation with CT imaging is recommended if not performed previously to evaluate for potential site of metastasis versus degenerative uptake. 3. Diffuse soft tissue uptake throughout the left leg, likely in keeping with patient's known DVT. 8/17/18  MRI brain   Impression:   1. No evidence of intracranial metastatic disease or other acute intracranial finding.   2. Mild deep cerebral white matter and pontine signal changes nonspecific, trouble chronic microvascular ischemic disease. 3. Postinflammatory changes left posterior ethmoid sinus. Imaging:  [x]I have personally reviewed the patients chest radiographs images and report   8/20/18    Results from Hospital Encounter encounter on 08/16/18   XR CHEST PORT   Narrative ---------------------------------------------------------------------------  <<<<<<<<< [facility] >>>>>>>>>   ---------------------------------------------------------------------------    CLINICAL HISTORY:  Shortness of breath. COMPARISON EXAMINATIONS:  August 16, 2018. ---  SINGLE FRONTAL VIEW OF THE CHEST  ---    The cardiomediastinal silhouette is stable. There is a lobular masslike area of  consolidation right upper lobe measuring up to 7.5 cm similar to previous. There  is no new focal consolidation or evidence for significant pleural effusion.  No  significant osseous abnormalities are identified.        --------------       Impression Impression:  --------------    Stable masslike consolidation right upper lobe. No new areas of consolidation or pleural effusion. No significant interval change. 8/19/18    Results from Hospital Encounter encounter on 08/16/18   CT ABD PELV W CONT   Narrative EXAM: CT of the abdomen and pelvis    INDICATION: History of bladder cancer, metastatic disease, follow-up after  treatment. .  History of acute DVT and back pain. Anemia. COMPARISON: None. TECHNIQUE: Axial CT imaging of the abdomen and pelvis was performed intravenous  contrast. Multiplanar reformats were generated. One or more dose reduction techniques were used on this CT: automated exposure  control, adjustment of the mAs and/or kVp according to patient's size, and  iterative reconstruction techniques. The specific techniques utilized on this CT  exam have been documented in the patient's electronic medical record.    _______________    FINDINGS:    LOWER CHEST: Moderate/large pericardial effusion. Trace bilateral pleural  effusions and dependent minor atelectasis. LIVER, BILIARY: Liver demonstrates enhancing lesions bilaterally. Largest in the  left hepatic lobe measuring 3.3 x 3.4 cm. Liver is diffusely heterogeneous with  mild periportal edema. . No biliary dilation. Gallbladder is moderately distended  with surrounding fluid attenuation which likely represent fluid-filled bowel  loops. PANCREAS: Normal.    SPLEEN: Normal.    ADRENALS: Bilateral adrenal masses, measuring 3.7 cm on the right, and 3.1 cm on  the left. Chantell Jewell KIDNEYS/URETERS/BLADDER: Normal.    PELVIC ORGANS: Lobulated soft tissue mass abutting or arising from the uterus in  the right pelvis collection measuring 7.9 x 6.7 cm. VASCULATURE: There is extensive DVT involving the visualized left femoral, iliac  veins, with thrombus extending into the inferior most IVC. Associated left lower  extremity edema.     LYMPH NODES: No enlarged retroperitoneal lymph node between the IVC and aorta  measuring 1.6 cm short axis. Arelis Pradhan Arelis Ku GASTROINTESTINAL TRACT: No bowel dilation or wall thickening. BONES: No acute or aggressive osseous abnormalities identified. OTHER: None.    _______________         Impression IMPRESSION:    Moderate/large pericardial effusion. Trace bilateral pleural effusions and  dependent atelectasis. Hepatic lesions, adrenal masses and retroperitoneal adenopathy as described  compatible with metastatic disease. Lobulated mass in the pelvis either abutting or arising from the uterus. This  could just represent an lobulated multifibroid uterus. Other mass or malignancy  not excluded. Consider ultrasound for further evaluation. Extensive left iliofemoral DVT extending into the IVC. [x]See my orders for details    My assessment, plan of care, findings, medications, side effects etc were discussed with:  [x]nursing []PT/OT    []respiratory therapy [x]Dr. Kelsey Carver   []family [x]Patient     [x]Total critical care time exclusive of procedures 55 minutes with complex decision making performed and > 50% time spent in face to face evaluation.     Roberto Wilson MD

## 2018-08-20 NOTE — PROGRESS NOTES
Assessment:     1. Hyponatremia  2. Anemia  3. Hypochloremia  4. Hypocalcemia  5. Acute DVT  6. Metastatic lung cancer  7. RF     Plan:     Stop IVF given hypoxia  Salt tabs  Will monitor  D/w pt    CC: Hyponatremia and ARF  Interval History: PT is feeling thirsty, has left leg tightening        Subjective:   See above, getting Echo          Review of Systems:  Feeling thirsty  Left leg swelling  panick attacks. Blood pressure 129/72, pulse (!) 107, temperature 97.6 °F (36.4 °C), resp. rate 24, height 5' 7\" (1.702 m), weight 87.1 kg (192 lb), SpO2 100 %, not currently breastfeeding.       awake and alert   NAD  Left leg edema  On oxygen      Intake/Output Summary (Last 24 hours) at 08/20/18 0935  Last data filed at 08/20/18 0659   Gross per 24 hour   Intake           931.25 ml   Output              800 ml   Net           131.25 ml      Recent Labs      08/19/18   0244   WBC  11.5     Lab Results   Component Value Date/Time    Sodium 130 (L) 08/19/2018 02:44 AM    Potassium 4.5 08/19/2018 02:44 AM    Chloride 98 (L) 08/19/2018 02:44 AM    CO2 23 08/19/2018 02:44 AM    Anion gap 9 08/19/2018 02:44 AM    Glucose 104 (H) 08/19/2018 02:44 AM    BUN 14 08/19/2018 02:44 AM    Creatinine 0.91 08/19/2018 02:44 AM    BUN/Creatinine ratio 15 08/19/2018 02:44 AM    GFR est AA >60 08/19/2018 02:44 AM    GFR est non-AA >60 08/19/2018 02:44 AM    Calcium 8.7 08/19/2018 02:44 AM        Current Facility-Administered Medications   Medication Dose Route Frequency Provider Last Rate Last Dose    perflutren lipid microspheres (DEFINITY) in NS bolus IV  1 mL IntraVENous RAD Marta Mitchell MD        0.9% sodium chloride infusion 250 mL  250 mL IntraVENous PRN Curtis Baez DO        LORazepam (ATIVAN) tablet 1 mg  1 mg Oral Q4H PRN Curtis Baez DO   1 mg at 08/19/18 8140    albuterol-ipratropium (DUO-NEB) 2.5 MG-0.5 MG/3 ML  3 mL Nebulization Q4H PRN Curtis Baez, DO   3 mL at 08/20/18 0716    levothyroxine (SYNTHROID) tablet 75 mcg  75 mcg Oral ACB Ame Lung, DO   75 mcg at 08/20/18 0708    0.9% sodium chloride infusion 250 mL  250 mL IntraVENous PRN Ame Lung, DO        acetaminophen (TYLENOL) tablet 650 mg  650 mg Oral QID Ame Lung, DO   650 mg at 08/19/18 2212    sodium chloride tablet 1 g  1 g Oral BID WITH MEALS Amy Cunha MD   1 g at 08/19/18 1811    morphine injection 2 mg  2 mg IntraVENous Q4H PRN Ame Lung, DO   2 mg at 08/20/18 0008    heparin 25,000 units in D5W 250 ml infusion  18-36 Units/kg/hr IntraVENous TITRATE Sammie Mccollum PA-C 18.6 mL/hr at 08/19/18 2258 24 Units/kg/hr at 08/19/18 2258    traMADol (ULTRAM) tablet 50 mg  50 mg Oral Q6H PRN Farzana Sierra MD   50 mg at 08/19/18 1666

## 2018-08-21 NOTE — PROGRESS NOTES
Hospitalist Progress Note    Patient: Jaime Gann MRN: [id number]  CSN: [id number]    YOB: 1955  Age: 66 y.o. Sex: female    DOA: 8/16/2018 LOS:  LOS: 5 days          Chief Complaint:    Metastatic cancer      Assessment/Plan     1. Acute DVT - off blood thinners, now with IVC filter placed  2. Large pericaridal effusion- s/p pericardiocentesis of large volume of bloody fluid-will see if repeat needed or if cardiothoracic surgery eval necessary  3. Sinus tachycardia-improved  4. metastatic cancer, liver, retroperitoneum, bone, adrenals, and possible uterine mass  5. Hypothyroidism-now on synthroid  6.  Anemia-appears to be combination of acute blood loss and iron deficiency    Very ill lady  ICU care for now  No blood thinners  Will review with cardiology the plan for her pericardial effusion (hemorrhagic)  Does not have path result for cancer typing yet-wither bone biopsy of clavicular mass or fluid analysis from pericardium may give it    Discussed gravity of illness with sons and advised he update family on severity as he inquired about    Full code status     Disposition :TBD  Patient Active Problem List   Diagnosis Code    Dehydration E86.0    Hyponatremia E87.1    Metastatic lung cancer (metastasis from lung to other site) [facility]) C34.90    Leg DVT (deep venous thromboembolism), acute, left (HCC) I82.402    Acute renal injury due to hypovolemia (HCC) N17.9, E86.1    PAD (peripheral artery disease) ([facility] [address] I73.9    Pericardial effusion I31.3       Subjective:  Denies chest pain, SOB  Weak  Denies new issues, or headaches  Feels hungry      Review of systems:    Constitutional: denies fevers, chills, myalgias  Respiratory: denies SOB, cough  Cardiovascular: denies chest pain, palpitations  Gastrointestinal: denies nausea, vomiting, diarrhea      Vital signs/Intake and Output:  Visit Vitals    /79    Pulse 99    Temp 98.2 °F (36.8 °C)    Resp 14    Ht 5' 7\" (1.702 m)    Wt 87.1 kg (192 lb)    SpO2 100%    Breastfeeding No    BMI 30.07 kg/m2     Current Shift:     Last three shifts:  08/19 1901 - 08/21 0700  In: 1249.6 [P.O.:60; I.V.:882.1]  Out: 4600 [Urine:3800]    Exam:    General: ill appearing BF alert, NAD, OX3  Head/Neck: NCAT, supple, No masses, No lymphadenopathy  CVS:Regular rate and rhythm, no M/R/G, S1/S2 heard, no thrill, catheter substernal  Lungs:Clear to auscultation bilaterally, no wheezes, rhonchi, or rales  Abdomen: Soft, Nontender, No distention, Normal Bowel sounds, No hepatomegaly  Extremities: swelling LLE, wrap in place  Neuro:grossly normal , follows commands  Psych:appropriate                Labs: Results:       Chemistry Recent Labs      08/20/18 2156 08/19/18   0244   GLU  90  104*   NA  135*  130*   K  4.6  4.5   CL  102  98*   CO2  25  23   BUN  10  14   CREA  0.81  0.91   CA  9.3  8.7   AGAP  8  9   BUCR  12  15   AP  488*   --    TP  7.1   --    ALB  1.7*   --    GLOB  5.4*   --    AGRAT  0.3*   --       CBC w/Diff Recent Labs      08/21/18   0400  08/20/18   2156  08/20/18   1803   08/19/18   0244   WBC   --   11.3   --    --   11.5   RBC   --   3.79*   --    --   2.83*   HGB  9.6*  9.7*  9.7*   < >  7.0*   HCT  30.3*  30.2*  29.6*   < >  22.1*   PLT   --   250   --    --   327    < > = values in this interval not displayed. Cardiac Enzymes Recent Labs      08/21/18   0400   CPK  136      Coagulation Recent Labs      08/21/18   0400  08/20/18   1051  08/20/18   0310   PTP   --   13.7  14.5   INR   --   1.1  1.2   APTT  32.9  28.2  78.9*       Lipid Panel No results found for: CHOL, CHOLPOCT, CHOLX, CHLST, CHOLV, 820180, HDL, LDL, LDLC, DLDLP, 486748, VLDLC, VLDL, TGLX, TRIGL, TRIGP, TGLPOCT, CHHD, CHHDX   BNP No results for input(s): BNPP in the last 72 hours.    Liver Enzymes Recent Labs      08/20/18   2156   TP  7.1   ALB  1.7*   AP  488*   SGOT  50*      Thyroid Studies Lab Results   Component Value Date/Time    TSH 60.00 (H) 08/17/2018 08:29 AM        Procedures/imaging: see electronic medical records for all procedures/Xrays and details which were not copied into this note but were reviewed prior to creation of Corinne MD Noni

## 2018-08-21 NOTE — PROGRESS NOTES
INITIAL NUTRITION ASSESSMENT     RECOMMENDATIONS/PLAN:   Adv diet per MD  Avoid prolonged NPO diet order as it does not meet estimated needs  Recc Ensure Enlive TID once diet is adv  Monitor labs/lytes, diet adv, skin integrity, wt, fluid status, BM      REASON FOR ASSESSMENT:     [x] ICU admission  NUTRITION ASSESSMENT:   Client History: 66 yrs old Female admitted with acute DVT, large pericardial effusion, sinus tachycardia, renal insuffiencey, anemia. Pt recent diagnosis w/ lung ca awaiting path. PMHx: lung ca, PAD   Cultural/Mosque Food Preferences: None Identified    FOOD/NUTRITION HISTORY  Diet History: pt reports losing appetite a few weeks ago. Pt has tried to improve appetite but has not had much look. Pt does report liking Ensure Enlive and requested at [facility]. She does report eating peaches, grilled chicken, ham and cheese sandwich and grilled cheese. Food Allergies:  [x] NKFA     Pertinent PTA Medications: [x] Reviewed     NUTRITION INTAKE   Diet Order:  NPO     Average PO Intake:       Patient Vitals for the past 100 hrs:   % Diet Eaten   08/17/18 1200 50 %   08/17/18 0800 0 %      Pertinent Medications:  [x] Reviewed; pepcid,   Electrolyte Replacement Protocol: []K  []Mg  []PO4    Insulin:  [] SSI  [] Pre-meal   []  Basal   [] Drip  [] None  Pt expected to meet estimated nutrient needs through next review:          []  Yes     [x] No; NPO does not meet estimated needs  ANTHROPOMETRICS  Height: 5' 7\" (170.2 cm)       Weight: 87.1 kg (192 lb)    BMI: 30.1 kg/m^2  -  obese (30%-39.9% BMI)        Weight change: no significant wt loss noted in chart hx                                   Comparison to Reference Standards:  IBW: 135 lbs      %IBW: 142%      AdjBW: 67.8 kg    NUTRITION-FOCUSED PHYSICAL ASSESSMENT  Skin: No PU. GI: +BM 8/18/18    BIOCHEMICAL DATA & MEDICAL TESTS  Pertinent Labs:  [x] Reviewed;  Na-135     NUTRITION PRESCRIPTION  Calories: 2126 kcal/day based on [place] x 1.2 x 1.3  Protein: 70-87 g/day based on 0.8-1.0 g/kg  CHO: 267g/day based on 50% of total energy  Fluid: 2126 ml/day based on 1 kcal/ml      NUTRITION DIAGNOSES:   1. Inadequate oral intake related to decrease appetite as evidence by pt report. NUTRITION INTERVENTIONS:   INTERVENTIONS:        GOALS:  1. Other: Adv diet per MD 1. Adv diet per MD by next review 3 days     LEARNING NEEDS (Diet, Supplementation, Food/Nutrient-Drug Interaction):   [] None Identified  [] Inpatient education provided/documented    [x] Identified and patient:  [] Declined     [x] Was not appropriate/indicated  NUTRITION MONITORING /EVALUATION:   Follow PO intake  Monitor wt  Monitor renal labs, electrolytes, fluid status  Monitor for additional supplement needs     [x] Participated in Interdisciplinary Rounds  [x] [address] [place] [address] Reviewed/Documented  DISCHARGE NUTRITION RECOMMENDATIONS ADDRESSED:      [x] To be determined closer to discharge    NUTRITION RISK:     [x]  At risk                     []  Not currently at risk     Will follow-up per policy.   Arely Reese, [id number] [address]

## 2018-08-21 NOTE — CONSULTS
[facility] Consult Note      Patient: Laurence Bearden MRN: [id number]  SSN: [id number]    YOB: 1955  Age: 66 y.o. Sex: female    Date of Consultation: 08/20/2018  Referring Physician: Silvia Gonzalez DO  Reason for Consultation: Pericardial effusion    Chief complain: Shortness of breath    HPI: 66year old female admitted with left lower extremity swelling and being managed for acute DVT of left lower extremity. Cardiology consult called for shortness of breath and CT revealed large pericardial effusion. Patient is recently diagnosed with lung mass and following with Dr. Henrique Franco pending work up with CT imaging. Over the weekend patient developed anemia, hypoxia requiring supplemental O2 and worsening of shortness of breath leading to further work up that showed pericardial effusion on CT abd/pelvis. She denies any chest pain, dizziness, palpitation, presyncope or syncope. She is ex smoker.    Past Medical History:   Diagnosis Date    Cancer [facility])     lung cancer    PAD (peripheral artery disease) ([facility] [address]      Past Surgical History:   Procedure Laterality Date    VASCULAR SURGERY PROCEDURE UNLIST Right     opened up vessels     Current Facility-Administered Medications   Medication Dose Route Frequency    sodium chloride (NS) flush 5-10 mL  5-10 mL IntraVENous Q8H    sodium chloride (NS) flush 5-10 mL  5-10 mL IntraVENous PRN    0.9% sodium chloride infusion  100 mL/hr IntraVENous CONTINUOUS    [START ON 8/21/2018] famotidine (PF) (PEPCID) 20 mg in sodium chloride 0.9% 10 mL injection  20 mg IntraVENous Q12H    flumazenil (ROMAZICON) 0.1 mg/mL injection 0.2 mg  0.2 mg IntraVENous Rad Multiple    naloxone (NARCAN) injection 0.2 mg  0.2 mg IntraVENous PRN    0.9% sodium chloride infusion 250 mL  250 mL IntraVENous PRN    LORazepam (ATIVAN) tablet 1 mg  1 mg Oral Q4H PRN    albuterol-ipratropium (DUO-NEB) 2.5 MG-0.5 MG/3 ML  3 mL Nebulization Q4H PRN    levothyroxine (SYNTHROID) tablet 75 mcg  75 mcg Oral ACB    0.9% sodium chloride infusion 250 mL  250 mL IntraVENous PRN    acetaminophen (TYLENOL) tablet 650 mg  650 mg Oral QID    sodium chloride tablet 1 g  1 g Oral BID WITH MEALS    morphine injection 2 mg  2 mg IntraVENous Q4H PRN    traMADol (ULTRAM) tablet 50 mg  50 mg Oral Q6H PRN       Allergies and Intolerances:   No Known Allergies    Family History:   History reviewed. No pertinent family history. Social History:   She  reports that she has quit smoking. She has never used smokeless tobacco.  She  reports that she drinks alcohol. Review of Systems:     Gen: No fever, chills, malaise, weight loss/gain. Heent: No headache, rhinorrhea, epistaxis, ear pain, hearing loss, sinus pain, neck pain/stiffness, sore throat. Heart: Positive shortness of breath, No chest pain, palpitations, pnd, or orthopnea. Resp: No cough, hemoptysis, wheezing and dyspnea   GI: No nausea, vomiting, diarrhea, constipation, melena or hematochezia. : No urinary obstruction, dysuria or hematuria. Derm: No rash, new skin lesion or pruritis. Musc/skeletal: no bone or joint complains. Vasc: Positive edema, No cyanosis or claudication. Endo: No heat/cold intolerance, no polyuria,polydipsia or polyphagia. Neuro: No unilateral weakness, numbness, tingling. No seizures. Heme: No easy bruising or bleeding. Physical:   Patient Vitals for the past 6 hrs:   Temp Pulse Resp BP SpO2   08/20/18 2231 97.9 °F (36.6 °C) - - - -   08/20/18 2200 - (!) 106 21 133/68 100 %   08/20/18 2100 - (!) 103 20 133/70 100 %   08/20/18 2030 - (!) 104 21 138/57 100 %   08/20/18 2015 - (!) 105 22 145/71 100 %   08/20/18 2000 - (!) 105 20 138/62 100 %   08/20/18 1945 - (!) 111 21 143/81 99 %   08/20/18 1800 - (!) 108 21 143/82 100 %   08/20/18 1730 - (!) 113 22 150/86 100 %         Exam:   General Appearance: In respiratory distress, using accessory muscles of respiration. HEENT: SILVER.    HEAD: Atraumatic  NECK: Positive JVD, no thyroidomeglay. CAROTIDS: No bruit  LUNGS: Clear bilaterally. HEART: S1+S2 audible, Tachycardic, no murmur, no pericardial rub. ABD: Non-tender, BS Audible    EXT: Left lower extremity edema, and no cysnosis. VASCULAR EXAM: Pulses are intact. PSYCHIATRIC EXAM: Mood is appropriate. MUSCULOSKELETAL: Grossly no joint deformity.   NEUROLOGICAL: AAO times 3, Motor and sensory sytem intact   Review of Data:   LABS:   Lab Results   Component Value Date/Time    WBC 11.3 08/20/2018 09:56 PM    HGB 9.7 (L) 08/20/2018 09:56 PM    HCT 30.2 (L) 08/20/2018 09:56 PM    PLATELET 827 32/29/4170 09:56 PM     Lab Results   Component Value Date/Time    Sodium 135 (L) 08/20/2018 09:56 PM    Potassium 4.6 08/20/2018 09:56 PM    Chloride 102 08/20/2018 09:56 PM    CO2 25 08/20/2018 09:56 PM    Glucose 90 08/20/2018 09:56 PM    BUN 10 08/20/2018 09:56 PM    Creatinine 0.81 08/20/2018 09:56 PM     No results found for: CHOL, CHOLX, CHLST, CHOLV, HDL, LDL, LDLC, DLDLP, TGLX, TRIGL, TRIGP  No results found for: GPT  No results found for: HBA1C, HGBE8, MZM4SEWL, NJY9RXUT      Cardiology Procedures:   Results for orders placed or performed during the hospital encounter of 08/16/18   EKG, 12 LEAD, INITIAL   Result Value Ref Range    Ventricular Rate 129 BPM    Atrial Rate 129 BPM    P-R Interval 114 ms    QRS Duration 78 ms    Q-T Interval 296 ms    QTC Calculation (Bezet) 433 ms    Calculated P Axis 57 degrees    Calculated R Axis 48 degrees    Calculated T Axis 64 degrees    Diagnosis       Sinus tachycardia  Poor R wave progression  Nonspecific T wave abnormality  Abnormal ECG               Impression / Plan:    Patient Active Problem List   Diagnosis Code    Dehydration E86.0    Hyponatremia E87.1    Metastatic lung cancer (metastasis from lung to other site) [facility]) C34.90    Leg DVT (deep venous thromboembolism), acute, left (HCC) I82.402    Acute renal injury due to hypovolemia (HCC) N17.9, E86.1    [facility] I73.[facility]    Pericardial effusion I31.3     PAD - right lower extremity by pass  Pericardial effusion with tamponade  Ex smoker     66year old female admitted with acute DVT developed worsening of shortness of breath since last night. She had CT abdomen done due to anemia and found to have large pericardial effusion. On exam she is tachycardic, has pulsus paradoxus, positive JVD and clear lung. Echocardiogram revealed large pericardial effusion with tamponade. CT abdomen reported   Moderate/large pericardial effusion. Trace bilateral pleural effusions and  dependent atelectasis.     Hepatic lesions, adrenal masses and retroperitoneal adenopathy as described  compatible with metastatic disease.     Lobulated mass in the pelvis either abutting or arising from the uterus. This  could just represent an lobulated multifibroid uterus. Other mass or malignancy  not excluded. Consider ultrasound for further evaluation.     Extensive left iliofemoral DVT extending into the IVC. Stop Heparin drip. In view of cardiac tamponade advised about emergent pericardiocentesis. All risks,benefits and alternatives explained to patient and family member and they verbally understood. She will also need IVC filter as will not able to continue heparin drip post procedure. Continue IV fluid normal saline @ 100 cc/her  Further recommendation to follow after pericardiocentesis. 50 minutes of critical care time spent in the direct evaluation and treatment of this high risk patient. The reason for providing this level of medical care for this critically ill patient was due a critical illness that impaired one or more vital organ systems such that there was a high probability of imminent or life threatening deterioration in the patients condition.  This care involved high complexity decision making to assess, manipulate, and support vital system functions, to treat this degree vital organ system failure and to prevent further life threatening deterioration of the patients condition.           Signed By: Nacho Lopez MD     August 20, 2018

## 2018-08-21 NOTE — ACP (ADVANCE CARE PLANNING)
Patient does not have an Advance Directive. She is currently too sleepy and somewhat confused to complete an AMD.     Legal next of kin would be her sons Jaron Jones and Eyal Singh. Yara Car [phone]. Son, Magdiel Whaley No contact information available at this time. Advance Directive needs to be addressed when patient feeling better and more alert and oriented. Patient is currently Full Code per her son, Jaron Jones.

## 2018-08-21 NOTE — PROGRESS NOTES
Bedside and Verbal shift change report given to Kerri Woody RN (oncoming nurse) by America Salas RN (offgoing nurse). Report included the following informatiSR-STSBAR, Procedure Summary, Recent Results and Cardiac Rhythm SR-ST.     0745: Assessment completed. A&Ox4. States pain in (L) shoulder which is not new for her. Did ask for morphine when able. ST on monitor. On 3L NC. Tolerating well. Lung sounds clear bilaterally. Drain remains in upper (R) chest by neck. Covered with Tegaderm and guaze. Bowel sounds active. (L) leg repositioned on to pillows to keep elevated. 1130: Patient had SCD placed on (R) leg for DVT prophylaxis. 1200: Reassessment completed. Patient weaned down to RA and tolerating well. Dr. Slim Suresh called and OK given to allow patient to eat.     1500: Dr. Slim Suresh in to remove drain from chest. Guaze and tegaderm placed. 1600: Reassessment completed. No changes from previous. Bedside and Verbal shift change report given to ANAMIKA Abdi (oncoming nurse) by Kerri Woody RN (offgoing nurse).  Report included the following information SBAR, Recent Results and Cardiac Rhythm ST.

## 2018-08-21 NOTE — PROGRESS NOTES
Spoke with MD during IDR. Dr. Abigail Arzola recommend continue to hold from PT services. Pt unsafe to mobilize at this time due medical complexity and inability to treat for DVT. Will d/c PT order at this time. MD recommends to hold PT probably for entire week. Will d/c PT orders at this time. Will continue to check back with MD for when appropriate to reorder PT. Thank you.

## 2018-08-21 NOTE — PROGRESS NOTES
Patient seen and examined s/p IVC filter placement on 8/20. Patient denies pain at this time, son at bedside. Respirations unlabored, RRR on monitor, right IJ access site soft, dressing clean, dry, and intact, left leg with 2+ edema from toes to thigh, left leg wrapped in ACE for compression and elevated above heart, legs warm, no palpable pedal pulses, +PT/+DP signals to right foot, +PT signal to left foot, +sensory/+motor function bilaterally. Keep left leg wrapped in ACE from toes to groin and leg elevated above the level of the heart.       Visit Vitals    /79    Pulse 99    Temp 98.2 °F (36.8 °C)    Resp 14    Ht 5' 7\" (1.702 m)    Wt 192 lb (87.1 kg)    SpO2 100%    Breastfeeding No    BMI 30.07 kg/m2

## 2018-08-21 NOTE — PROGRESS NOTES
1945- Report and care received, assessment completed per flow sheet. Alert, oriented, denies pain, NAD. LLE elevated on 2 pillows and wrapped with ace wrap from foot to thigh. IVF infusing via IV pump without difficulty. Anterior chest drain secured with transparent dressing and capped. 0011- Reassessment completed and without change. 7666- Reassessment completed and without change.

## 2018-08-21 NOTE — DIABETES MGMT
GLYCEMIC CONTROL PROGRESS NOTE:    -discussed in rounds, no known h/o DM  -NPO s/p cardiac procedure, diet expected to resume today  -no identified GC needs at this time    Recent Glucose Results:   Lab Results   Component Value Date/Time    GLU 90 08/20/2018 09:56 PM     Delfina Mahmood RN, MS  Glycemic Control Team  Pager [phone] (M-TH 8:30-5P)  *After Hours pager [phone]

## 2018-08-21 NOTE — PROGRESS NOTES
[facility]  Pulmonary, Critical Care, and Sleep Medicine    Name: Nahed Jimenez MRN: [id number]   : 1955 Hospital: [facility] [place]   Date: 2018        Pulmonary Critical Care F/Up Note    IMPRESSION:   Principal Problem:    Pericardial effusion (2018)      Overview: Added automatically from request for surgery [id number]    Active Problems:    Dehydration (2018)      Hyponatremia (2018)      Metastatic lung cancer (metastasis from lung to other site) [facility]) (2018)      Leg DVT (deep venous thromboembolism), acute, left ([facility] (2018)      Acute renal injury due to hypovolemia ([facility] [address] (2018)      PAD (peripheral artery disease) ([facility] [address] (2018)    · Anemia from blood loss, stable Hgb  · Hypothyroidism, on supplement   RECOMMENDATIONS:   Compensated respiratory status. On room air now. Supplemental O2 via NC as needed, titrate flow for goal SPO2 > 90%  Aspiration prevention bundle, head of the bed at 30' all times, pulmonary hygiene care  Monitor percidial drain output and further management per cardiology  Pericardial fluid cultures to be followed. Follow up pericardial fluid analysis and cytology  Fluids: NS @ 100 cc/hr- adjust per nephrology  Monitor hemodynamics, UO  Monitor Hgb Q6, transfuse for Hgb < 7  Follow up ECHO per cardiology  Stress ulcer prophylaxis  Vascular surgery input appreciated, s/p IVC filter placement 18. Monitor off of anticoagulation as bleeding risk felt to be higher  AM labs. Diet as tolerated  Palliative care consulted  Will defer respective systems problem management to primary and other consultant and follow patient in ICU with primary and other medical team  Further recommendations will be based on the patient's response to recommended treatment and results of the investigation ordered. Quality Care: PPI, DVT prophylaxis, HOB elevated, Infection control all reviewed and addressed.   PAIN CONTROL: morphine, ultram  · Skin/Wound: pericardial drain site care per nursing prtocol  · Nutrition: diet as tolerated  · Prophylaxis: GI Prophylaxis reviewed. · Restraints: none  · PT/OT eval and treat: as needed   · Lines/Tubes: lines PIV, howard none  ADVANCE DIRECTIVE: Full code. Palliative care consulted  FAMILY DISCUSSION: spoke and updated the patient and sons at her request  Events and notes from last 24 hours reviewed. Care plan discussed with nursing      Subjective/History:   Ms. Monica Feliz is a 66 y.o. female with PMHx for PAD who had recent dx of lung mass and following with Dr. Leretha Goltz pending work up with CT imaging, presented with acute extensive DVT of left leg. Admitted for treatment with Heparin and work up. She was consulted by Oncology for continuing DVT treatment and lung mass work up; nephrology for management of hyponatremia. Over the weekend patient developed anemia, hypoxia requiring supplemental O2 and progressive SOB leading to further work up that showed pericardial effusion on CT abd/pelvis and cardiac tamponade on ECHO. Heparin been stopped since midnight per staff, no abnormal bruising or abnormal bleeding from any body orifice. Remained with stable BP but sinus tachycardia. Underwent pericardial effusion drainage with removal of 800 cc bloody drainage. Patient post-procedure, reports SOB is significantly better, has discomfort at drain insertion site, no fever, chills, cough, wheezing, hemoptysis, palpitations, syncope, orthopnea, or paroxysmal nocturnal dyspnea.      8/21/18  Patient improved overnight and titrated off of O2 this AM  From 1 Lpm to room air now, SPO2 96%  Reports SOB significantly better  Remained hemodynamically stable, not on any vasopressor  Repeat ECHO done today; no major drainage per pericardial drain  Good urine output      Review of Systems:  General ROS: negative for  - fever, chills, weight loss, fatigue and malaise  Cardiovascular ROS: negative for - murmur, orthopnea, palpitations or paroxysmal nocturnal dyspnea  Gastrointestinal ROS: no abdominal pain, change in bowel habits, or black or bloody stools  Dermatological ROS: negative for - pruritus, rash or skin lesion changes              Latest lactic acid:   Lactic acid   Date Value Ref Range Status   08/17/2018 1.7 0.4 - 2.0 MMOL/L Final   08/17/2018 2.3 (HH) 0.4 - 2.0 MMOL/L Final     Comment:     CALLED TO AND CORRECTLY REPEATED BY:  Yeimy Rice RN, 3N ON 08/17/2018 AT 0137 TO JDA     08/16/2018 4.7 (HH) 0.4 - 2.0 MMOL/L Final     Comment:     CALLED TO AND CORRECTLY REPEATED BY:  Santy Roth RN AT 2042 ON 8/16/18 BY JOEL. Past Medical History:  Past Medical History:   Diagnosis Date    Cancer ([facility]     lung cancer    PAD (peripheral artery disease) ([facility] [address]         Past Surgical History:  Past Surgical History:   Procedure Laterality Date    VASCULAR SURGERY PROCEDURE UNLIST Right     opened up vessels        Medications:  Prior to Admission medications    Medication Sig Start Date End Date Taking? Authorizing Provider   naproxen (NAPROSYN) 500 mg tablet Take 500 mg by mouth two (2) times daily (with meals). Yes Historical Provider   traMADol (ULTRAM) 50 mg tablet Take 50 mg by mouth every six (6) hours as needed for Pain. Historical Provider   cyclobenzaprine (FLEXERIL) 10 mg tablet Take 10 mg by mouth three (3) times daily as needed for Muscle Spasm(s).     Historical Provider       Current Facility-Administered Medications   Medication Dose Route Frequency    sodium chloride (NS) flush 5-10 mL  5-10 mL IntraVENous Q8H    famotidine (PF) (PEPCID) 20 mg in sodium chloride 0.9% 10 mL injection  20 mg IntraVENous Q12H    levothyroxine (SYNTHROID) tablet 75 mcg  75 mcg Oral ACB    acetaminophen (TYLENOL) tablet 650 mg  650 mg Oral QID    sodium chloride tablet 1 g  1 g Oral BID WITH MEALS       Allergy:  No Known Allergies     Social History:  Social History   Substance Use Topics    Smoking status: Former Smoker    Smokeless tobacco: Never Used    Alcohol use Yes      Comment: occasionally        Family History:  History reviewed. No family history for lung cancer. Objective:   Vital Signs:    Blood pressure 143/79, pulse 99, temperature 98.2 °F (36.8 °C), resp. rate 14, height 5' 7\" (1.702 m), weight 87.1 kg (192 lb), SpO2 100 %, not currently breastfeeding. Body mass index is 30.07 kg/(m^2). O2 Device: Nasal cannula, room air   O2 Flow Rate (L/min): 1 l/min, room air   Temp (24hrs), Av °F (36.7 °C), Min:97.8 °F (36.6 °C), Max:98.2 °F (36.8 °C)       Intake/Output:   Last shift:         Last 3 shifts:  1901 -  0700  In: 1249.6 [P.O.:60; I.V.:882.1]  Out: 3973 [Urine:3800]    Intake/Output Summary (Last 24 hours) at 18 1327  Last data filed at 18 0620   Gross per 24 hour   Intake           578.33 ml   Output             2500 ml   Net         -1921.67 ml       Physical Exam:  General: AAO x 3, in no respiratory distress and acyanotic, cooperative, no distress, appears older than stated age  [de-identified]: Ngo Spillers, fundi benign, throat normal without erythema or exudate  Neck: No abnormally enlarged lymph nodes or thyroid, supple  Chest: normal, pericardial drain in place  Lungs: moderate air entry, clear to auscultation bilaterally, minimal dullness to percussion bases, no tenderness/ rash  Heart: Regular rate and rhythm, S1S2 present or without murmur or extra heart sounds  Abdomen: protuberant, bowel sounds normoactive, tympanic, abdomen is soft without significant tenderness, masses, organomegaly or guarding, rigidity, rebound  Extremity: 1-2+ and left > right leg edema, no cyanosis, clubbing; peripheral pulses by doppler bl present  Capillary refill: normal bl  Neuro: alert, oriented x 3, no defects noted in general exam.  Skin: Skin color, texture, turgor fair.  Skin dry, diaphoretic    Data:     Recent Results (from the past 24 hour(s))   HGB & HCT    Collection Time: 08/20/18  6:03 PM   Result Value Ref Range    HGB 9.7 (L) 12.0 - 16.0 g/dL    HCT 29.6 (L) 35.0 - 45.0 %   EKG, 12 LEAD, SUBSEQUENT    Collection Time: 08/20/18  9:33 PM   Result Value Ref Range    Ventricular Rate 104 BPM    Atrial Rate 104 BPM    P-R Interval 146 ms    QRS Duration 80 ms    Q-T Interval 370 ms    QTC Calculation (Bezet) 486 ms    Calculated P Axis 50 degrees    Calculated R Axis 24 degrees    Calculated T Axis 48 degrees    Diagnosis       Sinus tachycardia  Nonspecific T wave abnormality  Abnormal ECG  When compared with ECG of 16-AUG-2018 15:11,  Nonspecific T wave abnormality no longer evident in Inferior leads     CBC W/O DIFF    Collection Time: 08/20/18  9:56 PM   Result Value Ref Range    WBC 11.3 4.6 - 13.2 K/uL    RBC 3.79 (L) 4.20 - 5.30 M/uL    HGB 9.7 (L) 12.0 - 16.0 g/dL    HCT 30.2 (L) 35.0 - 45.0 %    MCV 79.7 74.0 - 97.0 FL    MCH 25.6 24.0 - 34.0 PG    MCHC 32.1 31.0 - 37.0 g/dL    RDW 17.3 (H) 11.6 - 14.5 %    PLATELET 767 057 - 207 K/uL    MPV 8.7 (L) 9.2 - 36.2 FL   METABOLIC PANEL, COMPREHENSIVE    Collection Time: 08/20/18  9:56 PM   Result Value Ref Range    Sodium 135 (L) 136 - 145 mmol/L    Potassium 4.6 3.5 - 5.5 mmol/L    Chloride 102 100 - 108 mmol/L    CO2 25 21 - 32 mmol/L    Anion gap 8 3.0 - 18 mmol/L    Glucose 90 74 - 99 mg/dL    BUN 10 7.0 - 18 MG/DL    Creatinine 0.81 0.6 - 1.3 MG/DL    BUN/Creatinine ratio 12 12 - 20      GFR est AA >60 >60 ml/min/1.73m2    GFR est non-AA >60 >60 ml/min/1.73m2    Calcium 9.3 8.5 - 10.1 MG/DL    Bilirubin, total 0.8 0.2 - 1.0 MG/DL    ALT (SGPT) 49 13 - 56 U/L    AST (SGOT) 50 (H) 15 - 37 U/L    Alk.  phosphatase 488 (H) 45 - 117 U/L    Protein, total 7.1 6.4 - 8.2 g/dL    Albumin 1.7 (L) 3.4 - 5.0 g/dL    Globulin 5.4 (H) 2.0 - 4.0 g/dL    A-G Ratio 0.3 (L) 0.8 - 1.7     PTT    Collection Time: 08/21/18  4:00 AM   Result Value Ref Range    aPTT 32.9 23.0 - 36.4 SEC   T4, FREE    Collection Time: 08/21/18  4:00 AM   Result Value Ref Range    T4, Free 0.8 0.7 - 1.5 NG/DL   T3, FREE    Collection Time: 08/21/18  4:00 AM   Result Value Ref Range    Triiodothyronine (T3), free 1.1 (L) 2.18 - 3.98 PG/ML   HGB & HCT    Collection Time: 08/21/18  4:00 AM   Result Value Ref Range    HGB 9.6 (L) 12.0 - 16.0 g/dL    HCT 30.3 (L) 35.0 - 45.0 %   CK    Collection Time: 08/21/18  4:00 AM   Result Value Ref Range     26 - 192 U/L   ECHO ADULT FOLLOW-UP OR LIMITED    Collection Time: 08/21/18 10:24 AM   Result Value Ref Range    LV ED Vol A2C 87.2 mL    LV ES Vol A4C 17.4 mL    LV ES Vol BP 24.3 19 - 49 mL    BP EF 64.1 55 - 100 %    LV Ejection Fraction MOD 4C 67 %    LV Ejection Fraction MOD 2C 61 %    LV ES Vol A2C 34.5 mL    LVES Vol Index BP 12.2 mL/m2    LV ED Vol A4C 52.7 mL    LVED Vol Index BP 34.2 mL/m2    Triscuspid Valve Regurgitation Peak Gradient 41.0 mmHg    LV ED Vol BP 67.9 56 - 104 ml    TR Max Velocity 320.30 cm/s    LVED Vol Index A4C 26.5 mL/m2    LVED Vol Index A2C 43.9 mL/m2    LVES Vol Index A4C 8.8 mL/m2    LVES Vol Index A2C 17.4 mL/m2           Recent Labs      08/19/18   0054   FIO2I  28   HCO3I  19.8*   PCO2I  30.8*   PHI  7.417   PO2I  90       All Micro Results     Procedure Component Value Units Date/Time    CULTURE, BLOOD [[id number]] Collected:  08/16/18 1523    Order Status:  Completed Specimen:  Blood from Blood Updated:  08/21/18 0721     Special Requests: NO SPECIAL REQUESTS        Culture result: NO GROWTH 5 DAYS       CULTURE, BLOOD [[id number]] Collected:  08/16/18 1955    Order Status:  Completed Specimen:  Blood from Blood Updated:  08/21/18 0721     Special Requests: NO SPECIAL REQUESTS        Culture result: NO GROWTH 5 DAYS       CULTURE, FUNGUS [[id number]] Collected:  08/20/18 1211    Order Status:  Completed Specimen:  Pericardial Fluid Updated:  08/20/18 2130     Special Requests: NO SPECIAL REQUESTS        FUNGUS SMEAR NO FUNGAL ELEMENTS SEEN        Culture result: PENDING    CULTURE, BODY FLUID [[id number]] Collected:  08/20/18 1211    Order Status:  Completed Specimen:  Pericardial Fluid Updated:  08/20/18 2129     Special Requests: NO SPECIAL REQUESTS        GRAM STAIN FEW WBC'S         NO ORGANISMS SEEN        Culture result: PENDING    AFB CULTURE + SMEAR W/RFLX ID FROM CULTURE [[id number]] Collected:  08/20/18 1230    Order Status:  Canceled     CULTURE, URINE [[id number]] Collected:  08/16/18 2150    Order Status:  Completed Specimen:  Urine from Clean catch Updated:  08/18/18 1020     Special Requests: NO SPECIAL REQUESTS        Culture result:       [id number]  COLONIES/mL  MIXED GRAM POSITIVE WILBERT, PROBABLE SKIN/GENITAL CONTAMINATION. Telemetry: normal sinus rhythm    8/20/18 ECHO  · Large circumferential pericardial effusion measuring 34 mm. Hemodynamic compromise consistent with cardiac tamponade. · Estimated left ventricular ejection fraction is 56 - 60%. Left ventricular cavity size is decreased. Left ventricular concentric borderline hypertrophy. · Mild tricuspid valve regurgitation is present. Pulmonary arterial systolic pressure is 40 mmHg. Mild pulmonary hypertension is present. · Severely elevated central venous pressure     8/16/18  DVT study  · No evidence of deep vein thrombosis in the right lower extremity. · Deep venous thrombus present in the left external iliac vein, saphenofemoral junction, common femoral, femoral , popliteal, posterior tibial and peroneal veins    8/17/18 NM Bone scan  Impression:   1.  Focal scintigraphic uptake involving the distal medial left clavicle, in keeping with lytic lesion demonstrated on prior CT. 2. Focal and indeterminate uptake within the sacrum. Correlation with CT imaging is recommended if not performed previously to evaluate for potential site of metastasis versus degenerative uptake. 3. Diffuse soft tissue uptake throughout the left leg, likely in keeping with patient's known DVT.     8/17/18  MRI brain   Impression:   1. No evidence of intracranial metastatic disease or other acute intracranial finding.   2. Mild deep cerebral white matter and pontine signal changes nonspecific, trouble chronic microvascular ischemic disease. 3. Postinflammatory changes left posterior ethmoid sinus. Imaging:  [x]I have personally reviewed the patients chest radiographs images and report   8/20/18    Results from Hospital Encounter encounter on 08/16/18   XR CHEST PORT   Narrative EXAM: Chest portable    INDICATION: Pericardiocentesis    COMPARISON: Single view chest 8/19/2018    _______________    FINDINGS:    AP portable chest film was performed. Cardiac silhouette appears slightly  smaller. A catheter-like structure projects over the inferior cardiac silhouette  may represent a pericardial space catheter. Persistent masslike opacity right  upper lobe. Findings suggesting early left lower lobe infiltrate/atelectasis. No  pneumothorax.    _______________         Impression IMPRESSION:      1. Catheter overlying the inferior cardiac silhouette may represent  pericardiocentesis drain. Cardiac silhouette appears slightly smaller. No  pneumothorax or mediastinal.  2. Persistent right upper lobe masslike opacity. 3. Possible early left lower lobe infiltrate. 8/19/18    Results from Hospital Encounter encounter on 08/16/18   CT ABD PELV W CONT   Narrative EXAM: CT of the abdomen and pelvis    INDICATION: History of bladder cancer, metastatic disease, follow-up after  treatment. .  History of acute DVT and back pain. Anemia. COMPARISON: None. TECHNIQUE: Axial CT imaging of the abdomen and pelvis was performed intravenous  contrast. Multiplanar reformats were generated. One or more dose reduction techniques were used on this CT: automated exposure  control, adjustment of the mAs and/or kVp according to patient's size, and  iterative reconstruction techniques.  The specific techniques utilized on this CT  exam have been documented in the patient's electronic medical record.    _______________    FINDINGS:    LOWER CHEST: Moderate/large pericardial effusion. Trace bilateral pleural  effusions and dependent minor atelectasis. LIVER, BILIARY: Liver demonstrates enhancing lesions bilaterally. Largest in the  left hepatic lobe measuring 3.3 x 3.4 cm. Liver is diffusely heterogeneous with  mild periportal edema. . No biliary dilation. Gallbladder is moderately distended  with surrounding fluid attenuation which likely represent fluid-filled bowel  loops. PANCREAS: Normal.    SPLEEN: Normal.    ADRENALS: Bilateral adrenal masses, measuring 3.7 cm on the right, and 3.1 cm on  the left. Darwin Shillings KIDNEYS/URETERS/BLADDER: Normal.    PELVIC ORGANS: Lobulated soft tissue mass abutting or arising from the uterus in  the right pelvis collection measuring 7.9 x 6.7 cm. VASCULATURE: There is extensive DVT involving the visualized left femoral, iliac  veins, with thrombus extending into the inferior most IVC. Associated left lower  extremity edema. LYMPH NODES: No enlarged retroperitoneal lymph node between the IVC and aorta  measuring 1.6 cm short axis. .. GASTROINTESTINAL TRACT: No bowel dilation or wall thickening. BONES: No acute or aggressive osseous abnormalities identified. OTHER: None.    _______________         Impression IMPRESSION:    Moderate/large pericardial effusion. Trace bilateral pleural effusions and  dependent atelectasis. Hepatic lesions, adrenal masses and retroperitoneal adenopathy as described  compatible with metastatic disease. Lobulated mass in the pelvis either abutting or arising from the uterus. This  could just represent an lobulated multifibroid uterus. Other mass or malignancy  not excluded. Consider ultrasound for further evaluation. Extensive left iliofemoral DVT extending into the IVC.           [x]See my orders for details    My assessment, plan of care, findings, medications, side effects etc were discussed with:  [x]nursing []PT/OT    []respiratory therapy [x]Dr. Arleth Allen   []family [x]Patient     [x]Total critical care time exclusive of procedures 35 minutes with complex decision making performed and > 50% time spent in face to face evaluation.     Pablo Clark MD

## 2018-08-21 NOTE — PROGRESS NOTES
Palliative Medicine  [place]: [phone] ([phone])  [facility]: [phone] ([phone])      GOALS OF CARE: Continue current care. TREATMENT PREFERENCES:   Code Status: Full Code    Advance Care Planning:  Advance Care Planning 8/21/2018   Patient's Healthcare Decision Maker is: Legal Next of Kin   Primary Decision Maker Name Naopleon Villar   Primary Decision Maker Phone Number [phone]   Primary Decision Maker Relationship to Patient Adult child   Secondary Decision Maker Name Rajeev [address] Phone Number -   Secondary Decision Maker Relationship to Patient Adult child   Confirm Advance Directive None   Patient Would Like to Complete Advance Directive Unable   Does the patient have other document types -         Patient / Family Encounter Documentation    Participants (names): patient (intermittently asleep), son Sadie Sewell, Palliative RN Pollo Billings, and myself. Narrative: Introduced Palliative Medicine for support and additional resource. Patient was intermittently awake and alert and at times appeared lethargic. Questioned her understanding of medical condition \"I have a lot going on; I have colon cancer\". Patient didn't demonstrate having good understanding of medical condition. Son shared he has been getting mixed messages from all providers involved regarding mothers condition. And he has numerous family members questioning him for information. Questioned what information he feels is contradicting and what information is family seeking. Son has questions regarding mother's potential cancer diagnosis, staging and recommended treatment. Informed him due to patient's critical condition unable to complete workup for cancer staging and treatment recommendations. Encouraged him to write down questions or concerns for medical team.     Patient does not have an Advance Directive. Legal next of kin would be her sons Sadie Sewell and Clau Glover.  Advance Directive needs to be addressed when patient feeling better and more alert and oriented. Psychosocial Identified: Patient was  for over 36 years to  who passed away 4 years ago due to COPD. They have two adult sons. Son Pierre Benitez was recently diagnosed with colon cancer and under going treatment. [facility] shared patient has numerous siblings out of state who have been concerned. Family members seem to be overwhelming son with questions. Encouraged him to have one point of contact among family to receive and retrieve information. Goals of Care / Plan: Continue to support family and patient. ? Completion of AMD to establish medical agent and goals of care. Thank you for the opportunity to assist in the care of Ms. Mims.    Cameron Duran, MSW  Palliative Medicine

## 2018-08-21 NOTE — PROGRESS NOTES
Asked to review on line ekg  No acute st changes noted   Poor r wave progression non specifiic st changes

## 2018-08-21 NOTE — OP NOTES
George Dietrich  MR#: [id number]  : 1955  ACCOUNT #: [de-identified]   DATE OF SERVICE: 2018    PREOPERATIVE DIAGNOSIS:  Metastatic cancer with hemorrhagic pericardial effusion and extensive left iliofemoral and caval thrombosis. POSTOPERATIVE DIAGNOSIS:  Metastatic cancer with hemorrhagic pericardial effusion and extensive left iliofemoral and caval thrombosis. PROCEDURES PERFORMED:  1. Ultrasound-guided percutaneous access right internal jugular vein. 2.  Placement of infrarenal inferior vena cava filter. SURGEON:  Nicol Campuzano MD.    ANESTHESIA:  MAC with local.    ESTIMATED BLOOD LOSS:  Minimal.     SPECIMENS REMOVED:  None. PACKS AND DRAINS:  None. IMPLANTS:  Cook Celect IVC filter. COMPLICATIONS:  None. CONDITION:  To recovery, stable. FINDINGS:  No evidence of any extensive caval thrombus up to the renal veins. Satisfactory placement of infrarenal vena cava filter confirmed by fluoroscopy. INDICATIONS FOR PROCEDURE:  The patient is a 81-year-old female with metastatic cancer was found to have a hemorrhagic pericardial effusion. Given these findings, both Cardiology, the intensivist and the hospital have requested an IVC filter and so we consulted to Vascular Surgery. Upon examination, the patient was noted to have extensive iliofemoral DVT with caval thrombosis up into the IVC just above the bifurcation. Again, final decision was made to place an IVC filter. Informed consent was obtained. PROCEDURE IN DETAIL:  On 2018 the patient was taken to the cath lab suite. She was identified by name and ID bracelet by the entire team.  Once that was done, the patient was placed on the catheterization table in supine position. After appropriate depth of the anesthesia was obtained, the patient was prepped and draped and timeout was performed.   At this point, using ultrasound, the right internal jugular vein was interrogated. It was patent and appeared to be an appropriate site for access. Ultrasound-guided percutaneous access of the right internal jugular vein was obtained. We advanced a Bentson wire down to the inferior vena cava then advanced a dilator followed by a sheath IVC filter. We performed venacavogram, which showed evidence of any kind of caval thrombus up to the infrarenal IVC. At this point, inferior vena cava was then deployed in a controlled fashion. A sheath was then removed. Manual pressure was held for hemostasis. At the end of the procedure, all counts were correct and I was present throughout the entire procedure.       MD Get Cunningham / JOSE  D: 08/20/2018 15:48     T: 08/21/2018 09:53  JOB #: [id number]

## 2018-08-22 NOTE — PROGRESS NOTES
Hospitalist Progress Note    Patient: Ray Briceno MRN: [id number]  CSN: [id number]    YOB: 1955  Age: 66 y.o. Sex: female    DOA: 8/16/2018 LOS:  LOS: 6 days          Chief Complaint: pericardial effusion          Assessment/Plan       Disposition :  Patient Active Problem List   Diagnosis Code    Dehydration E86.0    Hyponatremia E87.1    Metastatic lung cancer (metastasis from lung to other site) [facility]) C34.90    Leg DVT (deep venous thromboembolism), acute, left (HCC) I82.402    Acute renal injury due to hypovolemia (HCC) N17.9, E86.1    PAD (peripheral artery disease) ([facility]) I73.9    Pericardial effusion I31.3         1. Acute DVT - off blood thinners, now with IVC filter placed  2. Large pericaridal effusion- s/p pericardiocentesis of large volume of bloody fluid-will see if repeat needed or if cardiothoracic surgery eval necessary  3. Sinus tachycardia-improved  4. metastatic cancer, liver, retroperitoneum, bone, adrenals, and possible uterine mass  5. Hypothyroidism-now on synthroid  6. Anemia-appears to be combination of acute blood loss and iron deficiency     -Continue icu care. -Biopsy? Will d/w Pulm CC.    -Supportive care.  -Dispo TBD. -Regardless of pathology suspect that this is a very bad cancer and the prognosis is very guarded.         Subjective:    Seen and examined at bedside in icu 110    Review of systems:    Constitutional: denies fevers, chills, myalgias  Respiratory: denies SOB, cough  Cardiovascular: denies chest pain, palpitations  Gastrointestinal: denies nausea, vomiting, diarrhea      Vital signs/Intake and Output:  Visit Vitals    /67    Pulse (!) 109    Temp 97.9 °F (36.6 °C)    Resp 22    Ht 5' 7\" (1.702 m)    Wt 87.1 kg (192 lb)    SpO2 100%    Breastfeeding No    BMI 30.07 kg/m2     Current Shift:  08/22 0701 - 08/22 1900  In: -   Out: 600 [Urine:600]  Last three shifts:  08/20 1901 - 08/22 0700  In: 518.3 [I.V.:518.3]  Out: 2500 [Urine:2500]    Exam:    General: Well developed, alert, NAD, OX3  Head/Neck: mmm  CVS:s1s2 heart sounds not muffled  Lungs:Clear to auscultation bilaterally, no wheezes, rhonchi, or rales  Abdomen: Soft, bowel sounds present. Extremities: No edema. Labs: Results:       Chemistry Recent Labs      08/22/18 0416 08/20/18 2156   GLU  88  90   NA  137  135*   K  4.3  4.6   CL  103  102   CO2  27  25   BUN  13  10   CREA  0.84  0.81   CA  9.3  9.3   AGAP  7  8   BUCR  15  12   AP   --   488*   TP   --   7.1   ALB   --   1.7*   GLOB   --   5.4*   AGRAT   --   0.3*      CBC w/Diff Recent Labs      08/22/18 0416 08/21/18   0400  08/20/18 2156   WBC  11.1   --   11.3   RBC  4.22   --   3.79*   HGB  10.8*  9.6*  9.7*   HCT  34.0*  30.3*  30.2*   PLT  234   --   250   GRANS  77*   --    --    LYMPH  12*   --    --    EOS  2   --    --       Cardiac Enzymes Recent Labs      08/22/18   0416  08/21/18   0400   CPK  91  136      Coagulation Recent Labs      08/21/18   0400  08/20/18   1051  08/20/18   0310   PTP   --   13.7  14.5   INR   --   1.1  1.2   APTT  32.9  28.2  78.9*       Lipid Panel No results found for: CHOL, CHOLPOCT, CHOLX, CHLST, CHOLV, 826140, HDL, LDL, LDLC, DLDLP, 007024, VLDLC, VLDL, TGLX, TRIGL, TRIGP, TGLPOCT, CHHD, CHHDX   BNP No results for input(s): BNPP in the last 72 hours.    Liver Enzymes Recent Labs      08/20/18 2156   TP  7.1   ALB  1.7*   AP  488*   SGOT  50*      Thyroid Studies Lab Results   Component Value Date/Time    TSH 60.00 (H) 08/17/2018 08:29 AM        Procedures/imaging: see electronic medical records for all procedures/Xrays and details which were not copied into this note but were reviewed prior to creation of Stephanie Merchant, MD

## 2018-08-22 NOTE — PROGRESS NOTES
Patient seen and examined s/p IVC filter placement on 8/20. Reports some itching under her left leg ACE wrap, denies pain currently. Respirations unlabored, RRR, left leg with 2+ edema, improved from Monday, +DP/+PT signals bilaterally, +sensory/+motor function to lower extremities, right neck procedure site soft, dressing removed, no oozing. Okay to remove ACE wrap briefly to wash and dry leg and provide skin care but need to re-wrap immediately after skin care provided. Keep left leg elevated above level of heart. Plan discussed with nurse.     Visit Vitals    /67    Pulse (!) 105    Temp 98 °F (36.7 °C)    Resp 16    Ht 5' 7\" (1.702 m)    Wt 192 lb (87.1 kg)    SpO2 98%    Breastfeeding No    BMI 30.07 kg/m2

## 2018-08-22 NOTE — PROGRESS NOTES
Phone: [phone]  Paging : [phone]      Hematology / Oncology Progress Note    Admit Date: 8/16/2018    Assessment:   Principal Problem:    Pericardial effusion (8/16/2018)      Overview: Added automatically from request for surgery [id number]    Active Problems:    Dehydration (8/16/2018)      Hyponatremia (8/16/2018)      Metastatic lung cancer (metastasis from lung to other site) [facility]) (8/16/2018)      Leg DVT (deep venous thromboembolism), acute, left ([address] (8/16/2018)      Acute renal injury due to hypovolemia ([address] (8/16/2018)      PAD (peripheral artery disease) ([facility] (8/16/2018)      Plan:   1) Pericardial Effusion- with Tamponade. S/p pericardiocentesis of 800 ml hemorrhagic fluid removed on 8/20/2018. Drain now removed. Cytology pending. 2) LLE DVT- Paraneoplastic thrombophilia. Was receiving Heparin gtt, but anti-coagulation was discontinued upon discovery of bloody pericardial effusion. No further anti-coagulation. S/p IVC filter placement 8/20/2018.  3) Metastatic Cancer- to liver, retroperitoneum, bone, adrenals, and possible uterine mass. Palliative Care following. Please schedule Bx of large left supraclavicular LN. 4) Anemia- Hgb 10.8 g/dl today. 5) Hypothyroidism- TSH was 60 on 8/17/2018. Continue Synthroid. 6) Hyponatremia- 137 today  7) Hypocalcemia- 9.3 today. ROS:  Constitutional: No fever, chills, diaphoresis, + constipated, + fatigue + unexpected weight change. HEENT: No sore throat, rhinorrhea,or visual disturbance. Respiratory: No cough, chest tightness, shortness of breath has improved. Cardiovascular:No chest pain, palpitations. Gastrointestinal:No nausea, vomiting, diarrhea, early satiety, abdominal distention, abdominal pain. Genitourinary: No dysuria, urgency, frequency, hematuria, flank pain, difficulty urinating. Neurological: No headache, dizziness, weakness, tingling and numbness or fall.     Musculoskeletal: No bone pain (except at left medial clavicular mass) or back pain. No arthralgia or myalgia. No bruise/bleed easily. LL extremity edema 2+ with ACE wrapping      Physical Exam:  Visit Vitals    /77    Pulse (!) 110    Temp 97.9 °F (36.6 °C)    Resp 23    Ht 5' 7\" (1.702 m)    Wt 87.1 kg (192 lb)    SpO2 99%    Breastfeeding No    BMI 30.07 kg/m2       Date 08/21/18 0700 - 08/22/18 0659 08/22/18 0700 - 08/23/18 0659   Shift 6338-7319 2247-6087 24 Hour Total 5777-7607 1672-9507 24 Hour Total   I  N  T  A  K  E   Shift Total  (mL/kg)         O  U  T  P  U  T   Urine  (mL/kg/hr)  1350  (1.3) 1350  (0.6) 600  600      Urine Voided  1350 1350 600  600    Shift Total  (mL/kg)  1350  (15.5) 1350  (15.5) 600  (6.9)  600  (6.9)   NET  -1350 -1350 -600  -600   Weight (kg) 87.1 87.1 87.1 87.1 87.1 87.1   . HEENT: no pallor, anicteric sclera, oral pharynx without lesion   + medial left clavicular mass- tender. Heart: regular rate, and rhythm, without murmur, gallop or rubbing  Lungs:breathing comfortably, clear to auscultation and percussion bilaterally  ABD: bowel sound present, soft, nondistended, nontender, no hepatosplenomegaly or mass  Extremities: warm, well perfused, 2+ edema Left leg with ACE wrapping. MSK: no tenderness along the spine or long bones  Skin: No rash  Neuro: non-focal    Imaging:       ECHO 8/21/2018-  Left Ventricle  Normal cavity size and systolic function (ejection fraction normal). The estimated ejection fraction is 61 - 65%. No regional wall motion abnormality noted.        Right Ventricle  Normal cavity size and global systolic function.       Aortic Valve  Trileaflet aortic valve structure. No stenosis and no regurgitation.       Tricuspid Valve  Normal valve structure and no stenosis. Moderate tricuspid valve regurgitation. Pulmonary arterial systolic pressure is 40 mmHg.  Moderate pulmonary hypertension.       IVC/Hepatic Veins  Inferior vena cava not well visualized.       Pericardium  No evidence of pericardial effusion. Bilateral pleural effusion             pCXR 8/20/2018-  1. Catheter overlying the inferior cardiac silhouette may represent  pericardiocentesis drain. Cardiac silhouette appears slightly smaller. No  pneumothorax or mediastinal.  2. Persistent right upper lobe masslike opacity. 3. Possible early left lower lobe infiltrate. CT A/P 8/19/2018-  Moderate/large pericardial effusion. Trace bilateral pleural effusions and  dependent atelectasis.     Hepatic lesions, adrenal masses and retroperitoneal adenopathy as described  compatible with metastatic disease.     Lobulated mass in the pelvis either abutting or arising from the uterus. This  could just represent an lobulated multifibroid uterus. Other mass or malignancy  not excluded. Consider ultrasound for further evaluation.     Extensive left iliofemoral DVT extending into the IVC. MRI Brain 8/17/2018-  1. No evidence of intracranial metastatic disease or other acute intracranial  finding.     2. Mild deep cerebral white matter and pontine signal changes nonspecific,  trouble chronic microvascular ischemic disease.     3. Postinflammatory changes left posterior ethmoid sinus. Bone Scan 8/17/2018-  1. Focal scintigraphic uptake involving the distal medial left clavicle, in  keeping with lytic lesion demonstrated on prior CT. 2. Focal and indeterminate uptake within the sacrum. Correlation with CT imaging  is recommended if not performed previously to evaluate for potential site of  metastasis versus degenerative uptake. 3. Diffuse soft tissue uptake throughout the left leg, likely in keeping with  patient's known DVT. PVL LLE 8/16/2018-  Occlusive thrombus present in the left external iliac vein. Occlusive thrombus present in the left common femoral vein. Occlusive thrombus present in the left saphenofemoral junction. Occlusive thrombus present in the left profunda femoral vein.  Occlusive thrombus present in the left proximal femoral vein. Occlusive thrombus present in the left mid femoral vein. Occlusive thrombus present in the left distal femoral vein. Non-occlusive thrombus present in the left popliteal vein. Occlusive non-occlusive thrombus present in the left posterior tibial vein. Occlusive non-occlusive thrombus present in the left peroneal vein. Recent Results (from the past 24 hour(s))   ECHO ADULT FOLLOW-UP OR LIMITED    Collection Time: 08/21/18 10:24 AM   Result Value Ref Range    LV ED Vol A2C 87.2 mL    LV ES Vol A4C 17.4 mL    LV ES Vol BP 24.3 19 - 49 mL    BP EF 64.1 55 - 100 %    LV Ejection Fraction MOD 4C 67 %    LV Ejection Fraction MOD 2C 61 %    LV ES Vol A2C 34.5 mL    LVES Vol Index BP 12.2 mL/m2    LV ED Vol A4C 52.7 mL    LVED Vol Index BP 34.2 mL/m2    Triscuspid Valve Regurgitation Peak Gradient 41.0 mmHg    LV ED Vol BP 67.9 56 - 104 ml    TR Max Velocity 320.30 cm/s    LVED Vol Index A4C 26.5 mL/m2    LVED Vol Index A2C 43.9 mL/m2    LVES Vol Index A4C 8.8 mL/m2    LVES Vol Index A2C 17.4 mL/m2   CK    Collection Time: 08/22/18  4:16 AM   Result Value Ref Range    CK 91 26 - 192 U/L   CBC WITH AUTOMATED DIFF    Collection Time: 08/22/18  4:16 AM   Result Value Ref Range    WBC 11.1 4.6 - 13.2 K/uL    RBC 4.22 4.20 - 5.30 M/uL    HGB 10.8 (L) 12.0 - 16.0 g/dL    HCT 34.0 (L) 35.0 - 45.0 %    MCV 80.6 74.0 - 97.0 FL    MCH 25.6 24.0 - 34.0 PG    MCHC 31.8 31.0 - 37.0 g/dL    RDW 18.4 (H) 11.6 - 14.5 %    PLATELET 363 089 - 407 K/uL    MPV 8.8 (L) 9.2 - 11.8 FL    NEUTROPHILS 77 (H) 40 - 73 %    LYMPHOCYTES 12 (L) 21 - 52 %    MONOCYTES 9 3 - 10 %    EOSINOPHILS 2 0 - 5 %    BASOPHILS 0 0 - 2 %    ABS. NEUTROPHILS 8.5 (H) 1.8 - 8.0 K/UL    ABS. LYMPHOCYTES 1.3 0.9 - 3.6 K/UL    ABS. MONOCYTES 1.0 0.05 - 1.2 K/UL    ABS. EOSINOPHILS 0.2 0.0 - 0.4 K/UL    ABS.  BASOPHILS 0.0 0.0 - 0.1 K/UL    DF AUTOMATED     METABOLIC PANEL, BASIC    Collection Time: 08/22/18  4:16 AM   Result Value Ref Range Sodium 137 136 - 145 mmol/L    Potassium 4.3 3.5 - 5.5 mmol/L    Chloride 103 100 - 108 mmol/L    CO2 27 21 - 32 mmol/L    Anion gap 7 3.0 - 18 mmol/L    Glucose 88 74 - 99 mg/dL    BUN 13 7.0 - 18 MG/DL    Creatinine 0.84 0.6 - 1.3 MG/DL    BUN/Creatinine ratio 15 12 - 20      GFR est AA >60 >60 ml/min/1.73m2    GFR est non-AA >60 >60 ml/min/1.73m2    Calcium 9.3 8.5 - 10.1 MG/DL       Current Facility-Administered Medications:     sodium chloride (NS) flush 5-10 mL, 5-10 mL, IntraVENous, Q8H, Rick Faustin MD, 10 mL at 08/21/18 2133    sodium chloride (NS) flush 5-10 mL, 5-10 mL, IntraVENous, PRN, Rick Faustin MD    famotidine (PF) (PEPCID) 20 mg in sodium chloride 0.9% 10 mL injection, 20 mg, IntraVENous, Q12H, Freddy El MD, 20 mg at 08/21/18 2124    flumazenil (ROMAZICON) 0.1 mg/mL injection 0.2 mg, 0.2 mg, IntraVENous, Rad Multiple, Tre Lanza MD    naloxone [facility]) injection 0.2 mg, 0.2 mg, IntraVENous, PRN, Tre Lanza MD    0.9% sodium chloride infusion 250 mL, 250 mL, IntraVENous, PRN, Leslie Angeles, DO    LORazepam (ATIVAN) tablet 1 mg, 1 mg, Oral, Q4H PRN, Leslie Angeles, DO, 1 mg at 08/19/18 2215    albuterol-ipratropium (DUO-NEB) 2.5 MG-0.5 MG/3 ML, 3 mL, Nebulization, Q4H PRN, Leslie Angeles, DO, 3 mL at 08/20/18 0716    levothyroxine (SYNTHROID) tablet 75 mcg, 75 mcg, Oral, ACB, Leslie Angeles, DO, 75 mcg at 08/22/18 7806    0.9% sodium chloride infusion 250 mL, 250 mL, IntraVENous, PRN, Leslie Angeles, DO    acetaminophen (TYLENOL) tablet 650 mg, 650 mg, Oral, QID, Leslie Angeles, DO, 650 mg at 08/21/18 2123    sodium chloride tablet 1 g, 1 g, Oral, BID WITH MEALS, Amy Cunha MD, 1 g at 08/21/18 1713    morphine injection 2 mg, 2 mg, IntraVENous, Q4H PRN, Lesliedon Angeles DO, 2 mg at 08/22/18 7278    traMADol (ULTRAM) tablet 50 mg, 50 mg, Oral, Q6H PRN, Jennifer Somers MD, 50 mg at 08/22/18 Yan Hoff MD  [facility]. [address]  Office: [phone]

## 2018-08-22 NOTE — PROGRESS NOTES
1800:  Patient arrived to unit with Rojelio Bernheim, RN from ICU. Bedside report received and skin assessment performed, LLE wrapped in ace bandage, clean, dry, and intact. Patient has no complaints of pain or discomfort at the time. Whiteboard updated, bed at the lowest position with call bell within reach. 1934: Bedside and Verbal shift change report given to Humaira Baldwin RN (oncoming nurse) by Padmini Winters RN   (offgoing nurse). Report included the following information SBAR, Kardex, Procedure Summary, Intake/Output, MAR, Med Rec Status, Cardiac Rhythm ST and Alarm Parameters .

## 2018-08-22 NOTE — PROGRESS NOTES
Bedside and Verbal shift change report received from AMERICO Pepper RN (offgoing nurse). Report included the following information SBAR, Kardex, Intake/Output, MAR and Recent Results. 2030 Shift assessment completed, see EMR. Patient on room air, lung sound clear on auscultation. Patient alert and orient x4, eyes round reactive to light at 3 mm. Patient has active bowel sound. Patient left leg wrapped with ACE wrap and elevated. 0045 Reassessment completed, see EMR    0445 Reassessment completed, see EMR    Bedside and Verbal shift change report given to DARRELL Sage RN (oncoming nurse)  Report included the following information SBAR, Kardex, Intake/Output, MAR and Recent Results.

## 2018-08-22 NOTE — PROGRESS NOTES
Case was discussed with Dr. Leona Curiel, due to patient eating this morning and CT down on 8/23-case will be scheduled for early morning of 8/24-needs to be early for Pathology to send out on Fridays. Pt was made NPO for that morning and Shant Marin RN was notified of plan.

## 2018-08-22 NOTE — PROGRESS NOTES
Cardiology Progress Note        Patient: Nahed Jimenez        Sex: female          DOA: 8/16/2018  YOB: 1955      Age:  66 y.o.        LOS:  LOS: 5 days    Patient seen and examined, chart reviewed. Assessment/Plan     Patient Active Problem List   Diagnosis Code    Dehydration E86.0    Hyponatremia E87.1    Metastatic lung cancer (metastasis from lung to other site) [facility]) C34.90    Leg DVT (deep venous thromboembolism), acute, left ([facility]) I82.402    Acute renal injury due to hypovolemia ([facility]) N17.9, E86.1    PAD (peripheral artery disease) ([facility]) I73.9    Pericardial effusion I31.3      Pericardial effusion with tamponade status post pericardiocentesis. 800 mL of hemorrhagic fluid drained. Repeat echocardiogram done today revealed no pericardial effusion. Plan:  Pericardial drain was removed and dressing applied. No antiplatelets or anticoagulation for now. Continue management as per hospital medicine and pulmonic critical-care. Will repeat limited echocardiogram tomorrow              Subjective:    cc: My breathing is better      REVIEW OF SYSTEMS:     General: No fevers or chills. Cardiovascular: No chest pain,No palpitations, No orthopnea, No PND, positive leg swelling, No claudication  Pulmonary: positive dyspnea   Gastrointestinal: No nausea, vomiting, bleeding  Neurology: No Dizziness    Objective:      Visit Vitals    /86    Pulse (!) 108    Temp 97.6 °F (36.4 °C)    Resp 27    Ht 5' 7\" (1.702 m)    Wt 87.1 kg (192 lb)    SpO2 98%    Breastfeeding No    BMI 30.07 kg/m2     Body mass index is 30.07 kg/(m^2). Physical Exam:  General Appearance: Comfortable, not using accessory muscles of respiration. HEENT: SILVER. HEAD: Atraumatic  NECK: No JVD, no thyroidomeglay. CAROTIDS: no bruit  LUNGS: Clear bilaterally, absent air entry at bases   HEART: S1+S2 audible, no murmur, no pericardial rub.      ABD: Non-tender, BS Audible    NEUROLOGICAL: AAO times 3, No motor and sensory deficit  Medication:  Current Facility-Administered Medications   Medication Dose Route Frequency    sodium chloride (NS) flush 5-10 mL  5-10 mL IntraVENous Q8H    sodium chloride (NS) flush 5-10 mL  5-10 mL IntraVENous PRN    famotidine (PF) (PEPCID) 20 mg in sodium chloride 0.9% 10 mL injection  20 mg IntraVENous Q12H    flumazenil (ROMAZICON) 0.1 mg/mL injection 0.2 mg  0.2 mg IntraVENous Rad Multiple    naloxone (NARCAN) injection 0.2 mg  0.2 mg IntraVENous PRN    0.9% sodium chloride infusion 250 mL  250 mL IntraVENous PRN    LORazepam (ATIVAN) tablet 1 mg  1 mg Oral Q4H PRN    albuterol-ipratropium (DUO-NEB) 2.5 MG-0.5 MG/3 ML  3 mL Nebulization Q4H PRN    levothyroxine (SYNTHROID) tablet 75 mcg  75 mcg Oral ACB    0.9% sodium chloride infusion 250 mL  250 mL IntraVENous PRN    acetaminophen (TYLENOL) tablet 650 mg  650 mg Oral QID    sodium chloride tablet 1 g  1 g Oral BID WITH MEALS    morphine injection 2 mg  2 mg IntraVENous Q4H PRN    traMADol (ULTRAM) tablet 50 mg  50 mg Oral Q6H PRN               Lab/Data Reviewed:       Recent Labs      08/21/18   0400  08/20/18 2156 08/20/18   1803   08/19/18   0244   WBC   --   11.3   --    --   11.5   HGB  9.6*  9.7*  9.7*   < >  7.0*   HCT  30.3*  30.2*  29.6*   < >  22.1*   PLT   --   250   --    --   327    < > = values in this interval not displayed. Recent Labs      08/20/18 2156 08/19/18   0244   NA  135*  130*   K  4.6  4.5   CL  102  98*   CO2  25  23   GLU  90  104*   BUN  10  14   CREA  0.81  0.91   CA  9.3  8.7     35 minutes of critical care time spent in the direct evaluation and treatment of this high risk patient.  The reason for providing this level of medical care for this critically ill patient was due a critical illness that impaired one or more vital organ systems such that there was a high probability of imminent or life threatening deterioration in the patients condition. This care involved high complexity decision making to assess, manipulate, and support vital system functions, to treat this degree vital organ system failure and to prevent further life threatening deterioration of the patients condition.       Signed By: Bharath Rice MD     August 21, 2018

## 2018-08-22 NOTE — PROGRESS NOTES
NUTRITION UPDATE    - spoke to MD in rounds about liberalizing diet as pt has poor appetite and not meet estimated needs; agreed on liberalizing diet and removing AHA diet restriction    -If pt PO intakes improve and concerns for fluid retention AHA or low sodium diet can be re-added   - At this time [facility] Regular diet for pt w/ Ensure Enlive TID      Maria Luz Jules, RD  PAGER:  [phone]

## 2018-08-22 NOTE — INTERDISCIPLINARY ROUNDS
CRITICAL CARE INTERDISCIPLINARY ROUNDS    Patient Information:    Name:   David Joel    Age:   66 y.o. Admission Date:   8/16/2018    Critical Care Day: 1    Surgery Date:  NA    Attending Provider:   Rae Ortega MD    Surgeon:   BRENDAN     Consultant(s):   Cardio and palliative    Critical Care Physician:  Ange Colunga MD    Code Status: Full Code    Problem List:     Patient Active Problem List   Diagnosis Code    Dehydration E86.0    Hyponatremia E87.1    Metastatic lung cancer (metastasis from lung to other site) [facility]) C34.90    Leg DVT (deep venous thromboembolism), acute, left ([facility] I82.402    Acute renal injury due to hypovolemia ([facility] [address] N17.9, E86.1    PAD (peripheral artery disease) ([facility] [address] I73.9    Pericardial effusion I31.3       Principal Problem:  Pericardial effusion    During rounds the following quality care indicators and evidence based practices were addressed :  DVT Prophylaxis, PUD Prophylaxis and Nutritional Status Derrick Crystal (-[facility] NA) ; Central Line Day:NA Isolation:NA; Antibiotic Stewardship: NA; Probiotics Necessary: na    Glycemic Control:   Recent Labs      08/20/18   2156  08/19/18   0244   GLU  90  104*   ;  Recent Labs      08/19/18   0054   PHI  7.417   PCO2I  30.8*   PO2I  90    Adjustments na    Acute MI/PCI:   Not applicable    Door to Balloon: Admission Time: 1430 Procedure Start Time: 5526 (08/20/18 1545)  Procedure End Time: 9165 (08/20/18 1545)  Procedure: Other (comment) (IVC filter) (08/20/18 1506)    Heart Failure:    Not applicable     SCIP Measures for other Surgeries:   Not applicable CHG Bath Daily on [place]    Pneumonia:    Not applicable    Interdisciplinary team rounds were held with the following team membersCare Management, Diabetes Treatment Specialist, Nursing, Nutrition, Occupational Therapy, Palliative Care, Pastoral Care, Pharmacy, Physical Therapy and Physician. Plan of care discussed. Goals of Care/ Recommendations:  Follow Cardio advisement   See clinical pathway and/or care plan for interventions and desired outcomes.     Critical Care Discharge Status:     Yellow

## 2018-08-22 NOTE — PROGRESS NOTES
Problem: Falls - Risk of  Goal: *Absence of Falls  Document Yobany Fall Risk and appropriate interventions in the flowsheet. Outcome: Progressing Towards Goal  Fall Risk Interventions:  Mobility Interventions: PT Consult for mobility concerns         Medication Interventions: Evaluate medications/consider consulting pharmacy    Elimination Interventions: Call light in reach    History of Falls Interventions: Door open when patient unattended, Evaluate medications/consider consulting pharmacy        Problem: Pressure Injury - Risk of  Goal: *Prevention of pressure injury  Document Talib Scale and appropriate interventions in the flowsheet. Outcome: Progressing Towards Goal  Pressure Injury Interventions:             Activity Interventions: Pressure redistribution bed/mattress(bed type)    Mobility Interventions: Pressure redistribution bed/mattress (bed type), HOB 30 degrees or less    Nutrition Interventions: Document food/fluid/supplement intake    Friction and Shear Interventions: HOB 30 degrees or less

## 2018-08-22 NOTE — PROGRESS NOTES
[facility]  Pulmonary, Critical Care, and Sleep Medicine    Name: Esteban Can MRN: [id number]   : 1955 Hospital: [facility] [place]   Date: 2018        Pulmonary Critical Care F/Up Note    IMPRESSION:   Principal Problem:    Pericardial effusion (2018)      Overview: Added automatically from request for surgery [id number]    Active Problems:    Dehydration (2018)      Hyponatremia (2018)      Metastatic lung cancer (metastasis from lung to other site) [facility]) (2018)      Leg DVT (deep venous thromboembolism), acute, left ([facility] [address] (2018)      Acute renal injury due to hypovolemia ([facility] [address] (2018)      PAD (peripheral artery disease) ([facility] [address] (2018)    · Anemia from blood loss, stable Hgb  · Hypothyroidism, on supplement   RECOMMENDATIONS:   Compensated respiratory status. On room air now. Monitor for goal SPO2 > 90%  Aspiration prevention bundle, head of the bed at 30' all times, pulmonary hygiene care  Await scheduling for supraclavicular LN bx per oncology  Pericardial fluid cultures to be followed. Monitor hemodynamics, UO  Follow up ECHO per cardiology  Stress ulcer prophylaxis  Vascular surgery followed, s/p IVC filter placement 18. Monitor off of anticoagulation as bleeding risk felt to be higher  AM labs. Diet as tolerated  Palliative care consulted  Will defer respective systems problem management to primary and other consultant. If follow up ECHO today shows no major fluid reaccumulation then may transfer to tele per discussion with cardiology, hospitalist. Will sign off once off of ICU. Further recommendations will be based on the patient's response to recommended treatment and results of the investigation ordered. Quality Care: PPI, DVT prophylaxis, HOB elevated, Infection control all reviewed and addressed.   PAIN CONTROL: morphine, ultram  · Skin/Wound: pericardial drain site care per nursing prtocol  · Nutrition: diet as tolerated  · Prophylaxis: GI Prophylaxis reviewed. · Restraints: none  · PT/OT eval and treat: as needed   · Lines/Tubes: lines PIV, howard none  ADVANCE DIRECTIVE: Full code. Palliative care consulted  FAMILY DISCUSSION: spoke and updated the patient and sons at her request  Events and notes from last 24 hours reviewed. Care plan discussed with nursing      Subjective/History:   Ms. Nahed Jimenez is a 66 y.o. female with PMHx for PAD who had recent dx of lung mass and following with Dr. Aminta Ray pending work up with CT imaging, presented with acute extensive DVT of left leg. Admitted for treatment with Heparin and work up. She was consulted by Oncology for continuing DVT treatment and lung mass work up; nephrology for management of hyponatremia. Over the weekend patient developed anemia, hypoxia requiring supplemental O2 and progressive SOB leading to further work up that showed pericardial effusion on CT abd/pelvis and cardiac tamponade on ECHO. Heparin been stopped since midnight per staff, no abnormal bruising or abnormal bleeding from any body orifice. Remained with stable BP but sinus tachycardia. Underwent pericardial effusion drainage with removal of 800 cc bloody drainage. Patient post-procedure, reports SOB is significantly better, has discomfort at drain insertion site, no fever, chills, cough, wheezing, hemoptysis, palpitations, syncope, orthopnea, or paroxysmal nocturnal dyspnea. 8/22/18  Patient on room air now, SPO2 100%  Reports no major SOB; cough, chest pain, wheezing or hemoptysis.    Remained hemodynamically stable, not on any vasopressor  Repeat ECHO pending today; had pericardial drainage removed 8/21/18  Awaiting scheduling of supraclavicular LN bx scheduling since pericardial cytology negative for mlg  Good urine output      Review of Systems:  General ROS: negative for  - fever, chills, weight loss, fatigue and malaise  Cardiovascular ROS: negative for - murmur, orthopnea, palpitations or paroxysmal nocturnal dyspnea  Gastrointestinal ROS: no abdominal pain, change in bowel habits, or black or bloody stools  Dermatological ROS: negative for - pruritus, rash or skin lesion changes              Latest lactic acid:   Lactic acid   Date Value Ref Range Status   08/17/2018 1.7 0.4 - 2.0 MMOL/L Final   08/17/2018 2.3 (HH) 0.4 - 2.0 MMOL/L Final     Comment:     CALLED TO AND CORRECTLY REPEATED BY:  Lory Chiu RN, 3N ON 08/17/2018 AT 0137 TO JDA     08/16/2018 4.7 (HH) 0.4 - 2.0 MMOL/L Final     Comment:     CALLED TO AND CORRECTLY REPEATED BY:  Isidro Ley RN AT 2042 ON 8/16/18 BY JOEL. Past Medical History:  Past Medical History:   Diagnosis Date    Cancer ([facility] [address]     lung cancer    PAD (peripheral artery disease) ([facility] [address]         Past Surgical History:  Past Surgical History:   Procedure Laterality Date    VASCULAR SURGERY PROCEDURE UNLIST Right     opened up vessels        Medications:  Prior to Admission medications    Medication Sig Start Date End Date Taking? Authorizing Provider   naproxen (NAPROSYN) 500 mg tablet Take 500 mg by mouth two (2) times daily (with meals). Yes Historical Provider   traMADol (ULTRAM) 50 mg tablet Take 50 mg by mouth every six (6) hours as needed for Pain. Historical Provider   cyclobenzaprine (FLEXERIL) 10 mg tablet Take 10 mg by mouth three (3) times daily as needed for Muscle Spasm(s).     Historical Provider       Current Facility-Administered Medications   Medication Dose Route Frequency    sodium chloride (NS) flush 5-10 mL  5-10 mL IntraVENous Q8H    famotidine (PF) (PEPCID) 20 mg in sodium chloride 0.9% 10 mL injection  20 mg IntraVENous Q12H    levothyroxine (SYNTHROID) tablet 75 mcg  75 mcg Oral ACB    acetaminophen (TYLENOL) tablet 650 mg  650 mg Oral QID    sodium chloride tablet 1 g  1 g Oral BID WITH MEALS       Allergy:  No Known Allergies     Social History:  Social History   Substance Use Topics    Smoking status: Former Smoker    Smokeless tobacco: Never Used    Alcohol use Yes      Comment: occasionally        Family History:  History reviewed. No family history for lung cancer. Objective:   Vital Signs:    Blood pressure 127/67, pulse (!) 109, temperature 97.9 °F (36.6 °C), resp. rate 22, height 5' 7\" (1.702 m), weight 87.1 kg (192 lb), SpO2 100 %, not currently breastfeeding. Body mass index is 30.07 kg/(m^2). O2 Device: Room air   Temp (24hrs), Av.9 °F (36.6 °C), Min:97.6 °F (36.4 °C), Max:98.2 °F (36.8 °C)       Intake/Output:   Last shift:      701 - 1900  In: -   Out: 600 [Urine:600]  Last 3 shifts: 1901 -  0700  In: 518.3 [I.V.:518.3]  Out: 2500 [Urine:2500]    Intake/Output Summary (Last 24 hours) at 18 1137  Last data filed at 18 0735   Gross per 24 hour   Intake                0 ml   Output             1950 ml   Net            -1950 ml       Physical Exam:  General: AAO x 3, in no respiratory distress and acyanotic, cooperative, no distress, appears older than stated age  [de-identified]: PERRLA, EOMI, fundi benign, throat normal without erythema or exudate  Neck: No abnormally enlarged lymph nodes or thyroid, supple  Chest: normal, left supraclavicular lesion on anterior chest wall  Lungs: moderate air entry, clear to auscultation bilaterally, minimal dullness to percussion bases, no tenderness/ rash  Heart: Regular rate and rhythm, S1S2 present or without murmur or extra heart sounds  Abdomen: protuberant, bowel sounds normoactive, tympanic, abdomen is soft without significant tenderness, masses, organomegaly or guarding, rigidity, rebound  Extremity: 1-2+ and left > right leg edema, no cyanosis, clubbing; peripheral pulses by doppler bl present  Capillary refill: normal bl  Neuro: alert, oriented x 3, no defects noted in general exam.  Skin: Skin color, texture, turgor fair.  Skin dry, diaphoretic    Data:     Recent Results (from the past 24 hour(s))   CK    Collection Time: 08/22/18  4:16 AM   Result Value Ref Range    CK 91 26 - 192 U/L   CBC WITH AUTOMATED DIFF    Collection Time: 08/22/18  4:16 AM   Result Value Ref Range    WBC 11.1 4.6 - 13.2 K/uL    RBC 4.22 4.20 - 5.30 M/uL    HGB 10.8 (L) 12.0 - 16.0 g/dL    HCT 34.0 (L) 35.0 - 45.0 %    MCV 80.6 74.0 - 97.0 FL    MCH 25.6 24.0 - 34.0 PG    MCHC 31.8 31.0 - 37.0 g/dL    RDW 18.4 (H) 11.6 - 14.5 %    PLATELET 864 774 - 400 K/uL    MPV 8.8 (L) 9.2 - 11.8 FL    NEUTROPHILS 77 (H) 40 - 73 %    LYMPHOCYTES 12 (L) 21 - 52 %    MONOCYTES 9 3 - 10 %    EOSINOPHILS 2 0 - 5 %    BASOPHILS 0 0 - 2 %    ABS. NEUTROPHILS 8.5 (H) 1.8 - 8.0 K/UL    ABS. LYMPHOCYTES 1.3 0.9 - 3.6 K/UL    ABS. MONOCYTES 1.0 0.05 - 1.2 K/UL    ABS. EOSINOPHILS 0.2 0.0 - 0.4 K/UL    ABS. BASOPHILS 0.0 0.0 - 0.1 K/UL    DF AUTOMATED     METABOLIC PANEL, BASIC    Collection Time: 08/22/18  4:16 AM   Result Value Ref Range    Sodium 137 136 - 145 mmol/L    Potassium 4.3 3.5 - 5.5 mmol/L    Chloride 103 100 - 108 mmol/L    CO2 27 21 - 32 mmol/L    Anion gap 7 3.0 - 18 mmol/L    Glucose 88 74 - 99 mg/dL    BUN 13 7.0 - 18 MG/DL    Creatinine 0.84 0.6 - 1.3 MG/DL    BUN/Creatinine ratio 15 12 - 20      GFR est AA >60 >60 ml/min/1.73m2    GFR est non-AA >60 >60 ml/min/1.73m2    Calcium 9.3 8.5 - 10.1 MG/DL           No results for input(s): FIO2I, IFO2, HCO3I, IHCO3, HCOPOC, PCO2I, PCOPOC, IPHI, PHI, PHPOC, PO2I, PO2POC in the last 72 hours.     No lab exists for component: IPOC2    All Micro Results     Procedure Component Value Units Date/Time    CULTURE, BLOOD [[id number]] Collected:  08/16/18 1955    Order Status:  Completed Specimen:  Blood from Blood Updated:  08/22/18 0707     Special Requests: NO SPECIAL REQUESTS        Culture result: NO GROWTH 6 DAYS       CULTURE, BLOOD [[id number]] Collected:  08/16/18 1523    Order Status:  Completed Specimen:  Blood from Blood Updated:  08/22/18 0707     Special Requests: NO SPECIAL REQUESTS        Culture result: NO GROWTH 6 DAYS       CULTURE, BODY FLUID [[id number]] Collected:  08/20/18 1211    Order Status:  Completed Specimen:  Pericardial Fluid Updated:  08/21/18 1407     Special Requests: NO SPECIAL REQUESTS        GRAM STAIN FEW WBC'S         NO ORGANISMS SEEN        Culture result: NO GROWTH AFTER 17 HOURS       CULTURE, FUNGUS [[id number]] Collected:  08/20/18 1211    Order Status:  Completed Specimen:  Pericardial Fluid Updated:  08/20/18 2130     Special Requests: NO SPECIAL REQUESTS        FUNGUS SMEAR NO FUNGAL ELEMENTS SEEN        Culture result: PENDING    AFB CULTURE + SMEAR W/RFLX ID FROM CULTURE [[id number]] Collected:  08/20/18 1230    Order Status:  Canceled     CULTURE, URINE [[id number]] Collected:  08/16/18 2150    Order Status:  Completed Specimen:  Urine from Clean catch Updated:  08/18/18 1020     Special Requests: NO SPECIAL REQUESTS        Culture result:       [id number]  COLONIES/mL  MIXED GRAM POSITIVE WILBERT, PROBABLE SKIN/GENITAL CONTAMINATION. Telemetry: normal sinus rhythm    8/20/18 ECHO  · Large circumferential pericardial effusion measuring 34 mm. Hemodynamic compromise consistent with cardiac tamponade. · Estimated left ventricular ejection fraction is 56 - 60%. Left ventricular cavity size is decreased. Left ventricular concentric borderline hypertrophy. · Mild tricuspid valve regurgitation is present. Pulmonary arterial systolic pressure is 40 mmHg. Mild pulmonary hypertension is present. · Severely elevated central venous pressure     8/16/18  DVT study  · No evidence of deep vein thrombosis in the right lower extremity. · Deep venous thrombus present in the left external iliac vein, saphenofemoral junction, common femoral, femoral , popliteal, posterior tibial and peroneal veins    8/17/18 NM Bone scan  Impression:   1.  Focal scintigraphic uptake involving the distal medial left clavicle, in keeping with lytic lesion demonstrated on prior CT.   2. Focal and indeterminate uptake within the sacrum. Correlation with CT imaging is recommended if not performed previously to evaluate for potential site of metastasis versus degenerative uptake. 3. Diffuse soft tissue uptake throughout the left leg, likely in keeping with patient's known DVT. 8/17/18  MRI brain   Impression:   1. No evidence of intracranial metastatic disease or other acute intracranial finding.   2. Mild deep cerebral white matter and pontine signal changes nonspecific, trouble chronic microvascular ischemic disease. 3. Postinflammatory changes left posterior ethmoid sinus. Imaging:  [x]I have personally reviewed the patients chest radiographs images and report   8/20/18    Results from Hospital Encounter encounter on 08/16/18   XR CHEST PORT   Narrative EXAM: Chest portable    INDICATION: Pericardiocentesis    COMPARISON: Single view chest 8/19/2018    _______________    FINDINGS:    AP portable chest film was performed. Cardiac silhouette appears slightly  smaller. A catheter-like structure projects over the inferior cardiac silhouette  may represent a pericardial space catheter. Persistent masslike opacity right  upper lobe. Findings suggesting early left lower lobe infiltrate/atelectasis. No  pneumothorax.    _______________         Impression IMPRESSION:      1. Catheter overlying the inferior cardiac silhouette may represent  pericardiocentesis drain. Cardiac silhouette appears slightly smaller. No  pneumothorax or mediastinal.  2. Persistent right upper lobe masslike opacity. 3. Possible early left lower lobe infiltrate. 8/19/18    Results from Hospital Encounter encounter on 08/16/18   CT ABD PELV W CONT   Narrative EXAM: CT of the abdomen and pelvis    INDICATION: History of bladder cancer, metastatic disease, follow-up after  treatment. .  History of acute DVT and back pain. Anemia. COMPARISON: None.     TECHNIQUE: Axial CT imaging of the abdomen and pelvis was performed intravenous  contrast. Multiplanar reformats were generated. One or more dose reduction techniques were used on this CT: automated exposure  control, adjustment of the mAs and/or kVp according to patient's size, and  iterative reconstruction techniques. The specific techniques utilized on this CT  exam have been documented in the patient's electronic medical record.    _______________    FINDINGS:    LOWER CHEST: Moderate/large pericardial effusion. Trace bilateral pleural  effusions and dependent minor atelectasis. LIVER, BILIARY: Liver demonstrates enhancing lesions bilaterally. Largest in the  left hepatic lobe measuring 3.3 x 3.4 cm. Liver is diffusely heterogeneous with  mild periportal edema. . No biliary dilation. Gallbladder is moderately distended  with surrounding fluid attenuation which likely represent fluid-filled bowel  loops. PANCREAS: Normal.    SPLEEN: Normal.    ADRENALS: Bilateral adrenal masses, measuring 3.7 cm on the right, and 3.1 cm on  the left. Reggie Keel KIDNEYS/URETERS/BLADDER: Normal.    PELVIC ORGANS: Lobulated soft tissue mass abutting or arising from the uterus in  the right pelvis collection measuring 7.9 x 6.7 cm. VASCULATURE: There is extensive DVT involving the visualized left femoral, iliac  veins, with thrombus extending into the inferior most IVC. Associated left lower  extremity edema. LYMPH NODES: No enlarged retroperitoneal lymph node between the IVC and aorta  measuring 1.6 cm short axis. .. GASTROINTESTINAL TRACT: No bowel dilation or wall thickening. BONES: No acute or aggressive osseous abnormalities identified. OTHER: None.    _______________         Impression IMPRESSION:    Moderate/large pericardial effusion. Trace bilateral pleural effusions and  dependent atelectasis. Hepatic lesions, adrenal masses and retroperitoneal adenopathy as described  compatible with metastatic disease. Lobulated mass in the pelvis either abutting or arising from the uterus.  This  could just represent an lobulated multifibroid uterus. Other mass or malignancy  not excluded. Consider ultrasound for further evaluation. Extensive left iliofemoral DVT extending into the IVC. [x]See my orders for details    My assessment, plan of care, findings, medications, side effects etc were discussed with:  [x]nursing []PT/OT    []respiratory therapy [x]Dr. Addy Harvey   []family [x]Patient     [x]Total critical care time exclusive of procedures 35 minutes with complex decision making performed and > 50% time spent in face to face evaluation.     Jackeline Rasheed MD

## 2018-08-22 NOTE — PROGRESS NOTES
Cardiology Progress Note        Patient: Trisha Meckel        Sex: female          DOA: 8/16/2018  YOB: 1955      Age:  66 y.o.        LOS:  LOS: 6 days    Patient seen and examined, chart reviewed  Assessment/Plan     Patient Active Problem List   Diagnosis Code         Hyponatremia E87.1    Metastatic lung cancer (metastasis [facility] lung to other site) [facility]) C34.90    Leg DVT (deep venous thromboembolism), acute, left ([facility]) I82.402    Acute renal injury due to hypovolemia ([facility]) N17.9, E86.1    PAD (peripheral artery disease) ([facility]) I73.9    Pericardial effusion I31.3      Pericardial effusion with tamponade status post pericardiocentesis. 800 mL of hemorrhagic fluid drained. Repeat echocardiogram done today revealed trivial pericardial effusion.     Plan:      No antiplatelets or anticoagulation for now. Continue management as per hospital medicine and pulmonic critical-care. Subjective:    cc:  Denies any chest pain or shortness of breath      REVIEW OF SYSTEMS:     General: No fevers or chills. Cardiovascular: No chest pain,No palpitations, No orthopnea, No PND, No leg swelling, No claudication  Pulmonary: No dyspnea  Gastrointestinal: No nausea, vomiting, bleeding  Neurology: No Dizziness    Objective:      Visit Vitals    /60    Pulse (!) 106    Temp 98.3 °F (36.8 °C)    Resp 19    Ht 5' 7\" (1.702 m)    Wt 87.1 kg (192 lb)    SpO2 95%    Breastfeeding No    BMI 30.07 kg/m2     Body mass index is 30.07 kg/(m^2). Physical Exam:  General Appearance: Comfortable, not using accessory muscles of respiration. HEENT: SILVER. HEAD: Atraumatic  NECK: No JVD, no thyroidomeglay. CAROTIDS:no bruit  LUNGS: Clear bilaterally. HEART: S1+S2 audible, no murmur, no pericardial rub. ABD: Non-tender, BS Audible    MUSCULOSKELETAL: Grossly no joint deformity.   NEUROLOGICAL: AAO times 3, No motor and sensory deficit  Medication:  Current Facility-Administered Medications   Medication Dose Route Frequency    famotidine (PEPCID) tablet 20 mg  20 mg Oral BID    sodium chloride (NS) flush 5-10 mL  5-10 mL IntraVENous Q8H    sodium chloride (NS) flush 5-10 mL  5-10 mL IntraVENous PRN    flumazenil (ROMAZICON) 0.1 mg/mL injection 0.2 mg  0.2 mg IntraVENous Rad Multiple    naloxone (NARCAN) injection 0.2 mg  0.2 mg IntraVENous PRN    0.9% sodium chloride infusion 250 mL  250 mL IntraVENous PRN    LORazepam (ATIVAN) tablet 1 mg  1 mg Oral Q4H PRN    albuterol-ipratropium (DUO-NEB) 2.5 MG-0.5 MG/3 ML  3 mL Nebulization Q4H PRN    levothyroxine (SYNTHROID) tablet 75 mcg  75 mcg Oral ACB    0.9% sodium chloride infusion 250 mL  250 mL IntraVENous PRN    acetaminophen (TYLENOL) tablet 650 mg  650 mg Oral QID    sodium chloride tablet 1 g  1 g Oral BID WITH MEALS    morphine injection 2 mg  2 mg IntraVENous Q4H PRN    traMADol (ULTRAM) tablet 50 mg  50 mg Oral Q6H PRN               Lab/Data Reviewed:       Recent Labs      08/22/18   0416  08/21/18   0400  08/20/18   2156   WBC  11.1   --   11.3   HGB  10.8*  9.6*  9.7*   HCT  34.0*  30.3*  30.2*   PLT  234   --   250     Recent Labs      08/22/18   0416  08/20/18   2156   NA  137  135*   K  4.3  4.6   CL  103  102   CO2  27  25   GLU  88  90   BUN  13  10   CREA  0.84  0.81   CA  9.3  9.3       Signed By: Brenton Aviles MD     August 22, 2018

## 2018-08-22 NOTE — PROGRESS NOTES
0715  Bedside and Verbal shift change report received from Korin Becker RN. Report included the following information SBAR, Kardex, Intake/Output, MAR and Recent Results. 0800 Assessment complete    1200 Reassessment complete    1600 Reassessment complete    1730  Called report to TALIA Roberts Report included the following information SBAR, Kardex, Intake/Output, MAR and Recent Results. 2614  Pt transported to room 340 in bed and on monitor. Skin assessment and transfer strip completed with  TALIA Roberts.

## 2018-08-22 NOTE — PROGRESS NOTES
Chart reviewed and spoke with patient at bedside, cm did discuss discharge planning pt open to home health services or rehab if needed, denies Pcp pt stated she has one in mind but has business card at home plans to have son bring in information, cm will follow up with pt tomorrow,due to pt being very tired.

## 2018-08-22 NOTE — DIABETES MGMT
GLYCEMIC CONTROL PROGRESS NOTE:    -discussed in rounds, no known h/o DM  -diet resumed yesterday, poor PO intake  -no identified GC needs at this time    Recent Glucose Results:   Lab Results   Component Value Date/Time    GLU 88 08/22/2018 04:16 AM     Hayes Humphrey RN, MS  Glycemic Control Team  Pager [phone] (M-TH 8:30-5P)  *After Hours pager [phone]

## 2018-08-23 NOTE — PROGRESS NOTES
PM team attempted follow up visit. Patient was sleeping soundly, no family at bedside. We allowed patient to rest. Noted that biopsy is planned for tomorrow. Will attempt to see later as time allows.     Oren Lee RN

## 2018-08-23 NOTE — PROGRESS NOTES
1920: Bedside report received from Luis Harvey RN. Patient is resting quietly in bed. Family member at bedside. Bed is locked and in lowest position. 2040: Shift assessment. 2053: Patient reports a left shoulder pain rated at 7/10. 2 mg morphine given IV. 2150: Pain reassessment. Patient sleeping. 2343: Patient sates that pain has increase to 10/10. Patient was treated with 50 mg Tramadol     0030: Pain reassessment. Patient sleeping.    0404: Patient wakes up with a left shoulder pain rated at 10/10. Patient was treated with 2 mg morphine IV.      0433: Reassessment. 0500: Pain reassessment. Pain has improved to 4/10.     0630: No acute changes overnight. Patient well controlled with ordered pain medicine.

## 2018-08-23 NOTE — PROGRESS NOTES
Transtion of care: Home with family when medically cleared  Met with patient at bedside she informed cm she lives alone she has a son Alyssa Roberts [phone]. States when she gets d/c her brother will come stay with her. She also informed cm her mother and sister are coming to stay with her from [place]. paliative care is seeing estuardo. Patient is to have Bx. Patient will need follow up wit onocology when d/c and he can see her as out patient Dr. Robb Hoff. Cm will continue to follow.  PT ordered for recommendations

## 2018-08-23 NOTE — PROGRESS NOTES
Patient seen and examined s/p IVC filter placement on 8/20. Patient with left leg DVT extending to IVC. Denies pain currently. Respirations unlabored, RRR, left leg with 2+ edema from toes to thigh, +DP/+PT signals bilaterally, +sensory/+motor function bilaterally. Keep left leg wrapped with ACE from toes to groin and keep elevated above heart level.     Visit Vitals    /68 (BP 1 Location: Right arm, BP Patient Position: At rest)    Pulse (!) 109    Temp 98.8 °F (37.1 °C)    Resp 18    Ht 5' 7\" (1.702 m)    Wt 192 lb (87.1 kg)    SpO2 94%    Breastfeeding No    BMI 30.07 kg/m2

## 2018-08-23 NOTE — ROUTINE PROCESS
Bedside and Verbal shift change report given to Vicki Harvey RN (oncoming nurse) by Ira Zapien RN  (offgoing nurse). Report included the following information SBAR, Kardex and MAR.

## 2018-08-23 NOTE — PROGRESS NOTES
Clarified biopsy order w/interventional radiologist, Dr. Xu Hernandez, and oncologist, Dr. Ketan Brandon. Both MDs state that pt is to have a left clavicular bone biopsy. Dr. Ketan Brandon stated to cancel lymph node biopsy and reorder under his name a bone biopsy. See orders.

## 2018-08-23 NOTE — PROGRESS NOTES
Hospitalist Progress Note    Patient: Nikki Crotez MRN: [id number]  CSN: [id number]    YOB: 1955  Age: 66 y.o.   Sex: female    DOA: 8/16/2018 LOS:  LOS: 7 days          Chief Complaint:    Metastatic cancer      Assessment/Plan     Acute DVT - has IVC filter placed  Large pericaridal effusion- s/p pericardiocentesis of large volume of bloody fluid-catheter has been removed  Metastatic cancer, liver, retroperitoneum, bone, adrenals, and possible uterine mass-no tissue dx yet  Hypothyroidism-now on synthroid  Anemia-appears to be combination of acute blood loss and iron deficiency    Supraclavicular node biopsy to be done-need tissue dx, but this result could be followed up as outpatient  Hematolgy/onc on case    PT and OOB  Watch blood counts  No AC due to pericardial hemorrhagic effusion      Limiting step for d/c is strength, stability cardiovascular, and getting biopsy completed    Disposition :home health  Patient Active Problem List   Diagnosis Code    Dehydration E86.0    Hyponatremia E87.1    Metastatic lung cancer (metastasis from lung to other site) [facility]) C34.90    Leg DVT (deep venous thromboembolism), acute, left ([facility] [address] I82.402    Acute renal injury due to hypovolemia ([facility] [address] N17.9, E86.1    PAD (peripheral artery disease) ([facility] [address] I73.9    Pericardial effusion I31.3       Subjective:    She denies SOB  She is weak and has not been out of bed much yet    Denies CP, palp, HA  She is trying to eat    Review of systems:    Constitutional: denies fevers, chills, myalgias  Respiratory: denies cough  Cardiovascular: denies chest pain, palpitations  Gastrointestinal: denies nausea, vomiting, diarrhea      Vital signs/Intake and Output:  Visit Vitals    /75 (BP 1 Location: Right arm, BP Patient Position: At rest)    Pulse (!) 113    Temp 98.8 °F (37.1 °C)    Resp 18    Ht 5' 7\" (1.702 m)    Wt 87.1 kg (192 lb)    SpO2 99%    Breastfeeding No    BMI 30.07 kg/m2     Current Shift:     Last three shifts:  08/21 1901 - 08/23 0700  In: 1060 [P.O.:1060]  Out: 3100 [Urine:3100]    Exam:    General: Well developed, alert, NAD, OX3  Head/Neck: NCAT, supple, No masses, No lymphadenopathy  CVS:Regular rate and rhythm, no M/R/G, S1/S2 heard, no thrill  Lungs:Clear to auscultation bilaterally, no wheezes, rhonchi, or rales  Abdomen: Soft, Nontender, No distention, Normal Bowel sounds, No hepatomegaly  Extremities: Left leg edema down, wrap in place    Neuro:grossly normal , follows commands  Psych:appropriate                Labs: Results:       Chemistry Recent Labs      08/23/18 0515 08/22/18 0416 08/20/18 2156   GLU  107*  88  90   NA  136  137  135*   K  4.4  4.3  4.6   CL  100  103  102   CO2  26  27  25   BUN  14  13  10   CREA  0.83  0.84  0.81   CA  8.9  9.3  9.3   AGAP  10  7  8   BUCR  17  15  12   AP   --    --   488*   TP   --    --   7.1   ALB   --    --   1.7*   GLOB   --    --   5.4*   AGRAT   --    --   0.3*      CBC w/Diff Recent Labs      08/23/18 0515 08/22/18 0416 08/21/18 0400 08/20/18   2156   WBC  11.9  11.1   --   11.3   RBC  4.12*  4.22   --   3.79*   HGB  10.3*  10.8*  9.6*  9.7*   HCT  32.9*  34.0*  30.3*  30.2*   PLT  211  234   --   250   GRANS  79*  77*   --    --    LYMPH  11*  12*   --    --    EOS  2  2   --    --       Cardiac Enzymes Recent Labs      08/23/18 0515 08/22/18 0416   CPK  60  91      Coagulation Recent Labs      08/21/18   0400  08/20/18   1051   PTP   --   13.7   INR   --   1.1   APTT  32.9  28.2       Lipid Panel No results found for: CHOL, CHOLPOCT, CHOLX, CHLST, CHOLV, 584089, HDL, LDL, LDLC, DLDLP, 633535, VLDLC, VLDL, TGLX, TRIGL, TRIGP, TGLPOCT, CHHD, CHHDX   BNP No results for input(s): BNPP in the last 72 hours.    Liver Enzymes Recent Labs      08/20/18   2156   TP  7.1   ALB  1.7*   AP  488*   SGOT  50*      Thyroid Studies Lab Results   Component Value Date/Time    TSH 60.00 (H) 08/17/2018 08:29 AM Procedures/imaging: see electronic medical records for all procedures/Xrays and details which were not copied into this note but were reviewed prior to creation of Mitchell Brooks MD

## 2018-08-23 NOTE — PROGRESS NOTES
Problem: Falls - Risk of  Goal: *Absence of Falls  Document Yobany Fall Risk and appropriate interventions in the flowsheet. Outcome: Progressing Towards Goal  Fall Risk Interventions:  Mobility Interventions: Bed/chair exit alarm         Medication Interventions: Bed/chair exit alarm    Elimination Interventions:  Toileting schedule/hourly rounds, Call light in reach    History of Falls Interventions: Consult care management for discharge planning, Door open when patient unattended, Investigate reason for fall, Room close to nurse's station, Bed/chair exit alarm

## 2018-08-24 NOTE — PROGRESS NOTES
NUTRITION FOLLOW-UP    RECOMMENDATIONS/PLAN:   - [facility] appetite stimulant to help increase appetite as pt has had poor appetite and poor PO intake during stay at [facility]  - [facility] Bowel motility agent as pt has not had bowel movement since 8/18/18   Monitor labs/lytes, PO intakes, skin integrity, wt, fluid status, BM      NUTRITION ASSESSMENT:   Client Update: 66 yrs old Female with acute DVT S/p IVC filter placement 8/20/2018, large pericardial effusion S/p pericardiocentesis of 800 ml hemorrhagic fluid removed on 8/20/2018, sinus tachycardia, renal insuffiencey, anemia. Pt recent diagnosis w/ lung ca. Bone biopsy completed today. Pt was not in room during nutritional interview. FOOD/NUTRITION INTAKE   Diet Order:  Regular   Supplements: Ensure Enlive    Food Allergies: NKFA/  Average PO Intake:      Patient Vitals for the past 100 hrs:   % Diet Eaten   08/23/18 0630 0 %   08/22/18 1200 0 %      Pertinent Medications:  [x] Reviewed; pepcid,   Electrolyte Replacement Protocol: []K []Mg []PO4  Insulin:  []SSI  []Pre-meal   []Basal    []Drip  [x]None  Cultural/Alevism Food Preferences: None Identified    BIOCHEMICAL DATA & MEDICAL TESTS  Pertinent Labs:  [x] Reviewed; ANTHROPOMETRICS  Height: 5' 7\" (170.2 cm)       Weight: 87.1 kg (192 lb)         BMI: 30.1 kg/m^2 obese (30%-39.9% BMI)   Adm Weight: 192 lbs                Weight change: 0lbs  Adjusted Body Weight: 67.8kg     NUTRITION-FOCUSED PHYSICAL ASSESSMENT  Skin: No PU      GI: No new BM since last review     NUTRITION PRESCRIPTION  Calories: 2126 kcal/day based on [place] x 1.2 x 1.3  Protein: 70-87 g/day based on 0.8-1.0 g/kg  CHO: 267g/day based on 50% of total energy  Fluid: 2126 ml/day based on 1 kcal/ml      NUTRITION DIAGNOSES:   1. Inadequate oral intake related to decrease appetite as evidence by pt report.      NUTRITION INTERVENTIONS:   INTERVENTIONS: GOALS:  1. Other: Bowel Motility agent and appetite stimulant  1. Encourage PO intake >50% at most meals by next review 3 days       LEARNING NEEDS (Diet, Supplementation, Food/Nutrient-Drug Interaction):   [x] None Identified   [] Education provided/documented      Identified and patient: [] Declined   [] Was not appropriate/indicated        NUTRITION MONITORING /EVALUATION:   Follow PO intake  Monitor wt  Monitor renal labs, electrolytes, fluid status  Monitor for additional supplement needs     Previous Recommendations Implemented: yes        Previous Goals Met:  no -pt PO intakes remain poor       [] Participated in Interdisciplinary Rounds    [x] Interdisciplinary Care Plan Reviewed  DISCHARGE NUTRITION RECOMMENDATIONS ADDRESSED:        [x] To be determined closer to discharge    NUTRITION RISK:           [x] At risk                        [] Not currently at risk        Will follow-up per policy.   Blaine Mason, [id number] [address]

## 2018-08-24 NOTE — PROGRESS NOTES
Cardiology Progress Note        Patient: Cam Mann        Sex: female          DOA: 8/16/2018  YOB: 1955      Age:  66 y.o.        LOS:  LOS: 8 days    Patient seen and examined, chart reviewed  Assessment/Plan     Patient Active Problem List   Diagnosis Code    Dehydration E86.0    Hyponatremia E87.1    Metastatic lung cancer (metastasis [facility] lung to other site) [facility]) C34.90    Leg DVT (deep venous thromboembolism), acute, left ([facility]) I82.402    Acute renal injury due to hypovolemia ([facility]) N17.9, E86.1    PAD (peripheral artery disease) ([facility]) I73.9    Pericardial effusion I31.3      Pericardial effusion with tamponade status post pericardiocentesis.  800 mL of hemorrhagic fluid drained.  Repeat echocardiogram done today revealed trivial pericardial effusion. Acute Left lower extremity DVT s/p IVC filter      Plan:          No antiplatelets or anticoagulation for now. Call on call cardiologist if needed. Continue management as per hospital medicine and pulmonic critical-care. Plan discussed with patient and family member at bedside. Subjective:    cc:  Denies any chest pain or shortness of breath      REVIEW OF SYSTEMS:     General: No fevers or chills. Cardiovascular: No chest pain,No palpitations, No orthopnea, No PND,positive leg swelling, No claudication  Pulmonary: No dyspnea  Gastrointestinal: No nausea, vomiting, bleeding  Neurology: No Dizziness    Objective:      Visit Vitals    /71    Pulse 91    Temp 98.8 °F (37.1 °C)    Resp 20    Ht 5' 7\" (1.702 m)    Wt 87.1 kg (192 lb)    SpO2 100%    Breastfeeding No    BMI 30.07 kg/m2     Body mass index is 30.07 kg/(m^2). Physical Exam:  General Appearance: Comfortable, not using accessory muscles of respiration. HEENT: SILVER. HEAD: Atraumatic  NECK: No JVD, no thyroidomeglay. CAROTIDS:no bruit  LUNGS: Clear bilaterally. HEART: S1+S2 audible, no murmur, no pericardial rub. ABD: Non-tender, BS Audible    NEUROLOGICAL: AAO times 3, No motor and sensory deficit  Medication:  Current Facility-Administered Medications   Medication Dose Route Frequency    morphine injection 2 mg  2 mg IntraVENous Q4H PRN    metoprolol tartrate (LOPRESSOR) tablet 12.5 mg  12.5 mg Oral BID    levoFLOXacin (LEVAQUIN) 750 mg in D5W IVPB  750 mg IntraVENous Q24H    famotidine (PEPCID) tablet 20 mg  20 mg Oral BID    sodium chloride (NS) flush 5-10 mL  5-10 mL IntraVENous Q8H    sodium chloride (NS) flush 5-10 mL  5-10 mL IntraVENous PRN    0.9% sodium chloride infusion 250 mL  250 mL IntraVENous PRN    albuterol-ipratropium (DUO-NEB) 2.5 MG-0.5 MG/3 ML  3 mL Nebulization Q4H PRN    levothyroxine (SYNTHROID) tablet 75 mcg  75 mcg Oral ACB    0.9% sodium chloride infusion 250 mL  250 mL IntraVENous PRN    acetaminophen (TYLENOL) tablet 650 mg  650 mg Oral QID    sodium chloride tablet 1 g  1 g Oral BID WITH MEALS    traMADol (ULTRAM) tablet 50 mg  50 mg Oral Q6H PRN               Lab/Data Reviewed:       Recent Labs      08/24/18   0442  08/23/18   0515  08/22/18   0416   WBC  11.0  11.9  11.1   HGB  10.8*  10.3*  10.8*   HCT  34.2*  32.9*  34.0*   PLT  201  211  234     Recent Labs      08/24/18   0442  08/23/18   0515  08/22/18   0416   NA  136  136  137   K  4.7  4.4  4.3   CL  101  100  103   CO2  29  26  27   GLU  91  107*  88   BUN  12  14  13   CREA  0.83  0.83  0.84   CA  9.2  8.9  9.3       Signed By: Vi Farmer MD     August 24, 2018

## 2018-08-24 NOTE — PROGRESS NOTES
Problem: Mobility Impaired (Adult and Pediatric)  Goal: *Acute Goals and Plan of Care (Insert Text)  Physical Therapy Goals  Initiated 8/24/2018 and to be accomplished within 7 day(s)  1. Patient will move from supine <> sit with supervision. 2.  Patient will transfer from bed <> chair with supervision using the least restrictive device. 3.  Patient will perform sit <> stand with supervision/CGA. 4.  Patient will ambulate with supervision/ feet with the least restrictive device. Outcome: Progressing Towards Goal  physical Therapy EVALUATION    Patient: Nahed Jimenez (22 y.o. female)  Date: 8/24/2018  Primary Diagnosis: Pericardial effusion [I31.3]  Procedure(s) (LRB):  PERICARDIOCENTESIS (N/A) 4 Days Post-Op   Precautions:Fall, WBAT    ASSESSMENT :  Based on the objective data described below, the patient is a 67 yo F, initially evaluated by physical therapy on 8/19/2018. Pt subsequently transferred to ICU on 8/20/2018 and PT placed on hold as pt deemed unsafe to mobilize due to DVT and medical complexity per IDR's. Now with new orders and pt presents with decreased ROM/motor performance (L LE d/t DVT, L shd d/t left clavicle biopsy today), decreased independence in overall functional mobility with regard to bed mobility, transfers, impaired gait quality/balance and activity tolerance. Patient reports pain 3/10 L clavicle. Pt able to transition to sit EOB min/mod assist and participate in GT/min A 3ft EOB x 2. Irma slow, antalgic. Pt initially returned to bedside, then requested to use BSC. Assisted to [facility] to void (SBA for hygiene care seated), then returned to bed and left supine with L LE elevated and all needs in reach. SCD's in place R LE and nurse [place] notified. Recommend rehab at this point for follow up physical therapy upon discharge to reach maximal level of independence/safety with functional mobility.      Pt Education: pt educated in safety/technique during functional mobility tasks     Patient will benefit from skilled intervention to address the above impairments. Patients rehabilitation potential is considered to be Good  Factors which may influence rehabilitation potential include:   []         None noted  []         Mental ability/status  [x]         Medical condition  [x]         Home/family situation and support systems  []         Safety awareness  []         Pain tolerance/management  []         Other:      PLAN :  Recommendations and Planned Interventions:  [x]           Bed Mobility Training             []    Neuromuscular Re-Education  [x]           Transfer Training                   []    Orthotic/Prosthetic Training  [x]           Gait Training                          []    Modalities  [x]           Therapeutic Exercises          []    Edema Management/Control  [x]           Therapeutic Activities            [x]    Patient and Family Training/Education  []           Other (comment):    Frequency/Duration: Patient will be followed by physical therapy 1-2 times per day to address goals. Discharge Recommendations: Rehab  Further Equipment Recommendations for Discharge: rolling walker     SUBJECTIVE:   Patient stated I think this leg is supposed to bed wrapped.     OBJECTIVE DATA SUMMARY:     Past Medical History:   Diagnosis Date    Cancer ([facility] [address]     lung cancer    PAD (peripheral artery disease) ([facility] [address]     Pericardial effusion      Past Surgical History:   Procedure Laterality Date    VASCULAR SURGERY PROCEDURE UNLIST Right     opened up vessels     Barriers to Learning/Limitations: yes;  physical  Compensate with: Verbal Cues and Tactile Cues  Prior Level of Function/Home Situation: pt reports independence prior to illness which began in July 2018.   Home Situation  Home Environment: Private residence  # Steps to Enter: 0  One/Two Story Residence: One story  Living Alone: Yes (but brother has been with pt weekends and often during wk)  Support Systems: Family member(s)  Patient Expects to be Discharged to[de-identified] Unknown  Current DME Used/Available at Home: None  Tub or Shower Type: Tub/Shower combination  Critical Behavior:  Neurologic State: Alert; Appropriate for age  Orientation Level: Oriented X4  Cognition: Appropriate decision making; Appropriate for age attention/concentration; Appropriate safety awareness; Follows commands  Safety/Judgement: Fall prevention  Psychosocial  Patient Behaviors: Calm; Cooperative  Needs Expressed: Educational;Nutritional  Purposeful Interaction: Yes  Pt Identified Daily Priority: Clinical issues (comment)  Caritas Process: Nurture loving kindness;Establish trust;Nurture spiritual self;Teaching/learning; Attend basic human needs;Create healing environment  Caring Interventions: Reassure; Therapeutic modalities  Reassure: Therapeutic listening; Informing; Acceptance;Caring rounds;Quiet presence  Therapeutic Modalities: Intentional therapeutic touch  Skin Condition/Temp: Dry;Warm  Skin Integrity: Other (comment) (PUNCTURE TO LEFT CHEST, LEFT CLAVICLE)  Skin Integumentary  Skin Color: Appropriate for ethnicity  Skin Condition/Temp: Dry;Warm  Skin Integrity: Other (comment) (PUNCTURE TO LEFT CHEST, LEFT CLAVICLE)  Turgor: Non-tenting  Hair Growth: Present  Varicosities: Absent  Strength:    Strength: Generally decreased, functional  Tone & Sensation:   Tone: Normal  Sensation: Intact  Range Of Motion:  AROM: Generally decreased, functional (L shd not testes s/p biopsy L clavicle today)  Functional Mobility:  Bed Mobility:  Supine to Sit: Minimum assistance; Additional time  Sit to Supine: Minimum assistance  Scooting: Contact guard assistance; Other (comment) (vc)  Transfers:  Sit to Stand: Minimum assistance; Moderate assistance; Additional time (bed elevated)  Stand to Sit: Minimum assistance  Bed to Chair: Minimum assistance (to [facility])  Balance:   Sitting: Intact  Standing: Intact; With support  Standing - Static: Fair  Standing - Dynamic : Fair  Ambulation/Gait Training:  Distance (ft): 6 Feet (ft) (3ft twice)  Assistive Device: Gait belt; Other (comment) (HHA)  Ambulation - Level of Assistance: Minimal assistance  Gait Abnormalities: Antalgic;Decreased step clearance; Step to gait  Base of Support: Shift to right  Stance: Left decreased  Speed/Irma: Slow  Step Length: Right shortened;Left shortened  Swing Pattern: Left asymmetrical  Pain:  Pain Scale 1: Numeric (0 - 10)  Pain Intensity 1: 3  Pain Location 1: Generalized; Shoulder  Pain Orientation 1: Left  Pain Description 1: Aching  Pain Intervention(s) 1: Medication (see MAR); Rest;Repositioned  Activity Tolerance:   Fair  Please refer to the flowsheet for vital signs taken during this treatment. After treatment:   []         Patient left in no apparent distress sitting up in chair  [x]         Patient left in no apparent distress in bed with LE elevated  [x]         Call bell left within reach  [x]         Nursing notified  [x]         Visitor present  []         Bed alarm activated    COMMUNICATION/EDUCATION:   [x]         Fall prevention education was provided and the patient/caregiver indicated understanding. [x]         Patient/family have participated as able in goal setting and plan of care. [x]         Patient/family agree to work toward stated goals and plan of care. []         Patient understands intent and goals of therapy, but is neutral about his/her participation. []         Patient is unable to participate in goal setting and plan of care.     Eval Complexity: History: HIGH Complexity :3+ comorbidities / personal factors will impact the outcome/ POC Exam:MEDIUM Complexity : 3 Standardized tests and measures addressing body structure, function, activity limitation and / or participation in recreation  Presentation: MEDIUM Complexity : Evolving with changing characteristics  Clinical Decision Making:Medium Complexity amb <30ft with AD, L LE edematous and ace wrapped foot to thigh Overall Complexity:MEDIUM    G-Codes (GP)  Mobility  [id number] Current  CK= 40-59%  [id number] Goal  CI= 1-19%  The severity rating is based on the functional mobility assessment.     Thank you for this referral.  Lulu Pritchard, PT   Time Calculation: 39 mins

## 2018-08-24 NOTE — PROGRESS NOTES
Phone: [phone]  Paging : [phone]      Hematology / Oncology Progress Note    Admit Date: 8/16/2018    Assessment:   Principal Problem:    Pericardial effusion (8/16/2018)      Overview: Added automatically from request for surgery [id number]    Active Problems:    Dehydration (8/16/2018)      Hyponatremia (8/16/2018)      Metastatic lung cancer (metastasis from lung to other site) [facility]) (8/16/2018)      Leg DVT (deep venous thromboembolism), acute, left ([address] (8/16/2018)      Acute renal injury due to hypovolemia ([address] (8/16/2018)      PAD (peripheral artery disease) ([facility] (8/16/2018)      Plan:   1) Pericardial Effusion- with Tamponade. S/p pericardiocentesis of 800 ml hemorrhagic fluid removed on 8/20/2018. Drain removed. Cytology negative for malignancy. 2) LLE DVT- Paraneoplastic thrombophilia. Was receiving Heparin gtt, but anti-coagulation was discontinued upon discovery of bloody pericardial effusion. No further anti-coagulation. S/p IVC filter placement 8/20/2018.  3) Metastatic Cancer- to liver, retroperitoneum, bone, adrenals, and possible uterine mass. Palliative Care following. Plan Bx of large left supraclavicular LN today 8/24/2018.  4) Anemia- Hgb 10.8 g/dl today. 5) Hypothyroidism- TSH was 60 on 8/17/2018. Continue Synthroid. 6) Hyponatremia- 136 today  7) Hypocalcemia- 9.2 today. ROS:  Constitutional: No fever, chills, diaphoresis, + constipated, + fatigue + unexpected weight change. HEENT: No sore throat, rhinorrhea,or visual disturbance. Respiratory: No cough, chest tightness, shortness of breath has improved. Cardiovascular:No chest pain, palpitations. Gastrointestinal:No nausea, vomiting, diarrhea, early satiety, abdominal distention, abdominal pain. Genitourinary: No dysuria, urgency, frequency, hematuria, flank pain, difficulty urinating. Neurological: No headache, dizziness, weakness, tingling and numbness or fall.     Musculoskeletal: + left medial clavicular mass bone pain. No arthralgia or myalgia. No bruise/bleed easily. LL extremity edema 2+ with ACE wrapping      Physical Exam:  Visit Vitals    /70 (BP 1 Location: Right arm)    Pulse (!) 113    Temp 98 °F (36.7 °C)    Resp 21    Ht 5' 7\" (1.702 m)    Wt 87.1 kg (192 lb)    SpO2 99%    Breastfeeding No    BMI 30.07 kg/m2       Date 08/23/18 0700 - 08/24/18 0659 08/24/18 0700 - 08/25/18 0659   Shift 8691-1980 8819-8389 24 Hour Total 2754-8178 9388-1988 24 Hour Total   I  N  T  A  K  E   Shift Total  (mL/kg)         O  U  T  P  U  T   Urine  (mL/kg/hr)  2550  (2.4) 2550  (1.2)         Urine Voided  2550 2550       Shift Total  (mL/kg)  2550  (29.3) 2550  (29.3)      NET  -2550 -2550      Weight (kg) 87.1 87.1 87.1 87.1 87.1 87.1   . HEENT: no pallor, anicteric sclera, oral pharynx without lesion   + medial left clavicular mass- tender. Heart: regular rate, and rhythm, without murmur, gallop or rubbing  Lungs:breathing comfortably, clear to auscultation and percussion bilaterally  ABD: bowel sound present, soft, nondistended, nontender, no hepatosplenomegaly or mass  Extremities: warm, well perfused, 2+ edema Left leg with ACE wrapping. MSK: no tenderness along the spine or long bones  Skin: No rash  Neuro: non-focal    Imaging:      ECHO 8/22/2018-  · Trivial pericardial effusion adjacent to the right ventricle. Bilateral pleural effusion  Normal cavity size, wall thickness and systolic function (ejection fraction normal). The estimated ejection fraction is 61 - 65%. ECHO 8/21/2018-  Left Ventricle  Normal cavity size and systolic function (ejection fraction normal). The estimated ejection fraction is 61 - 65%. No regional wall motion abnormality noted.        Right Ventricle  Normal cavity size and global systolic function.       Aortic Valve  Trileaflet aortic valve structure. No stenosis and no regurgitation.       Tricuspid Valve  Normal valve structure and no stenosis. Moderate tricuspid valve regurgitation. Pulmonary arterial systolic pressure is 40 mmHg. Moderate pulmonary hypertension.       IVC/Hepatic Veins  Inferior vena cava not well visualized.       Pericardium  No evidence of pericardial effusion. Bilateral pleural effusion             pCXR 8/20/2018-  1. Catheter overlying the inferior cardiac silhouette may represent  pericardiocentesis drain. Cardiac silhouette appears slightly smaller. No  pneumothorax or mediastinal.  2. Persistent right upper lobe masslike opacity. 3. Possible early left lower lobe infiltrate. CT A/P 8/19/2018-  Moderate/large pericardial effusion. Trace bilateral pleural effusions and  dependent atelectasis.     Hepatic lesions, adrenal masses and retroperitoneal adenopathy as described  compatible with metastatic disease.     Lobulated mass in the pelvis either abutting or arising from the uterus. This  could just represent an lobulated multifibroid uterus. Other mass or malignancy  not excluded. Consider ultrasound for further evaluation.     Extensive left iliofemoral DVT extending into the IVC. MRI Brain 8/17/2018-  1. No evidence of intracranial metastatic disease or other acute intracranial  finding.     2. Mild deep cerebral white matter and pontine signal changes nonspecific,  trouble chronic microvascular ischemic disease.     3. Postinflammatory changes left posterior ethmoid sinus. Bone Scan 8/17/2018-  1. Focal scintigraphic uptake involving the distal medial left clavicle, in  keeping with lytic lesion demonstrated on prior CT. 2. Focal and indeterminate uptake within the sacrum. Correlation with CT imaging  is recommended if not performed previously to evaluate for potential site of  metastasis versus degenerative uptake. 3. Diffuse soft tissue uptake throughout the left leg, likely in keeping with  patient's known DVT. PVL LLE 8/16/2018-  Occlusive thrombus present in the left external iliac vein. Occlusive thrombus present in the left common femoral vein. Occlusive thrombus present in the left saphenofemoral junction. Occlusive thrombus present in the left profunda femoral vein. Occlusive thrombus present in the left proximal femoral vein. Occlusive thrombus present in the left mid femoral vein. Occlusive thrombus present in the left distal femoral vein. Non-occlusive thrombus present in the left popliteal vein. Occlusive non-occlusive thrombus present in the left posterior tibial vein. Occlusive non-occlusive thrombus present in the left peroneal vein. Recent Results (from the past 24 hour(s))   CK    Collection Time: 08/24/18  4:42 AM   Result Value Ref Range    CK 45 26 - 192 U/L   CBC WITH AUTOMATED DIFF    Collection Time: 08/24/18  4:42 AM   Result Value Ref Range    WBC 11.0 4.6 - 13.2 K/uL    RBC 4.27 4.20 - 5.30 M/uL    HGB 10.8 (L) 12.0 - 16.0 g/dL    HCT 34.2 (L) 35.0 - 45.0 %    MCV 80.1 74.0 - 97.0 FL    MCH 25.3 24.0 - 34.0 PG    MCHC 31.6 31.0 - 37.0 g/dL    RDW 19.0 (H) 11.6 - 14.5 %    PLATELET 189 603 - 181 K/uL    MPV 8.8 (L) 9.2 - 11.8 FL    NEUTROPHILS 78 (H) 40 - 73 %    LYMPHOCYTES 12 (L) 21 - 52 %    MONOCYTES 7 3 - 10 %    EOSINOPHILS 3 0 - 5 %    BASOPHILS 0 0 - 2 %    ABS. NEUTROPHILS 8.6 (H) 1.8 - 8.0 K/UL    ABS. LYMPHOCYTES 1.3 0.9 - 3.6 K/UL    ABS. MONOCYTES 0.8 0.05 - 1.2 K/UL    ABS. EOSINOPHILS 0.3 0.0 - 0.4 K/UL    ABS.  BASOPHILS 0.0 0.0 - 0.1 K/UL    DF AUTOMATED     METABOLIC PANEL, BASIC    Collection Time: 08/24/18  4:42 AM   Result Value Ref Range    Sodium 136 136 - 145 mmol/L    Potassium 4.7 3.5 - 5.5 mmol/L    Chloride 101 100 - 108 mmol/L    CO2 29 21 - 32 mmol/L    Anion gap 6 3.0 - 18 mmol/L    Glucose 91 74 - 99 mg/dL    BUN 12 7.0 - 18 MG/DL    Creatinine 0.83 0.6 - 1.3 MG/DL    BUN/Creatinine ratio 14 12 - 20      GFR est AA >60 >60 ml/min/1.73m2    GFR est non-AA >60 >60 ml/min/1.73m2    Calcium 9.2 8.5 - 10.1 MG/DL       Current Facility-Administered Medications:     famotidine (PEPCID) tablet 20 mg, 20 mg, Oral, BID, Maximiliano Conn MD, 20 mg at 08/23/18 2331    sodium chloride (NS) flush 5-10 mL, 5-10 mL, IntraVENous, Q8H, Vi Farmer MD, 10 mL at 08/23/18 2331    sodium chloride (NS) flush 5-10 mL, 5-10 mL, IntraVENous, PRN, Vi Farmer MD    flumazenil (ROMAZICON) 0.1 mg/mL injection 0.2 mg, 0.2 mg, IntraVENous, Rad Multiple, Ismael Rosen MD    naloxone [place]) injection 0.2 mg, 0.2 mg, IntraVENous, PRN, Ismael Rosen MD    0.9% sodium chloride infusion 250 mL, 250 mL, IntraVENous, PRN, Servando Cummings DO    LORazepam (ATIVAN) tablet 1 mg, 1 mg, Oral, Q4H PRN, Servando Cummings DO, 1 mg at 08/19/18 2215    albuterol-ipratropium (DUO-NEB) 2.5 MG-0.5 MG/3 ML, 3 mL, Nebulization, Q4H PRN, Servando Cummings DO, 3 mL at 08/20/18 0716    levothyroxine (SYNTHROID) tablet 75 mcg, 75 mcg, Oral, ACB, Servando Cummings DO, 75 mcg at 08/24/18 0653    0.9% sodium chloride infusion 250 mL, 250 mL, IntraVENous, PRN, Servando Cummings DO    acetaminophen (TYLENOL) tablet 650 mg, 650 mg, Oral, QID, Servando Cummings DO, 650 mg at 08/23/18 2331    sodium chloride tablet 1 g, 1 g, Oral, BID WITH MEALS, Amy Cunha MD, 1 g at 08/23/18 1714    morphine injection 2 mg, 2 mg, IntraVENous, Q4H PRN, Servando Cummings DO, 2 mg at 08/24/18 0653    traMADol (ULTRAM) tablet 50 mg, 50 mg, Oral, Q6H PRN, Rona Jain MD, 50 mg at 08/22/18 [address]  Issac Cesar MD  [facility]. [address]  Office: [phone]

## 2018-08-24 NOTE — PROGRESS NOTES
Cardiology Progress Note        Patient: Tom Dunham        Sex: female          DOA: 8/16/2018  YOB: 1955      Age:  66 y.o.        LOS:  LOS: 7 days    Patient seen and examined, chart reviewed. Assessment/Plan     Patient Active Problem List   Diagnosis Code              Metastatic lung cancer (metastasis from lung to other site) [facility]) C34.90    Leg DVT (deep venous thromboembolism), acute, left ([facility]) I82.402    Acute renal injury due to hypovolemia ([facility]) N17.9, E86.1    PAD (peripheral artery disease) ([facility]) I73.9    Pericardial effusion I31.3      Pericardial effusion with tamponade status post pericardiocentesis.  800 mL of hemorrhagic fluid drained.  Repeat echocardiogram done today revealed trivial pericardial effusion. Acute Left lower extremity DVT s/p IVC filter      Plan:        No antiplatelets or anticoagulation for now. Continue management as per hospital medicine and pulmonic critical-care. Subjective:    cc: My breathing is better      REVIEW OF SYSTEMS:     General: No fevers or chills. Cardiovascular: No chest pain,No palpitations, No orthopnea, No PND, Positive leg swelling, No claudication  Pulmonary: Positive dyspnea. Gastrointestinal: No nausea, vomiting, bleeding  Neurology: No Dizziness    Objective:      Visit Vitals    /76    Pulse 98    Temp 99 °F (37.2 °C)    Resp 18    Ht 5' 7\" (1.702 m)    Wt 87.1 kg (192 lb)    SpO2 99%    Breastfeeding No    BMI 30.07 kg/m2     Body mass index is 30.07 kg/(m^2). Physical Exam:  General Appearance: Comfortable, not using accessory muscles of respiration. HEENT: SILVER. HEAD: Atraumatic  NECK: No JVD, no thyroidomeglay. CAROTIDS: No bruit  LUNGS: Clear bilaterally. HEART: S1+S2 audible, no murmur, no pericardial rub.      NEUROLOGICAL: AAO times 3, No motor and sensory deficit  Medication:  Current Facility-Administered Medications   Medication Dose Route Frequency    famotidine (PEPCID) tablet 20 mg  20 mg Oral BID    sodium chloride (NS) flush 5-10 mL  5-10 mL IntraVENous Q8H    sodium chloride (NS) flush 5-10 mL  5-10 mL IntraVENous PRN    flumazenil (ROMAZICON) 0.1 mg/mL injection 0.2 mg  0.2 mg IntraVENous Rad Multiple    naloxone (NARCAN) injection 0.2 mg  0.2 mg IntraVENous PRN    0.9% sodium chloride infusion 250 mL  250 mL IntraVENous PRN    LORazepam (ATIVAN) tablet 1 mg  1 mg Oral Q4H PRN    albuterol-ipratropium (DUO-NEB) 2.5 MG-0.5 MG/3 ML  3 mL Nebulization Q4H PRN    levothyroxine (SYNTHROID) tablet 75 mcg  75 mcg Oral ACB    0.9% sodium chloride infusion 250 mL  250 mL IntraVENous PRN    acetaminophen (TYLENOL) tablet 650 mg  650 mg Oral QID    sodium chloride tablet 1 g  1 g Oral BID WITH MEALS    morphine injection 2 mg  2 mg IntraVENous Q4H PRN    traMADol (ULTRAM) tablet 50 mg  50 mg Oral Q6H PRN               Lab/Data Reviewed:       Recent Labs      08/23/18   0515  08/22/18   0416  08/21/18   0400   WBC  11.9  11.1   --    HGB  10.3*  10.8*  9.6*   HCT  32.9*  34.0*  30.3*   PLT  211  234   --      Recent Labs      08/23/18   0515  08/22/18   0416   NA  136  137   K  4.4  4.3   CL  100  103   CO2  26  27   GLU  107*  88   BUN  14  13   CREA  0.83  0.84   CA  8.9  9.3       Signed By: Laisha Goldstein MD     August 23, 2018

## 2018-08-24 NOTE — PROGRESS NOTES
Transtition of care: anticipate home with Clark Rosas when ready for d/c cms is aware patient will need follow up appointment with Dr. Gisella Wright   Met with patient at bedside she informed cm she lives alone she has a son Merry Forde [phone]. States when she gets d/c her brother will come stay with her. She also informed cm her mother and sister are coming to stay with her from [place]. paliative care is seeing estuardo. Patient is to have Bx. Patient will need follow up wit onocology when d/c and he can see her as out patient Dr. Gisella Wright. Cm will continue to follow. PT ordered for recommendations   Care Management Interventions  Transition of Care Consult (CM Consult): Discharge Planning  Physical Therapy Consult: Yes  Current Support Network: Family Lives [place], Lives Alone  Confirm Follow Up Transport: Family  Plan discussed with Pt/Family/Caregiver:  Yes

## 2018-08-24 NOTE — PROCEDURES
Vascular & Interventional Radiology Brief Procedure Note    Interventional Radiologist: Matheus Atkins MD    Pre-operative Diagnosis: metastatic ca, uncertain primary    Post-operative Diagnosis: Same as pre-op dx    Procedure(s) Performed:  CT guided core bx of left medial clavicle lesion    Anesthesia:  Local and Moderate Sedation    Findings:  4 18g core bx specimens obtained, adequate specimen per path on site    Complications: None    Estimated Blood Loss:  minimal    Tubes and Drains: None    Condition: Roseanna Flynn MD, MD  [facility]  8/24/2018

## 2018-08-24 NOTE — PROGRESS NOTES
Problem: Falls - Risk of  Goal: *Absence of Falls  Document Yobany Fall Risk and appropriate interventions in the flowsheet.    Outcome: Progressing Towards Goal  Fall Risk Interventions:  Mobility Interventions: Patient to call before getting OOB, PT Consult for mobility concerns         Medication Interventions: Patient to call before getting OOB, Evaluate medications/consider consulting pharmacy    Elimination Interventions: Call light in reach, Patient to call for help with toileting needs    History of Falls Interventions: Bed/chair exit alarm, Evaluate medications/consider consulting pharmacy

## 2018-08-24 NOTE — PROGRESS NOTES
Returned patient to room 340 following procedure. Patient resting side rails up for safety and awaiting  Nurse for bedside report.

## 2018-08-24 NOTE — PROGRESS NOTES
Hospitalist Progress Note    Patient: Nicki Agrawal MRN: [id number]  CSN: [id number]    YOB: 1955  Age: 66 y.o.   Sex: female    DOA: 8/16/2018 LOS:  LOS: 8 days          Chief Complaint:    metastatic cancer      Assessment/Plan     Acute DVT - has IVC filter placed-no AC due to hemorrhagic pericardial effusion  Large pericaridal effusion- s/p pericardiocentesis of large volume of bloody fluid-catheter has been removed, cyto neg  Metastatic cancer, liver, retroperitoneum, bone, adrenals, and possible uterine mass-no tissue dx yet  Hypothyroidism-now on synthroid  Anemia-appears to be combination of acute blood loss and iron deficiency  Tachycardia-she seems asymptomatic-it makes me wonder if she developed PE from her extensive DVT-she has a filter in place-unfortunaltely we cannot anticoagulate her due to bloody effusion she had around heart so needs close monitoring-cards has been following her    Add low dose beta blocker for tachycardia, HTN  Watch on monitor  PT as tolerated  Unwrapped leg and keep elevated , can wrap again this afternoon    Bone biopsy of clavicle completed    Early infiltrate noted on repeat CXR so will start levaquin for possible early opneumonia    D/c to home in couple days likely when HR stable and hemodynamics acceptable               Disposition :  Patient Active Problem List   Diagnosis Code    Dehydration E86.0    Hyponatremia E87.1    Metastatic lung cancer (metastasis from lung to other site) [facility]) C34.90    Leg DVT (deep venous thromboembolism), acute, left ([facility]) I82.402    Acute renal injury due to hypovolemia ([address] N17.9, E86.1    PAD (peripheral artery disease) ([facility] I73.9    Pericardial effusion I31.3       Subjective:    Wants her leg unwrapped, feels better after wraps off  Denies CP or SOB  Has pain in left shoulder where mass is and biopsy done    Denies fevers/chills    Review of systems:    Constitutional: denies myalgias  Respiratory: denies SOB, cough  Cardiovascular: denies chest pain, palpitations  Gastrointestinal: denies nausea, vomiting, diarrhea      Vital signs/Intake and Output:  Visit Vitals    /70 (BP 1 Location: Right arm)    Pulse (!) 113    Temp 98 °F (36.7 °C)    Resp 21    Ht 5' 7\" (1.702 m)    Wt 87.1 kg (192 lb)    SpO2 99%    Breastfeeding No    BMI 30.07 kg/m2     Current Shift:     Last three shifts:  08/22 1901 - 08/24 0700  In: 480 [P.O.:480]  Out: 3050 [Urine:3050]    Exam:    General: chronically ill BF alert, NAD, OX3  Head/Neck: NCAT, supple, No masses, No lymphadenopathy  CVS:Regular rate and rhythm, no M/R/G, S1/S2 heard, no thrill  Lungs:Clear to auscultation bilaterally, no wheezes, rhonchi, or rales  Abdomen: Soft, Nontender, No distention, Normal Bowel sounds, No hepatomegaly  Extremities: swelling 2 plus LLE, lessened overal, DP palp  Neuro:grossly normal , follows commands  Psych:appropriate                Labs: Results:       Chemistry Recent Labs      08/24/18 0442  08/23/18   0515  08/22/18   0416   GLU  91  107*  88   NA  136  136  137   K  4.7  4.4  4.3   CL  101  100  103   CO2  29  26  27   BUN  12  14  13   CREA  0.83  0.83  0.84   CA  9.2  8.9  9.3   AGAP  6  10  7   BUCR  14  17  15      CBC w/Diff Recent Labs      08/24/18 0442 08/23/18   0515  08/22/18   0416   WBC  11.0  11.9  11.1   RBC  4.27  4.12*  4.22   HGB  10.8*  10.3*  10.8*   HCT  34.2*  32.9*  34.0*   PLT  201  211  234   GRANS  78*  79*  77*   LYMPH  12*  11*  12*   EOS  3  2  2      Cardiac Enzymes Recent Labs      08/24/18 0442  08/23/18   0515   CPK  45  60      Coagulation No results for input(s): PTP, INR, APTT in the last 72 hours. No lab exists for component: INREXT    Lipid Panel No results found for: CHOL, CHOLPOCT, CHOLX, CHLST, CHOLV, 164330, HDL, LDL, LDLC, DLDLP, 762108, VLDLC, VLDL, TGLX, TRIGL, TRIGP, TGLPOCT, CHHD, CHHDX   BNP No results for input(s): BNPP in the last 72 hours.    Liver Enzymes No results for input(s): TP, ALB, TBIL, AP, SGOT, GPT in the last 72 hours.     No lab exists for component: DBIL   Thyroid Studies Lab Results   Component Value Date/Time    TSH 60.00 (H) 08/17/2018 08:29 AM        Procedures/imaging: see electronic medical records for all procedures/Xrays and details which were not copied into this note but were reviewed prior to creation of Kathi Asher MD

## 2018-08-24 NOTE — ROUTINE PROCESS
Bedside and Verbal shift change report given to SANDY Barrera (oncoming nurse) by Concha Castro RN   (offgoing nurse). Report included the following information SBAR, Kardex, Intake/Output, MAR, Recent Results and Med Rec Status.

## 2018-08-24 NOTE — PROGRESS NOTES
Pt arrived on unit; Pt Alert and Oriented; Consent signed; See sedation flowsheet  and/or vital signs. Pt transferred to treatment table for procedure.

## 2018-08-25 NOTE — ROUTINE PROCESS
Bedside and Verbal shift change report given to ESTELLA Zaldivar (oncoming nurse) by MARC Tian (offgoing nurse). Report included the following information SBAR, Kardex, Intake/Output and MAR.

## 2018-08-25 NOTE — PROGRESS NOTES
1935: Assumed patient care from [address] [place] [address]. Patient is alert and oriented to person, place, time and situation. Respiratory status is stable on room air. Vital signs are stable. MEWS score is a one. Patient deepa any pain, discomfort, nausea vomiting dizziness or anxiety. White board and fall card is updated. Bed is locked and in lowest position. Call bell, water and personal belongings are within reach. Patient has no questions, comments or concerns after bedside shift report. 2035: Rounded on the patient. The five \"P's\" were addressed. The patient is watching television in bed with no signs of distress noted. 2135: Rounded on the patient. The five \"P's\" were addressed. The patient is watching television in bed with no signs of distress noted. 2235: Rounded on the patient. The five \"P's\" were addressed. The patient is watching television in bed with no signs of distress noted. 2335: Rounded on the patient. The five \"P's\" were addressed. The patient is watching television in bed with no signs of distress noted. Patient asked for and received 2 mg of IV Morphine for generalized pain which she rated at a 7/10, with five being an acceptable level. 0135: Rounded on the patient. The five \"P's\" were addressed. The patient appears to be sleeping in bed with no signs of distress noted. 0335: Rounded on the patient. The five \"P's\" were addressed. The patient appears to be sleeping in bed with no signs of distress noted. 3576: Patient asked for and received 2 mg of IV Morphine for generalized pain which she rated at a 7/10, with five being an acceptable level. 0535: Rounded on the patient. The five \"P's\" were addressed. The patient appears to be sleeping in bed with no signs of distress noted. 0700: Patient had an uneventful shift. Respiratory status, vital signs and MEWS score remained stable. Patient was resting quietly with no signs of distress noted. Bed locked and in lowest position. Call bell water and personal belongings were within reach. Patient had no questions, comments or concerns after bedside shift report. Bedside report given to [employer].

## 2018-08-25 NOTE — PROGRESS NOTES
Problem: Mobility Impaired (Adult and Pediatric)  Goal: *Acute Goals and Plan of Care (Insert Text)  Physical Therapy Goals  Initiated 8/19/2018 and to be accomplished within 7 day(s)  1. Patient will move from supine to sit and sit to supine  in bed with modified independence. 2.  Patient will transfer from bed to chair and chair to bed with modified independence using the least restrictive device. 3.  Patient will perform sit to stand with modified independence. 4.  Patient will ambulate with modified independence for 150 feet with the least restrictive device. Outcome: Progressing Towards Goal  Pt refused PT due to:  []  Nausea/vomiting  []  Eating  [x]  Pain, pt reported 10/10 pain at this time, does not want to participate in PT treatment today, requested to rest today as she has not had any sleep secondary to pain. Pt educated on the importance of participating however still refused. []  Pt lethargic  []  Off Unit  Will f/u later as schedule allows. Thank you.   Joey Ibrahim, PT

## 2018-08-25 NOTE — PROGRESS NOTES
Hospitalist Progress Note    Patient: Reji Alexander MRN: [id number]  CSN: [id number]    YOB: 1955  Age: 66 y.o. Sex: female    DOA: 8/16/2018 LOS:  LOS: 9 days                Assessment/Plan     Patient Active Problem List   Diagnosis Code    Dehydration E86.0    Hyponatremia E87.1    Metastatic lung cancer (metastasis from lung to other site) [facility]) C34.90    Leg DVT (deep venous thromboembolism), acute, left (HCC) I82.402    Acute renal injury due to hypovolemia (HCC) N17.9, E86.1    PAD (peripheral artery disease) (HCC) I73.9    Pericardial effusion I31.3            67 yo female with metastatic ca, admitted for DVT    Acute DVT - heparin stopped due to hemorrhagic pericardial effusion. S/p IVC filter. Pericardial effusion - s/p pericardiocentesis, cytology neg. Metastatic ca - with mets to liver, bone, retroperitoneum. No tissue diagnosis yet. S/p bone biopsy 08/24/2018, follow pathology  Heme/onc following. Hypothyroidism - on synthroid    Hyponatremia - resolved    Disposition : 2-3 days    Review of systems  General: No fevers or chills. Cardiovascular: No chest pain or pressure. No palpitations. Pulmonary: No shortness of breath. Gastrointestinal: No nausea, vomiting. Physical Exam:  General: Awake, cooperative, no acute distress    HEENT: NC, Atraumatic. PERRLA, anicteric sclerae. Lungs: CTA Bilaterally. No Wheezing/Rhonchi/Rales. Heart:  Regular  rhythm,  No murmur, No Rubs, No Gallops  Abdomen: Soft, Non distended, Non tender.  +Bowel sounds,   Extremities: No c/c/e  Psych:   Not anxious or agitated. Neurologic:  No acute neurological deficit.            Vital signs/Intake and Output:  Visit Vitals    /64 (BP 1 Location: Right arm, BP Patient Position: At rest)    Pulse (!) 101    Temp 98.6 °F (37 °C)    Resp 20    Ht 5' 7\" (1.702 m)    Wt 87 kg (191 lb 12.8 oz)    SpO2 100%    Breastfeeding No    BMI 30.04 kg/m2     Current Shift:  08/25 0701 - 08/25 1900  In: 480 [P.O.:480]  Out: 250 [Urine:250]  Last three shifts:  08/23 1901 - 08/25 0700  In: 480 [P.O.:480]  Out: 3150 [Urine:3150]            Labs: Results:       Chemistry Recent Labs      08/24/18   0442  08/23/18   0515   GLU  91  107*   NA  136  136   K  4.7  4.4   CL  101  100   CO2  29  26   BUN  12  14   CREA  0.83  0.83   CA  9.2  8.9   AGAP  6  10   BUCR  14  17      CBC w/Diff Recent Labs      08/24/18   0442  08/23/18   0515   WBC  11.0  11.9   RBC  4.27  4.12*   HGB  10.8*  10.3*   HCT  34.2*  32.9*   PLT  201  211   GRANS  78*  79*   LYMPH  12*  11*   EOS  3  2      Cardiac Enzymes Recent Labs      08/25/18   0505  08/24/18   0442   CPK  44  45      Coagulation No results for input(s): PTP, INR, APTT in the last 72 hours. No lab exists for component: INREXT    Lipid Panel No results found for: CHOL, CHOLPOCT, CHOLX, CHLST, CHOLV, 541901, HDL, LDL, LDLC, DLDLP, 393794, VLDLC, VLDL, TGLX, TRIGL, TRIGP, TGLPOCT, CHHD, CHHDX   BNP No results for input(s): BNPP in the last 72 hours. Liver Enzymes No results for input(s): TP, ALB, TBIL, AP, SGOT, GPT in the last 72 hours.     No lab exists for component: DBIL   Thyroid Studies Lab Results   Component Value Date/Time    TSH 60.00 (H) 08/17/2018 08:29 AM        Procedures/imaging: see electronic medical records for all procedures/Xrays and details which were not copied into this note but were reviewed prior to creation of Plan

## 2018-08-25 NOTE — PROGRESS NOTES
9808 Assumed patient care from off going nurse AMERICO Mckinley RN. Patient is resting quietly in bed and appears to be in no sign of distress. Whiteboard updated, bed left in lowest position, with call bell left within reach.

## 2018-08-26 NOTE — PROGRESS NOTES
1910 Verbal bedside received from [address] off going nurse. Assumed patient care, bed in low position, call light within reach, white board updated. 2000 Patient complained of SOB with exertion, needed to catch her breath. checked O2 sat, pt. O2 at 98%. No other complain. Meds administered. 0600 Patient had uneventful night. Complains of pain addressed. NAD. Bedside and Verbal shift change report given to Mac Seo RN (oncoming nurse) by Herson Nails (offgoing nurse).  Report included the following information SBAR, Kardex, MAR and Cardiac Rhythm ST.

## 2018-08-26 NOTE — PROGRESS NOTES
Problem: Mobility Impaired (Adult and Pediatric)  Goal: *Acute Goals and Plan of Care (Insert Text)  Physical Therapy Goals  Initiated 8/19/2018 and to be accomplished within 7 day(s)  1. Patient will move from supine to sit and sit to supine  in bed with modified independence. 2.  Patient will transfer from bed to chair and chair to bed with modified independence using the least restrictive device. 3.  Patient will perform sit to stand with modified independence. 4.  Patient will ambulate with modified independence for 150 feet with the least restrictive device. Outcome: Progressing Towards Goal  physical Therapy TREATMENT    Patient: Randolph Laboy (2 y.o. female)  Date: 8/26/2018  Diagnosis: Pericardial effusion [I31.3] Pericardial effusion  Procedure(s) (LRB):  PERICARDIOCENTESIS (N/A) 6 Days Post-Op  Precautions: Fall, WBAT   Chart, physical therapy assessment, plan of care and goals were reviewed. ASSESSMENT:  Encouragement needed to participate. Pt mentions she gets up often and now lethargic. However c/o feeling constipated. edu pt mobility will assist. Pt amb 25' with RW CGA. Slow step to gait noted. VCs for correct RW management and safety. Pt able to void urine and stool. CGA for barry care. edu pt to remain sitting EOB, however pt declined. Nursing aware of pain 8/10 to LLE. Cont POC. Progression toward goals:  []      Improving appropriately and progressing toward goals  [x]      Improving slowly and progressing toward goals  []      Not making progress toward goals and plan of care will be adjusted     PLAN:  Patient continues to benefit from skilled intervention to address the above impairments. Continue treatment per established plan of care.   Discharge Recommendations:  Home Health  Further Equipment Recommendations for Discharge:  rolling walker     SUBJECTIVE:   Patient stated  I have help at home     OBJECTIVE DATA SUMMARY:   Critical Behavior:  Neurologic State: Alert  Orientation Level: Oriented X4  Cognition: Appropriate decision making, Appropriate for age attention/concentration, Appropriate safety awareness, Follows commands, Recognition of people/places  Safety/Judgement: Fall prevention  Functional Mobility Training:  Bed Mobility:  Supine to Sit: Supervision; Additional time  Sit to Supine: Supervision; Additional time  Transfers:  Sit to Stand: Stand-by assistance  Stand to Sit: Stand-by assistance  Balance:  Sitting: Intact  Standing: Intact; With support  Standing - Static: Fair  Standing - Dynamic : Fair  Ambulation/Gait Training:  Distance (ft): 25 Feet (ft)  Assistive Device: Walker, rolling;Gait belt  Ambulation - Level of Assistance: Contact guard assistance  Gait Abnormalities: Antalgic;Decreased step clearance; Step to gait  Base of Support: Shift to right  Stance: Left decreased  Speed/Irma: Slow  Step Length: Right shortened;Left shortened  Swing Pattern: Left asymmetrical;Right asymmetrical    Pain:  Pain Scale 1: Numeric (0 - 10)  Pain Intensity 1: 0  Pain Location 1: Shoulder  Pain Orientation 1: Left  Pain Description 1: Aching  Pain Intervention(s) 1: Medication (see MAR)  Activity Tolerance:   Fair     After treatment:   [] Patient left in no apparent distress sitting up in chair  [x] Patient left in no apparent distress in bed  [x] Call bell left within reach  [x] Nursing notified  [] Caregiver present  [] Bed alarm activated      Opal Lombardo PTA   Time Calculation: 34 mins

## 2018-08-26 NOTE — PROGRESS NOTES
Hospitalist Progress Note    Patient: Tammy Turk MRN: [id number]  CSN: [id number]    YOB: 1955  Age: 66 y.o. Sex: female    DOA: 8/16/2018 LOS:  LOS: 10 days                Assessment/Plan     Patient Active Problem List   Diagnosis Code    Dehydration E86.0    Hyponatremia E87.1    Metastatic lung cancer (metastasis from lung to other site) [facility]) C34.90    Leg DVT (deep venous thromboembolism), acute, left (HCC) I82.402    Acute renal injury due to hypovolemia (HCC) N17.9, E86.1    PAD (peripheral artery disease) (HCC) I73.9    Pericardial effusion I31.3            65 yo female with metastatic ca, admitted for DVT    Acute DVT - heparin stopped due to hemorrhagic pericardial effusion. S/p IVC filter. Pericardial effusion - s/p pericardiocentesis, cytology neg. Metastatic ca - with mets to liver, bone, retroperitoneum. No tissue diagnosis yet. S/p bone biopsy 08/24/2018, follow pathology  Heme/onc following. Hypothyroidism - on synthroid    Hyponatremia - resolved    Disposition : 2-3 days    Review of systems  General: No fevers or chills. Cardiovascular: No chest pain or pressure. No palpitations. Pulmonary: No shortness of breath. Gastrointestinal: No nausea, vomiting. Physical Exam:  General: Awake, cooperative, no acute distress    HEENT: NC, Atraumatic. PERRLA, anicteric sclerae. Lungs: CTA Bilaterally. No Wheezing/Rhonchi/Rales. Heart:  Regular  rhythm,  No murmur, No Rubs, No Gallops  Abdomen: Soft, Non distended, Non tender.  +Bowel sounds,   Extremities: No c/c/e  Psych:   Not anxious or agitated. Neurologic:  No acute neurological deficit.            Vital signs/Intake and Output:  Visit Vitals    BP 94/55 (BP 1 Location: Right arm, BP Patient Position: At rest;Supine)    Pulse (!) 104    Temp 99 °F (37.2 °C)    Resp 18    Ht 5' 7\" (1.702 m)    Wt 87.1 kg (192 lb 0.3 oz)    SpO2 100%    Breastfeeding No    BMI 30.07 kg/m2     Current Shift: 08/26 0701 - 08/26 1900  In: 420 [P.O.:420]  Out: 400 [Urine:400]  Last three shifts:  08/24 1901 - 08/26 0700  In: 960 [P.O.:960]  Out: 2450 [Urine:2450]            Labs: Results:       Chemistry Recent Labs      08/24/18   0442   GLU  91   NA  136   K  4.7   CL  101   CO2  29   BUN  12   CREA  0.83   CA  9.2   AGAP  6   BUCR  14      CBC w/Diff Recent Labs      08/24/18   0442   WBC  11.0   RBC  4.27   HGB  10.8*   HCT  34.2*   PLT  201   GRANS  78*   LYMPH  12*   EOS  3      Cardiac Enzymes Recent Labs      08/26/18   0455  08/25/18   0505   CPK  49  44      Coagulation No results for input(s): PTP, INR, APTT in the last 72 hours. No lab exists for component: INREXT, INREXT    Lipid Panel No results found for: CHOL, CHOLPOCT, CHOLX, CHLST, CHOLV, 694390, HDL, LDL, LDLC, DLDLP, 298203, VLDLC, VLDL, TGLX, TRIGL, TRIGP, TGLPOCT, CHHD, CHHDX   BNP No results for input(s): BNPP in the last 72 hours. Liver Enzymes No results for input(s): TP, ALB, TBIL, AP, SGOT, GPT in the last 72 hours.     No lab exists for component: DBIL   Thyroid Studies Lab Results   Component Value Date/Time    TSH 60.00 (H) 08/17/2018 08:29 AM        Procedures/imaging: see electronic medical records for all procedures/Xrays and details which were not copied into this note but were reviewed prior to creation of Plan

## 2018-08-26 NOTE — PROGRESS NOTES
1930 Verbal bedside received from Ziggy Valerio off going nurse. Assumed patient care, bed in low position, call light within reach, white board updated. 2000 Meds administered, assessment completed. 0400 Complains of pain, med administered. 0600 Patient had uneventful night. NAD    0700 Bedside and Verbal shift change report given to Lin Dumont RN (oncoming nurse) by Arabella Robert (offgoing nurse). Report included the following information SBAR, Kardex and MAR.

## 2018-08-26 NOTE — PROGRESS NOTES
Problem: Falls - Risk of  Goal: *Absence of Falls  Document Yobany Fall Risk and appropriate interventions in the flowsheet. Outcome: Progressing Towards Goal  Fall Risk Interventions:  Mobility Interventions: Patient to call before getting OOB, Assess mobility with egress test         Medication Interventions: Patient to call before getting OOB, Teach patient to arise slowly    Elimination Interventions: Call light in reach    History of Falls Interventions: Utilize gait belt for transfer/ambulation, Door open when patient unattended        Problem: Pressure Injury - Risk of  Goal: *Prevention of pressure injury  Document Talib Scale and appropriate interventions in the flowsheet.    Outcome: Progressing Towards Goal  Pressure Injury Interventions:       Moisture Interventions: Apply protective barrier, creams and emollients, Absorbent underpads    Activity Interventions: Pressure redistribution bed/mattress(bed type)    Mobility Interventions: Pressure redistribution bed/mattress (bed type)    Nutrition Interventions: Document food/fluid/supplement intake    Friction and Shear Interventions: Apply protective barrier, creams and emollients

## 2018-08-26 NOTE — PROGRESS NOTES
1745: Entered patient room to  dinner tray, and noticed that she had taken  ace bandage off lower left leg. Asked patient why she removed the bandage, patient stated that \"it was itching her\". Notified An Rea RN.

## 2018-08-27 NOTE — PROGRESS NOTES
Problem: Falls - Risk of  Goal: *Absence of Falls  Document Yobany Fall Risk and appropriate interventions in the flowsheet.    Outcome: Progressing Towards Goal  Fall Risk Interventions:  Mobility Interventions: Communicate number of staff needed for ambulation/transfer, Patient to call before getting OOB         Medication Interventions: Patient to call before getting OOB, Teach patient to arise slowly    Elimination Interventions: Call light in reach, Patient to call for help with toileting needs    History of Falls Interventions: Bed/chair exit alarm

## 2018-08-27 NOTE — PROGRESS NOTES
Phone: [phone]  Paging : [phone]      Hematology / Oncology Progress Note    Admit Date: 2018    Assessment:     62yo w/ hx of DVT admitted w/ SOB w/ lung/bone liver lesions awaiting biopsy results with hyponatremia(resolved)    Plan:     Await tissue diagnosis  Will need mediport - noted in initial consult  - please facilitate  Follow up with Dr. Tank Fine [phone] upon discharge for treatment  PT/OT  Discharge planning    Subjective:     +PAin at biopsy site      Objective:     Patient Vitals for the past 24 hrs:   BP Temp Pulse Resp SpO2   18 0414 117/56 98 °F (36.7 °C) (!) 106 22 97 %   18 2247 133/66 97.9 °F (36.6 °C) (!) 110 22 100 %   18 2144 126/63 - (!) 118 - -   18 1851 118/62 98.2 °F (36.8 °C) (!) 112 20 100 %   18 1514 115/68 98.4 °F (36.9 °C) 99 18 100 %   18 1044 94/55 99 °F (37.2 °C) (!) 104 18 100 %         Intake/Output Summary (Last 24 hours) at 18 0746  Last data filed at 18 0414   Gross per 24 hour   Intake              720 ml   Output             1500 ml   Net             -780 ml       Temp (24hrs), Av.3 °F (36.8 °C), Min:97.9 °F (36.6 °C), Max:99 °F (37.2 °C)           Exam:  General: Cooperative  HEENT: No thrush, MMM  Lungs: Decreased breath sounds  Cardiovascular: Tachy, regular  Abdomen: SNTND  Neurologic: A+ Ox 3      Recent Results (from the past 24 hour(s))   CK    Collection Time: 18  2:25 AM   Result Value Ref Range    CK 39 26 - 192 U/L       Demar Maurice MD   [facility]  [phone]

## 2018-08-27 NOTE — PROGRESS NOTES
Problem: Mobility Impaired (Adult and Pediatric)  Goal: *Acute Goals and Plan of Care (Insert Text)  Physical Therapy Goals  Initiated 8/19/2018 and to be accomplished within 7 day(s)  1. Patient will move from supine to sit and sit to supine  in bed with modified independence. 2.  Patient will transfer from bed to chair and chair to bed with modified independence using the least restrictive device. 3.  Patient will perform sit to stand with modified independence. 4.  Patient will ambulate with modified independence for 150 feet with the least restrictive device. Outcome: Progressing Towards Goal  physical Therapy TREATMENT    Patient: Marjorie Grayson (76 y.o. female)  Date: 8/27/2018  Diagnosis: Pericardial effusion [I31.3] Pericardial effusion  Procedure(s) (LRB):  PERICARDIOCENTESIS (N/A) 7 Days Post-Op  Precautions: Fall, WBAT   Chart, physical therapy assessment, plan of care and goals were reviewed. ASSESSMENT:  Assisted pt to restroom. Pt unable willing to increase ambulation distance with RW. Mild fatigue post amb. Cont POC. Progression toward goals:  []      Improving appropriately and progressing toward goals  [x]      Improving slowly and progressing toward goals  []      Not making progress toward goals and plan of care will be adjusted     PLAN:  Patient continues to benefit from skilled intervention to address the above impairments. Continue treatment per established plan of care.   Discharge Recommendations:  Home Health with assistance   Further Equipment Recommendations for Discharge:  rolling walker     SUBJECTIVE:   Patient stated  I have gas\"    OBJECTIVE DATA SUMMARY:   Critical Behavior:  Neurologic State: Alert  Orientation Level: Oriented X4  Cognition: Appropriate decision making, Appropriate for age attention/concentration, Appropriate safety awareness, Follows commands  Safety/Judgement: Fall prevention  Functional Mobility Training:  Bed Mobility:  Supine to Sit: Supervision  Sit to Supine: Supervision  Transfers:  Sit to Stand: Stand-by assistance  Stand to Sit: Stand-by assistance  Balance:  Sitting: Intact  Standing: Intact; With support  Standing - Static: Good  Standing - Dynamic : Fair  Ambulation/Gait Training:  Distance (ft): 25 Feet (ft)  Assistive Device: Walker, rolling;Gait belt  Ambulation - Level of Assistance: Contact guard assistance  Gait Abnormalities: Antalgic;Decreased step clearance; Step to gait  Base of Support: Shift to right  Stance: Left decreased  Speed/Irma: Slow  Step Length: Right shortened;Left shortened  Swing Pattern: Left asymmetrical;Right asymmetrical    Pain:  Pain Scale 1: Numeric (0 - 10)  Pain Intensity 1: 9  Pain Location 1: Shoulder  Pain Orientation 1: Left  Pain Description 1: Throbbing;Aching  Pain Intervention(s) 1: Medication (see MAR)  Activity Tolerance:   Fair+    After treatment:   [] Patient left in no apparent distress sitting up in chair  [x] Patient left in no apparent distress in bed  [x] Call bell left within reach  [] Nursing notified  [] Caregiver present  [] Bed alarm activated      Eda Henry PTA   Time Calculation: 19 mins

## 2018-08-27 NOTE — PROGRESS NOTES
Patient with left iliac vein DVT s/p IVC filter placement. Denies pain and shortness of breath. Respirations unlabored, RRR, left leg warm and well perfused, 1+ edema from toes to thigh. Keep left leg in compression and elevated to help with edema. Will follow up as outpatient in two months. Prescription for compression stockings given to family. Tubigrip sleeve applied to left leg for compression. Plan discussed with patient and family at bedside. Will sign off but remain available if needed.     Visit Vitals    /71 (BP 1 Location: Right arm, BP Patient Position: At rest)    Pulse 97    Temp 98.7 °F (37.1 °C)    Resp 22    Ht 5' 7\" (1.702 m)    Wt 192 lb 0.3 oz (87.1 kg)    SpO2 98%    Breastfeeding No    BMI 30.07 kg/m2

## 2018-08-27 NOTE — PROGRESS NOTES
Problem: Falls - Risk of  Goal: *Absence of Falls  Document Yobany Fall Risk and appropriate interventions in the flowsheet. Outcome: Progressing Towards Goal  Fall Risk Interventions:  Mobility Interventions: Bed/chair exit alarm, Patient to call before getting OOB         Medication Interventions: Patient to call before getting OOB    Elimination Interventions: Call light in reach, Patient to call for help with toileting needs    History of Falls Interventions: Bed/chair exit alarm        Problem: Pressure Injury - Risk of  Goal: *Prevention of pressure injury  Document Talib Scale and appropriate interventions in the flowsheet.    Outcome: Progressing Towards Goal  Pressure Injury Interventions:       Moisture Interventions: Maintain skin hydration (lotion/cream)    Activity Interventions: Pressure redistribution bed/mattress(bed type)    Mobility Interventions: Pressure redistribution bed/mattress (bed type), PT/OT evaluation    Nutrition Interventions: Offer support with meals,snacks and hydration, Document food/fluid/supplement intake    Friction and Shear Interventions: Transferring/repositioning devices

## 2018-08-27 NOTE — PROGRESS NOTES
Transition of care: home when medically cleared  Per Dr. Elsie Rojas note patient will need med port. patient will also need follow up appointment wit Dr. Paula Rodriguez upon D/c. Patient states she lives alone and use to go to [facility] for everything however khloe she changed to [facility] she lost her pcp. States he son jarocho [phone] has all of this information. Patient reports the doctor told he she might not be able to o back to work. Patient reports her brother will assist her with transportation to and from appointments. Her mother is coming for a visit from [place] today. Cm has addressed [facility] with patient however, she is not sure at this time. Cm will continue to follow. Care Management Interventions  Transition of Care Consult (CM Consult): Discharge Planning  Physical Therapy Consult: Yes  Current Support Network: Family Lives [place], Lives Alone  Confirm Follow Up Transport: Family  Plan discussed with Pt/Family/Caregiver:  Yes

## 2018-08-27 NOTE — PROGRESS NOTES
Hospitalist Progress Note    Patient: Esteban Can MRN: [id number]  CSN: [id number]    YOB: 1955  Age: 66 y.o. Sex: female    DOA: 8/16/2018 LOS:  LOS: 11 days                Assessment/Plan     Patient Active Problem List   Diagnosis Code    Dehydration E86.0    Hyponatremia E87.1    Metastatic lung cancer (metastasis from lung to other site) [facility]) C34.90    Leg DVT (deep venous thromboembolism), acute, left (HCC) I82.402    Acute renal injury due to hypovolemia (HCC) N17.9, E86.1    PAD (peripheral artery disease) (HCC) I73.9    Pericardial effusion I31.3            67 yo female with metastatic ca, admitted for DVT    Acute DVT - heparin stopped due to hemorrhagic pericardial effusion. S/p IVC filter. Pericardial effusion - s/p pericardiocentesis, cytology neg. Metastatic ca - with mets to liver, bone, retroperitoneum. No tissue diagnosis yet. S/p bone biopsy 08/24/2018, follow pathology  Heme/onc following. Hypothyroidism - on synthroid    Hyponatremia - resolved    Disposition : 1-2 days    Review of systems  General: No fevers or chills. Cardiovascular: No chest pain or pressure. No palpitations. Pulmonary: No shortness of breath. Gastrointestinal: No nausea, vomiting. Physical Exam:  General: Awake, cooperative, no acute distress    HEENT: NC, Atraumatic. PERRLA, anicteric sclerae. Lungs: CTA Bilaterally. No Wheezing/Rhonchi/Rales. Heart:  Regular  rhythm,  No murmur, No Rubs, No Gallops  Abdomen: Soft, Non distended, Non tender.  +Bowel sounds,   Extremities: No c/c/e  Psych:   Not anxious or agitated. Neurologic:  No acute neurological deficit.            Vital signs/Intake and Output:  Visit Vitals    /71 (BP 1 Location: Right arm, BP Patient Position: At rest)    Pulse 97    Temp 98.7 °F (37.1 °C)    Resp 22    Ht 5' 7\" (1.702 m)    Wt 87.1 kg (192 lb 0.3 oz)    SpO2 98%    Breastfeeding No    BMI 30.07 kg/m2     Current Shift:  08/27 0701 - 08/27 1900  In: -   Out: 300 [Urine:300]  Last three shifts:  08/25 1901 - 08/27 0700  In: 720 [P.O.:720]  Out: 3100 [Urine:3100]            Labs: Results:       Chemistry Recent Labs      08/27/18 0225   GLU  100*   NA  137   K  4.6   CL  99*   CO2  30   BUN  15   CREA  0.92   CA  8.9   AGAP  8   BUCR  16      CBC w/Diff Recent Labs      08/27/18 0225   WBC  12.8   RBC  3.89*   HGB  9.8*   HCT  31.9*   PLT  195      Cardiac Enzymes Recent Labs      08/27/18 0225 08/26/18   0455   CPK  39  49      Coagulation No results for input(s): PTP, INR, APTT in the last 72 hours. No lab exists for component: INREXT, INREXT    Lipid Panel No results found for: CHOL, CHOLPOCT, CHOLX, CHLST, CHOLV, 677837, HDL, LDL, LDLC, DLDLP, 809468, VLDLC, VLDL, TGLX, TRIGL, TRIGP, TGLPOCT, CHHD, CHHDX   BNP No results for input(s): BNPP in the last 72 hours. Liver Enzymes No results for input(s): TP, ALB, TBIL, AP, SGOT, GPT in the last 72 hours.     No lab exists for component: DBIL   Thyroid Studies Lab Results   Component Value Date/Time    TSH 60.00 (H) 08/17/2018 08:29 AM        Procedures/imaging: see electronic medical records for all procedures/Xrays and details which were not copied into this note but were reviewed prior to creation of Plan

## 2018-08-27 NOTE — PROGRESS NOTES
0730: Received report from NASREEN Lockwood    1630:pt complaining of difficulty having a bowel movement. 1640:Expressed patients difficulty having bowel movement to Dr. Main Aj.  gave telephone order for soap suds enema. 1720:Administered soap and suds enema to pt. She was only able to hold in solution for 5 minutes before being transferred to the bed pan. Patient only released small amount of formed stool with solution that washed back out. 1735: Transferred patient to bedside commode to utilize gravity to allow patient to move bowels. Patient passing flautus. Advised pt not to strain. Patient still expressed discomfort from not being able to have a bowel movement. 1840: Paged     1900: Talked to Liz Oates made him aware that patient is still complaining of being constipated and that the soap and suds enema brought no relief.  gave telephone order for a dulcolax suppository to be administered later that night and a repeat soap and suds enema the next morning.

## 2018-08-27 NOTE — PROGRESS NOTES
Patient has a MEWS score of 3. Patient MEWS score has been consist at this. MD aware. Will continue to monitor. Patient stable.

## 2018-08-27 NOTE — PROGRESS NOTES
NUTRITION FOLLOW-UP    RECOMMENDATIONS/PLAN:   - continue POC  - continue to encourage PO intakes >75% of most meals to meet estimated needs  - monitor labs/lytes, fluid status, bowel function, skin integrity, wt    NUTRITION ASSESSMENT:   Client Update: 66 yrs old Female with acute DVT s/p IVC filter placement on 8/20. Pt s/p pericardiocentesis of 800ml on 8/20. Pt with recent diagnosis of lung cancer. Oncology following, awaiting biopsy results per MD note    FOOD/NUTRITION INTAKE   Diet Order:  regular  Supplements: ensure enlive TID  Food Allergies: NKFA  Average PO Intake:      Patient Vitals for the past 100 hrs:   % Diet Eaten   08/26/18 1933 480 %   08/26/18 1753 85 %   08/26/18 1243 100 %   08/25/18 1600 100 %   08/25/18 1200 50 %   08/25/18 0800 75 %   08/24/18 1935 50 %      Pertinent Medications:  [x] Reviewed; pepcid, synthroid, pericolace  Electrolyte Replacement Protocol: []K []Mg []PO4  Insulin:  []SSI  []Pre-meal   []Basal    []Drip  [x]None  Cultural/Denominational Food Preferences: None Identified    BIOCHEMICAL DATA & MEDICAL TESTS  Pertinent Labs:  [x] Reviewed   ANTHROPOMETRICS  Height: 5' 7\" (170.2 cm)       Weight: 87.1 kg (192 lb 0.3 oz)         BMI: 30.1 kg/m^2 obese (30%-39.9% BMI)   Adm Weight: 192 lbs                Weight change: n/a  Adjusted Body Weight: 67.8kg    NUTRITION-FOCUSED PHYSICAL ASSESSMENT  Skin: no pressure area per documentation  GI: BM 8/26    NUTRITION PRESCRIPTION  Calories: 2126 kcal/day based on [place] x 1.2 x 1.3  Protein: 70-87 g/day based on 0.8-1.0 g/kg  CHO: 267g/day based on 50% of total energy  Fluid: 2126 ml/day based on 1 kcal/ml       NUTRITION DIAGNOSES:   1. Prior inadequate oral intake related to decreased appetite as evidenced by report    NUTRITION INTERVENTIONS:   INTERVENTIONS:        GOALS:  1. Other: continue POC 1. Continue to meet estimated needs throughout the next 7 days.      LEARNING NEEDS (Diet, Supplementation, Food/Nutrient-Drug Interaction):   [x] None Identified   [] Education provided/documented      Identified and patient: [] Declined   [] Was not appropriate/indicated        NUTRITION MONITORING /EVALUATION:   Follow PO intake  Monitor wt  Monitor renal labs, electrolytes, fluid status  Monitor for additional supplement needs     Previous Recommendations Implemented: yes        Previous Goals Met:  yes       [] Participated in Interdisciplinary Rounds    [x] Interdisciplinary Care Plan Reviewed  DISCHARGE NUTRITION RECOMMENDATIONS ADDRESSED:       [x] To be determined closer to discharge    NUTRITION RISK:           [x] At risk                        [] Not currently at risk        Will follow-up per policy.   Tamika Garcia 1

## 2018-08-28 PROBLEM — E86.1 ACUTE RENAL INJURY DUE TO HYPOVOLEMIA (HCC): Status: RESOLVED | Noted: 2018-01-01 | Resolved: 2018-01-01

## 2018-08-28 PROBLEM — E86.0 DEHYDRATION: Status: RESOLVED | Noted: 2018-01-01 | Resolved: 2018-01-01

## 2018-08-28 PROBLEM — N17.9 ACUTE RENAL INJURY DUE TO HYPOVOLEMIA (HCC): Status: RESOLVED | Noted: 2018-01-01 | Resolved: 2018-01-01

## 2018-08-28 PROBLEM — E87.1 HYPONATREMIA: Status: RESOLVED | Noted: 2018-01-01 | Resolved: 2018-01-01

## 2018-08-28 NOTE — PROGRESS NOTES
Cardiology Progress Note        Patient: Stella Mahoney        Sex: female          DOA: 8/16/2018  YOB: 1955      Age:  66 y.o.        LOS:  LOS: 11 days    Patient seen and examined, chart reviewed  Assessment/Plan     Patient Active Problem List   Diagnosis Code    Dehydration E86.0    Hyponatremia E87.1    Metastatic lung cancer (metastasis from lung to other site) [facility]) C34.90    Leg DVT (deep venous thromboembolism), acute, left (HCC) I82.402    Acute renal injury due to hypovolemia ([address] N17.9, E86.1    PAD (peripheral artery disease) ([facility]) I73.9    Pericardial effusion I31.3      Pericardial effusion with tamponade status post pericardiocentesis.  800 mL of hemorrhagic fluid drained.  Repeat echocardiogram done today revealed trivial pericardial effusion. Acute Left lower extremity DVT s/p IVC filter      Plan:          No antiplatelets or anticoagulation for now. Continue management as per hospital medicine  Cardiology signoff Will follow-up in cardiology clinic after discharge. Subjective:    cc:  Denies any chest pain or shortness of breath      REVIEW OF SYSTEMS:     General: No fevers or chills. Cardiovascular: No chest pain,No palpitations, No orthopnea, No PND, positive leg swelling, No claudication  Pulmonary: No dyspnea  Gastrointestinal: No nausea, vomiting, bleeding  Neurology: No Dizziness    Objective:      Visit Vitals    /75 (BP 1 Location: Right arm, BP Patient Position: At rest)    Pulse (!) 105    Temp 98.2 °F (36.8 °C)    Resp 20    Ht 5' 7\" (1.702 m)    Wt 87.1 kg (192 lb 0.3 oz)    SpO2 96%    Breastfeeding No    BMI 30.07 kg/m2     Body mass index is 30.07 kg/(m^2). Physical Exam:  General Appearance: Comfortable, not using accessory muscles of respiration. HEENT: SILVER. HEAD: Atraumatic  NECK: No JVD, no thyroidomeglay. CAROTIDS:no bruit  LUNGS: Clear bilaterally.    HEART: S1+S2 audible, no murmur, no pericardial rub.      ABD: Non-tender, BS Audible    NEUROLOGICAL: AAO times 3, No motor and sensory deficit  Medication:  Current Facility-Administered Medications   Medication Dose Route Frequency    oxyCODONE ER (OxyCONTIN) tablet 10 mg  10 mg Oral Q12H    oxyCODONE-acetaminophen (PERCOCET) 5-325 mg per tablet 1 Tab  1 Tab Oral Q6H PRN    senna-docusate (PERICOLACE) 8.6-50 mg per tablet 1 Tab  1 Tab Oral DAILY    metoprolol tartrate (LOPRESSOR) tablet 12.5 mg  12.5 mg Oral BID    levoFLOXacin (LEVAQUIN) 750 mg in D5W IVPB  750 mg IntraVENous Q24H    famotidine (PEPCID) tablet 20 mg  20 mg Oral BID    sodium chloride (NS) flush 5-10 mL  5-10 mL IntraVENous Q8H    sodium chloride (NS) flush 5-10 mL  5-10 mL IntraVENous PRN    0.9% sodium chloride infusion 250 mL  250 mL IntraVENous PRN    albuterol-ipratropium (DUO-NEB) 2.5 MG-0.5 MG/3 ML  3 mL Nebulization Q4H PRN    levothyroxine (SYNTHROID) tablet 75 mcg  75 mcg Oral ACB    0.9% sodium chloride infusion 250 mL  250 mL IntraVENous PRN    acetaminophen (TYLENOL) tablet 650 mg  650 mg Oral QID    sodium chloride tablet 1 g  1 g Oral BID WITH MEALS    traMADol (ULTRAM) tablet 50 mg  50 mg Oral Q6H PRN               Lab/Data Reviewed:       Recent Labs      08/27/18 0225   WBC  12.8   HGB  9.8*   HCT  31.9*   PLT  195     Recent Labs      08/27/18 0225   NA  137   K  4.6   CL  99*   CO2  30   GLU  100*   BUN  15   CREA  0.92   CA  8.9       Signed By: Eulis Curling, MD     August 27, 2018

## 2018-08-28 NOTE — PROGRESS NOTES
Chart reviewed. Pt admitted for pericardial effusion. Attended IDRs with MD Lori Verde. Pt will be able to dc today. Offered FOC for [place]. Per previous CM notes, pt has been arranged with [facility]. Pt uncertain of PCP. Son will be at hospital shortly. CM will cont to follow. 0 Spoke with pt and her family. Confirmed PCP is MD Mary Arana. Pt will follow up with MD [place]'s office. Pts family had questions about medicaid. This CM spoke with Amarilis Benson from [employer].  She stated pt has insurance coverage and pt would need to follow up with  for assistance. Pts son, Felicity Faoroq made aware. All questions answered. CM will cont to be available.

## 2018-08-28 NOTE — PROGRESS NOTES
Problem: Pressure Injury - Risk of  Goal: *Prevention of pressure injury  Document Talib Scale and appropriate interventions in the flowsheet.    Outcome: Progressing Towards Goal  Pressure Injury Interventions:       Moisture Interventions: Absorbent underpads, Check for incontinence Q2 hours and as needed, Offer toileting Q_hr    Activity Interventions: Increase time out of bed, Pressure redistribution bed/mattress(bed type)    Mobility Interventions: HOB 30 degrees or less, Pressure redistribution bed/mattress (bed type)    Nutrition Interventions: Document food/fluid/supplement intake, Offer support with meals,snacks and hydration    Friction and Shear Interventions: Apply protective barrier, creams and emollients, HOB 30 degrees or less

## 2018-08-28 NOTE — ROUTINE PROCESS
Dual AVS reviewed with Shi SMITH RN. All medications reviewed individually with patient. Opportunities for questions and concerns provided. Patient discharged via (mode of transport ie. Car, ambulance or air transport) wheelchair  Patient's arm band appropriately discarded.

## 2018-08-28 NOTE — ROUTINE PROCESS
Bedside and Verbal shift change report given to DARRELL Florence R.N. (oncoming nurse) by PRECIOUS Thomas R.N. (offgoing nurse). Report included the following information SBAR, Kardex, Intake/Output, MAR, Recent Results and Med Rec Status.

## 2018-08-28 NOTE — PROGRESS NOTES
Problem: Falls - Risk of  Goal: *Absence of Falls  Document Yobany Fall Risk and appropriate interventions in the flowsheet.    Outcome: Progressing Towards Goal  Fall Risk Interventions:  Mobility Interventions: Assess mobility with egress test, Bed/chair exit alarm, Patient to call before getting OOB, Utilize walker, cane, or other assistive device         Medication Interventions: Patient to call before getting OOB, Teach patient to arise slowly    Elimination Interventions: Bed/chair exit alarm, Call light in reach, Patient to call for help with toileting needs, Toileting schedule/hourly rounds    History of Falls Interventions: Bed/chair exit alarm, Investigate reason for fall, Room close to nurse's station

## 2018-08-28 NOTE — ROUTINE PROCESS
0700: Bedside and Verbal shift change report given to Hang Cardozo  (oncoming nurse) by Carli Roberts RN   (offgoing nurse).  Report included the following information SBAR, Kardex, Intake/Output, MAR, Recent Results and Cardiac Rhythm SR.

## 2018-08-28 NOTE — PROGRESS NOTES
1900: Received report from [facility]. White board updated. Pt is alert and oriented x4. Pt reports pain of her left shoulder area where biopsy took place,  the site is c/d/i, rest and repositioning done at this time. No respiratory distress is reported. Pt c/o feeling constipated and having small bowel movements, suppository scheduled. Pt's questions and concerns addressed at this time. Will continue to monitor. 2200: Pt had large bowel movement, pt states that she feels better, and less constipated. 0000: Reassessment completed, no changes compared to initial assessment, pt does not report any pain or discomfort at this time. Bed in lowest position, and call bell within reach. Will continue to monitor. 0400: Pt sleeping comfortably, will continue to round hourly on pt.

## 2018-08-28 NOTE — DISCHARGE INSTRUCTIONS
DISCHARGE SUMMARY from Nurse    PATIENT INSTRUCTIONS:    After general anesthesia or intravenous sedation, for 24 hours or while taking prescription Narcotics:  · Limit your activities  · Do not drive and operate hazardous machinery  · Do not make important personal or business decisions  · Do  not drink alcoholic beverages  · If you have not urinated within 8 hours after discharge, please contact your surgeon on call. Report the following to your surgeon:  · Excessive pain, swelling, redness or odor of or around the surgical area  · Temperature over 100.5  · Nausea and vomiting lasting longer than 4 hours or if unable to take medications  · Any signs of decreased circulation or nerve impairment to extremity: change in color, persistent  numbness, tingling, coldness or increase pain  · Any questions    What to do at Home:  Recommended activity: Activity as tolerated    If you experience any of the following symptoms , please follow up with . *  Please give a list of your current medications to your Primary Care Provider. *  Please update this list whenever your medications are discontinued, doses are      changed, or new medications (including over-the-counter products) are added. *  Please carry medication information at all times in case of emergency situations. These are general instructions for a healthy lifestyle:    No smoking/ No tobacco products/ Avoid exposure to second hand smoke  Surgeon General's Warning:  Quitting smoking now greatly reduces serious risk to your health.     Obesity, smoking, and sedentary lifestyle greatly increases your risk for illness    A healthy diet, regular physical exercise & weight monitoring are important for maintaining a healthy lifestyle    You may be retaining fluid if you have a history of heart failure or if you experience any of the following symptoms:  Weight gain of 3 pounds or more overnight or 5 pounds in a week, increased swelling in our hands or feet or shortness of breath while lying flat in bed. Please call your doctor as soon as you notice any of these symptoms; do not wait until your next office visit. Recognize signs and symptoms of STROKE:    F-face looks uneven    A-arms unable to move or move unevenly    S-speech slurred or non-existent    T-time-call 911 as soon as signs and symptoms begin-DO NOT go       Back to bed or wait to see if you get better-TIME IS BRAIN. Warning Signs of HEART ATTACK     Call 911 if you have these symptoms:   Chest discomfort. Most heart attacks involve discomfort in the center of the chest that lasts more than a few minutes, or that goes away and comes back. It can feel like uncomfortable pressure, squeezing, fullness, or pain.  Discomfort in other areas of the upper body. Symptoms can include pain or discomfort in one or both arms, the back, neck, jaw, or stomach.  Shortness of breath with or without chest discomfort.  Other signs may include breaking out in a cold sweat, nausea, or lightheadedness. Don't wait more than five minutes to call 911 - MINUTES MATTER! Fast action can save your life. Calling 911 is almost always the fastest way to get lifesaving treatment. Emergency Medical Services staff can begin treatment when they arrive -- up to an hour sooner than if someone gets to the hospital by car. The discharge information has been reviewed with the patient. The patient verbalized understanding. Discharge medications reviewed with the patient and appropriate educational materials and side effects teaching were provided. ___________________________________________________________________________________________________________________________________     Deep Vein Thrombosis: Care Instructions  Your Care Instructions    A deep vein thrombosis (DVT) is a blood clot in certain veins of the legs, pelvis, or arms.  Blood clots in these veins need to be treated because they can get bigger, break loose, and travel through the bloodstream to the lungs. A blood clot in a lung can be life-threatening. The doctor may have given you a blood thinner (anticoagulant). A blood thinner can stop the blood clot from growing larger and prevent new clots from forming. You will need to take a blood thinner for 3 to 6 months or longer. The doctor has checked you carefully, but problems can develop later. If you notice any problems or new symptoms, get medical treatment right away. Follow-up care is a key part of your treatment and safety. Be sure to make and go to all appointments, and call your doctor if you are having problems. It's also a good idea to know your test results and keep a list of the medicines you take. How can you care for yourself at home? · Take your medicines exactly as prescribed. Call your doctor if you think you are having a problem with your medicine. · If you are taking a blood thinner, be sure you get instructions about how to take your medicine safely. Blood thinners can cause serious bleeding problems. · Wear compression stockings if your doctor recommends them. These stockings are tighter at the feet than on the legs. They may reduce pain and swelling in your legs. But there are different types of stockings, and they need to fit right. So your doctor will recommend what you need. · When you sit, use a pillow to raise the arm or leg that has the blood clot. Try to keep it above the level of your heart. When should you call for help? Call 911 anytime you think you may need emergency care. For example, call if:    · You passed out (lost consciousness).     · You have symptoms of a blood clot in your lung (called a pulmonary embolism). These include:  ¨ Sudden chest pain. ¨ Trouble breathing.   ¨ Coughing up blood.    Call your doctor now or seek immediate medical care if:    · You have new or worse trouble breathing.     · You are dizzy or lightheaded, or you feel like you may faint.     · You have symptoms of a blood clot in your arm or leg. These may include:  ¨ Pain in the arm, calf, back of the knee, thigh, or groin. ¨ Redness and swelling in the arm, leg, or groin.    Watch closely for changes in your health, and be sure to contact your doctor if:    · You do not get better as expected. Where can you learn more? Go to http://denis-wyatt.info/. Enter C075 in the search box to learn more about \"Deep Vein Thrombosis: Care Instructions. \"  Current as of: November 21, 2017  Content Version: 11.7  © 2213-0039 [employer]. Care instructions adapted under license by [employer] (which disclaims liability or warranty for this information). If you have questions about a medical condition or this instruction, always ask your healthcare professional. [address] any warranty or liability for your use of this information.   Patient armband removed and shredded

## 2018-08-28 NOTE — PROGRESS NOTES
Met with patient and reviewed results of pathology confirming metastatic adenocarcinoma. Discussed treatment options including immunotherapy or chemotherapy +- radiation therapy. Will need outpatient PET ([facility] will facilitate). Please arrange a mediport prior to discharge. Otherwise ok to discharge from Oncology standpoint and follow up with Dr. Matt Christie ([phone]). Will touch base with pathology regarding EGFR, Alk, BRAF, PDL1 and ROS 1 testing. Dylan Alanis.  Francie Guzman M.D.  [facility]

## 2018-08-28 NOTE — DISCHARGE SUMMARY
Discharge Summary Patient: Nikki Cortez MRN: [id number]  CSN: [id number] YOB: 1955  Age: 66 y.o. Sex: female DOA: 8/16/2018 LOS:  LOS: 12 days   Discharge Date: 8/28/2018 Primary Care Provider:  Ame Freeman DO Admission Diagnoses: Pericardial effusion [I31.3] Discharge Diagnoses:   
Problem List as of 8/28/2018  Never Reviewed Codes Class Noted - Resolved Metastatic lung cancer (metastasis from lung to other site) [facility]) ICD-10-CM: C34.90 ICD-9-CM: 162.9  8/16/2018 - Present Leg DVT (deep venous thromboembolism), acute, left (HCC) ICD-10-CM: X83.212 ICD-9-CM: 453.40  8/16/2018 - Present PAD (peripheral artery disease) (HCC) ICD-10-CM: I73.9 ICD-9-CM: 443.9  8/16/2018 - Present * (Principal)Pericardial effusion ICD-10-CM: I31.3 ICD-9-CM: 423.9  8/16/2018 - Present Overview Signed 8/20/2018  9:59 AM by Heber Danielson Added automatically from request for surgery [id number] RESOLVED: Dehydration ICD-10-CM: E86.0 ICD-9-CM: 276.51  8/16/2018 - 8/28/2018 RESOLVED: Hyponatremia ICD-10-CM: E87.1 ICD-9-CM: 276.1  8/16/2018 - 8/28/2018 RESOLVED: Acute renal injury due to hypovolemia (HCC) ICD-10-CM: N17.9, E86.1 ICD-9-CM: 584.9, 276.52  8/16/2018 - 8/28/2018 Discharge Medications:    
Discharge Medication List as of 8/28/2018  3:05 PM  
  
START taking these medications Details  
levothyroxine (SYNTHROID) 75 mcg tablet Take 1 Tab by mouth Daily (before breakfast). , Print, Disp-30 Tab, R-0  
  
metoprolol tartrate (LOPRESSOR) 25 mg tablet Take 0.5 Tabs by mouth two (2) times a day., Print, Disp-60 Tab, R-0  
  
oxyCODONE ER (OXYCONTIN) 10 mg ER tablet Take 1 Tab by mouth every twelve (12) hours. Max Daily Amount: 20 mg., Print, Disp-30 Tab, R-0  
  
oxyCODONE-acetaminophen (PERCOCET) 5-325 mg per tablet Take 1 Tab by mouth every six (6) hours as needed.  Max Daily Amount: 4 Tabs., Print, Disp-15 Tab, R-0  
  
senna-docusate (PERICOLACE) 8.6-50 mg per tablet Take 1 Tab by mouth daily. , Print, Disp-30 Tab, R-0  
  
  
STOP taking these medications  
  
 traMADol (ULTRAM) 50 mg tablet Comments:  
Reason for Stopping:   
   
 cyclobenzaprine (FLEXERIL) 10 mg tablet Comments:  
Reason for Stopping:   
   
 naproxen (NAPROSYN) 500 mg tablet Comments:  
Reason for Stopping:   
   
  
 
 
Discharge Condition: Good Procedures : pericardiocentesis Consults: Cardiology, Hematology/Oncology, Nephrology, Pulmonary/Critical Care and Vascular Surgery PHYSICAL EXAM  
Visit Vitals  /53 (BP 1 Location: Right arm, BP Patient Position: At rest;Supine)  Pulse (!) 115  Temp 99.4 °F (37.4 °C)  Resp 18  Ht 5' 7\" (1.702 m)  Wt 88.3 kg (194 lb 10.7 oz)  SpO2 96%  Breastfeeding No  
 BMI 30.49 kg/m2 General: Awake, cooperative, no acute distress   
HEENT: NC, Atraumatic. PERRLA, EOMI. Anicteric sclerae. Lungs:  CTA Bilaterally. No Wheezing/Rhonchi/Rales. Heart:  Regular  rhythm,  No murmur, No Rubs, No Gallops Abdomen: Soft, Non distended, Non tender. +Bowel sounds, Extremities: No c/c/e Psych:   Not anxious or agitated. Neurologic:  No acute neurological deficits. Admission HPI :  
Yisel Mitchell is a 66 y.o. female who has past history of PAD, recent diagnosis of lung ca presents to ER with concerns of left leg pain that started last night, patient also notes pain on putting weight on her left leg along with numbness. She reports that she was recently diagnosed with lung ca and was seen by Dr. Silva Condon. She has history of PAD and is s/p fem/pop bypass about 3-4 years ago at [facility]. She has been smoking since she was 15 yrs old and quit a month ago. History of HTN but not taking medications. In ER her LE doppler showed DVT in left leg. Her BUN/Cr 25/1.55, CTA chest not done. Her sodium at 127. Lactic acid 2.6.  She was noted to be dehydrated and her pulse above 120. She received fluid bolus in ER. She was started on heparin drip. Hospital Course :  
Patient was admitted to telemetry, she was started on anticoagulation with heparin drip and was seen by heme/onc and nephrology. Biopsy of her left clavicular metastatic area planned. 3 days into her hospitalization, she developed SOB, hypoxia, anemiaCT chest done revealed large pericardial effusion. Echo with cardiac tamponade. He underwent pericardiocentesis with removal of 800cc of hemorrhagic fluid. Post procedure her symptoms improved significantly. Acute DVT - left leg, heparin drip stopped, no anticoagulation per cardiology secondary to pericardial effusion. She was seen by vascular and is s/p IVC filter. Pericardial effusion - s/p pericardiocentesis with removal of about 800cc of hemorrhagic fluid. Cytology negative for malignancy. Metastatic ca - with mets to liver, bone, retroperitoneum. S/p bone biopsy 08/24/2018, pathology consistent with adenocarcinoma. She was seen and followed by heme/onc. 
mediport was requested however this could not be arranged during hospitalization, this will be arranged as outpatient. She will follow up with Dr. Munira Dejesus. Hypothyroidism - started on synthroid. TSH elevated at 60, T3 1.1. Recommend follow up TSH in 6 weeks. Activity: Activity as tolerated Diet: Regular Diet Follow-up: PCP, heme/onc Disposition: home with home health Minutes spent on discharge: 54 Labs: Results:  
   
Chemistry Recent Labs  
   08/27/18 0225 GLU  100* NA  137  
K  4.6 CL  99* CO2  30 BUN  15  
CREA  0.92  
CA  8.9 AGAP  8  
BUCR  16 CBC w/Diff Recent Labs  
   08/27/18 0225 WBC  12.8 RBC  3.89* HGB  9.8* HCT  31.9*  
PLT  195 Cardiac Enzymes Recent Labs  
   08/28/18 
 0336  08/27/18 0225 CPK  39  39 Coagulation No results for input(s): PTP, INR, APTT in the last 72 hours. No lab exists for component: INREXT, INREXT Lipid Panel No results found for: CHOL, CHOLPOCT, CHOLX, CHLST, CHOLV, 493032, HDL, LDL, LDLC, DLDLP, 130633, VLDLC, VLDL, TGLX, TRIGL, TRIGP, TGLPOCT, CHHD, CHHDX  
BNP No results for input(s): BNPP in the last 72 hours. Liver Enzymes No results for input(s): TP, ALB, TBIL, AP, SGOT, GPT in the last 72 hours. No lab exists for component: DBIL Thyroid Studies Lab Results Component Value Date/Time TSH 60.00 (H) 08/17/2018 08:29 AM  
    
 
 
Significant Diagnostic Studies: Nm Bone Scan Wh Body Result Date: 8/17/2018 WHOLE BODY BONE SCAN INDICATION: 60-year-old patient with lung cancer of the right lung. COMPARISON: CT scan of the chest performed at an outside hospital; chest radiograph 8/16/2018 TECHNIQUE: Following intravenous administration of 36.0 mCi 99mTc-MDP, approximately three hour delayed whole body images were obtained in anterior and posterior projections. FINDINGS: The biodistribution of the injected radiopharmaceutical is as expected. Focal increased radiotracer accumulation noted involving the distal medial left clavicle, in keeping with site of clavicular lesion on prior chest CT. Additional focal increased uptake is also demonstrated involving the upper sacrum. Relatively diffuse asymmetric soft tissue uptake is present throughout the left thigh and left leg. Degenerative uptake is noted at the shoulder girdles bilaterally as well as each hip and knee joint. There is expected soft tissue radiotracer activity in the kidneys and urinary bladder. Impression: 1. Focal scintigraphic uptake involving the distal medial left clavicle, in keeping with lytic lesion demonstrated on prior CT. 2. Focal and indeterminate uptake within the sacrum. Correlation with CT imaging is recommended if not performed previously to evaluate for potential site of metastasis versus degenerative uptake.  3. Diffuse soft tissue uptake throughout the left leg, likely in keeping with patient's known DVT. Xr Chest Pa Lat Result Date: 8/24/2018 PA and lateral Chest Indication:  Follow-up for pneumonia Comparison:  08/20/18 Findings: The heart size is stable. Cardiac monitor leads overlie the chest. A right upper lobe area of focal parenchymal opacity is present. A possible early infiltrate appears present at the left lung base with ill-defined parenchymal density and obscuration of the diaphragm. There is no vascular congestion. No pneumothorax is evident. Trace pleural thickening is present. IMPRESSION: Continuing right upper lobe parenchymal opacity. Follow-up is suggested to exclude any possible central lung mass Suggestion of an early left lower lobe infiltrate Mri Brain W Wo Cont Result Date: 8/17/2018 CLINICAL INDICATION/HISTORY:Left leg pain and numbness, recent diagnosis of lung cancer COMPARISON:None. TECHNIQUE: Sagittal spin echo T1, axial spin echo T1, axial MEGHAN T2, FLAIR, T2 gradient and diffusion sequences, and axial and coronal T1 postgadolinium sequences of the brain were obtained. Postgadolinium magnetization transfer sequence obtained. FINDINGS:  Diffusion:  There are no areas of restricted diffusion, no evidence of an acute infarction. Brain parenchyma:  No evidence of intracranial mass hemorrhage or mass effect. No cortical signal abnormality. Very mild bilateral deep cerebral white matter foci of increased T2 and FLAIR signal and mildly involving central elvin. Ventricles and sulci:  Normal.  No significant atrophy given age. Extra-axial:  No extra-axial fluid collection or mass is noted. Brain vasculature:  No vascular abnormality is appreciated on this routine brain MR examination. Craniocervical junction:  Upper cervical degenerative spondylosis with normal-appearing cervical medullary junction.  Skull base, extracranial and calvarium:  Mucosal thickening/retention cyst portion of left ethmoid air cells posteriorly. Orbits, IACs and mastoids unremarkable. Partial empty sella. No focal skull abnormality. Contrast:There is no abnormal enhancement. Impression: 1. No evidence of intracranial metastatic disease or other acute intracranial finding. 2. Mild deep cerebral white matter and pontine signal changes nonspecific, trouble chronic microvascular ischemic disease. 3. Postinflammatory changes left posterior ethmoid sinus. Ct Bx Bone Super Result Date: 8/24/2018 HISTORY: Known metastatic disease, with unknown primary, with expansile extraosseous lesion medial left clavicle Correlation bone scan 8/17/2018 and previous abdomen CT 8/19/2018 TECHNIQUE AND FINDINGS: I discussed this procedure with the patient including discussion of risks benefits and alternatives with risks including risk of bleeding or infection. The patient understood the procedure, her questions were answered, and she agreed to the procedure. Timeout performed at [phone] With the patient in supine position, usual sterile technique was used with one percent local lidocaine. CT guidance was used to place a 17-gauge needle into the anterior periphery of the expansile extraosseous soft tissue lesion along the anterior aspect medial left clavicle. 4 separate 18-gauge core biopsy specimens were obtained with satisfactory specimen obtained according to the pathology tech on site. Needle was removed and hemostasis was achieved. The patient left the CT suite in stable condition. Conscious sedation: The patient was monitored by the nurse Merary Alfonso from [id number] until [id number], during which time patient received 1.0 mg Versed and 50 micrograms fentanyl. IMPRESSION: Stable status post technically successful CT-guided core biopsy medial left clavicle lesion. Ct Abd Pelv W Cont Result Date: 8/19/2018 EXAM: CT of the abdomen and pelvis INDICATION: History of bladder cancer, metastatic disease, follow-up after treatment. . History of acute DVT and back pain. Anemia. COMPARISON: None. TECHNIQUE: Axial CT imaging of the abdomen and pelvis was performed intravenous contrast. Multiplanar reformats were generated. One or more dose reduction techniques were used on this CT: automated exposure control, adjustment of the mAs and/or kVp according to patient's size, and iterative reconstruction techniques. The specific techniques utilized on this CT exam have been documented in the patient's electronic medical record. _______________ FINDINGS: LOWER CHEST: Moderate/large pericardial effusion. Trace bilateral pleural effusions and dependent minor atelectasis. LIVER, BILIARY: Liver demonstrates enhancing lesions bilaterally. Largest in the left hepatic lobe measuring 3.3 x 3.4 cm. Liver is diffusely heterogeneous with mild periportal edema. . No biliary dilation. Gallbladder is moderately distended with surrounding fluid attenuation which likely represent fluid-filled bowel loops. PANCREAS: Normal. SPLEEN: Normal. ADRENALS: Bilateral adrenal masses, measuring 3.7 cm on the right, and 3.1 cm on the left. Mabeline Sink KIDNEYS/URETERS/BLADDER: Normal. PELVIC ORGANS: Lobulated soft tissue mass abutting or arising from the uterus in the right pelvis collection measuring 7.9 x 6.7 cm. VASCULATURE: There is extensive DVT involving the visualized left femoral, iliac veins, with thrombus extending into the inferior most IVC. Associated left lower extremity edema. LYMPH NODES: No enlarged retroperitoneal lymph node between the IVC and aorta measuring 1.6 cm short axis. .. GASTROINTESTINAL TRACT: No bowel dilation or wall thickening. BONES: No acute or aggressive osseous abnormalities identified. OTHER: None. _______________ IMPRESSION: Moderate/large pericardial effusion. Trace bilateral pleural effusions and dependent atelectasis.  Hepatic lesions, adrenal masses and retroperitoneal adenopathy as described compatible with metastatic disease. Lobulated mass in the pelvis either abutting or arising from the uterus. This could just represent an lobulated multifibroid uterus. Other mass or malignancy not excluded. Consider ultrasound for further evaluation. Extensive left iliofemoral DVT extending into the IVC. Xr Chest [facility] Result Date: 8/20/2018 EXAM: Chest portable INDICATION: Pericardiocentesis COMPARISON: Single view chest 8/19/2018 _______________ FINDINGS: AP portable chest film was performed. Cardiac silhouette appears slightly smaller. A catheter-like structure projects over the inferior cardiac silhouette may represent a pericardial space catheter. Persistent masslike opacity right upper lobe. Findings suggesting early left lower lobe infiltrate/atelectasis. No pneumothorax. _______________ IMPRESSION: 1. Catheter overlying the inferior cardiac silhouette may represent pericardiocentesis drain. Cardiac silhouette appears slightly smaller. No pneumothorax or mediastinal. 2. Persistent right upper lobe masslike opacity. 3. Possible early left lower lobe infiltrate. Xr Chest [facility] Result Date: 8/19/2018 
--------------------------------------------------------------------------- <<<<<<<<<           [facility]           >>>>>>>>> --------------------------------------------------------------------------- CLINICAL HISTORY:  Shortness of breath. COMPARISON EXAMINATIONS:  August 16, 2018. ---  SINGLE FRONTAL VIEW OF THE CHEST  --- The cardiomediastinal silhouette is stable. There is a lobular masslike area of consolidation right upper lobe measuring up to 7.5 cm similar to previous. There is no new focal consolidation or evidence for significant pleural effusion. No significant osseous abnormalities are identified.  -------------- Impression: -------------- Stable masslike consolidation right upper lobe. No new areas of consolidation or pleural effusion. No significant interval change. Xr Chest [facility] Result Date: 8/16/2018 EXAM: Chest x-ray single AP view INDICATION: Bilateral lower extremity swelling. Shortness of breath. Known right lung cancer. COMPARISON: None. _____________ FINDINGS: Right upper lobe mass is present with associated partial atelectasis of the right upper lobe. Superimposed right upper lobe pneumonia cannot be excluded. Right paratracheal lymphadenopathy is present. Mild cardiomegaly is present. No pleural effusion. No acute osseus abnormality. _____________ IMPRESSION: Right upper lobe mass is present with associated atelectasis. Underlying consolidation related to secondary pneumonia cannot be excluded. Right paratracheal lymphadenopathy is present. Mild cardiomegaly. No results found for this or any previous visit.  
 
 
 
CC: Yennifer Burns, DO

## 2018-09-01 NOTE — ED NOTES
Son at bedside. Discharge instructions reviewed with the patient with opportunity for questions given. The patient verbalized understanding. Patient armband removed and shredded. Patient in stable condition at time of discharge.

## 2018-09-01 NOTE — ED PROVIDER NOTES
EMERGENCY DEPARTMENT HISTORY AND PHYSICAL EXAM 
 
Date: 9/1/2018 Patient Name: Monica Feliz History of Presenting Illness Chief Complaint Patient presents with  Leg Pain RIGHT  Shortness of Breath History Provided By: Patient Chief Complaint: Leg Pain Duration: 2 Days Timing:  Constant and Worsening Location: BLE Severity: Moderate Modifying Factors: Worse with movement. Associated Symptoms: SANCHEZ Additional History (Context):  
3:22 PM 
Monica Feliz is a 66 y.o. female with PMHX PAD, lung carcinoma, DVT and PSHX IVC filter presents to the emergency department C/O gradually worsening BLE pain, R>L, onset 2 days ago s/p PT. Associated sxs include SANCHEZ. Worse with movement. Pt states she tried elevating, resting her BLE, and taking her pain medications with no relief. Pt was admitted here approximately 2 weeks ago for acute LLE DVT and pericardial effusion requiring IVC filter and pericardiocentesis and was discharged 3-4 days ago. Pt denies use of tobacco/EtOH/illicit drugs use, fever, and any other sxs or complaints. PCP: Irvin Salomon DO 
 
Current Outpatient Prescriptions Medication Sig Dispense Refill  levothyroxine (SYNTHROID) 75 mcg tablet Take 1 Tab by mouth Daily (before breakfast). 30 Tab 0  
 metoprolol tartrate (LOPRESSOR) 25 mg tablet Take 0.5 Tabs by mouth two (2) times a day. 60 Tab 0  
 oxyCODONE ER (OXYCONTIN) 10 mg ER tablet Take 1 Tab by mouth every twelve (12) hours. Max Daily Amount: 20 mg. 30 Tab 0  
 oxyCODONE-acetaminophen (PERCOCET) 5-325 mg per tablet Take 1 Tab by mouth every six (6) hours as needed. Max Daily Amount: 4 Tabs. (Patient taking differently: Take 1 Tab by mouth every six (6) hours as needed for Pain.) 15 Tab 0  
 senna-docusate (PERICOLACE) 8.6-50 mg per tablet Take 1 Tab by mouth daily. 30 Tab 0 Past History Past Medical History: 
Past Medical History:  
Diagnosis Date  Cancer ([facility] [address] lung cancer  PAD (peripheral artery disease) ([facility] [address]  Pericardial effusion Past Surgical History: 
Past Surgical History:  
Procedure Laterality Date  VASCULAR SURGERY PROCEDURE UNLIST Right   
 opened up vessels Family History: 
History reviewed. No pertinent family history. Social History: 
Social History Substance Use Topics  Smoking status: Former Smoker  Smokeless tobacco: Never Used  Alcohol use Yes Comment: occasionally Allergies: 
No Known Allergies Review of Systems Review of Systems Respiratory: Positive for shortness of breath. Musculoskeletal: Positive for myalgias (BLE, R>L). All other systems reviewed and are negative. Physical Exam  
 
Vitals:  
 09/01/18 1449 09/01/18 1517 09/01/18 1600 BP: 141/84  146/75 Pulse: (!) 114  (!) 109 Resp: 18  21 Temp: 98 °F (36.7 °C) SpO2: 100% 100% 99% Weight: 83.5 kg (184 lb) Physical Exam  
Nursing note and vitals reviewed. Constitutional: No acute distress. Elderly  Crissy female. Head: Normocephalic, Atraumatic Eyes: Pupils are equal, round, and reactive to light, EOMI Neck: Supple, non-tender Cardiovascular: Tachycardic. Regular rhythm, no murmurs, rubs, or gallops Chest: Normal work of breathing and chest excursion bilaterally Lungs: Clear to ausculation bilaterally, no wheezes, no rhonchi Abdomen: Soft, non tender, non distended, normoactive bowel sounds Back: No evidence of trauma or deformity Extremities: No evidence of trauma or deformity, no LE edema Skin: Warm and dry, normal cap refill Neuro: Alert and appropriate, CN intact, normal speech, strength and sensation full and symmetric bilaterally, normal gait, normal coordination Psychiatric: Normal mood and affect Diagnostic Study Results Labs - Recent Results (from the past 12 hour(s)) EKG, 12 LEAD, INITIAL Collection Time: 09/01/18  2:55 PM  
Result Value Ref Range  Ventricular Rate 115 BPM  
 Atrial Rate 115 BPM  
 P-R Interval 142 ms QRS Duration 74 ms Q-T Interval 318 ms QTC Calculation (Bezet) 439 ms Calculated P Axis 55 degrees Calculated R Axis 4 degrees Calculated T Axis 80 degrees Diagnosis Sinus tachycardia Anterior infarct , age undetermined Abnormal ECG When compared with ECG of 20-AUG-2018 21:33, Nonspecific T wave abnormality now evident in Inferior leads CBC WITH AUTOMATED DIFF Collection Time: 09/01/18  3:30 PM  
Result Value Ref Range WBC 12.5 4.6 - 13.2 K/uL  
 RBC 3.86 (L) 4.20 - 5.30 M/uL HGB 9.7 (L) 12.0 - 16.0 g/dL HCT 31.0 (L) 35.0 - 45.0 % MCV 80.3 74.0 - 97.0 FL  
 MCH 25.1 24.0 - 34.0 PG  
 MCHC 31.3 31.0 - 37.0 g/dL  
 RDW 18.9 (H) 11.6 - 14.5 % PLATELET 717 441 - 132 K/uL MPV 8.4 (L) 9.2 - 11.8 FL  
 NEUTROPHILS 80 (H) 40 - 73 % LYMPHOCYTES 9 (L) 21 - 52 % MONOCYTES 9 3 - 10 % EOSINOPHILS 2 0 - 5 % BASOPHILS 0 0 - 2 %  
 ABS. NEUTROPHILS 10.0 (H) 1.8 - 8.0 K/UL  
 ABS. LYMPHOCYTES 1.1 0.9 - 3.6 K/UL  
 ABS. MONOCYTES 1.1 0.05 - 1.2 K/UL  
 ABS. EOSINOPHILS 0.3 0.0 - 0.4 K/UL  
 ABS. BASOPHILS 0.0 0.0 - 0.1 K/UL  
 DF AUTOMATED CARDIAC PANEL,(CK, CKMB & TROPONIN) Collection Time: 09/01/18  3:30 PM  
Result Value Ref Range CK 49 26 - 192 U/L  
 CK - MB <1.0 <3.6 ng/ml CK-MB Index  0.0 - 4.0 % CALCULATION NOT PERFORMED WHEN RESULT IS BELOW LINEAR LIMIT Troponin-I, Qt. <0.02 0.00 - 8.68 NG/ML  
METABOLIC PANEL, COMPREHENSIVE Collection Time: 09/01/18  3:30 PM  
Result Value Ref Range Sodium 134 (L) 136 - 145 mmol/L Potassium 4.4 3.5 - 5.5 mmol/L Chloride 97 (L) 100 - 108 mmol/L  
 CO2 27 21 - 32 mmol/L Anion gap 10 3.0 - 18 mmol/L Glucose 93 74 - 99 mg/dL BUN 10 7.0 - 18 MG/DL Creatinine 0.89 0.6 - 1.3 MG/DL  
 BUN/Creatinine ratio 11 (L) 12 - 20 GFR est AA >60 >60 ml/min/1.73m2 GFR est non-AA >60 >60 ml/min/1.73m2  Calcium 9.3 8.5 - 10.1 MG/DL  
 Bilirubin, total 0.7 0.2 - 1.0 MG/DL  
 ALT (SGPT) 46 13 - 56 U/L  
 AST (SGOT) 46 (H) 15 - 37 U/L Alk. phosphatase 387 (H) 45 - 117 U/L Protein, total 7.8 6.4 - 8.2 g/dL Albumin 1.9 (L) 3.4 - 5.0 g/dL Globulin 5.9 (H) 2.0 - 4.0 g/dL A-G Ratio 0.3 (L) 0.8 - 1.7 NT-PRO BNP Collection Time: 09/01/18  3:30 PM  
Result Value Ref Range NT pro-BNP 1954 (H) 0 - 900 PG/ML Radiologic Studies -  
 
4:04 PM 
RADIOLOGY FINDINGS Chest X-ray shows mass in the right upper lobe, unchanged from 8/23/18. Pending review by Radiologist 
Recorded by Adela Prabhakar, ED Scribe, as dictated by General Farhad DO  
 
XR CHEST PORT    (Results Pending) CT Results  (Last 48 hours) None CXR Results  (Last 48 hours) None Medical Decision Making I am the first provider for this patient. I reviewed the vital signs, available nursing notes, past medical history, past surgical history, family history and social history. Vital Signs-Reviewed the patient's vital signs. Pulse Oximetry Analysis - 100% on RA Cardiac Monitor: 
Rate: 112 bpm 
Rhythm: tachycardic EKG interpretation: (Preliminary) 2:55 PM  
Sinus tachycardia at 115 bpm, QTc 439. EKG read by General Farhad DO at 1:01 PM  
 
Records Reviewed: Nursing Notes and Old Medical Records Provider Notes:  
66 y.o. female hx of recent diagnosed lung adenocarcinoma, recent DVT s/p IVC filter, hx of recent hospitalization for pericardial effusions requiring pericardiocentesis who presents with right sided leg pain in NAD. On exam, she's noted to be tachycardic, however, when reviewing prior vital signs, pt has had persistent tachycardia. She is normotensive, saturating well on RA and afebrile. She does not appear to be toxic. Will check for infectious vs. Cardiac vs. CHF and reassess. Procedures: 
Procedures ED Course:  
 
3:22 PM Initial assessment performed.  The patients presenting problems have been discussed, and they are in agreement with the care plan formulated and outlined with them. I have encouraged them to ask questions as they arise throughout their visit. 4: PM Pt's cardiac enzymes negative. Mild hyponatremia and hypocholeremia. CXR showing persistent RUL mass with improvement in her pleural effusion on the left from prior CXR. NAP. BNP slightly elevated. Saturating % on RA. Prior DVT study negative in the RLE, no indication for repeat ultrasound as patient has IVC filter in place, likely coagulopathic from underlying malignancy however no change in management even with new DVT in RLE. Urged f/u with her cardiologist and PCP as scheduled. Diagnosis and Disposition DISCHARGE NOTE: 
4:39 PM 
Bee Mims's  results have been reviewed with her. She has been counseled regarding her diagnosis, treatment, and plan. She verbally conveys understanding and agreement of the signs, symptoms, diagnosis, treatment and prognosis and additionally agrees to follow up as discussed. She also agrees with the care-plan and conveys that all of her questions have been answered. I have also provided discharge instructions for her that include: educational information regarding their diagnosis and treatment, and list of reasons why they would want to return to the ED prior to their follow-up appointment, should her condition change. She has been provided with education for proper emergency department utilization. CLINICAL IMPRESSION: 
 
1. Leg DVT (deep venous thromboembolism), acute, left ([facility] [address] 2. Primary malignant neoplasm of right lung metastatic to other site [facility]) 3. PAD (peripheral artery disease) ([facility] [address] PLAN: 
1. D/C Home 2. Current Discharge Medication List  
  
 
3. Follow-up Information Follow up With Details Comments Contact Info Shilo Birmingham DO Schedule an appointment as soon as possible for a visit in 2 days  [address] [place] [address] 
 [address] [address] 
[phone] [facility] EMERGENCY DEPT  As needed, if symptoms worsen [address] [address] 
[phone]  
  
 
___________________________ Attestations:  
 
SCRIBE ATTESTATION: 
This note is prepared by [employer], acting as Scribe for Deborah Portillo DO. 
 
PROVIDER ATTESTATION: 
Deborah Portillo DO: The scribe's documentation has been prepared under my direction and personally reviewed by me in its entirety. I confirm that the note above accurately reflects all work, treatment, procedures, and medical decision making performed by me.  
_______________________________

## 2018-09-01 NOTE — DISCHARGE INSTRUCTIONS
Peripheral Arterial Disease of the Leg: Care Instructions  Your Care Instructions  Peripheral arterial disease (PAD) occurs when the blood vessels (arteries) that supply blood to the legs, belly, pelvis, arms, or neck get too narrow. This reduces blood flow to that area. The legs are affected most often. Fatty buildup (plaque) in the arteries usually is the cause of PAD. This buildup is also called \"hardening\" of the arteries. Your risk of PAD increases if you smoke or have high cholesterol, high blood pressure, diabetes, or a family history of PAD. Many people do not have symptoms. If you do have symptoms, you may have weak or tired legs, difficulty walking or balancing, or pain. If you have pain, you might feel a tight, aching, or squeezing pain in the calf, foot, thigh, or buttock that occurs during exercise. The pain usually gets worse during exercise and goes away when you rest. If PAD gets worse, you may have symptoms of poor blood flow such as leg pain when you rest.  Medicines and lifestyle changes may help your symptoms and lower your risk of heart attack and stroke. In some cases, surgery or other treatment is needed. It is important that you follow up with your doctor. Follow-up care is a key part of your treatment and safety. Be sure to make and go to all appointments, and call your doctor if you are having problems. It's also a good idea to know your test results and keep a list of the medicines you take. How can you care for yourself at home? · Do not smoke. Smoking can make PAD worse. If you need help quitting, talk to your doctor about stop-smoking programs and medicines. These can increase your chances of quitting for good. · Take your medicines exactly as prescribed. Call your doctor if you think you are having a problem with your medicine. · If you take a blood thinner, such as aspirin, be sure to get instructions about how to take your medicine safely.  Blood thinners can cause serious bleeding problems. · Ask your doctor if a cardiac rehab program is right for you. Cardiac rehab can help you make lifestyle changes. In cardiac rehab, a team of health professionals provides education and support to help you make new, healthy habits. · Eat heart-healthy foods such as fruits, vegetables, whole grains, fish, lean meats, and low-fat or nonfat dairy foods. Limit sodium, sugar, and alcohol. · If your doctor recommends it, get more exercise. Walking is a good choice. Bit by bit, increase the amount you walk every day. Try for at least 30 minutes on most days of the week. If you have symptoms when you exercise, ask your doctor about a special exercise program that may help relieve your symptoms. · Stay at a healthy weight. Lose weight if you need to. · Take good care of your feet. ¨ Treat cuts and scrapes on your legs right away. Poor blood flow prevents (or slows) quick healing of even small cuts or scrapes. This is even more important if you have diabetes. ¨ Avoid shoes that are too tight or that rub your feet. Shoes should be comfortable and fit well. ¨ Avoid socks or stockings that are tight enough to leave elastic-band marks on your legs. Tight socks can make circulation problems worse. ¨ Keep your feet clean and moisturized to prevent drying and cracking. Place cotton or lamb's wool between your toes to prevent rubbing and to absorb moisture. ¨ If you have a sore on your leg or foot, keep it dry and cover it with a nonstick bandage until you see your doctor. When should you call for help? Call 911 anytime you think you may need emergency care. For example, call if:    · You have symptoms of a heart attack. These may include:  ¨ Chest pain or pressure, or a strange feeling in the chest.  ¨ Sweating. ¨ Shortness of breath. ¨ Nausea or vomiting. ¨ Pain, pressure, or a strange feeling in the back, neck, jaw, or upper belly or in one or both shoulders or arms.   ¨ Lightheadedness or sudden weakness. ¨ A fast or irregular heartbeat.    After you call 911, the  may tell you to chew 1 adult-strength or 2 to 4 low-dose aspirin. Wait for an ambulance. Do not try to drive yourself.    · You have sudden, severe leg pain, and your leg is cool and pale.     · You have symptoms of a stroke. These may include:  ¨ Sudden numbness, tingling, weakness, or loss of movement in your face, arm, or leg, especially on only one side of your body. ¨ Sudden vision changes. ¨ Sudden trouble speaking. ¨ Sudden confusion or trouble understanding simple statements. ¨ Sudden problems with walking or balance. ¨ A sudden, severe headache that is different from past headaches.    Call your doctor now or seek immediate medical care if:    · You have leg pain that does not go away even if you rest.     · Your leg pain changes or gets worse. For example, if you have more pain with normal activity or the same pain with decreased activity, you should call.     · You have cold or numb feet or toes.     · You have leg or foot sores that are slow to heal.     · The skin on your legs or feet changes color.     · You have an open sore on your leg or foot that is infected. Signs of infection include:  ¨ Increased pain, swelling, warmth, or redness. ¨ Red streaks leading from the sore. ¨ Pus draining from the sore. ¨ A fever.    Watch closely for changes in your health, and be sure to contact your doctor if you have any problems. Where can you learn more? Go to http://denis-wyatt.info/. Enter 056 [id number] in the search box to learn more about \"Peripheral Arterial Disease of the Leg: Care Instructions. \"  Current as of: December 6, 2017  Content Version: 11.7  © 1682-1699 [employer]. Care instructions adapted under license by [employer] (which disclaims liability or warranty for this information).  If you have questions about a medical condition or this instruction, always ask your healthcare professional. [place] [address] any warranty or liability for your use of this information. Deep Vein Thrombosis: Care Instructions  Your Care Instructions    A deep vein thrombosis (DVT) is a blood clot in certain veins of the legs, pelvis, or arms. Blood clots in these veins need to be treated because they can get bigger, break loose, and travel through the bloodstream to the lungs. A blood clot in a lung can be life-threatening. The doctor may have given you a blood thinner (anticoagulant). A blood thinner can stop the blood clot from growing larger and prevent new clots from forming. You will need to take a blood thinner for 3 to 6 months or longer. The doctor has checked you carefully, but problems can develop later. If you notice any problems or new symptoms, get medical treatment right away. Follow-up care is a key part of your treatment and safety. Be sure to make and go to all appointments, and call your doctor if you are having problems. It's also a good idea to know your test results and keep a list of the medicines you take. How can you care for yourself at home? · Take your medicines exactly as prescribed. Call your doctor if you think you are having a problem with your medicine. · If you are taking a blood thinner, be sure you get instructions about how to take your medicine safely. Blood thinners can cause serious bleeding problems. · Wear compression stockings if your doctor recommends them. These stockings are tighter at the feet than on the legs. They may reduce pain and swelling in your legs. But there are different types of stockings, and they need to fit right. So your doctor will recommend what you need. · When you sit, use a pillow to raise the arm or leg that has the blood clot. Try to keep it above the level of your heart. When should you call for help? Call 911 anytime you think you may need emergency care.  For example, call if:    · You passed out (lost consciousness).     · You have symptoms of a blood clot in your lung (called a pulmonary embolism). These include:  ¨ Sudden chest pain. ¨ Trouble breathing. ¨ Coughing up blood.    Call your doctor now or seek immediate medical care if:    · You have new or worse trouble breathing.     · You are dizzy or lightheaded, or you feel like you may faint.     · You have symptoms of a blood clot in your arm or leg. These may include:  ¨ Pain in the arm, calf, back of the knee, thigh, or groin. ¨ Redness and swelling in the arm, leg, or groin.    Watch closely for changes in your health, and be sure to contact your doctor if:    · You do not get better as expected. Where can you learn more? Go to http://denis-wyatt.info/. Enter U899 in the search box to learn more about \"Deep Vein Thrombosis: Care Instructions. \"  Current as of: November 21, 2017  Content Version: 11.7  © 5437-8490 [employer]. Care instructions adapted under license by [employer] (which disclaims liability or warranty for this information). If you have questions about a medical condition or this instruction, always ask your healthcare professional. [place] [address] any warranty or liability for your use of this information.

## 2018-09-01 NOTE — ED TRIAGE NOTES
Patient arrives via EMS from home residence with c/o RIGHT leg pain and SOB on exertion. Patient d/c from 3 on 8/28

## 2018-09-03 NOTE — ED NOTES
Called the patient at 7:14 PM on 9/1/2018 in regards to overread on CXR. Questionable infiltrate on CXR from 9/1. Patient reports feeling well. Discussed CXR results and recommend antibiotics. Patient's prescription for Doxycycline sent to [facility]. Address confirmed by patient. Patient verbalized understanding.

## 2018-09-16 PROBLEM — R06.02 SOB (SHORTNESS OF BREATH): Status: ACTIVE | Noted: 2018-01-01

## 2018-09-16 PROBLEM — D64.9 ANEMIA: Status: ACTIVE | Noted: 2018-01-01

## 2018-09-16 PROBLEM — I10 HTN (HYPERTENSION): Status: ACTIVE | Noted: 2018-01-01

## 2018-09-16 NOTE — ED TRIAGE NOTES
Patient with complaints of increasing shortness of breath since 9/12/18.  Patient with metastatic lung CA

## 2018-09-16 NOTE — ED PROVIDER NOTES
EMERGENCY DEPARTMENT HISTORY AND PHYSICAL EXAM 
 
Date: 9/16/2018 Patient Name: Armen Araiza History of Presenting Illness Chief Complaint Patient presents with  Shortness of Breath History Provided By: Patient Chief Complaint: SOB Duration: 1 Months Timing:  Acute Location: N/A Severity: 8 out of 10 Modifying factors:  SOB is exacerbated when laying down Associated Symptoms: denies any other associated signs or symptoms Additional History (Context):  
5:31 PM 
Armen Araiza is a 66 y.o. female with PMHx of stage 4 lung cancer that has metastasized to her liver and bones, PAD, Pericardial effusion who presents to the emergency department C/O worsening SOB onset 1 month ago that worsened 4 days ago. No associated sxs. SOB is exacerbated when laying down. Pt had a PT and home health care nurse come in on 9/12/18 and has had SOB of breath since. Pt was admitted at this facility on 8/16/18 and had a filter placed to stop blood clots from traveling to her bran and heart. Pt had fluid surrounding her heart and had a procedure performed to remove the fluid. Pt was discharged on 8/28/18. Pt was seen at this facility again on 9/1/18 and admitted and discharged. Pt has BLE DVT's. Pt reports Hx of metastatic lung cancer. Pt's oncologist is Dr. Pipe Balbuena. Pt;s PCP is Dr. James Kahn. Pt denies fever, chills, cough, abdominal pain, leg swelling, and any other sxs or complaints. PCP: Vikash Combs DO 
 
Current Facility-Administered Medications Medication Dose Route Frequency Provider Last Rate Last Dose  piperacillin-tazobactam (ZOSYN) 3.375 g in 0.9% sodium chloride (MBP/ADV) 100 mL MBP  3.375 g IntraVENous Q6H Jerald Whitlock  mL/hr at 09/16/18 2129 3.375 g at 09/16/18 2129  acetaminophen (TYLENOL) tablet 650 mg  650 mg Oral Q4H PRN Jerald Whitlock MD      
 naloxone [facility]) injection 0.4 mg  0.4 mg IntraVENous PRN Jerald Whitlock MD      
  diphenhydrAMINE (BENADRYL) injection 12.5 mg  12.5 mg IntraVENous Q4H PRN Alberto Garcia MD      
 ondansetron [facility]) injection 4 mg  4 mg IntraVENous Q4H PRN Alberto Garcia MD      
 docusate sodium (COLACE) capsule 100 mg  100 mg Oral BID Alberto Garcia MD      
 heparin (porcine) injection 5,000 Units  5,000 Units SubCUTAneous Q8H Alberto Garcia MD      
 albuterol-ipratropium (DUO-NEB) 2.5 MG-0.5 MG/3 ML  3 mL Nebulization Q4H PRN Alberto Garcia MD      
 [START ON 9/17/2018] levothyroxine (SYNTHROID) tablet 75 mcg  75 mcg Oral ACB Alberto Garcia MD      
 metoprolol tartrate (LOPRESSOR) tablet 12.5 mg  12.5 mg Oral BID Alberto Garcia MD      
 oxyCODONE ER (OxyCONTIN) tablet 10 mg  10 mg Oral Q12H Alberto Garcia MD      
 oxyCODONE-acetaminophen (PERCOCET) 5-325 mg per tablet 1 Tab  1 Tab Oral Q6H PRN Alberto Garcia MD   1 Tab at 09/16/18 2130  Vancomycin- Pharmacy to dose  1 Each Other Rx Dosing/Monitoring Alberto Garcia MD      
 ELECTROLYTE REPLACEMENT PROTOCOL- Potassium  1 Each Other PRN Alberto Garcia MD      
 ELECTROLYTE REPLACEMENT PROTOCOL- Magnesium  1 Each Other PRN Alberto Garcia MD      
 ELECTROLYTE REPLACEMENT PROTOCOL- Phosphorus  1 Each Other PRИРИНА Garcia MD      
 ELECTROLYTE REPLACEMENT PROTOCOL- Calcium  1 Each Other PRN Alberto Garcia MD      
 vancomycin (VANCOCIN) 1500 mg in  ml infusion  1,500 mg IntraVENous ONCE Alberto Garcia MD      
 
Current Outpatient Prescriptions Medication Sig Dispense Refill  levothyroxine (SYNTHROID) 75 mcg tablet Take 1 Tab by mouth Daily (before breakfast). 30 Tab 0  
 metoprolol tartrate (LOPRESSOR) 25 mg tablet Take 0.5 Tabs by mouth two (2) times a day. 60 Tab 0  
 oxyCODONE ER (OXYCONTIN) 10 mg ER tablet Take 1 Tab by mouth every twelve (12) hours. Max Daily Amount: 20 mg. 30 Tab 0  
 oxyCODONE-acetaminophen (PERCOCET) 5-325 mg per tablet Take 1 Tab by mouth every six (6) hours as needed. Max Daily Amount: 4 Tabs.  (Patient taking differently: Take 1 Tab by mouth every six (6) hours as needed for Pain.) 15 Tab 0  
  senna-docusate (PERICOLACE) 8.6-50 mg per tablet Take 1 Tab by mouth daily. 30 Tab 0 Past History Past Medical History: 
Past Medical History:  
Diagnosis Date  Cancer ([facility] lung cancer  HTN (hypertension) 9/16/2018  PAD (peripheral artery disease) ([facility]  Pericardial effusion Past Surgical History: 
Past Surgical History:  
Procedure Laterality Date  VASCULAR SURGERY PROCEDURE UNLIST Right   
 opened up vessels Family History: 
History reviewed. No pertinent family history. Social History: 
Social History Substance Use Topics  Smoking status: Former Smoker  Smokeless tobacco: Never Used  Alcohol use Yes Comment: occasionally Allergies: 
No Known Allergies Review of Systems Review of Systems Constitutional: Negative for chills and fever. Respiratory: Positive for shortness of breath. Negative for cough. Cardiovascular: Negative for leg swelling. Gastrointestinal: Negative for abdominal pain. All other systems reviewed and are negative. Physical Exam  
 
Vitals:  
 09/16/18 1649 09/16/18 1900 09/16/18 1926 09/16/18 2030 BP: 136/76 134/75  133/63 Pulse: (!) 112 (!) 110  (!) 115 Resp: 20 28  29 Temp: 97.6 °F (36.4 °C) SpO2: 100% 100% 100% 100% Weight: 79.8 kg (176 lb) Height: 5' 7\" (1.702 m) Physical Exam  
Constitutional: She is oriented to person, place, and time. She appears well-developed and well-nourished. HENT:  
Head: Normocephalic and atraumatic. Right Ear: External ear normal.  
Left Ear: External ear normal.  
Nose: Nose normal.  
Mouth/Throat: Oropharynx is clear and moist.  
Eyes: Conjunctivae and EOM are normal. Pupils are equal, round, and reactive to light. Neck: Normal range of motion. Neck supple. No JVD present. No tracheal deviation present.   
Cardiovascular: Regular rhythm, normal heart sounds and intact distal pulses. Tachycardia present. Exam reveals no gallop and no friction rub. No murmur heard. Pulmonary/Chest: Effort normal. No respiratory distress. She has decreased breath sounds (at the bases, worse on left). She has no wheezes. She has rhonchi. She has no rales. She exhibits mass (right anterior). Abdominal: Soft. Bowel sounds are normal. She exhibits no distension and no mass. There is no tenderness. There is no rebound and no guarding. Musculoskeletal: Normal range of motion. She exhibits no edema or tenderness. Right lower leg: She exhibits no edema. Left lower leg: She exhibits no edema. Neurological: She is alert and oriented to person, place, and time. She has normal reflexes. No cranial nerve deficit. Skin: Skin is warm and dry. No rash noted. Psychiatric: She has a normal mood and affect. Her behavior is normal.  
Nursing note and vitals reviewed. Diagnostic Study Results Labs - Recent Results (from the past 12 hour(s)) EKG, 12 LEAD, INITIAL Collection Time: 09/16/18  6:12 PM  
Result Value Ref Range Ventricular Rate 110 BPM  
 Atrial Rate 110 BPM  
 P-R Interval 140 ms QRS Duration 78 ms Q-T Interval 352 ms QTC Calculation (Bezet) 476 ms Calculated P Axis 44 degrees Calculated R Axis 10 degrees Calculated T Axis 122 degrees Diagnosis Sinus tachycardia Possible Left atrial enlargement T wave abnormality, consider lateral ischemia Abnormal ECG When compared with ECG of 01-SEP-2018 14:55, Inverted T waves have replaced nonspecific T wave abnormality in Lateral  
leads CBC WITH AUTOMATED DIFF Collection Time: 09/16/18  6:20 PM  
Result Value Ref Range WBC 12.9 4.6 - 13.2 K/uL  
 RBC 3.42 (L) 4.20 - 5.30 M/uL HGB 8.5 (L) 12.0 - 16.0 g/dL HCT 27.5 (L) 35.0 - 45.0 % MCV 80.4 74.0 - 97.0 FL  
 MCH 24.9 24.0 - 34.0 PG  
 MCHC 30.9 (L) 31.0 - 37.0 g/dL  
 RDW 18.7 (H) 11.6 - 14.5 % PLATELET 846 587 - 829 K/uL MPV 8.5 (L) 9.2 - 11.8 FL  
 NEUTROPHILS 76 (H) 40 - 73 % LYMPHOCYTES 14 (L) 21 - 52 % MONOCYTES 8 3 - 10 % EOSINOPHILS 1 0 - 5 % BASOPHILS 1 0 - 2 %  
 ABS. NEUTROPHILS 9.8 (H) 1.8 - 8.0 K/UL  
 ABS. LYMPHOCYTES 1.8 0.9 - 3.6 K/UL  
 ABS. MONOCYTES 1.0 0.05 - 1.2 K/UL  
 ABS. EOSINOPHILS 0.2 0.0 - 0.4 K/UL  
 ABS. BASOPHILS 0.1 0.0 - 0.1 K/UL  
 DF AUTOMATED METABOLIC PANEL, COMPREHENSIVE Collection Time: 09/16/18  6:20 PM  
Result Value Ref Range Sodium 131 (L) 136 - 145 mmol/L Potassium 5.1 3.5 - 5.5 mmol/L Chloride 95 (L) 100 - 108 mmol/L  
 CO2 27 21 - 32 mmol/L Anion gap 9 3.0 - 18 mmol/L Glucose 95 74 - 99 mg/dL BUN 13 7.0 - 18 MG/DL Creatinine 0.80 0.6 - 1.3 MG/DL  
 BUN/Creatinine ratio 16 12 - 20 GFR est AA >60 >60 ml/min/1.73m2 GFR est non-AA >60 >60 ml/min/1.73m2 Calcium 9.1 8.5 - 10.1 MG/DL Bilirubin, total 0.7 0.2 - 1.0 MG/DL  
 ALT (SGPT) 17 13 - 56 U/L  
 AST (SGOT) 23 15 - 37 U/L Alk. phosphatase 279 (H) 45 - 117 U/L Protein, total 7.7 6.4 - 8.2 g/dL Albumin 1.9 (L) 3.4 - 5.0 g/dL Globulin 5.8 (H) 2.0 - 4.0 g/dL A-G Ratio 0.3 (L) 0.8 - 1.7 CARDIAC PANEL,(CK, CKMB & TROPONIN) Collection Time: 09/16/18  6:20 PM  
Result Value Ref Range CK 37 26 - 192 U/L  
 CK - MB <1.0 <3.6 ng/ml CK-MB Index  0.0 - 4.0 % CALCULATION NOT PERFORMED WHEN RESULT IS BELOW LINEAR LIMIT Troponin-I, Qt. <0.02 0.00 - 0.06 NG/ML  
NT-PRO BNP Collection Time: 09/16/18  6:20 PM  
Result Value Ref Range NT pro-BNP 1278 (H) 0 - 900 PG/ML  
LACTIC ACID Collection Time: 09/16/18  7:20 PM  
Result Value Ref Range Lactic acid 2.1 (HH) 0.4 - 2.0 MMOL/L Radiologic Studies -  
XR CHEST PORT    (Results Pending) CTA CHEST W OR W WO CONT    (Results Pending) 7:03 PM 
RADIOLOGY FINDINGS Chest X-ray shows left pleural effusion versus infiltrate.   
Pending review by Radiologist 
 Recorded by Leola Cuellar ED Scribe, as dictated by Josh Pal MD. CT Results  (Last 48 hours) None CXR Results  (Last 48 hours) None Medications given in the ED- Medications  
piperacillin-tazobactam (ZOSYN) 3.375 g in 0.9% sodium chloride (MBP/ADV) 100 mL MBP (3.375 g IntraVENous New Bag 9/16/18 2129)  
acetaminophen (TYLENOL) tablet 650 mg (not administered)  
naloxone (NARCAN) injection 0.4 mg (not administered) diphenhydrAMINE (BENADRYL) injection 12.5 mg (not administered)  
ondansetron (ZOFRAN) injection 4 mg (not administered) docusate sodium (COLACE) capsule 100 mg (not administered)  
heparin (porcine) injection 5,000 Units (not administered)  
albuterol-ipratropium (DUO-NEB) 2.5 MG-0.5 MG/3 ML (not administered) levothyroxine (SYNTHROID) tablet 75 mcg (not administered)  
metoprolol tartrate (LOPRESSOR) tablet 12.5 mg (not administered)  
oxyCODONE ER (OxyCONTIN) tablet 10 mg (not administered)  
oxyCODONE-acetaminophen (PERCOCET) 5-325 mg per tablet 1 Tab (1 Tab Oral Given 9/16/18 2130) Vancomycin- Pharmacy to dose (not administered) ELECTROLYTE REPLACEMENT PROTOCOL- Potassium (not administered) ELECTROLYTE REPLACEMENT PROTOCOL- Magnesium (not administered) ELECTROLYTE REPLACEMENT PROTOCOL- Phosphorus (not administered) ELECTROLYTE REPLACEMENT PROTOCOL- Calcium (not administered)  
vancomycin (VANCOCIN) 1500 mg in  ml infusion (not administered)  
sodium chloride 0.9 % bolus infusion 500 mL (500 mL IntraVENous New Bag 9/16/18 2114) iopamidol (ISOVUE-370) 76 % injection  mL (100 mL IntraVENous Given 9/16/18 2046) Medical Decision Making I am the first provider for this patient. I reviewed the vital signs, available nursing notes, past medical history, past surgical history, family history and social history. Vital Signs-Reviewed the patient's vital signs. Pulse Oximetry Analysis - 100% on RA Cardiac Monitor: Rate: 111 bpm 
Rhythm: Sinus tachycardia EKG interpretation: (Preliminary) Rhythm: Sinus tachycardia. Rate: 110 bpm; Nonspecific T wave changes EKG read by Guanakito Moreno MD at 6:13 PM  
 
Records Reviewed: Nursing Notes and Old Medical Records Provider Notes (Medical Decision Making):  
INITIAL CLINICAL IMPRESSION and PLANS: 
The patient presents with the primary complaint(s) of: SOB. The presentation, to include historical aspects and clinical findings are consistent with the DX of CHF. However, other possible DX's to consider as primary, associated with, or exacerbated by include: 1. Pleural effusion 2. Pericardial effusion 3. Pneumonia 4. PE 
5. ACS 6. Anemia Considering the above, my initial management plan to evaluate and therapeutic interventions include the following and as noted in the orders: 
 
1. Labs: NT-PRO BNP, cardiac panel, UA, CBC, Lactic acid, blood culture 2. Imaging: XR Chest Port, CTA Chest 
 
Procedures: 
Procedures ED Course:  
5:31 PM Initial assessment performed. The patients presenting problems have been discussed, and they are in agreement with the care plan formulated and outlined with them. I have encouraged them to ask questions as they arise throughout their visit. 7:11 PM Discussed patient's history, exam, and available diagnostics results with Javier Robertson MD, cardiology, who recommends getting a CTA and if there is an significant pleural effusion to get an echo. BEDSIDE TRANSFER OF CARE: 
7:19 PM 
Patient care will be transferred from Guanakito Moreno MD to Smita Garcia MD.  Discussed available diagnostic results and care plan at length at beside. Patient and family made aware of provider change. All questions answered. Patient and family voiced understanding. Written by LOUIS Olivasibbrianna, as dictated by Guanakito Moreno MD. 
 
8:04 PM Updated Pt family on treatment plan 8:52 PM Discussed patient's history, exam, and available diagnostics results with Jacob Potter MD, internal medicine, who agrees to admit to ICU. 9:49 PM Informed by radiology of a PE in the left pulmonary artery 9:54 PM Discussed patient's history, exam, and available diagnostics results with Castro Hollins MD, internal medicine, who will review chart and decide on appropriate coagulation. Diagnosis and Disposition Discussion: 
Patient was SOB and tachycardic on presentation. CXR showed left effusion. Patient was given empiric antibiotics(Zosyn) for pneumonia. ECG and troponin showed no evidence of ACS. Labs remarkable for anemia, hyponatremia, elevated BNP. CTA chest showed left PE with left pleural effusion and right sided lung cancer metastatic to bone and lymph nodes. As per Dr. Shabana Zamarripa, patient not started on heparin for PE secondary to prior pericardial tamponade. Patient had no evidence of sepsis. Case discussed with hospitalist who agreed with admission to ICU. Critical Care Time:  
I have spent 30 minutes of critical care time involved in lab review, consultations with specialist, family decision-making, and documentation. During this entire length of time I was immediately available to the patient. Critical Care: The reason for providing this level of medical care for this critically ill patient was due a critical illness that impaired one or more vital organ systems such that there was a high probability of imminent or life threatening deterioration in the patients condition. This care involved high complexity decision making to assess, manipulate, and support vital system functions, to treat this degreee vital organ system failure and to prevent further life threatening deterioration of the patients condition. Core Measures: 
For Hospitalized Patients: 
 
1. Hospitalization Decision Time: The decision to hospitalize the patient was made by Anthony Trinh MD at 7:00 PM on 9/16/2018 2. Aspirin: Aspirin was not given because the patient did not present with a stroke at the time of their Emergency Department evaluation 9:26 PM  Patient is being admitted to the hospital by Abahy Esqueda MD. The results of their tests and reasons for their admission have been discussed with them and/or available family. They convey agreement and understanding for the need to be admitted and for their admission diagnosis. CONDITIONS ON ADMISSION: 
Sepsis is not present at the time of admission. Deep Vein Thrombosis is present at the time of admission. Thrombosis is present at the time of admission. Urinary Tract Infection is not present at the time of admission. Pneumonia is not present at the time of admission. MRSA is not present at the time of admission. Wound infection is not present at the time of admission. Pressure Ulcer is not present at the time of admission. CLINICAL IMPRESSION: 
 
1. SOB (shortness of breath) 2. Left pulmonary emboli without right heart strain 3. Left plural effusion 4. Right lung cancer metastatic to bone and lymphnodes PLAN: 
1. Admit to ICU 
_______________________________ Attestations: This note is prepared by Joshua Jean-Baptiste, acting as Scribe for Cno Terrell MD. 
 
Con Terrell MD:  The scribe's documentation has been prepared under my direction and personally reviewed by me in its entirety. I confirm that the note above accurately reflects all work, treatment, procedures, and medical decision making performed by me. This note is prepared by Hunter Patel, acting as Scribe for Rene Craig MD. Rene Craig MD:  The scribe's documentation has been prepared under my direction and personally reviewed by me in its entirety. I confirm that the note above accurately reflects all work, treatment, procedures, and medical decision making performed by me. 
_______________________________

## 2018-09-17 PROBLEM — R91.8 LUNG INFILTRATE: Status: ACTIVE | Noted: 2018-01-01

## 2018-09-17 PROBLEM — R64 CACHEXIA (HCC): Status: ACTIVE | Noted: 2018-01-01

## 2018-09-17 PROBLEM — I26.99 PULMONARY EMBOLI (HCC): Status: ACTIVE | Noted: 2018-01-01

## 2018-09-17 PROBLEM — J90 PLEURAL EFFUSION: Status: ACTIVE | Noted: 2018-01-01

## 2018-09-17 NOTE — PROGRESS NOTES
2250 - Bedside and Verbal transfer report given to Charlene Hayes RN (ICU nurse) by Verónica Sherwood RN (ED nurse). Report included the following information SBAR, Kardex, Intake/Output, Accordion, Recent Results, Med Rec Status and Cardiac Rhythm ST.  
2310 - Pt assessed and vitals obtained. Pt bathed and skin checked. No open areas or redness present. Dual EKG strip read to be sinus tach. Pt tachypnic, anxious, sats 100% on room air, dyspnea with exertion, lung sounds clear to diminished on the Left and clear on the right. AxOx4, endorses pain 8/10 in back. CT of chest shows PE, no anticoagulation at this time due to \"further risk of hemopericardium and tamponade\" per Dr Cuong Griffith. Denies nausea and vomiting. Voiding in bedpan and given purewick after urine sample obtained. Son at the bedside. Will continue to monitor, see EMR for full details. 18 - Paged Dr Cuong Griffith about Lactic of 2.1 and pt pain 8/10 and requesting Morphine. No orders recived at this time for lactic, Morphine orders received. 0000 - Admission documentation completed, morphine and benadryl given per orders. Son ready to leave, given pt code. Informed that tomorrow palliative care team will be in contact with pt.  
0300 - Pt reassessed and vitals obtained. Minimal changes. Pt less anxious and resting comfortably at this time. Will continue to monitor, see EMR for full details. 4936 - Pt requested pain medication and benadryl for itching. Medicated per orders. 0500 - Labwork unable to obtained at this time by phlebotomist. Attempt will be made again at a later time. [address] called RN to notify RN that lab work was unable to be obtained. Next phlebotolomist to try around 8.  
0745 - Bedside and Verbal shift change report given to Wilfred Mo RN (oncoming nurse) by Charlene Hayes RN (offgoing nurse).  Report included the following information SBAR, Kardex, Intake/Output, Accordion, Recent Results and Cardiac Rhythm NSR/ST. [place] RN aware that pt needs lab work drawn.

## 2018-09-17 NOTE — PROGRESS NOTES
INITIAL NUTRITION ASSESSMENT 
  
RECOMMENDATIONS/PLAN:  
Ensure Enlive TID Monitor labs/lytes, PO intakes, skin integrity, wt, fluid status, BM 
 
REASON FOR ASSESSMENT:  
 
[]  RN Referral:  
 [x] MST score >/=2 Malnutrition Screening Tool (MST): 
Recently Lost Weight Without Trying: Yes If Yes, How Much Weight Loss: 2-13 lbs Eating Poorly Due to Decreased Appetite: Yes MST Score: 2 NUTRITION ASSESSMENT:  
Client History: 66 yrs old Female admitted with pe, SOB, stage IV lung ca. PMHx: lung ca, HTN, PAD, pe 
Cultural/Gnosticism Food Preferences: None Identified FOOD/NUTRITION HISTORY Diet History: pt was recently admitted where she told dietitian that she had lost appetite; pt continues to lose wt per chart hx Food Allergies:  [x] NKFA Pertinent PTA Medications: synthroid, lopressor, pericolace NUTRITION INTAKE Diet Order:  Regular Average PO Intake:      
No data found. Pertinent Medications:  [x] Reviewed; colace, synthroid, lopressor, Electrolyte Replacement Protocol: [x]K  [x]Mg  [x]PO4 Insulin:  [] SSI  [] Pre-meal   []  Basal   [] Drip  [] None Pt expected to meet estimated nutrient needs through next review:          []  Yes     [x] No; pt estimated needs are very high due to disease state; pt will not be able to meet estimated needs  ANTHROPOMETRICS Height: 5' 7\" (170.2 cm)       Weight: 79.8 kg (176 lb) BMI: 27.6 kg/m^2  -  overweight (25.0%-29.9% BMI) Weight change: pt was 192# on 8/21/18 currently pt is 176# which is a significant wt loss of  -8.33% Comparison to Reference Standards: IBW: 135 lbs      %IBW: 130%      AdjBW: n/a NUTRITION-FOCUSED PHYSICAL ASSESSMENT Skin: No PU    
GI: No BM 
 
BIOCHEMICAL DATA & MEDICAL TESTS Pertinent Labs:  [x] Reviewed; Na-127 NUTRITION PRESCRIPTION Calories: 3074-5437 kcal/day based on 35-40kcal/kg Protein: 120g/day based on 1.5 g/kg Fluid: 0758-4087 ml/day based on 1 kcal/ml NUTRITION DIAGNOSES:  
1. Malnutrition related to adv disease state as evidence by -8.33% wt loss in less than a month and poor appetite NUTRITION INTERVENTIONS:  
INTERVENTIONS:        GOALS: 
1. Commercial Beverage-Ensure Enlive TID 1. Encourage PO intake >50% at most meals by next review 4 days LEARNING NEEDS (Diet, Supplementation, Food/Nutrient-Drug Interaction):  
[x] None Identified 
[] Inpatient education provided/documented   
[] Identified and patient:  [] Declined     [] Was not appropriate/indicated NUTRITION MONITORING /EVALUATION:  
Follow PO intake Monitor wt Monitor renal labs, electrolytes, fluid status Monitor for additional supplement needs [x] Participated in Interdisciplinary Rounds 
[x] [address] Reviewed/Documented DISCHARGE NUTRITION RECOMMENDATIONS ADDRESSED:  
   [x] To be determined closer to discharge NUTRITION RISK:     [x]  At risk                     []  Not currently at risk Will follow-up per policy. Zully Cummings, [address]

## 2018-09-17 NOTE — ROUTINE PROCESS
Ultrasound Guided Left Thoracentesis was preformed. 1150 cc's drained from Left Pleural Cavity. Fluid sent to lab for testing. Procedure was done at bed side in ICU. Nurse was present during procedure. Patient tolerated procedure without pain.

## 2018-09-17 NOTE — DIABETES MGMT
GLYCEMIC CONTROL & NUTRITION: 
 
 
- discussed in rounds and chart reviewed; no known h/o DM, BG currently within target range. No glycemic control needs identified at this time. Recent Glucose Results:  
Lab Results Component Value Date/Time GLU 87 09/17/2018 07:01 AM  
 GLU 95 09/16/2018 06:20 PM  
 
 
 
Mando Cantrell MS, RN, CDE Glycemic Control Team 
[phone] Pager [phone] (M-TH 8:30-5P) *After Hours pager [phone]
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
## 2018-09-17 NOTE — PROGRESS NOTES
Called about a PE on her CT scan results This is a very difficult and unfortunate case Ms Irving Posadas had a hemopericardium that required drainage by cardiology in August 
She received a IVC filter at that time for an acute DVT as she was NOT able to receive blood thinners due to the cardiac issue She will risk further hemopericardium and tamponade if anticoagulated She has developed this PE with a filter in place Her advanced malignancy related issues warrant hospice care as she is in the final stages of terminal cancer We discussed this aat her last admission and she did not want to engage the discussion However we are unable to offer much at this juncture that will not cause more issue for her and seeking comfort care measures to optimize her final stage of life is in my opinion the best plan

## 2018-09-17 NOTE — PROGRESS NOTES
Father Vaibhav Diaz visited with   [facility], who is a 66 y.o.,female. Father Vaibhav Diaz provided [employer]. Chaplains will continue to follow and will provide pastoral care on an as needed/requested basis.  recommends bedside caregivers page  on duty if patient shows signs of acute spiritual or emotional distress. Daisy Griffith MDiv Spiritual Care Department [phone] - Office

## 2018-09-17 NOTE — CONSULTS
Nephrology Consult Note Admit Date:    9/16/2018 Name:  Sadie Maldonado Age :  66 y.o. Consult Requesting Physican:  
Dr Monroe Elliott Reason for Consult: Hyponatremia HPI:  
Nima Neumann is 65 yo aaf with PMHx of HTN and Lung ca who presented with progressive SOB. No fever or CP. Pt was recently diagnosed with metastatic Lung ca and was about to start chemotherapy. On initial evaluation CTA showed Left main pulmonary artery pulmonary embolism,  right upper lobe neoplasm with metastatic involvement in the right 
hilum, mediastinum, left clavicle, and left adrenal gland, and moderate left pleural effusion with associated left basilar Atelectasis. Labs showed SNa of 131 and normal Cr 0.8. Pt denies excessive water intake. Looking back She had mild hyponatremia last month that has resolved since then. Pt currently c/o SOB, otherwise no CP, fever. No N/V/D. No confusion or balance issue. Lab from this am showed SNa further down to 127. Impression: - Hyponatremia, possibly SIADH given lung ca +/- pain induced. Clinically seems euvolumic. SNa trending down. - HTN 
- SOB sec to ? Lt pleural effusion vs ?PE vs ?met lung ca - Lung Ca, metastatic - PE 
- Anemia - Lt pleural effusion - Hx of DVT, s/p IVC filter Plan: - Restrict water intake to 1.2L/24 hrs - Check serum Osm, Urine Na and Osm  
- Minimize IVF when possible - I/O  
- Pain control - Plan for thoracentesis PM/SHx:  
 
Active Ambulatory Problems Diagnosis Date Noted  Metastatic lung cancer (metastasis from lung to other site) [facility]) 08/16/2018  Leg DVT (deep venous thromboembolism), acute, left ([address] 08/16/2018  PAD (peripheral artery disease) ([address] 08/16/2018  Pericardial effusion 08/16/2018 Resolved Ambulatory Problems Diagnosis Date Noted  Dehydration 08/16/2018  Hyponatremia 08/16/2018  Acute renal injury due to hypovolemia ([address] 08/16/2018 Past Medical History:  
Diagnosis Date  Cancer ([facility]  HTN (hypertension) 9/16/2018  PAD (peripheral artery disease) ([facility]  Pericardial effusion Meds:  
 
Current Facility-Administered Medications:  
  influenza vaccine 2018-19 (6 mos+)(PF) (FLUARIX QUAD/FLULAVAL QUAD) injection 0.5 mL, 0.5 mL, IntraMUSCular, ONCE, Gonzalo Solares MD 
  piperacillin-tazobactam (ZOSYN) 3.375 g in 0.9% sodium chloride (MBP/ADV) 100 mL MBP, 3.375 g, IntraVENous, Q6H, Gonzalo Solares MD, Stopped at 09/17/18 1115   acetaminophen (TYLENOL) tablet 650 mg, 650 mg, Oral, Q4H PRN, Gonzalo Solares MD 
  naloxone [facility]) injection 0.4 mg, 0.4 mg, IntraVENous, PRN, Gonzalo Solares MD 
  diphenhydrAMINE (BENADRYL) injection 12.5 mg, 12.5 mg, IntraVENous, Q4H PRN, Gonzalo Solares MD, 12.5 mg at 09/17/18 0413   ondansetron (ZOFRAN) injection 4 mg, 4 mg, IntraVENous, Q4H PRN, Gonzalo Solares MD 
  docusate sodium (COLACE) capsule 100 mg, 100 mg, Oral, BID, Gonzalo Solares MD, 100 mg at 09/17/18 2780   heparin (porcine) injection 5,000 Units, 5,000 Units, SubCUTAneous, Q8H, Gonzalo Solares MD, Stopped at 09/17/18 0800 
  albuterol-ipratropium (DUO-NEB) 2.5 MG-0.5 MG/3 ML, 3 mL, Nebulization, Q4H PRN, Gonzalo Solares MD 
  levothyroxine (SYNTHROID) tablet 75 mcg, 75 mcg, Oral, ACB, Gonzalo Solares MD, 75 mcg at 09/17/18 2926   metoprolol tartrate (LOPRESSOR) tablet 12.5 mg, 12.5 mg, Oral, BID, Gonzalo Solares MD, 12.5 mg at 09/17/18 8055   oxyCODONE ER (OxyCONTIN) tablet 10 mg, 10 mg, Oral, Q12H, Gonzalo Solares MD, 10 mg at 09/17/18 3024   oxyCODONE-acetaminophen (PERCOCET) 5-325 mg per tablet 1 Tab, 1 Tab, Oral, Q6H PRN, Gonzalo Solares MD, 1 Tab at 09/16/18 2130   Vancomycin- Pharmacy to dose, 1 Each, Other, Rx Dosing/Monitoring, Gonzalo Solares MD 
  ELECTROLYTE REPLACEMENT PROTOCOL- Potassium, 1 Each, Other, PRN, Gonzalo Solares MD 
  ELECTROLYTE REPLACEMENT PROTOCOL- Magnesium, 1 Each, Other, PRN, Gonzalo Solares MD 
  ELECTROLYTE REPLACEMENT PROTOCOL- Phosphorus, 1 Each, Other, PRN, Gonzalo Solares MD 
   ELECTROLYTE REPLACEMENT PROTOCOL- Calcium, 1 Each, Other, PRN, Maren Nance MD 
  vancomycin (VANCOCIN) 1,000 mg in 0.9% sodium chloride (MBP/ADV) 250 mL adv, 1,000 mg, IntraVENous, Q12H, Maren Nance MD, Last Rate: 125 mL/hr at 18 1213, 1,000 mg at 18 1213   morphine injection 1 mg, 1 mg, IntraVENous, Q2H PRN, Shannan Contreras MD, 1 mg at 18 1304 Allergy: No Known Allergies FHx:  
History reviewed. No pertinent family history. SHx:  
 
Social History Social History  Marital status:  Spouse name: N/A  
 Number of children: N/A  
 Years of education: N/A Occupational History  Not on file. Social History Main Topics  Smoking status: Former Smoker  Smokeless tobacco: Never Used  Alcohol use Yes Comment: occasionally  Drug use: Yes Special: Marijuana  Sexual activity: Not on file Other Topics Concern  Not on file Social History Narrative ROS:  
Per HPI otherwise neg. Objective:  
 
Visit Vitals  /74  Pulse (!) 102  Temp 98.7 °F (37.1 °C)  Resp 28  Ht 5' 7\" (1.702 m)  Wt 79.8 kg (176 lb)  SpO2 95%  Breastfeeding No  
 BMI 27.57 kg/m2 Temp (24hrs), Av.1 °F (36.7 °C), Min:97.6 °F (36.4 °C), Max:98.7 °F (37.1 °C) Intake/Output Summary (Last 24 hours) at 18 1317 Last data filed at 18 0700 Gross per 24 hour Intake                0 ml Output              500 ml Net             -500 ml Physical Exam:  
 
General Appearance: no pain, no distress Head: atraumatic HEENT: no jaundice, moist oral mucosa Neck: no JVD noted, Lt clavicular mass Chest: Decrease air entry on the lt side. Heart: S1/S2, no murmur, gallop or rub, RRR Adbomen: soft, nontender, BS active : no flank tenderness, no howard catheter Skin: intact, no rash Extremity: no edema, cyanosis or clubbing MS: No joint deformity or tenderness Neuro: conscious, alert, oriented x 3, no focal deficit Data Review: BMP Lab Results Component Value Date/Time Sodium 127 (L) 09/17/2018 07:01 AM  
 Potassium 4.9 09/17/2018 07:01 AM  
 Chloride 93 (L) 09/17/2018 07:01 AM  
 CO2 22 09/17/2018 07:01 AM  
 Anion gap 12 09/17/2018 07:01 AM  
 Glucose 87 09/17/2018 07:01 AM  
 BUN 11 09/17/2018 07:01 AM  
 Creatinine 0.83 09/17/2018 07:01 AM  
 BUN/Creatinine ratio 13 09/17/2018 07:01 AM  
 GFR est AA >60 09/17/2018 07:01 AM  
 GFR est non-AA >60 09/17/2018 07:01 AM  
 Calcium 9.0 09/17/2018 07:01 AM  
 
 
CBC Lab Results Component Value Date/Time WBC 12.9 09/17/2018 07:57 AM  
 HGB 9.7 (L) 09/17/2018 07:57 AM  
 HCT 31.5 (L) 09/17/2018 07:57 AM  
 PLATELET 096 80/30/1898 07:57 AM  
 MCV 81.4 09/17/2018 07:57 AM  
 
 
No components found for: PTHINT Lab Results Component Value Date IRON 16 (L) 08/20/2018 [facility] [phone]

## 2018-09-17 NOTE — PROGRESS NOTES
Bedside shift report received by offgoing Nurse  Santana Rooney in SBAR format, all questions answered fully,Patient admitted for worsening SOB, dx of stage 4 lung cancer that has metastasized to her liver and bones,PAD, pericardial effusion, her SOB is exacerbated when laying down. Patient had a recent admission here on 8/16/18 and had a IVC filter placed to stop blood clots from traveling to her brain and heart Pt had fluid surrounding her heart and had a procedure performed to remove the fluid. Pt was discharged on 8/28/18. Pt was seen at this facility again on 9/1/18 and admitted and discharged. Pt has BLE DVT's. Pt reports Hx of metastatic lung cancer. Pt's oncologist is Dr. Grady Bedolla. Pt;s PCP is Dr. Rosibel Munoz. Patient is afebrile, denies cough, c/o SOB with any exertion, currently on bed rest, repositioned for comfort, in upright position. call bell in reach. Assumed care of patient at this time. Dr Siena Estrada from oncology at bedside. New PE not on anticoagulants due to hemorrhagic effusion, pericardial effusion CT 1026 No evidence of intracranial metastatic disease or other acute or significant 
intracranial finding Thoracentesis 1150 ml removed by Radiologist,patient tolerated well,Reassessment completed,no changes at this time 1500 Echo completed Reassessment completed,breathing improved,Pain well managed with PRN Morphine and routine oxycontin. 1845 OK to give next dose of heparin per Radiology standpoint and DR Ava Cooper. 0845 Shift report given to Celine Mcgrath RN in [employer], all questions answered fully, Patient resting comfortably in bed,Pain managed well throughout shift call bell in reach, family at bedside. Patient denies any pain or chest pain

## 2018-09-17 NOTE — CONSULTS
[facility] Pulmonary, Critical Care, and Sleep Medicine Name: Luis Daniel Hester MRN: [id number] : 1955 Hospital: [facility] [place] Date: 2018 Pulmonary Critical Care Initial Patient Consult IMPRESSION:  
· Principal Problem: 
·   SOB (shortness of breath) (2018) ·  
· Active Problems: ·   Metastatic lung cancer (metastasis from lung to other site) [facility]) (2018) ·  
·   Pericardial effusion (2018) ·     Overview: Added automatically from request for surgery [id number] ·  
·   HTN (hypertension) (2018) ·  
·   Anemia (2018) ·  
· Left side pleural effusion · Left side possible PE · Left basilar opacity being treated for any post-obstructive pneumonia · Hx of DVT, s/p IVC filter for inability to anticoagulate from hemorrhagic pericardial effusion and tamponade requiring catheter drainage · PAD  
RECOMMENDATIONS:  
Compensated respiratory status; on room air, monitor for goal SPO2 > 90% Aspiration prevention bundle, head of the bed at 30' all times Bronchodilators PRN, pulmonary hygiene care Discuss option of left side thoracentesis. Standard discussion of thoracentesis alternatives, risk/complication with the patient includes but not limited to bleeding, infection, not able to find anything, injury to lungs and nearby organs, need for chest tube for air leak, need for more procedures/ surgeries, respiratory failure, medications side effects, discomfort, trapped lung, and any unforeseen adverse events. All her questions answered. Patient prefers to speak with son before consenting. Given recent events of hemorrhagic pericardial effusion with tamponade, reaccumulation of pericardial fluid; will await input from cardiology, follow up ECHO, oncology input Antibiotic choice: Zosyn and Vancomycin Cultures drawn and will be followed. Lactic acid repeat. Monitor hemodynamics; currently stable Check ECHO; cardiology consulted Await oncology input Check CT head and PVL legs DVT prophylaxis - Heparin SQ 
AM labs Diet as tolerated Palliative care consulted. Overall poor prognosis. Will defer respective systems problem management to primary and other consultant and follow patient in ICU with primary and other medical team 
Further recommendations will be based on the patient's response to recommended treatment and results of the investigation ordered. Quality Care: PPI, DVT prophylaxis, HOB elevated, Infection control all reviewed and addressed. PAIN AND SEDATION: percocet prn 
· Skin/Wound: some skin old bruising · Nutrition: oral diet · Prophylaxis: DVT and GI Prophylaxis reviewed · Restraints: none 
· PT/OT eval and treat: when more stable · Lines/Tubes: lines PIV; howard none ADVANCE DIRECTIVE: Full code. Palliative consulted FAMILY DISCUSSION: spoke with patient Events and notes from last 24 hours reviewed. Care plan discussed with nursing Subjective/History: Ms. Holly Fish has been seen and evaluated as Dr. Camilla Marino requested for lung cancer and dyspnea. Patient is a 66 y.o. female with PMHx for metastatic adenocarcinoma of lung, hemorrhagic pericardial effusion, DVT s/p IVC filter, PAD presented with worsening in SOB over past 4-5 days with associated orthopnea leading to ED visit. The patient denies fever, chill, cough, chest pain, wheezing or hemoptysis. Reports of palpitation and lightheadedness. The patient denies any symptoms of neurological impairment or TIA's; abdominal or flank pain, nausea or vomiting, dysphagia, change in bowel habits or bleeding from any body orifices. In ED, work up with CTA chest showed known lung mass with pleural effusion, pericardial effusion and possible left side PE. She remained hemodynamically stable and on room air. At home she is not on any O2, worked with PT, used compression stockings intermittently. Review of Systems: 
Pertinent items are noted in HPI. Latest lactic acid:  
Lactic acid Date Value Ref Range Status 09/16/2018 2.1 (HH) 0.4 - 2.0 MMOL/L Final  
  Comment:  
  CALLED TO AND CORRECTLY REPEATED BY: 
Mike Page RN ICU TO [facility] AT 2308 09/16/18 09/16/2018 2.1 (HH) 0.4 - 2.0 MMOL/L Final  
  Comment:  
  CALLED TO AND CORRECTLY REPEATED BY: 
Cristina Davies RN ER TO [facility] AT 2011 ON [id number] 
  
08/17/2018 1.7 0.4 - 2.0 MMOL/L Final  
 
 
Past Medical History: 
Past Medical History:  
Diagnosis Date  Cancer ([facility] [address] lung cancer  HTN (hypertension) 9/16/2018  PAD (peripheral artery disease) ([facility] [address]  Pericardial effusion Past Surgical History: 
Past Surgical History:  
Procedure Laterality Date  VASCULAR SURGERY PROCEDURE UNLIST Right   
 opened up vessels Medications: 
Prior to Admission medications Medication Sig Start Date End Date Taking? Authorizing Provider  
levothyroxine (SYNTHROID) 75 mcg tablet Take 1 Tab by mouth Daily (before breakfast). 8/29/18  Yes Erwin Denton MD  
metoprolol tartrate (LOPRESSOR) 25 mg tablet Take 0.5 Tabs by mouth two (2) times a day. Patient taking differently: Take 25 mg by mouth two (2) times a day. 8/28/18   Erwin Denton MD  
oxyCODONE ER (OXYCONTIN) 10 mg ER tablet Take 1 Tab by mouth every twelve (12) hours. Max Daily Amount: 20 mg. 8/28/18   Erwin Denton MD  
oxyCODONE-acetaminophen (PERCOCET) 5-325 mg per tablet Take 1 Tab by mouth every six (6) hours as needed. Max Daily Amount: 4 Tabs. Patient taking differently: Take 1 Tab by mouth every six (6) hours as needed for Pain. 8/28/18   Erwin Denton MD  
senna-docusate (PERICOLACE) 8.6-50 mg per tablet Take 1 Tab by mouth daily. 8/29/18   Erwin Denton MD  
 
 
Current Facility-Administered Medications Medication Dose Route Frequency  influenza vaccine 2018-19 (6 mos+)(PF) (FLUARIX QUAD/FLULAVAL QUAD) injection 0.5 mL  0.5 mL IntraMUSCular ONCE  
  piperacillin-tazobactam (ZOSYN) 3.375 g in 0.9% sodium chloride (MBP/ADV) 100 mL MBP  3.375 g IntraVENous Q6H  
 docusate sodium (COLACE) capsule 100 mg  100 mg Oral BID  heparin (porcine) injection 5,000 Units  5,000 Units SubCUTAneous Q8H  
 levothyroxine (SYNTHROID) tablet 75 mcg  75 mcg Oral ACB  metoprolol tartrate (LOPRESSOR) tablet 12.5 mg  12.5 mg Oral BID  
 oxyCODONE ER (OxyCONTIN) tablet 10 mg  10 mg Oral Q12H  Vancomycin- Pharmacy to dose  1 Each Other Rx Dosing/Monitoring  vancomycin (VANCOCIN) 1,000 mg in 0.9% sodium chloride (MBP/ADV) 250 mL adv  1,000 mg IntraVENous Q12H Allergy: No Known Allergies Social History: 
Social History Substance Use Topics  Smoking status: Former Smoker  Smokeless tobacco: Never Used  Alcohol use Yes Comment: occasionally Family History: 
History reviewed. No family history for lung cancer. Objective:  
Vital Signs:   
Blood pressure 148/79, pulse (!) 116, temperature 98.2 °F (36.8 °C), resp. rate (!) 34, height 5' 7\" (1.702 m), weight 79.8 kg (176 lb), SpO2 98 %, not currently breastfeeding. Body mass index is 27.57 kg/(m^2). O2 Device: Room air Temp (24hrs), Av.9 °F (36.6 °C), Min:97.6 °F (36.4 °C), Max:98.2 °F (36.8 °C) Intake/Output:  
Last shift:        
Last 3 shifts: 09/15 1901 -  0700 In: -  
Out: 250 [Urine:250] Intake/Output Summary (Last 24 hours) at 18 0830 Last data filed at 18 2310 Gross per 24 hour Intake                0 ml Output              250 ml Net             -250 ml Physical Exam: 
General: Alert and oriented to all spheres, in no respiratory distress and acyanotic, cooperative, no distress, appears older than stated age, on room air HEENT: PERRLA, EOMI, fundi benign, throat normal without erythema or exudate Neck: No abnormally enlarged lymph nodes or thyroid, supple Chest: normal 
 Lungs: decreased air exchange LLL, breathing normal , dullness to percussion L base, rhonchi L base, no tenderness/ rash Heart: Regular rate and rhythm, S1S2 present or without murmur or extra heart sounds Abdomen: non distended, bowel sounds normoactive, tympanic, abdomen is soft without significant tenderness, masses, organomegaly or guarding, rigidity, rebound Extremity: negative for edema, cyanosis, clubbing Capillary refill: normal 
Neuro: alert, oriented x 3, no defects noted in general exam. 
Skin: Skin color, texture, turgor fair. Skin dry, warm, non-diaphoretic Data:  
 
Recent Results (from the past 24 hour(s)) EKG, 12 LEAD, INITIAL Collection Time: 09/16/18  6:12 PM  
Result Value Ref Range Ventricular Rate 110 BPM  
 Atrial Rate 110 BPM  
 P-R Interval 140 ms QRS Duration 78 ms Q-T Interval 352 ms QTC Calculation (Bezet) 476 ms Calculated P Axis 44 degrees Calculated R Axis 10 degrees Calculated T Axis 122 degrees Diagnosis Sinus tachycardia Possible Left atrial enlargement T wave abnormality, consider lateral ischemia Abnormal ECG When compared with ECG of 01-SEP-2018 14:55, Inverted T waves have replaced nonspecific T wave abnormality in Lateral  
leads CBC WITH AUTOMATED DIFF Collection Time: 09/16/18  6:20 PM  
Result Value Ref Range WBC 12.9 4.6 - 13.2 K/uL  
 RBC 3.42 (L) 4.20 - 5.30 M/uL HGB 8.5 (L) 12.0 - 16.0 g/dL HCT 27.5 (L) 35.0 - 45.0 % MCV 80.4 74.0 - 97.0 FL  
 MCH 24.9 24.0 - 34.0 PG  
 MCHC 30.9 (L) 31.0 - 37.0 g/dL  
 RDW 18.7 (H) 11.6 - 14.5 % PLATELET 007 636 - 145 K/uL MPV 8.5 (L) 9.2 - 11.8 FL  
 NEUTROPHILS 76 (H) 40 - 73 % LYMPHOCYTES 14 (L) 21 - 52 % MONOCYTES 8 3 - 10 % EOSINOPHILS 1 0 - 5 % BASOPHILS 1 0 - 2 %  
 ABS. NEUTROPHILS 9.8 (H) 1.8 - 8.0 K/UL  
 ABS. LYMPHOCYTES 1.8 0.9 - 3.6 K/UL  
 ABS. MONOCYTES 1.0 0.05 - 1.2 K/UL  
 ABS. EOSINOPHILS 0.2 0.0 - 0.4 K/UL ABS. BASOPHILS 0.1 0.0 - 0.1 K/UL  
 DF AUTOMATED METABOLIC PANEL, COMPREHENSIVE Collection Time: 09/16/18  6:20 PM  
Result Value Ref Range Sodium 131 (L) 136 - 145 mmol/L Potassium 5.1 3.5 - 5.5 mmol/L Chloride 95 (L) 100 - 108 mmol/L  
 CO2 27 21 - 32 mmol/L Anion gap 9 3.0 - 18 mmol/L Glucose 95 74 - 99 mg/dL BUN 13 7.0 - 18 MG/DL Creatinine 0.80 0.6 - 1.3 MG/DL  
 BUN/Creatinine ratio 16 12 - 20 GFR est AA >60 >60 ml/min/1.73m2 GFR est non-AA >60 >60 ml/min/1.73m2 Calcium 9.1 8.5 - 10.1 MG/DL Bilirubin, total 0.7 0.2 - 1.0 MG/DL  
 ALT (SGPT) 17 13 - 56 U/L  
 AST (SGOT) 23 15 - 37 U/L Alk. phosphatase 279 (H) 45 - 117 U/L Protein, total 7.7 6.4 - 8.2 g/dL Albumin 1.9 (L) 3.4 - 5.0 g/dL Globulin 5.8 (H) 2.0 - 4.0 g/dL A-G Ratio 0.3 (L) 0.8 - 1.7 CARDIAC PANEL,(CK, CKMB & TROPONIN) Collection Time: 09/16/18  6:20 PM  
Result Value Ref Range CK 37 26 - 192 U/L  
 CK - MB <1.0 <3.6 ng/ml CK-MB Index  0.0 - 4.0 % CALCULATION NOT PERFORMED WHEN RESULT IS BELOW LINEAR LIMIT Troponin-I, Qt. <0.02 0.00 - 0.06 NG/ML  
NT-PRO BNP Collection Time: 09/16/18  6:20 PM  
Result Value Ref Range NT pro-BNP 1278 (H) 0 - 900 PG/ML  
MAGNESIUM Collection Time: 09/16/18  6:20 PM  
Result Value Ref Range Magnesium 1.9 1.6 - 2.6 mg/dL PHOSPHORUS Collection Time: 09/16/18  6:20 PM  
Result Value Ref Range Phosphorus 4.3 2.5 - 4.9 MG/DL  
CULTURE, BLOOD Collection Time: 09/16/18  7:20 PM  
Result Value Ref Range Special Requests: NO SPECIAL REQUESTS Culture result: NO GROWTH AFTER 11 HOURS    
LACTIC ACID Collection Time: 09/16/18  7:20 PM  
Result Value Ref Range Lactic acid 2.1 (HH) 0.4 - 2.0 MMOL/L  
CULTURE, BLOOD Collection Time: 09/16/18  8:00 PM  
Result Value Ref Range Special Requests: NO SPECIAL REQUESTS Culture result: NO GROWTH AFTER 11 HOURS    
LACTIC ACID  Collection Time: 09/16/18 10:19 PM  
 Result Value Ref Range Lactic acid 2.1 (HH) 0.4 - 2.0 MMOL/L  
URINALYSIS W/ RFLX MICROSCOPIC Collection Time: 09/17/18 12:30 AM  
Result Value Ref Range Color YELLOW Appearance CLEAR Specific gravity >1.030 (H) 1.005 - 1.030  
 pH (UA) 5.0 5.0 - 8.0 Protein TRACE (A) NEG mg/dL Glucose NEGATIVE  NEG mg/dL Ketone TRACE (A) NEG mg/dL Bilirubin NEGATIVE  NEG Blood NEGATIVE  NEG Urobilinogen 1.0 0.2 - 1.0 EU/dL Nitrites NEGATIVE  NEG Leukocyte Esterase NEGATIVE  NEG    
URINE MICROSCOPIC ONLY Collection Time: 09/17/18 12:30 AM  
Result Value Ref Range WBC 1 to 3 0 - 5 /hpf  
 RBC NEGATIVE  0 - 5 /hpf Epithelial cells FEW 0 - 5 /lpf Bacteria FEW (A) NEG /hpf Mucus NEGATIVE  NEG /lpf Uric acid crystals 1+ (A) NEG  
METABOLIC PANEL, BASIC Collection Time: 09/17/18  7:01 AM  
Result Value Ref Range Sodium 127 (L) 136 - 145 mmol/L Potassium 4.9 3.5 - 5.5 mmol/L Chloride 93 (L) 100 - 108 mmol/L  
 CO2 22 21 - 32 mmol/L Anion gap 12 3.0 - 18 mmol/L Glucose 87 74 - 99 mg/dL BUN 11 7.0 - 18 MG/DL Creatinine 0.83 0.6 - 1.3 MG/DL  
 BUN/Creatinine ratio 13 12 - 20 GFR est AA >60 >60 ml/min/1.73m2 GFR est non-AA >60 >60 ml/min/1.73m2 Calcium 9.0 8.5 - 10.1 MG/DL MAGNESIUM Collection Time: 09/17/18  7:01 AM  
Result Value Ref Range Magnesium 1.9 1.6 - 2.6 mg/dL PHOSPHORUS Collection Time: 09/17/18  7:01 AM  
Result Value Ref Range Phosphorus 4.0 2.5 - 4.9 MG/DL No results for input(s): FIO2I, IFO2, HCO3I, IHCO3, HCOPOC, PCO2I, PCOPOC, IPHI, PHI, PHPOC, PO2I, PO2POC in the last 72 hours. No lab exists for component: IPOC2 All Micro Results Procedure Component Value Units Date/Time CULTURE, BLOOD [[id number]] Collected:  09/16/18 1920 Order Status:  Completed Specimen:  Whole Blood from Blood Updated:  09/17/18 7613 Special Requests: NO SPECIAL REQUESTS Culture result: NO GROWTH AFTER 11 HOURS     
 CULTURE, BLOOD [[id number]] Collected:  09/16/18 2000 Order Status:  Completed Specimen:  Blood from Blood Updated:  09/17/18 1205 Special Requests: NO SPECIAL REQUESTS Culture result: NO GROWTH AFTER 11 HOURS Telemetry: normal sinus rhythm, sinus tachycardia 8/22/18 ECHO · Trivial pericardial effusion adjacent to the right ventricle. Bilateral pleural effusion · Normal cavity size, wall thickness and systolic function (ejection fraction normal). The estimated ejection fraction is 61 - 65% Imaging: 
[x]I have personally reviewed the patients chest radiographs images and report Results from Hospital Encounter encounter on 09/16/18 XR CHEST PORT Narrative PORTABLE CHEST AT 9033 HOURS: 
 
Indication: Shortness of breath. Technique:  Portable, erect AP view. Comparison:  09/01/2018 Findings:  Persistent known right upper lobe lung mass. Increasing opacity 
within the left mid to lower lung zone consistent with increasing pleural 
effusion with associated consolidation, atelectasis or pneumonia. No pulmonary 
edema or pneumothorax. Cardiac silhouette and mediastinal structures are stable. Blunting of the right paratracheal stripe consistent with enlarged lymph nodes. Medial left clavicular lesion is subtle with loss of inferior cortex. Impression IMPRESSION: 
 
1. Increasing opacity within the left mid to lower lung zone consistent with 
increasing pleural effusion with associated consolidation, atelectasis or 
pneumonia. 2. Persistent known right upper lobe lung mass with enlarged right paratracheal 
lymph nodes. Results from Hospital Encounter encounter on 09/16/18 CTA CHEST W OR W WO CONT Narrative EXAM: CTA chest 
 
INDICATION: Dyspnea. COMPARISON: None TECHNIQUE: Axial CT imaging from the thoracic inlet through the diaphragm with intravenous contrast. Coronal and sagittal MIP reformats were generated. One or more dose reduction techniques were used on this CT: automated exposure 
control, adjustment of the mAs and/or kVp according to patient's size, and 
iterative reconstruction techniques. The specific techniques utilized on this CT 
exam have been documented in the patient's electronic medical record. 
 
_______________ FINDINGS: 
 
EXAM QUALITY: There is suboptimal opacification of the pulmonary arteries 
limiting evaluation of pulmonary emboli. PULMONARY ARTERIES: Critical result: Likely filling defect in the left main 
pulmonary artery with extending into the upper and lower lobar branch vessels. MEDIASTINUM: Normal heart size. No evidence of right heart strain. Aorta is 
unremarkable. Moderate-sized pericardial effusion. LUNGS: Large right upper lobe mass measuring 6.1 x 6.8 x 7.1 cm. Left lower 
lobar subsegmental atelectasis secondary to pleural effusion. PLEURA: Moderate size left pleural effusion. AIRWAY: Normal. 
 
LYMPH NODES: There is bulky right hilar and mediastinal adenopathy. Largest 
right paratracheal lymph node measures 4.1 x 3.5 cm. UPPER ABDOMEN: Left adrenal mass measures 3.3 x 4.0 cm. IVC filter in situ. OTHER: Expansile mass arising from the left clavicle with cortical destruction. SUPERFICIAL SOFT TISSUES: Unremarkable. 
 
_______________ Impression IMPRESSION: 
 
1. Left main pulmonary artery pulmonary embolism extending into the left lobar 
arteries. No sugar evidence of right heart strain. Overall clot burden is 
difficult to ascertain due to suboptimal artery opacification. 2. Large right upper lobe neoplasm with metastatic involvement in the right 
hilum, mediastinum, left clavicle, and left adrenal gland. 3. Moderate-sized left pleural effusion with associated left basilar 
atelectasis. Critical results discussed with Dr. Nancy Luciano at 9:45 PM on 9/16/2018. [x]See my orders for details My assessment, plan of care, findings, medications, side effects etc were discussed with: 
[x]nursing []PT/OT   
[]respiratory therapy []Dr. Yadira Bobo [x]Patient [x]Total critical care time exclusive of procedures 45 minutes with complex decision making performed and > 50% time spent in face to face evaluation. Sangeetha Vegas MD 
 
Addendum 9:20 am 
Son at the bedside requesting to speak. Discussed the questions from son and patient. Updated him on CTA chest result and plan of care at patient's request. Discussed role, alternatives, risks of thoracentesis. Answered all their questions. They verbalized understanding and agrees for me to order thoracentesis. Order placed. Sangeetha Vegas MD  
 
Addendum late entry note 4 pm 
Reviewed CT head with RN and patient had uncomplicated left side thoracentesis with removal of 1100+ mL of fluid removed. Will follow up on pl fluid results.  
 
Sangeetha Vegas MD

## 2018-09-17 NOTE — ACP (ADVANCE CARE PLANNING)
Patient does not have an Advance Directive. 
  
She is currently sleeping and having echo completed.  
  
Legal next of kin would be her sons Lord Hollis and Ira Villarreal. 
  
Son, Jyoti Celestin [phone]. 
  
Son, Twila Rojas No contact information available at this time. 
  
Advance Directive needs to be addressed when patient feeling better and more alert and oriented. 
  
Patient is currently Full Code per her son, Lord Hollis. 9/18: Patient disginated son Jyoti Celestin primary medical agent then sister Oskar Espinoza secondary medical agent. She completed only part of second section and wanted to continue discussing wishes with son. Left original AMD with patient and family to continue discussing. So AMD wasn't placed on chart for scanning. She does wish to remain FULL CODE.

## 2018-09-17 NOTE — PROGRESS NOTES
Cm met with pt and son Tracy England at bedside,pt unable to participate in conversation at that time,cm did explain role as discharge planner,prior to admission son stated his mother was receiving home health services but doesn't recall agency name, pt lives alone but has had conversations with his mother about staying with him which he really wants and feels his mother will  be agreeable to it if needed, son did become teary eyed during conversation feels everything moving so fast with his mother's condition,very emotional time for family at this time son plans to talk with palliative and family regarding options cm did provide contact information plan to revisit at another time.

## 2018-09-17 NOTE — INTERDISCIPLINARY ROUNDS
CRITICAL CARE INTERDISCIPLINARY ROUNDS Patient Information: 
 
Name:   Katelynn Rivera Age:   66 y.o. Admission Date:   9/16/2018 Readmit Risk Assessment Information:  
 
Day of Stay:  1 Expected Discharge Date:    TBA Attending Provider:   Sander Najera MD 
 
Primary Care Provider:   Oz Vazquez DO 
 
Problem List:    
Patient Active Problem List  
Diagnosis Code  Metastatic lung cancer (metastasis from lung to other site) [facility]) C34.90  Leg DVT (deep venous thromboembolism), acute, left (HCC) I82.402  PAD (peripheral artery disease) (HCC) I73.9  Pericardial effusion I31.3  
 SOB (shortness of breath) R06.02  
 HTN (hypertension) I10  
 Anemia D64.9  Pleural effusion J90  
 Lung infiltrate R91.8  Pulmonary emboli ([address] I26.99  
 Cachexia ([address] R64 Principal Problem:  SOB (shortness of breath) Procedure:   Thoracentesis During rounds the following quality care indicators and evidence based practices were addressed :     DVT Prophylaxis, PUD Prophylaxis, Pressure Injury Prevention, Pain Management, Nutritional Status and Critical Care Interventions Airways Acute MI/PCI:   Not applicable Transfer Level of Care:  Not Ready for Transfer The patient will require the following interventions based on the Readmission Risk Assessment:  Palliative Care evaluation Discharge Management:  Palliative Care Anticipated Discharge Date:  TBA Interdisciplinary team rounds were held on  9/17/18 with the following team membersCare Management, Diabetes Treatment Specialist, Nursing, Nutrition, Palliative Care, Pharmacy and Physician and the  Patient [facility] Plan of care discussed. See clinical pathway and/or care plan for interventions and desired outcomes.

## 2018-09-17 NOTE — ED NOTES
Pt returned from CT, CT pulled L AC PIV due to infiltration. Swelling noted above IV site. Applied warm, dry heat.
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
## 2018-09-17 NOTE — PROGRESS NOTES
Pharmacy Dosing Services: Vancomycin Consult for Vancomycin Dosing by Pharmacy by Dr. Karina Garcia provided for this 66y.o. year old female , for indication of Empiric Treatment. Day of Therapy 1 Ht Readings from Last 1 Encounters:  
09/16/18 170.2 cm (67\") Wt Readings from Last 1 Encounters:  
09/16/18 79.8 kg (176 lb) Other Current Antibiotics Zosyn 3.375 g IV every 6 hours Significant Cultures Pending Serum Creatinine Lab Results Component Value Date/Time Creatinine 0.80 09/16/2018 06:20 PM  
  
Creatinine Clearance Estimated Creatinine Clearance: 80.3 mL/min (based on Cr of 0.8). BUN Lab Results Component Value Date/Time BUN 13 09/16/2018 06:20 PM  
  
WBC Lab Results Component Value Date/Time WBC 12.9 09/16/2018 06:20 PM  
  
H/H Lab Results Component Value Date/Time HGB 8.5 (L) 09/16/2018 06:20 PM  
  
Platelets Lab Results Component Value Date/Time PLATELET 723 60/28/5826 06:20 PM  
  
Temp 97.6 °F (36.4 °C) Start Vancomycin therapy, with loading dose of 1500 mg at 2300 9/16/18. Follow with maintenance dose of 1000 mg at 1100 9/17/18, every 12 hours. Dose calculated to approximate a therapeutic trough of ~15 mcg/mL. Pharmacy to follow daily and will make changes to dose and/or frequency based on clinical status. Pharmacist Abe Salazar 97

## 2018-09-17 NOTE — PROCEDURES
VASCULAR & INTERVENTIONAL RADIOLOGY NOTE: 
 
Procedure:  Portable Ultrasound guided left thoracentesis Pre-operative Diagnosis: Pleural effusion Post-operative Diagnosis: same Indications: Increasing left pleural effusion. Procedure Details: Informed consent obtained from patient. Standard aseptic technique. Ultrasound guidance. Posterior intercostal approach. 1% lidocaine for local anesthesia. 5 [place] [employer] Yueh sheathed needle connected to vacuum bottle. 1150 mL of serosanguineous fluid removed. Complications:  None immediate. Condition: Stable. Impression:  Successful thoracentesis. Plan: Post procedure chest xray pending. Angelica Dove MD 
Vascular & Interventional Radiology [facility] 9/17/2018

## 2018-09-17 NOTE — PROGRESS NOTES
Phone: [phone] Paging : [phone] Hematology / Oncology Progress Note Admit Date: 2018 Assessment:  
 
Principal Problem: 
  SOB (shortness of breath) (2018) Active Problems: Metastatic lung cancer (metastasis from lung to other site) [facility]) (2018) Pericardial effusion (2018) Overview: Added automatically from request for surgery [id number] HTN (hypertension) (2018) Anemia (2018) Plan:  
 
Advanced NSCLC with borderline performance status PDL1 20% Ros/MET EGFR/ALK negative New PE not  on anticoagulants due to hemorrhagic effusion Pericardial effusion if significant will need a window Would agree with palliative care consult To ill at present for systemic treatment Subjective:  
Notes dyspnea Objective:  
 
Patient Vitals for the past 24 hrs: 
 BP Temp Pulse Resp SpO2 Height Weight  
18 0600 144/74 - (!) 111 28 - - -  
18 0500 130/72 - (!) 109 27 - - -  
18 0400 151/75 - (!) 107 30 - - -  
18 0300 143/62 98.2 °F (36.8 °C) (!) 101 28 98 % - -  
18 0200 140/73 - 97 20 97 % - -  
18 0100 119/71 - 96 29 - - -  
18 0000 154/73 - (!) 115 24 100 % - -  
18 2310 163/63 98.1 °F (36.7 °C) (!) 112 25 100 % - -  
18 2235 136/67 97.6 °F (36.4 °C) (!) 108 30 100 % - -  
18 2030 133/63 - (!) 115 29 100 % - -  
18 1926 - - - - 100 % - -  
18 1900 134/75 - (!) 110 28 100 % - -  
18 1649 136/76 97.6 °F (36.4 °C) (!) 112 20 100 % 5' 7\" (1.702 m) 79.8 kg (176 lb) Intake/Output Summary (Last 24 hours) at 18 0262 Last data filed at 18 2310 Gross per 24 hour Intake                0 ml Output              250 ml Net             -250 ml Temp (24hrs), Av.9 °F (36.6 °C), Min:97.6 °F (36.4 °C), Max:98.2 °F (36.8 °C) ROS: Neg N/V/fever chills/SSCP Pos SANCHEZ, anorexia Exam: 
General: tachyonic HEENT: supraclav LAD 
 Lungs: decreased breath sounds Cardiovascular: muffle heart sounds Abdomen:NT/ND Neurologic:AAO times 4 Recent Results (from the past 24 hour(s)) EKG, 12 LEAD, INITIAL Collection Time: 09/16/18  6:12 PM  
Result Value Ref Range Ventricular Rate 110 BPM  
 Atrial Rate 110 BPM  
 P-R Interval 140 ms QRS Duration 78 ms Q-T Interval 352 ms QTC Calculation (Bezet) 476 ms Calculated P Axis 44 degrees Calculated R Axis 10 degrees Calculated T Axis 122 degrees Diagnosis Sinus tachycardia Possible Left atrial enlargement T wave abnormality, consider lateral ischemia Abnormal ECG When compared with ECG of 01-SEP-2018 14:55, Inverted T waves have replaced nonspecific T wave abnormality in Lateral  
leads CBC WITH AUTOMATED DIFF Collection Time: 09/16/18  6:20 PM  
Result Value Ref Range WBC 12.9 4.6 - 13.2 K/uL  
 RBC 3.42 (L) 4.20 - 5.30 M/uL HGB 8.5 (L) 12.0 - 16.0 g/dL HCT 27.5 (L) 35.0 - 45.0 % MCV 80.4 74.0 - 97.0 FL  
 MCH 24.9 24.0 - 34.0 PG  
 MCHC 30.9 (L) 31.0 - 37.0 g/dL  
 RDW 18.7 (H) 11.6 - 14.5 % PLATELET 525 061 - 517 K/uL MPV 8.5 (L) 9.2 - 11.8 FL  
 NEUTROPHILS 76 (H) 40 - 73 % LYMPHOCYTES 14 (L) 21 - 52 % MONOCYTES 8 3 - 10 % EOSINOPHILS 1 0 - 5 % BASOPHILS 1 0 - 2 %  
 ABS. NEUTROPHILS 9.8 (H) 1.8 - 8.0 K/UL  
 ABS. LYMPHOCYTES 1.8 0.9 - 3.6 K/UL  
 ABS. MONOCYTES 1.0 0.05 - 1.2 K/UL  
 ABS. EOSINOPHILS 0.2 0.0 - 0.4 K/UL  
 ABS. BASOPHILS 0.1 0.0 - 0.1 K/UL  
 DF AUTOMATED METABOLIC PANEL, COMPREHENSIVE Collection Time: 09/16/18  6:20 PM  
Result Value Ref Range Sodium 131 (L) 136 - 145 mmol/L Potassium 5.1 3.5 - 5.5 mmol/L Chloride 95 (L) 100 - 108 mmol/L  
 CO2 27 21 - 32 mmol/L Anion gap 9 3.0 - 18 mmol/L Glucose 95 74 - 99 mg/dL BUN 13 7.0 - 18 MG/DL Creatinine 0.80 0.6 - 1.3 MG/DL  
 BUN/Creatinine ratio 16 12 - 20 GFR est AA >60 >60 ml/min/1.73m2 GFR est non-AA >60 >60 ml/min/1.73m2 Calcium 9.1 8.5 - 10.1 MG/DL Bilirubin, total 0.7 0.2 - 1.0 MG/DL  
 ALT (SGPT) 17 13 - 56 U/L  
 AST (SGOT) 23 15 - 37 U/L Alk. phosphatase 279 (H) 45 - 117 U/L Protein, total 7.7 6.4 - 8.2 g/dL Albumin 1.9 (L) 3.4 - 5.0 g/dL Globulin 5.8 (H) 2.0 - 4.0 g/dL A-G Ratio 0.3 (L) 0.8 - 1.7 CARDIAC PANEL,(CK, CKMB & TROPONIN) Collection Time: 09/16/18  6:20 PM  
Result Value Ref Range CK 37 26 - 192 U/L  
 CK - MB <1.0 <3.6 ng/ml CK-MB Index  0.0 - 4.0 % CALCULATION NOT PERFORMED WHEN RESULT IS BELOW LINEAR LIMIT Troponin-I, Qt. <0.02 0.00 - 0.06 NG/ML  
NT-PRO BNP Collection Time: 09/16/18  6:20 PM  
Result Value Ref Range NT pro-BNP 1278 (H) 0 - 900 PG/ML  
MAGNESIUM Collection Time: 09/16/18  6:20 PM  
Result Value Ref Range Magnesium 1.9 1.6 - 2.6 mg/dL PHOSPHORUS Collection Time: 09/16/18  6:20 PM  
Result Value Ref Range Phosphorus 4.3 2.5 - 4.9 MG/DL  
CULTURE, BLOOD Collection Time: 09/16/18  7:20 PM  
Result Value Ref Range Special Requests: NO SPECIAL REQUESTS Culture result: NO GROWTH AFTER 11 HOURS    
LACTIC ACID Collection Time: 09/16/18  7:20 PM  
Result Value Ref Range Lactic acid 2.1 (HH) 0.4 - 2.0 MMOL/L  
CULTURE, BLOOD Collection Time: 09/16/18  8:00 PM  
Result Value Ref Range Special Requests: NO SPECIAL REQUESTS Culture result: NO GROWTH AFTER 11 HOURS    
LACTIC ACID Collection Time: 09/16/18 10:19 PM  
Result Value Ref Range Lactic acid 2.1 (HH) 0.4 - 2.0 MMOL/L  
URINALYSIS W/ RFLX MICROSCOPIC Collection Time: 09/17/18 12:30 AM  
Result Value Ref Range Color YELLOW Appearance CLEAR Specific gravity >1.030 (H) 1.005 - 1.030  
 pH (UA) 5.0 5.0 - 8.0 Protein TRACE (A) NEG mg/dL Glucose NEGATIVE  NEG mg/dL Ketone TRACE (A) NEG mg/dL Bilirubin NEGATIVE  NEG Blood NEGATIVE  NEG Urobilinogen 1.0 0.2 - 1.0 EU/dL Nitrites NEGATIVE  NEG Leukocyte Esterase NEGATIVE  NEG    
URINE MICROSCOPIC ONLY Collection Time: 09/17/18 12:30 AM  
Result Value Ref Range WBC 1 to 3 0 - 5 /hpf  
 RBC NEGATIVE  0 - 5 /hpf Epithelial cells FEW 0 - 5 /lpf Bacteria FEW (A) NEG /hpf Mucus NEGATIVE  NEG /lpf Uric acid crystals 1+ (A) NEG Jose Luevano MD

## 2018-09-17 NOTE — PROGRESS NOTES
Reason for Admission: c/o SOB 
               
RRAT Score: 15 Do you (patient/family) have any concerns for transition/discharge? TBD Plan for utilizing home health: TBD Likelihood of readmission? TBD Transition of Care Plan:     Chart reviewed noted per ED not pt  Arrived c/o SOB currently has metastatic lung cancer , admitted to ICU cm will cont to review and remain available for d/c planning.

## 2018-09-17 NOTE — PROGRESS NOTES
Hospitalist Progress Note-critical care note Patient: Stella Mahoney MRN: [id number]  CSN: [id number] YOB: 1955  Age: 66 y.o. Sex: female DOA: 9/16/2018 LOS:  LOS: 1 day Chief complaint: pleural effusion, lung cancer , PE. htn , lung infiltrate Assessment/Plan Hospital Problems  Never Reviewed Codes Class Noted POA Pleural effusion ICD-10-CM: J90 ICD-9-CM: 511.9  9/17/2018 Unknown Lung infiltrate ICD-10-CM: R91.8 ICD-9-CM: 793.19  9/17/2018 Unknown Pulmonary emboli [facility]) ICD-10-CM: I26.99 
ICD-9-CM: 415.19  9/17/2018 Unknown Cachexia [facility]) ICD-10-CM: R64 
ICD-9-CM: 799.4  9/17/2018 Unknown * (Principal)SOB (shortness of breath) ICD-10-CM: R06.02 
ICD-9-CM: 786.05  9/16/2018 Unknown HTN (hypertension) ICD-10-CM: I10 
ICD-9-CM: 401.9  9/16/2018 Unknown Anemia ICD-10-CM: D64.9 ICD-9-CM: 285.9  9/16/2018 Unknown Metastatic lung cancer (metastasis from lung to other site) [facility]) ICD-10-CM: C34.90 ICD-9-CM: 162.9  8/16/2018 Yes Pericardial effusion ICD-10-CM: I31.3 ICD-9-CM: 423.9  8/16/2018 Yes Overview Signed 8/20/2018  9:59 AM by Xochitl Black Added automatically from request for surgery [id number] Dyspnea Due to lung cancer vs PE vs possible pna vs pleural effusion Will have thoracentesis today Need them on board Breathing tx prn , 
Continue  vanc and zosyn for empiric treatment  
  
Lung cancer -stage 4 F/u with oncologist -case discussed with Dr. Elisa Marsh Pain control . Planning mediport  
  
 
pe With hx of dvt with ivc filter Not a candidate for anticoagulant  due to the cardiac issue 
  
 HTN, accelerated Continue home medication. 
  
Pericardial effusion Will repeat echo, cardio 
  
Anemia Continue monitoring  
  
Hyponatremia due to lung lesion  
  
Poor prognosis.  Palliative care called, case discussed with  Letid-agree with palliative care consult. Disposition :tbd,  
 
Subjective: still sob and unable to lying flat Nurse: can not get any blood for testing Review of systems: 
 
General: No fevers or chills. Cardiovascular: No chest pain or pressure. No palpitations. Pulmonary: shortness of breath. Gastrointestinal: No nausea, vomiting. Vital signs/Intake and Output: 
Visit Vitals  /79  Pulse (!) 116  Temp 98.2 °F (36.8 °C)  Resp (!) 34  
 Ht 5' 7\" (1.702 m)  Wt 79.8 kg (176 lb)  SpO2 98%  Breastfeeding No  
 BMI 27.57 kg/m2 Current Shift:    
Last three shifts:  09/15 1901 - 09/17 0700 In: -  
Out: 250 [Urine:250] Physical Exam: 
General: WD, WN. Alert, cooperative, in  distress   
HEENT: NC, Atraumatic. PERRLA, anicteric sclerae. Lungs: Decreased bs, Heart:  tachy,  + murmur, No Rubs, No Gallops Abdomen: Soft, Non distended, Non tender.  +Bowel sounds, Extremities: No c/c/e Psych:   Not anxious or agitated. Neurologic:  No acute neurological deficit. Labs: Results:  
   
Chemistry Recent Labs  
   09/17/18 
 0701  09/16/18 
 1820 GLU  87  95 NA  127*  131* K  4.9  5.1 CL  93*  95* CO2  22  27 BUN  11  13 CREA  0.83  0.80 CA  9.0  9.1 AGAP  12  9 BUCR  13  16 AP   --   279* TP   --   7.7 ALB   --   1.9*  
GLOB   --   5.8* AGRAT   --   0.3* CBC w/Diff Recent Labs  
   09/17/18 
 0757  09/16/18 
 1820 WBC  12.9  12.9 RBC  3.87*  3.42* HGB  9.7*  8.5* HCT  31.5*  27.5*  
PLT  291  262 GRANS  76*  76* LYMPH  13*  14* EOS  2  1 Cardiac Enzymes Recent Labs  
   09/16/18 
 1820 CPK  37 CKND1  CALCULATION NOT PERFORMED WHEN RESULT IS BELOW LINEAR LIMIT Coagulation No results for input(s): PTP, INR, APTT in the last 72 hours. No lab exists for component: INREXT, INREXT Lipid Panel No results found for: CHOL, CHOLPOCT, CHOLX, [address], CHOLV, 110816, HDL, LDL, LDLC, DLDLP, 419147, VLDLC, VLDL, TGLX, TRIGL, TRIGP, TGLPOCT, CHHD, CHHDX  
BNP No results for input(s): BNPP in the last 72 hours. Liver Enzymes Recent Labs  
   09/16/18 
 1820 TP  7.7 ALB  1.9* AP  279* SGOT  23 Thyroid Studies Lab Results Component Value Date/Time TSH 60.00 (H) 08/17/2018 08:29 AM  
    
Procedures/imaging: see electronic medical records for all procedures/Xrays and details which were not copied into this note but were reviewed prior to creation of Plan Marina Wilkes MD

## 2018-09-18 NOTE — CONSULTS
[facility] Consult Note Patient: Yisel Mitchell MRN: [id number]  SSN: [id number] YOB: 1955  Age: 66 y.o. Sex: female Date of Consultation: 09/16/2018 Referring Physician: Willy Benton MD 
Reason for Consultation: Shortness of breath Chief complain: Shortness of breath HPI: 66year old female with history of metastatic adenocarcinoma of lung, hemorrhagic pericardial effusion, DVT s/p IVC filter, PAD presented with worsening of shortness of breath over past 4-5 days. The patient denies fever, chill, cough, chest pain, wheezing or hemoptysis. CTA chest showed known lung mass with pleural effusion, pericardial effusion and Pulmonary embolism. Patient had pulmonary embolism last month and she was on IV heparin and she developed large pericardial effusion with tamponade that required pericardiocentesis. Denies any chest pain. Denies any presyncope or syncope. Past Medical History:  
Diagnosis Date  Cancer ([facility] [address] lung cancer  HTN (hypertension) 9/16/2018  PAD (peripheral artery disease) ([facility] [address]  Pericardial effusion Past Surgical History:  
Procedure Laterality Date  VASCULAR SURGERY PROCEDURE UNLIST Right   
 opened up vessels Current Facility-Administered Medications Medication Dose Route Frequency  piperacillin-tazobactam (ZOSYN) 3.375 g in 0.9% sodium chloride (MBP/ADV) 100 mL MBP  3.375 g IntraVENous Q6H  
 acetaminophen (TYLENOL) tablet 650 mg  650 mg Oral Q4H PRN  
 naloxone (NARCAN) injection 0.4 mg  0.4 mg IntraVENous PRN  
 diphenhydrAMINE (BENADRYL) injection 12.5 mg  12.5 mg IntraVENous Q4H PRN  
 ondansetron (ZOFRAN) injection 4 mg  4 mg IntraVENous Q4H PRN  
 docusate sodium (COLACE) capsule 100 mg  100 mg Oral BID  heparin (porcine) injection 5,000 Units  5,000 Units SubCUTAneous Q8H  
 albuterol-ipratropium (DUO-NEB) 2.5 MG-0.5 MG/3 ML  3 mL Nebulization Q4H PRN  
 levothyroxine (SYNTHROID) tablet 75 mcg  75 mcg Oral ACB  metoprolol tartrate (LOPRESSOR) tablet 12.5 mg  12.5 mg Oral BID  
 oxyCODONE ER (OxyCONTIN) tablet 10 mg  10 mg Oral Q12H  
 oxyCODONE-acetaminophen (PERCOCET) 5-325 mg per tablet 1 Tab  1 Tab Oral Q6H PRN  Vancomycin- Pharmacy to dose  1 Each Other Rx Dosing/Monitoring  ELECTROLYTE REPLACEMENT PROTOCOL- Potassium  1 Each Other PRN  
 ELECTROLYTE REPLACEMENT PROTOCOL- Magnesium  1 Each Other PRN  
 ELECTROLYTE REPLACEMENT PROTOCOL- Phosphorus  1 Each Other PRN  
 ELECTROLYTE REPLACEMENT PROTOCOL- Calcium  1 Each Other PRN  
 vancomycin (VANCOCIN) 1,000 mg in 0.9% sodium chloride (MBP/ADV) 250 mL adv  1,000 mg IntraVENous Q12H  
 morphine injection 1 mg  1 mg IntraVENous Q2H PRN Allergies and Intolerances:  
No Known Allergies Family History:  
History reviewed. No pertinent family history. Social History: She  reports that she has quit smoking. She has never used smokeless tobacco.  She  reports that she drinks alcohol. Review of Systems:  
 
Gen: No fever, chills, malaise, weight loss/gain. Heent: No headache, rhinorrhea, epistaxis, ear pain, hearing loss, sinus pain, neck pain/stiffness, sore throat. Heart: No chest pain, palpitations, SANCHEZ, pnd, or orthopnea. Resp: No cough, hemoptysis, wheezing and shortness of breath. GI: No nausea, vomiting, diarrhea, constipation, melena or hematochezia. : No urinary obstruction, dysuria or hematuria. Derm: No rash, new skin lesion or pruritis. Musc/skeletal: no bone or joint complains. Vasc: No edema, cyanosis or claudication. Endo: No heat/cold intolerance, no polyuria,polydipsia or polyphagia. Neuro: No unilateral weakness, numbness, tingling. No seizures. Heme: No easy bruising or bleeding. Physical:  
Patient Vitals for the past 6 hrs: 
 Temp Pulse Resp BP SpO2  
09/17/18 2100 - (!) 106 20 122/66 98 % 09/17/18 2000 - (!) 104 18 131/63 95 % 09/17/18 1930 - (!) 109 28 - 97 % 09/17/18 1917 97.9 °F (36.6 °C) - - - -  
09/17/18 1900 - (!) 108 18 118/66 97 % 09/17/18 1830 - (!) 113 24 - 95 % 09/17/18 1800 98.5 °F (36.9 °C) (!) 108 21 111/67 97 % 09/17/18 1730 - (!) 111 26 - 98 % 09/17/18 1700 - (!) 110 26 130/75 97 % 09/17/18 1630 - (!) 103 22 - 95 % 09/17/18 1600 98.7 °F (37.1 °C) (!) 104 21 120/76 97 % Exam:  
General Appearance: Comfortable, not using accessory muscles of respiration, Looks chronically ill HEENT: SILVER. HEAD: Atraumatic NECK: No JVD, no thyroidomeglay. CAROTIDS: No bruit LUNGS: Clear bilaterally. HEART: S1+S2 audible, no murmur, no pericardial rub. ABD: Non-tender, BS Audible EXT: + edema, and no cyanosis. VASCULAR EXAM: Pulses are intact. PSYCHIATRIC EXAM: Mood is appropriate. MUSCULOSKELETAL: Grossly no joint deformity. NEUROLOGICAL: AAO times 3, Motor and sensory sytem intact Review of Data:  
LABS:  
Lab Results Component Value Date/Time WBC 12.9 09/17/2018 07:57 AM  
 HGB 9.7 (L) 09/17/2018 07:57 AM  
 HCT 31.5 (L) 09/17/2018 07:57 AM  
 PLATELET 866 05/66/7566 07:57 AM  
 
Lab Results Component Value Date/Time Sodium 127 (L) 09/17/2018 07:01 AM  
 Potassium 4.9 09/17/2018 07:01 AM  
 Chloride 93 (L) 09/17/2018 07:01 AM  
 CO2 22 09/17/2018 07:01 AM  
 Glucose 87 09/17/2018 07:01 AM  
 BUN 11 09/17/2018 07:01 AM  
 Creatinine 0.83 09/17/2018 07:01 AM  
 
No results found for: CHOL, CHOLX, CHLST, CHOLV, HDL, LDL, LDLC, DLDLP, TGLX, TRIGL, TRIGP No results found for: GPT No results found for: HBA1C, HGBE8, ZZL1FGDM, [id number] Cardiology Procedures:  
Results for orders placed or performed during the hospital encounter of 09/16/18 EKG, 12 LEAD, INITIAL Result Value Ref Range Ventricular Rate 110 BPM  
 Atrial Rate 110 BPM  
 P-R Interval 140 ms QRS Duration 78 ms Q-T Interval 352 ms QTC Calculation (Bezet) 476 ms Calculated P Axis 44 degrees Calculated R Axis 10 degrees Calculated T Axis 122 degrees Diagnosis Sinus tachycardia Possible Left atrial enlargement Poor R wave progression T wave abnormality, consider lateral ischemia Abnormal ECG Impression / Plan:   
Patient Active Problem List  
Diagnosis Code  Metastatic lung cancer (metastasis from lung to other site) [facility]) C34.90  Leg DVT (deep venous thromboembolism), acute, left (HCC) I82.402  PAD (peripheral artery disease) (HCC) I73.9  Pericardial effusion I31.3  
 SOB (shortness of breath) R06.02  
 HTN (hypertension) I10  
 Anemia D64.9  Pleural effusion J90  
 Lung infiltrate R91.8  Pulmonary emboli ([address] I26.99  
 Cachexia ([address] R64 S/p IVC filter H/o Cardiac tamponade No clinical evidence of cardiac tamponade No anticoagulation as patient developed hemorrhagic pericardial effusion with tamponade on IV heparin last month Echocardiogram 
Continue management as per hospital medicine Signed By: Maureen Beverly MD   
 September 17, 2018

## 2018-09-18 NOTE — PROGRESS NOTES
[facility] Daily Progress Note Admitting time: 9/16/2018  4:53 PM 
Today 9/18/2018 Principal Problem: 
  SOB (shortness of breath) (9/16/2018) Active Problems: Metastatic lung cancer (metastasis from lung to other site) [facility]) (8/16/2018) Pericardial effusion (8/16/2018) Overview: Added automatically from request for surgery [id number] HTN (hypertension) (9/16/2018) Anemia (9/16/2018) Pleural effusion (9/17/2018) Lung infiltrate (9/17/2018) Pulmonary emboli ([address] (9/17/2018) Cachexia ([address] (9/17/2018) 
 
 
 
  
HPI:  
Warner Novoa is 65 yo aaf with PMHx of HTN and Lung ca who presented with progressive SOB. No fever or CP. Pt was recently diagnosed with metastatic Lung ca and was about to start chemotherapy. On initial evaluation CTA showed Left main pulmonary artery pulmonary embolism,  right upper lobe neoplasm with metastatic involvement in the right 
hilum, mediastinum, left clavicle, and left adrenal gland, and moderate left pleural effusion with associated left basilar Atelectasis. Labs showed SNa of 131 and normal Cr 0.8. Pt denies excessive water intake. Looking back She had mild hyponatremia last month that has resolved since then. Pt currently c/o SOB, otherwise no CP, fever. No N/V/D. No confusion or balance issue. Lab from this am showed SNa further down to 127. 
  
  
Impression: - Hyponatremia, possibly SIADH given lung ca +/- pain induced. Clinically seems euvolumic. SNa improved, 127->131 today 
-- Thais 23/high Uosm, c/w SIADH 
- HTN 
- SOB sec to ? Lt pleural effusion vs ?PE vs ?met lung ca - Lung Ca, metastatic - PE 
- Anemia - Lt pleural effusion - Hx of DVT, s/p IVC filter 
  
Plan:  
- cont fluid restriction 1.2L/24 hrs 
- encourage po intake, on supplement 
- give marinol to stimulate appetite - Minimize IVF when possible - I/O  
- Pain control 
- hospice consult 
- will cont to follow 
  
 
 Subjective Pt has no new complaint today. SOB improved after thorocentesis yesterday. Very poor appetite. No F/C, N/V/D. Objective Blood pressure 116/73, pulse (!) 109, temperature 98.8 °F (37.1 °C), resp. rate 18, height 5' 7\" (1.702 m), weight 79.8 kg (176 lb), SpO2 97 %, not currently breastfeeding. Intake/Output Summary (Last 24 hours) at 09/18/18 7013 Last data filed at 09/18/18 2611 Gross per 24 hour Intake             1450 ml Output             1400 ml Net               50 ml Wt Readings from Last 3 Encounters:  
09/17/18 79.8 kg (176 lb) 09/01/18 83.5 kg (184 lb) 08/28/18 88.3 kg (194 lb 10.7 oz) Gen: NAD HEENT: NC/AT Neck: supple, no JVD Chest: CTAB 
CVS: RRR 
ABD: Soft, ND, NT 
EXT: no edema Medications [unfilled] Lab Basic Metabolic Profile Recent Labs  
   09/18/18 
 0351 09/17/18 
 0701  09/16/18 
 1820 NA  131*  127*  131* CO2  19*  22  27 BUN  12  11  13  
  
[unfilled] No components found for: PTHINT  
 
 
CBC w/Diff Recent Labs  
   09/18/18 
 0351 09/17/18 
 0757  09/16/18 
 1820 WBC  12.6  12.9  12.9 RBC  3.58*  3.87*  3.42* HCT  29.5*  31.5*  27.5* MCV  82.4  81.4  80.4 MCH  24.6  25.1  24.9 MCHC  29.8*  30.8*  30.9*  
RDW  19.2*  19.0*  18.7* Recent Labs  
   09/18/18 
 0351 09/17/18 
 0757  09/16/18 
 1820 BANDS  2   --    -- MONOS  5  8  8 EOS  3  2  1 BASOS  0  1  1  
RDW  19.2*  19.0*  18.7* Juancho Childs MD 
Pager: [phone] Office: [phone]

## 2018-09-18 NOTE — PROGRESS NOTES
Palliative Medicine Consult [facility] [phone] (COPE) Patient Name: Alfie Onofre YOB: 1955 Reason for Consult: goals of care Requesting Provider: Dr. Smith Pollock  
Primary Care Physician: Yennifer Burns DO 
 
 SUMMARY:  
Alfie Onofre is a 66 y.o. with a past history of S/p CVA, Hip fracture, HTN/Hyperlipidemia, who was admitted on 9/16/2018 from home with a diagnosis of Dyspnea due to lung cancer stage 4, HTN, Pericardial effusion, Anemia, Hyponatremia due to lung lesion. Current medical issues leading to Palliative Medicine involvement include: recurrent hospitalization. Paliative medicine team members Carissa Donald RN, Marlene Marcus MSW and Manuel Flowers RN visited to discuss goals of care. She was reclining in bed. Her brother, Luis Garcia, was at bedside. She denied pain and seemed in good spirits. When asked what she knew of her current medical condition, she admitted that she was very confused. She stated that she did not remember speaking to her oncologist and that she was having some difficulty understanding much of the information provided. She stated that she relied heavily on her son, Jaki Reddy, to help her understand new medical information. \"He seems to absorb it all and explain it to me in a way I understand\". We discussed Advance Directives and she stated she did not have one and was interested but wanted her son, Jaki Reddy, available for any further discussions. We agreed to come back when Jaki Reddy is available. Jaki Reddy was able to come in later but shared concerns as he had been unable to speak with any of his mothers physicians yet today. He stated he had left a message for Dr. Mere Beasley but had not received any response. He further shared that he was hoping to speak to Dr. Nahed Harvey for a medical update. Dr. Nahed Harvey was able to come speak with Jaki Barry and answered his questions.  Antonioanthonymargaret Reddy was also able to speak with Dr. Mere Beasley and was waiting to speak to Dr. Rachael Moulton. Kimberly Marie did state that he felt well supported by staff and physicians as he was attempting to get as much information as possible to assist his mother with decision making. We discussed DNR as well as Advance Directives. Mrs. Rafaela Rea again expressed interest in completing an AMD but wanted to discuss the Living Will portion further with her son, Kimberly Marie. We left her a form to review. She asked us to return tomorrow to complete it. She and Kimberly Marie expressed appreciation for palliative team support. At present she remains Full Code per her request. We will follow up tomorrow to complete AMD and to provide continued support. RECOMMENDATIONS:  
1. Continue current treatment 2. Patient and family want more discussion with oncologist regarding treatment options 3. Full code per patient request. 
4. Continue discussions regarding code status 5. Complete Advance Directive (She wants son, Kimberly Marie, present for discussions) 6. Continue palliative support GOALS OF CARE / TREATMENT PREFERENCES:  
 
GOALS OF CARE: 
Patient/Health Care Proxy Stated Goals: Prolong life TREATMENT PREFERENCES:  
Code Status: Full Code Advance Care Planning: 
Advance Care Planning 9/17/2018 Patient's Healthcare Decision Maker is: Legal Next of Kin Primary Decision Maker Name Jagdish Arreguin Primary Decision Maker Phone Number [phone] Primary Decision Maker Relationship to Patient Adult child Secondary Decision Maker Name - Secondary Decision Maker Phone Number - Secondary Decision Maker Relationship to Patient -  
Confirm Advance Directive None Patient Would Like to Complete Advance Directive Unable Does the patient have other document types - Medical Interventions: Full interventions CLINICAL ASSESSMENT:  
Palliative Performance Scale (PPS): PPS: 50 Modified ESAS Completed by: provider Fatigue: 0 Drowsiness: 0 Depression: 2 Pain: 0 Anxiety: 2 Nausea: 0 Anorexia: 0 Dyspnea: 0 Clinical Pain Assessment (nonverbal scale for severity on nonverbal patients):  
Clinical Pain Assessment Severity: 0 PSYCHOSOCIAL/SPIRITUAL ASSESSMENT:  
Palliative IDT has assessed this patient for cultural preferences / practices and a referral made as appropriate to needs (Cultural Services, Patient Advocacy, Ethics, etc.)  2 sons 1 brother, 5 sisters Worked as a  Any spiritual / Catholic concerns: 
[] Yes /  [x] No 
 
Caregiver Burnout: 
[] Yes /  [x] No /  [] No Caregiver Present Anticipatory grief assessment:  
[x] Normal  / [] Maladaptive Thank you for including palliative medicine in the care of this patient.  
 
Sixto Valdivia RN

## 2018-09-18 NOTE — PROGRESS NOTES
Pharmacy Dosing Services: Vancomycin Consult for Vancomycin Dosing by Pharmacy by Dr. Camila Seth provided for this 66y.o. year old female , for indication of empiric therapy. Day of Therapy 3 Scr = 0.96   CrCl = 66.9 ml/min   WBC = 12.6 Current dose:  Vancomycin 1000 mg IV every 12 hours. Vancomycin Trough has been ordered for 9/18/18 at 23:30 (30 minutes prior to 00:00 dose) Goal therapeutic trough of 15 - 20 mcg/mL. Pharmacy to follow daily and will make changes to dose and/or frequency based on clinical status. Pharmacist Jaison Larsen, [facility]

## 2018-09-18 NOTE — PROGRESS NOTES
Phone: [phone] Paging : [phone] Hematology / Oncology Progress Note Admit Date: 9/16/2018 Assessment:  
 
Principal Problem: 
  SOB (shortness of breath) (9/16/2018) Active Problems: Metastatic lung cancer (metastasis from lung to other site) [facility]) (8/16/2018) Pericardial effusion (8/16/2018) Overview: Added automatically from request for surgery [id number] HTN (hypertension) (9/16/2018) Anemia (9/16/2018) Pleural effusion (9/17/2018) Lung infiltrate (9/17/2018) Pulmonary emboli ([address] (9/17/2018) Cachexia ([address] (9/17/2018) Plan:  
 
Pleural effusion dyspnea improved after thoracentesis Pericardial effusion trivial on echo Advanced NSCLC no curable will follow up with Dr Chuck Montanez Agree with palliative care consult she is appropriate for DNR Subjective:  
 
Notes improved dyspnea Objective:  
 
Patient Vitals for the past 24 hrs: 
 BP Temp Pulse Resp SpO2 Height Weight  
09/18/18 0722 - 98.8 °F (37.1 °C) - - - - -  
09/18/18 0700 116/73 98.1 °F (36.7 °C) (!) 109 18 97 % - -  
09/18/18 0600 118/56 - (!) 104 16 96 % - -  
09/18/18 0500 124/57 - 99 17 96 % - -  
09/18/18 0400 118/63 98.4 °F (36.9 °C) (!) 109 26 96 % - -  
09/18/18 0300 124/71 - (!) 108 22 96 % - -  
09/18/18 0200 140/62 - (!) 104 19 100 % - -  
09/18/18 0100 129/73 - (!) 101 20 96 % - -  
09/18/18 0015 - - 100 19 - - -  
09/18/18 0000 120/58 - - - - - -  
09/17/18 2357 - 97.4 °F (36.3 °C) - - - - -  
09/17/18 2300 132/70 - (!) 103 21 - - -  
09/17/18 2200 134/77 - (!) 104 21 95 % - -  
09/17/18 2100 122/66 - (!) 106 20 98 % - -  
09/17/18 2000 131/63 - (!) 104 18 95 % - -  
09/17/18 1930 - - (!) 109 28 97 % - -  
09/17/18 1917 - 97.9 °F (36.6 °C) - - - - -  
09/17/18 1900 118/66 - (!) 108 18 97 % - -  
09/17/18 1830 - - (!) 113 24 95 % - -  
09/17/18 1800 111/67 98.5 °F (36.9 °C) (!) 108 21 97 % - -  
09/17/18 1730 - - (!) 111 26 98 % - -  
 18 1700 130/75 - (!) 110 26 97 % - -  
18 1630 - - (!) 103 22 95 % - -  
18 1600 120/76 98.7 °F (37.1 °C) (!) 104 21 97 % - -  
18 1530 - - (!) 106 19 (!) 80 % - -  
18 1500 - - (!) 110 25 95 % - -  
18 1430 - - (!) 114 26 94 % - -  
18 1400 (!) 107/94 - (!) 106 21 95 % - -  
18 1330 - - 100 27 97 % - -  
18 1300 140/60 98.7 °F (37.1 °C) (!) 102 28 95 % - -  
18 1213 144/74 98.7 °F (37.1 °C) - - - 5' 7\" (1.702 m) 79.8 kg (176 lb)  
18 1200 136/70 98.7 °F (37.1 °C) 100 25 94 % - -  
18 1000 134/75 - (!) 102 25 94 % - -  
18 0900 128/90 - (!) 120 28 97 % - -  
18 0800 148/79 98.2 °F (36.8 °C) (!) 116 (!) 34 97 % - - Intake/Output Summary (Last 24 hours) at 18 0756 Last data filed at 18 0057 Gross per 24 hour Intake             1570 ml Output             1450 ml Net              120 ml Temp (24hrs), Av.4 °F (36.9 °C), Min:97.4 °F (36.3 °C), Max:98.8 °F (37.1 °C) ROS: 
POS: dyspnea/chest pain NEG: fever/chills/n/v/focal neurologic syndromes Exam: 
General: tachyonic HEENT:an icteric Lungs: decreased breath sound bilaterally Extremities:trace LE edema Neurologic: AAO times 4 Recent Results (from the past 24 hour(s)) CBC W/O DIFF Collection Time: 18  7:57 AM  
Result Value Ref Range WBC 12.9 4.6 - 13.2 K/uL  
 RBC 3.87 (L) 4.20 - 5.30 M/uL HGB 9.7 (L) 12.0 - 16.0 g/dL HCT 31.5 (L) 35.0 - 45.0 % MCV 81.4 74.0 - 97.0 FL  
 MCH 25.1 24.0 - 34.0 PG  
 MCHC 30.8 (L) 31.0 - 37.0 g/dL  
 RDW 19.0 (H) 11.6 - 14.5 % PLATELET 913 008 - 752 K/uL MPV 8.8 (L) 9.2 - 11.8 FL  
DIFFERENTIAL, AUTO Collection Time: 18  7:57 AM  
Result Value Ref Range NEUTROPHILS 76 (H) 40 - 73 % LYMPHOCYTES 13 (L) 21 - 52 % MONOCYTES 8 3 - 10 % EOSINOPHILS 2 0 - 5 % BASOPHILS 1 0 - 2 %  
 ABS. NEUTROPHILS 10.0 (H) 1.8 - 8.0 K/UL ABS. LYMPHOCYTES 1.7 0.9 - 3.6 K/UL  
 ABS. MONOCYTES 1.0 0.05 - 1.2 K/UL  
 ABS. EOSINOPHILS 0.2 0.0 - 0.4 K/UL  
 ABS. BASOPHILS 0.1 0.0 - 0.1 K/UL  
 DF AUTOMATED    
CALCIUM, IONIZED Collection Time: 09/17/18  7:57 AM  
Result Value Ref Range Ionized Calcium 1.15 1.12 - 1.32 MMOL/L  
PROTHROMBIN TIME + INR Collection Time: 09/17/18 10:10 AM  
Result Value Ref Range Prothrombin time 15.4 (H) 11.5 - 15.2 sec INR 1.2 0.8 - 1.2 OSMOLALITY, SERUM/PLASMA Collection Time: 09/17/18 10:10 AM  
Result Value Ref Range Osmolality, serum/plasma 277 (L) 280 - 301 MOSM/kg H2O  
ECHO ADULT COMPLETE Collection Time: 09/17/18  1:13 PM  
Result Value Ref Range LA Volume 35.63 22 - 52 mL Right Atrial Area 4C 11.06 cm2 Ao Root D 2.50 cm Aortic Valve Systolic Peak Velocity 308.60 cm/s AoV VTI 20.10 cm Aortic Valve Area by Continuity of Peak Velocity 2.3 cm2 Aortic Valve Area by Continuity of VTI 2.2 cm2 AoV PG 6.4 mmHg LVIDd 3.94 3.9 - 5.3 cm  
 LVPWd 1.16 (A) 0.6 - 0.9 cm LVIDs 2.84 cm IVSd 1.20 (A) 0.6 - 0.9 cm  
 LV ED Vol A2C 82.8 mL  
 LV ES Vol A4C 32.2 mL  
 LV ES Vol BP 34.5 19 - 49 mL  
 LVOT d 1.89 cm  
 LVOT Peak Velocity 102.55 cm/s LVOT Peak Gradient 4.2 mmHg LVOT VTI 16.09 cm LV E' Septal Velocity 0.10 cm/s LV E' Lateral Velocity 0.08 cm/s  
 MVA (PHT) 5.2 cm2  
 MV A Dillon 84.76 cm/s  
 MV E Dillon 0.64 cm/s  
 MV E/A 0.80 RVIDd 3.28 cm Right Ventricular Outflow Track Peak Velocity 57.60 cm/s Aortic Valve Systolic Mean Gradient 3.3 mmHg BP EF 58.6 55 - 100 % LV Ejection Fraction MOD 4C 61 % LV Ejection Fraction MOD 2C 56 % LA Vol 4C 22.31 22 - 52 mL  
 LA Vol 2C 38.02 22 - 52 mL  
 LV Mass .9 (A) 67 - 162 g  
 LV Mass AL Index 81.2 g/m2 E/E' lateral 8.00   
 E/E' septal 6.40 E/E' ratio (averaged) 7.20 LV ES Vol A2C 36.6 mL  
 LVES Vol Index BP 18.0 mL/m2 LV ED Vol A4C 81.4 mL  
 LVED Vol Index BP 43.5 mL/m2 Mitral Valve E Wave Deceleration Time 146.8 ms Mitral Valve Pressure Half-time 42.6 ms Left Atrium Major Axis 2.99 cm Tapse 2.00 1.5 - 2.0 cm Triscuspid Valve Regurgitation Peak Gradient 27.7 mmHg Pulmonic Valve Max Velocity 106.80 cm/s LV ED Vol BP 83.4 56 - 104 ml  
 TR Max Velocity 263.03 cm/s  
 LA Vol Index 18.60 ml/m2 LA Vol Index 19.85 ml/m2 LA Vol Index 11.65 ml/m2 LVED Vol Index A4C 42.5 mL/m2 LVED Vol Index A2C 43.2 mL/m2 LVES Vol Index A4C 16.8 mL/m2 LVES Vol Index A2C 19.1 mL/m2 LYNN/BSA Pk Dillon 1.2 cm2/m2 LYNN/BSA VTI 1.2 cm2/m2 PV peak gradient 4.6 mmHg CELL COUNT AND DIFF, BODY FLUID Collection Time: 09/17/18  2:45 PM  
Result Value Ref Range BODY FLUID TYPE PLEURAL FLUID FLUID COLOR RED    
 FLUID APPEARANCE CLOUDY FLUID RBC CT. (A) NRRE /cu mm SPECIMEN GROSSLY BLOODY. RBC'S TOO NUMEROUS TO COUNT. FLUID NUCLEATED CELLS 6500 (A) NRRE /cu mm  
 FLD NEUTROPHILS 10 % FLD BANDS 0 % FLD LYMPHS 84 % FLD MONOCYTES 1 % FLD EOSINS 0 % MACROPHAGE 5 % WBC COMMENTS MANY MESOTHELIAL CELLS   
LDH, BODY FLUID Collection Time: 09/17/18  2:45 PM  
Result Value Ref Range Fluid Type: PLEURAL FLUID    
 LD, body fld. 1046 U/L  
CULTURE, BODY FLUID W GRAM STAIN Collection Time: 09/17/18  2:45 PM  
Result Value Ref Range Special Requests: NO SPECIAL REQUESTS    
 GRAM STAIN FEW WBC'S    
 GRAM STAIN NO ORGANISMS SEEN Culture result: PENDING   
PROTEIN TOTAL, FLUID Collection Time: 09/17/18  2:45 PM  
Result Value Ref Range Fluid Type: PLEURAL FLUID Protein total, body fld. 5.2 g/dL OSMOLALITY, UR Collection Time: 09/17/18 10:30 PM  
Result Value Ref Range Osmolality,urine 661 50 - 1400 MOSM/kg H2O  
SODIUM, UR, RANDOM Collection Time: 09/17/18 10:30 PM  
Result Value Ref Range Sodium,urine random 23 20 - 732 MMOL/L  
METABOLIC PANEL, BASIC  Collection Time: 09/18/18  3:51 AM  
 Result Value Ref Range Sodium 131 (L) 136 - 145 mmol/L Potassium 5.6 (H) 3.5 - 5.5 mmol/L Chloride 97 (L) 100 - 108 mmol/L  
 CO2 19 (L) 21 - 32 mmol/L Anion gap 15 3.0 - 18 mmol/L Glucose 83 74 - 99 mg/dL BUN 12 7.0 - 18 MG/DL Creatinine 0.96 0.6 - 1.3 MG/DL  
 BUN/Creatinine ratio 13 12 - 20 GFR est AA >60 >60 ml/min/1.73m2 GFR est non-AA 59 (L) >60 ml/min/1.73m2 Calcium 8.9 8.5 - 10.1 MG/DL MAGNESIUM Collection Time: 09/18/18  3:51 AM  
Result Value Ref Range Magnesium 2.0 1.6 - 2.6 mg/dL CBC WITH AUTOMATED DIFF Collection Time: 09/18/18  3:51 AM  
Result Value Ref Range WBC 12.6 4.6 - 13.2 K/uL  
 RBC 3.58 (L) 4.20 - 5.30 M/uL HGB 8.8 (L) 12.0 - 16.0 g/dL HCT 29.5 (L) 35.0 - 45.0 % MCV 82.4 74.0 - 97.0 FL  
 MCH 24.6 24.0 - 34.0 PG  
 MCHC 29.8 (L) 31.0 - 37.0 g/dL  
 RDW 19.2 (H) 11.6 - 14.5 % PLATELET 158 904 - 320 K/uL MPV 9.1 (L) 9.2 - 11.8 FL  
 NEUTROPHILS 76 (H) 42 - 75 % BAND NEUTROPHILS 2 0 - 5 % LYMPHOCYTES 14 (L) 20 - 51 % MONOCYTES 5 2 - 9 % EOSINOPHILS 3 0 - 5 % BASOPHILS 0 0 - 3 %  
 ABS. NEUTROPHILS 9.6 (H) 1.8 - 8.0 K/UL  
 ABS. LYMPHOCYTES 1.8 0.8 - 3.5 K/UL  
 ABS. MONOCYTES 0.6 0 - 1.0 K/UL  
 ABS. EOSINOPHILS 0.4 0.0 - 0.4 K/UL  
 ABS. BASOPHILS 0.0 0.0 - 0.1 K/UL  
 RBC COMMENTS ANISOCYTOSIS 2+ 
    
 RBC COMMENTS POLYCHROMASIA 1+ 
    
 RBC COMMENTS HYPOCHROMIA 1+ 
    
 RBC COMMENTS MICROCYTOSIS 1+ 
    
 DF MANUAL PHOSPHORUS Collection Time: 09/18/18  3:51 AM  
Result Value Ref Range Phosphorus 3.7 2.5 - 4.9 MG/DL  
CALCIUM, IONIZED Collection Time: 09/18/18  3:52 AM  
Result Value Ref Range Ionized Calcium 1.12 1.12 - 1.32 MMOL/L  
LACTIC ACID Collection Time: 09/18/18  3:52 AM  
Result Value Ref Range  Lactic acid 3.4 (HH) 0.4 - 2.0 MMOL/L  
 
 
Rex Campbell MD

## 2018-09-18 NOTE — DIABETES MGMT
GLYCEMIC CONTROL & NUTRITION: 
 
 
- discussed in rounds and chart reviewed; no known h/o DM, BG currently within target range. No glycemic control needs identified at this time. Recent Glucose Results:  
Lab Results Component Value Date/Time GLU 83 09/18/2018 03:51 AM  
 
 
 
Sadie Morse MS, RN, CDE Glycemic Control Team 
[phone] Pager [phone] (M-TH 8:30-5P) *After Hours pager [phone]

## 2018-09-18 NOTE — PROGRESS NOTES
0700Bedside shift change report given to Lillian Saldaña RN(oncoming nurse) by Manjula Galdamez (offgoing nurse). Report included the following information SBAR, Kardex, ED Summary, Intake/Output, MAR, Accordion, Recent Results, Med Rec Status, Cardiac Rhythm sinus tach and Alarm Parameters Patient verbalizes she is comfortable at present, denies any distress. Assumed care of patient at this time. Patient son came in, missed DR Butler visit this am and had questions,request call from oncology DR Butler and cardiology, DR Kelsey Bliss paged at this time. DR Beronica Arredondo and Palliative care also at bedside. Paged DR Kelsey Bliss and DR Butler at this time. Dr Kristofer Marcelino talked to son, palliative care team at bedside with patient. 1200 Reassessment completed, no change 1600 Reassessment completed, no change, pain managed throughout the shift. 1915 Bedside shift report given to Marlena Schneider RN in [employer], all questions answered, Pt resting in bed with family at bedside, patient verbalizes she is comfortable at present. call bell is in reach

## 2018-09-18 NOTE — PROGRESS NOTES
[facility] Pulmonary, Critical Care, and Sleep Medicine Name: Alfie Onofre MRN: [id number] : 1955 Hospital: [facility] [place] Date: 2018 Pulmonary Critical Care Initial Patient Consult IMPRESSION:  
Principal Problem: 
  SOB (shortness of breath) (2018) Active Problems: Metastatic lung cancer (metastasis from lung to other site) [facility]) (2018) Pericardial effusion (2018) Overview: Added automatically from request for surgery [id number] HTN (hypertension) (2018) Anemia (2018) Pleural effusion (2018) Lung infiltrate (2018) Pulmonary emboli ([address] (2018) Cachexia ([address] (2018) · Left side pleural effusion likely malignant, s/p thoracentesis · Left side possible PE, considered to be at increased risk of pericardial rebleed on full anti-coagulation · No tamponade with small pericardial effusion per preliminary ECHO result · Left basilar opacity being treated for any post-obstructive pneumonia · Hx of DVT, s/p IVC filter for inability to anticoagulate from hemorrhagic pericardial effusion and tamponade requiring catheter drainage · PAD  
RECOMMENDATIONS:  
Compensated respiratory status; on room air, monitor for goal SPO2 > 90% Aspiration prevention bundle, head of the bed at 30' all times Bronchodilators PRN, pulmonary hygiene care Follow up pl fluid cytology Await input from cardiology, follow up ECHO final result Appreciate oncology input Antibiotic choice: Zosyn and Vancomycin. Culture will be followed to narrow or stop empirical antibiotics Monitor hemodynamics; stable and compensated No evidence of metastatic activity on CT head Await PVL legs DVT prophylaxis - Heparin SQ Diet as tolerated Palliative care consulted. Overall poor prognosis.  Discussed with patient risk of recurrent VTE and massive PE with risk of cardiac arrest, advanced lung cancer and related mortality and other. Discussed her questions. Recommended DNR and possible hospice. Patient remained undecided. Will defer respective systems problem management to primary and other consultant and possible tx to floor when okay with cardiology, hospitalist 
Further recommendations will be based on the patient's response to recommended treatment and results of the investigation ordered. Quality Care: PPI, DVT prophylaxis, HOB elevated, Infection control all reviewed and addressed. PAIN AND SEDATION: percocet prn 
· Skin/Wound: none new · Nutrition: oral diet · Prophylaxis: DVT and GI Prophylaxis reviewed · PT/OT eval and treat: when more stable · Lines/Tubes: lines PIV; no howard ADVANCE DIRECTIVE: Full code. Palliative consulted FAMILY DISCUSSION: spoke with patient and family at bedside at her request 
Events and notes from last 24 hours reviewed. Care plan discussed with nursing Subjective/History:  
Patient is a 66 y.o. female with PMHx for metastatic adenocarcinoma of lung, hemorrhagic pericardial effusion, DVT s/p IVC filter, PAD presented with worsening in SOB over past 4-5 days with associated orthopnea leading to ED visit. The patient denies fever, chill, cough, chest pain, wheezing or hemoptysis. Reports of palpitation and lightheadedness. The patient denies any symptoms of neurological impairment or TIA's; abdominal or flank pain, nausea or vomiting, dysphagia, change in bowel habits or bleeding from any body orifices. In ED, work up with CTA chest showed known lung mass with pleural effusion, pericardial effusion and possible left side PE. She remained hemodynamically stable and on room air. At home she is not on any O2, worked with PT, used compression stockings intermittently. 9/18/18 Patient reports significant improvement in her breathing after thoracentesis The patient denies fever, chills, cough, chest pain, wheezing or hemoptysis. Remained hemodynamically stable, denies any palpitations, syncope, orthopnea, edema or pnd  
Oral intake poor. UO good. Discussed issues with VTE treatment and risk of bleeding. Discussed options of DNR, hospice Patient remained undecided. Brother at the bedside. Discussed his questions at patient's request 
 
Review of Systems: 
General ROS: negative for  - fever, chills, weight loss, fatigue and malaise Hematological and Lymphatic ROS: negative for - bleeding problems, jaundice, pallor Cardiovascular ROS: negative for - chest pain, edema, murmur, orthopnea, palpitations or paroxysmal nocturnal dyspnea Gastrointestinal ROS: no abdominal pain, change in bowel habits, or black or bloody stools Dermatological ROS: negative for - pruritus, rash or skin lesion changes Latest lactic acid:  
Lactic acid Date Value Ref Range Status 09/18/2018 3.4 (HH) 0.4 - 2.0 MMOL/L Final  
  Comment:  
  CALLED TO AND CORRECTLY REPEATED BY: 
AMA AGUILERA RN ICU TO HK  [facility] [id number] 
  
09/16/2018 2.1 (HH) 0.4 - 2.0 MMOL/L Final  
  Comment:  
  CALLED TO AND CORRECTLY REPEATED BY: 
Mt Valdes RN ICU TO JRW AT 2308 09/16/18 09/16/2018 2.1 (HH) 0.4 - 2.0 MMOL/L Final  
  Comment:  
  CALLED TO AND CORRECTLY REPEATED BY: 
Mikel Page RN ER TO [facility] AT 2011 ON [id number] Past Medical History: 
Past Medical History:  
Diagnosis Date  Cancer ([facility] lung cancer  HTN (hypertension) 9/16/2018  PAD (peripheral artery disease) ([facility]  Pericardial effusion Past Surgical History: 
Past Surgical History:  
Procedure Laterality Date  VASCULAR SURGERY PROCEDURE UNLIST Right   
 opened up vessels Medications: 
Prior to Admission medications Medication Sig Start Date End Date Taking? Authorizing Provider  
levothyroxine (SYNTHROID) 75 mcg tablet Take 1 Tab by mouth Daily (before breakfast).  8/29/18  Yes Jessica Mar MD  
 metoprolol tartrate (LOPRESSOR) 25 mg tablet Take 0.5 Tabs by mouth two (2) times a day. Patient taking differently: Take 25 mg by mouth two (2) times a day. 8/28/18   Freddie Reis MD  
oxyCODONE ER (OXYCONTIN) 10 mg ER tablet Take 1 Tab by mouth every twelve (12) hours. Max Daily Amount: 20 mg. Patient not taking: Reported on 9/14/2018 8/28/18   Freddie Reis MD  
oxyCODONE-acetaminophen (PERCOCET) 5-325 mg per tablet Take 1 Tab by mouth every six (6) hours as needed. Max Daily Amount: 4 Tabs. Patient taking differently: Take 1 Tab by mouth every six (6) hours as needed for Pain. 8/28/18   Freddie Reis MD  
senna-docusate (PERICOLACE) 8.6-50 mg per tablet Take 1 Tab by mouth daily. 8/29/18   Freddie Reis MD  
 
 
Current Facility-Administered Medications Medication Dose Route Frequency  dronabinol (MARINOL) capsule 2.5 mg  2.5 mg Oral BID  piperacillin-tazobactam (ZOSYN) 3.375 g in 0.9% sodium chloride (MBP/ADV) 100 mL MBP  3.375 g IntraVENous Q6H  
 docusate sodium (COLACE) capsule 100 mg  100 mg Oral BID  heparin (porcine) injection 5,000 Units  5,000 Units SubCUTAneous Q8H  
 levothyroxine (SYNTHROID) tablet 75 mcg  75 mcg Oral ACB  metoprolol tartrate (LOPRESSOR) tablet 12.5 mg  12.5 mg Oral BID  
 oxyCODONE ER (OxyCONTIN) tablet 10 mg  10 mg Oral Q12H  Vancomycin- Pharmacy to dose  1 Each Other Rx Dosing/Monitoring  vancomycin (VANCOCIN) 1,000 mg in 0.9% sodium chloride (MBP/ADV) 250 mL adv  1,000 mg IntraVENous Q12H Allergy: No Known Allergies Social History: 
Social History Substance Use Topics  Smoking status: Former Smoker  Smokeless tobacco: Never Used  Alcohol use Yes Comment: occasionally Family History: 
History reviewed. No family history for lung cancer. Objective:  
Vital Signs:   
Blood pressure 121/64, pulse (!) 105, temperature 98.7 °F (37.1 °C), resp. rate 22, height 5' 7\" (1.702 m), weight 79.8 kg (176 lb), SpO2 97 %, not currently breastfeeding. Body mass index is 27.57 kg/(m^2). O2 Device: Nasal cannula O2 Flow Rate (L/min): 2 l/min Temp (24hrs), Av.4 °F (36.9 °C), Min:97.4 °F (36.3 °C), Max:98.8 °F (37.1 °C) Intake/Output:  
Last shift:       07 - 1900 In: 480 [P.O.:480] Out: 300 [Urine:300] Last 3 shifts: 1901 -  0700 In: 6039 [P.O.:1220; I.V.:350] Out: 1950 [CZKRQ:1810] Intake/Output Summary (Last 24 hours) at 18 1237 Last data filed at 18 1131 Gross per 24 hour Intake             1590 ml Output             1300 ml Net              290 ml Physical Exam: 
General: AAO x 3, in no respiratory distress, cooperative, no distress, appears older than stated age, on O2 via NC 
HEENT: PERRLA, EOMI, fundi benign, throat normal without erythema or exudate Neck: No abnormally enlarged lymph nodes or thyroid, supple Chest: normal 
Lungs: improved air exchange LLL, dullness to percussion L base, rhonchi L base, no tenderness/ rash Heart: Regular rate and rhythm, S1S2 present or without murmur or extra heart sounds Abdomen: non distended, bowel sounds normoactive, tympanic, soft without significant tenderness, masses, organomegaly or guarding, rigidity, rebound Extremity: negative for edema, cyanosis, clubbing Neuro: alert, oriented x 3, no defects noted in general exam. 
Skin: Skin color, texture, turgor fair. Skin dry, warm, non-diaphoretic Data:  
 
Recent Results (from the past 24 hour(s)) ECHO ADULT COMPLETE Collection Time: 18  1:13 PM  
Result Value Ref Range LA Volume 35.63 22 - 52 mL Right Atrial Area 4C 11.06 cm2 Ao Root D 2.50 cm Aortic Valve Systolic Peak Velocity 884.66 cm/s AoV VTI 20.10 cm Aortic Valve Area by Continuity of Peak Velocity 2.3 cm2 Aortic Valve Area by Continuity of VTI 2.2 cm2 AoV PG 6.4 mmHg  LVIDd 3.94 3.9 - 5.3 cm  
 LVPWd 1.16 (A) 0.6 - 0.9 cm LVIDs 2.84 cm IVSd 1.20 (A) 0.6 - 0.9 cm  
 LV ED Vol A2C 82.8 mL  
 LV ES Vol A4C 32.2 mL  
 LV ES Vol BP 34.5 19 - 49 mL  
 LVOT d 1.89 cm  
 LVOT Peak Velocity 102.55 cm/s LVOT Peak Gradient 4.2 mmHg LVOT VTI 16.09 cm LV E' Septal Velocity 0.10 cm/s LV E' Lateral Velocity 0.08 cm/s  
 MVA (PHT) 5.2 cm2  
 MV A Dillon 84.76 cm/s  
 MV E Dillon 0.64 cm/s  
 MV E/A 0.80 RVIDd 3.28 cm Right Ventricular Outflow Track Peak Velocity 57.60 cm/s Aortic Valve Systolic Mean Gradient 3.3 mmHg BP EF 58.6 55 - 100 % LV Ejection Fraction MOD 4C 61 % LV Ejection Fraction MOD 2C 56 % LA Vol 4C 22.31 22 - 52 mL  
 LA Vol 2C 38.02 22 - 52 mL  
 LV Mass .9 (A) 67 - 162 g  
 LV Mass AL Index 81.2 g/m2 E/E' lateral 8.00   
 E/E' septal 6.40 E/E' ratio (averaged) 7.20 LV ES Vol A2C 36.6 mL  
 LVES Vol Index BP 18.0 mL/m2 LV ED Vol A4C 81.4 mL  
 LVED Vol Index BP 43.5 mL/m2 Mitral Valve E Wave Deceleration Time 146.8 ms Mitral Valve Pressure Half-time 42.6 ms Left Atrium Major Axis 2.99 cm Tapse 2.00 1.5 - 2.0 cm Triscuspid Valve Regurgitation Peak Gradient 27.7 mmHg Pulmonic Valve Max Velocity 106.80 cm/s LV ED Vol BP 83.4 56 - 104 ml  
 TR Max Velocity 263.03 cm/s  
 LA Vol Index 18.60 ml/m2 LA Vol Index 19.85 ml/m2 LA Vol Index 11.65 ml/m2 LVED Vol Index A4C 42.5 mL/m2 LVED Vol Index A2C 43.2 mL/m2 LVES Vol Index A4C 16.8 mL/m2 LVES Vol Index A2C 19.1 mL/m2 LYNN/BSA Pk Dillon 1.2 cm2/m2 LYNN/BSA VTI 1.2 cm2/m2 PV peak gradient 4.6 mmHg CELL COUNT AND DIFF, BODY FLUID Collection Time: 09/17/18  2:45 PM  
Result Value Ref Range BODY FLUID TYPE PLEURAL FLUID FLUID COLOR RED    
 FLUID APPEARANCE CLOUDY FLUID RBC CT. (A) NRRE /cu mm SPECIMEN GROSSLY BLOODY. RBC'S TOO NUMEROUS TO COUNT. FLUID NUCLEATED CELLS 6500 (A) NRRE /cu mm  
 FLD NEUTROPHILS 10 % FLD BANDS 0 % FLD LYMPHS 84 % FLD MONOCYTES 1 % FLD EOSINS 0 % MACROPHAGE 5 % WBC COMMENTS MANY MESOTHELIAL CELLS   
LDH, BODY FLUID Collection Time: 09/17/18  2:45 PM  
Result Value Ref Range Fluid Type: PLEURAL FLUID    
 LD, body fld. 1046 U/L  
CULTURE, BODY FLUID W GRAM STAIN Collection Time: 09/17/18  2:45 PM  
Result Value Ref Range Special Requests: NO SPECIAL REQUESTS    
 GRAM STAIN FEW WBC'S    
 GRAM STAIN NO ORGANISMS SEEN Culture result: NO GROWTH AFTER 14 HOURS    
PROTEIN TOTAL, FLUID Collection Time: 09/17/18  2:45 PM  
Result Value Ref Range Fluid Type: PLEURAL FLUID Protein total, body fld. 5.2 g/dL OSMOLALITY, UR Collection Time: 09/17/18 10:30 PM  
Result Value Ref Range Osmolality,urine 661 50 - 1400 MOSM/kg H2O  
SODIUM, UR, RANDOM Collection Time: 09/17/18 10:30 PM  
Result Value Ref Range Sodium,urine random 23 20 - 608 MMOL/L  
METABOLIC PANEL, BASIC Collection Time: 09/18/18  3:51 AM  
Result Value Ref Range Sodium 131 (L) 136 - 145 mmol/L Potassium 5.6 (H) 3.5 - 5.5 mmol/L Chloride 97 (L) 100 - 108 mmol/L  
 CO2 19 (L) 21 - 32 mmol/L Anion gap 15 3.0 - 18 mmol/L Glucose 83 74 - 99 mg/dL BUN 12 7.0 - 18 MG/DL Creatinine 0.96 0.6 - 1.3 MG/DL  
 BUN/Creatinine ratio 13 12 - 20 GFR est AA >60 >60 ml/min/1.73m2 GFR est non-AA 59 (L) >60 ml/min/1.73m2 Calcium 8.9 8.5 - 10.1 MG/DL MAGNESIUM Collection Time: 09/18/18  3:51 AM  
Result Value Ref Range Magnesium 2.0 1.6 - 2.6 mg/dL CBC WITH AUTOMATED DIFF Collection Time: 09/18/18  3:51 AM  
Result Value Ref Range WBC 12.6 4.6 - 13.2 K/uL  
 RBC 3.58 (L) 4.20 - 5.30 M/uL HGB 8.8 (L) 12.0 - 16.0 g/dL HCT 29.5 (L) 35.0 - 45.0 % MCV 82.4 74.0 - 97.0 FL  
 MCH 24.6 24.0 - 34.0 PG  
 MCHC 29.8 (L) 31.0 - 37.0 g/dL  
 RDW 19.2 (H) 11.6 - 14.5 % PLATELET 978 721 - 727 K/uL MPV 9.1 (L) 9.2 - 11.8 FL  
 NEUTROPHILS 76 (H) 42 - 75 % BAND NEUTROPHILS 2 0 - 5 % LYMPHOCYTES 14 (L) 20 - 51 % MONOCYTES 5 2 - 9 % EOSINOPHILS 3 0 - 5 % BASOPHILS 0 0 - 3 %  
 ABS. NEUTROPHILS 9.6 (H) 1.8 - 8.0 K/UL  
 ABS. LYMPHOCYTES 1.8 0.8 - 3.5 K/UL  
 ABS. MONOCYTES 0.6 0 - 1.0 K/UL  
 ABS. EOSINOPHILS 0.4 0.0 - 0.4 K/UL  
 ABS. BASOPHILS 0.0 0.0 - 0.1 K/UL  
 RBC COMMENTS ANISOCYTOSIS 2+ 
    
 RBC COMMENTS POLYCHROMASIA 1+ 
    
 RBC COMMENTS HYPOCHROMIA 1+ 
    
 RBC COMMENTS MICROCYTOSIS 1+ 
    
 DF MANUAL PHOSPHORUS Collection Time: 09/18/18  3:51 AM  
Result Value Ref Range Phosphorus 3.7 2.5 - 4.9 MG/DL  
CALCIUM, IONIZED Collection Time: 09/18/18  3:52 AM  
Result Value Ref Range Ionized Calcium 1.12 1.12 - 1.32 MMOL/L  
LACTIC ACID Collection Time: 09/18/18  3:52 AM  
Result Value Ref Range Lactic acid 3.4 (HH) 0.4 - 2.0 MMOL/L No results for input(s): FIO2I, IFO2, HCO3I, IHCO3, HCOPOC, PCO2I, PCOPOC, IPHI, PHI, PHPOC, PO2I, PO2POC in the last 72 hours. No lab exists for component: IPOC2 All Micro Results Procedure Component Value Units Date/Time CULTURE, BODY FLUID [[id number]] Collected:  09/17/18 1445 Order Status:  Completed Specimen:  Pleural Fluid Updated:  09/18/18 1026 Special Requests: NO SPECIAL REQUESTS     
  GRAM STAIN FEW WBC'S     
   NO ORGANISMS SEEN Culture result: NO GROWTH AFTER 14 HOURS     
 CULTURE, BLOOD [[id number]] Collected:  09/16/18 1920 Order Status:  Completed Specimen:  Whole Blood from Blood Updated:  09/18/18 2959 Special Requests: NO SPECIAL REQUESTS Culture result: NO GROWTH 2 DAYS     
 CULTURE, BLOOD [[id number]] Collected:  09/16/18 2000 Order Status:  Completed Specimen:  Blood from Blood Updated:  09/18/18 8201 Special Requests: NO SPECIAL REQUESTS Culture result: NO GROWTH 2 DAYS Telemetry: normal sinus rhythm, sinus tachycardia 9/17/18 ECHO Pending final result 8/22/18 ECHO · Trivial pericardial effusion adjacent to the right ventricle. Bilateral pleural effusion · Normal cavity size, wall thickness and systolic function (ejection fraction normal). The estimated ejection fraction is 61 - 65% Imaging: 
[x]I have personally reviewed the patients chest radiographs images and report Results from Hospital Encounter encounter on 09/16/18 XR CHEST PORT Narrative Chest, single view Indication: Left pleural effusion status post thoracentesis. Comparison: 9/16/2018 Findings:  Portable upright AP view of the chest was obtained. Decrease in hazy density the left mid and lower lung in keeping with decreased 
left-sided pleural effusion status post thoracentesis. Persistent right upper 
lobe pulmonary mass is present. Right pleural space is clear. Cardiac size and 
mediastinal contours are normal. No acute osseous abnormality is present. Impression IMPRESSION: 
1. Decreased volume left-sided pleural fluid status post thoracentesis. No 
postprocedural pneumothorax. 2. Unchanged radiographic appearance to right upper lobe mass. Results from Hospital Encounter encounter on 09/16/18 CT HEAD WO CONT Narrative EXAM: CT head INDICATION: Non-small cell lung cancer, staging, hypertension, anemia. COMPARISON: Correlation brain MRI 8/17/2018. TECHNIQUE: Axial CT imaging of the head  was obtained from skull base to vertex 
without intravenous contrast. Coronal and sagittal reconstructions were 
obtained. One or more dose reduction techniques were used on this CT: automated exposure 
control, adjustment of the mAs and/or kVp according to patient's size, and 
iterative reconstruction techniques. The specific techniques utilized on this CT 
exam have been documented in the patient's electronic medical record. 
 
_______________ FINDINGS: 
 
BRAIN AND POSTERIOR FOSSA: The sulci, folia, ventricles and basal cisterns are 
 within normal limits for the patient?s age. There is no intracranial hemorrhage, 
mass effect, or shift of midline structures. There are no areas of abnormal 
parenchymal attenuation. EXTRA-AXIAL SPACES AND MENINGES: There are no abnormal extra-axial fluid 
collections or mass. Small benign-appearing dural calcification right side 
tentorium. CALVARIUM: No acute osseous abnormality. SINUSES: Mild mucosal thickening roof of left ethmoid sinus. Mastoids clear. OTHER: None. 
 
_______________ Impression IMPRESSION: 
 
No evidence of intracranial metastatic disease or other acute or significant 
intracranial finding. [x]See my orders for details My assessment, plan of care, findings, medications, side effects etc were discussed with: 
[x]nursing []PT/OT   
[]respiratory therapy []Dr. Ravindra Walters [x]Patient [x]High complexity decision making performed and > 50% time spent in face to face evaluation.  
 
Alex Hernandez MD

## 2018-09-18 NOTE — PROGRESS NOTES
Hospitalist Progress Note-critical care note Patient: Lisa Romero MRN: [id number]  CSN: [id number] YOB: 1955  Age: 66 y.o. Sex: female DOA: 9/16/2018 LOS:  LOS: 2 days Chief complaint: pleural effusion, lung cancer , PE. htn , lung infiltrate Assessment/Plan Hospital Problems  Never Reviewed Codes Class Noted POA Pleural effusion ICD-10-CM: J90 ICD-9-CM: 511.9  9/17/2018 Unknown Lung infiltrate ICD-10-CM: R91.8 ICD-9-CM: 793.19  9/17/2018 Unknown Pulmonary emboli [facility]) ICD-10-CM: I26.99 
ICD-9-CM: 415.19  9/17/2018 Unknown Cachexia [facility]) ICD-10-CM: R64 
ICD-9-CM: 799.4  9/17/2018 Unknown * (Principal)SOB (shortness of breath) ICD-10-CM: R06.02 
ICD-9-CM: 786.05  9/16/2018 Unknown HTN (hypertension) ICD-10-CM: I10 
ICD-9-CM: 401.9  9/16/2018 Unknown Anemia ICD-10-CM: D64.9 ICD-9-CM: 285.9  9/16/2018 Unknown Metastatic lung cancer (metastasis from lung to other site) [facility]) ICD-10-CM: C34.90 ICD-9-CM: 162.9  8/16/2018 Yes Pericardial effusion ICD-10-CM: I31.3 ICD-9-CM: 423.9  8/16/2018 Yes Overview Signed 8/20/2018  9:59 AM by Shaw Camacho Added automatically from request for surgery [id number] Dyspnea Due to lung cancer vs PE vs possible pna vs pleural effusion Sob better after  thoracentesis Breathing tx prn , 
Continue  vanc and zosyn for empiric treatment  
  
Lung cancer -stage 4 F/u with oncologist -case discussed with Dr. Pilo Patton -agree with palliative care on board to address the code status, poor prognosis Pain control . Not sure if still want to treat pt, pt can not lying flat for mediport  
  
 
pe With hx of dvt with ivc filter Not a candidate for anticoagulant  due to the cardiac issue-'No anticoagulation as patient developed hemorrhagic pericardial effusion with tamponade on IV heparin last month \" per Dr. Karson Ramires  
 
  
 HTN, accelerated Continue home medication. 
  
Pericardial effusion Repeated echo:Trivial pericardial effusion 
  
Anemia Continue monitoring  
  
Hyponatremia due to lung lesion  
  
Poor prognosis. Palliative care called, case discussed with dr. Homa Carvalho with palliative care consult. Disposition :tbd Subjective: still sob. Nurse: unable to lying down Review of systems: 
 
General: No fevers or chills. Cardiovascular: No chest pain or pressure. No palpitations. Pulmonary: shortness of breath. Gastrointestinal: No nausea, vomiting. Vital signs/Intake and Output: 
Visit Vitals  /61  Pulse 94  Temp 98.7 °F (37.1 °C)  Resp 26  
 Ht 5' 7\" (1.702 m)  Wt 79.8 kg (176 lb)  SpO2 98%  Breastfeeding No  
 BMI 27.57 kg/m2 Current Shift:  09/18 0701 - 09/18 1900 In: 720 [P.O.:720] Out: 550 [Urine:550] Last three shifts:  09/16 1901 - 09/18 0700 In: 0891 [P.O.:1220; I.V.:350] Out: 1950 [TZWYY:1440] Physical Exam: 
General: WD, WN. Alert, cooperative, in  distress   
HEENT: NC, Atraumatic. PERRLA, anicteric sclerae. Lungs: Decreased bs, Heart:  tachy,  + murmur, No Rubs, No Gallops Abdomen: Soft, Non distended, Non tender.  +Bowel sounds, Extremities: No c/c/e Psych:   Not anxious or agitated. Neurologic:  No acute neurological deficit. Labs: Results:  
   
Chemistry Recent Labs  
   09/18/18 
 0351  09/17/18 
 0701  09/16/18 
 1820 GLU  83  87  95 NA  131*  127*  131*  
K  5.6*  4.9  5.1 CL  97*  93*  95* CO2  19*  22  27 BUN  12  11  13 CREA  0.96  0.83  0.80 CA  8.9  9.0  9.1 AGAP  15  12  9 BUCR  13  13  16 AP   --    --   279* TP   --    --   7.7 ALB   --    --   1.9*  
GLOB   --    --   5.8* AGRAT   --    --   0.3* CBC w/Diff Recent Labs  
   09/18/18 
 0351  09/17/18 
 0757  09/16/18 
 1820 WBC  12.6  12.9  12.9 RBC  3.58*  3.87*  3.42* HGB  8.8*  9.7*  8.5* HCT  29.5*  31.5*  27.5*  
PLT  304  291  262 GRANS  76*  76*  76* LYMPH  14*  13*  14* EOS  3  2  1 Cardiac Enzymes Recent Labs  
   09/16/18 
 1820 CPK  37 CKND1  CALCULATION NOT PERFORMED WHEN RESULT IS BELOW LINEAR LIMIT Coagulation Recent Labs  
   09/17/18 [address] PTP  15.4* INR  1.2 Lipid Panel No results found for: CHOL, CHOLPOCT, CHOLX, CHLST, CHOLV, 983787, HDL, LDL, LDLC, DLDLP, 464058, VLDLC, VLDL, TGLX, TRIGL, TRIGP, TGLPOCT, CHHD, CHHDX  
BNP No results for input(s): BNPP in the last 72 hours. Liver Enzymes Recent Labs  
   09/16/18 
 1820 TP  7.7 ALB  1.9* AP  279* SGOT  23 Thyroid Studies Lab Results Component Value Date/Time TSH 60.00 (H) 08/17/2018 08:29 AM  
    
Procedures/imaging: see electronic medical records for all procedures/Xrays and details which were not copied into this note but were reviewed prior to creation of Plan Christiano Huntley MD

## 2018-09-18 NOTE — PROGRESS NOTES
1930- Report and care received, assessment completed per flow sheet. Alert, oriented, cooperative, NAD. Denies pain or SOB at this time. Left upper chest mass, near clavicular area, noted. States mass is not new and has been there since August, \"It's part of the cancer. \". 
 
0000- Reassessment completed and without change. 0300- Reassessment completed and without change.

## 2018-09-19 PROBLEM — Z00.00 PE (PHYSICAL EXAM), ANNUAL: Status: ACTIVE | Noted: 2018-01-01

## 2018-09-19 PROBLEM — O22.30 DVT (DEEP VEIN THROMBOSIS) IN PREGNANCY: Status: ACTIVE | Noted: 2018-01-01

## 2018-09-19 PROBLEM — J96.10 CHRONIC RESPIRATORY FAILURE (HCC): Status: ACTIVE | Noted: 2018-01-01

## 2018-09-19 NOTE — PROGRESS NOTES
Logistics called at this time,Patient transferring to [facility]  Room 2308. Nurse unable to take report now, will call back in 10 minutes. 1330 TRANSFER - OUT REPORT: 
 
Verbal report given to  [facility] Nurse Dante CASTILLO on [facility]  being transferred to CVT [facility] for routine progression of care Report consisted of patients Situation, Background, Assessment and  
Recommendations(SBAR). Information from the following report(s) SBAR, Kardex, ED Summary, Intake/Output, MAR, Accordion, Recent Results, Med Rec Status, Cardiac Rhythm NSR and Alarm Parameters  was reviewed with the receiving nurse. Opportunity for questions and clarification was provided. ANNA link also notified at this time results of Right  fem pop Dvt  From Duplex done this am.Dr Zoltan Heath also notified.

## 2018-09-19 NOTE — PROGRESS NOTES
Chart reviewed and attended IDR's cm informed pt will be transferred to [facility] for higher level of care,at this time cm no longer on case,unit will be scheduling transport to [address] [place] [address] Management Interventions PCP Verified by CM: Yes 
Palliative Care Criteria Met (RRAT>21 & CHF Dx)?: Yes Mode of Transport at Discharge: [facility] Transition of Care Consult (CM Consult): Other (emtala transfer) Discharge Durable Medical Equipment: No 
Physical Therapy Consult: No 
Occupational Therapy Consult: No 
Speech Therapy Consult: No 
Current Support Network: Lives Alone Confirm Follow Up Transport: Family Plan discussed with Pt/Family/Caregiver: Yes Discharge Location Discharge Placement: Transferred to higher level of care

## 2018-09-19 NOTE — PROGRESS NOTES
Pharmacy Dosing Services: Vancomycin Consult for Vancomycin Dosing by Pharmacy by Dr. Dhiraj Hunt provided for this 66y.o. year old female , for indication of Empiric Treatment. Day of Therapy 3 Vancomycin Trough= 14.9 mcg/ml at 2346 9/18/18 Continue with Vancomycin 1000 mg IV every 12 hours Serum Creatinine Lab Results Component Value Date/Time Creatinine 0.96 09/18/2018 03:51 AM  
  
Creatinine Clearance Estimated Creatinine Clearance: 66.9 mL/min (based on Cr of 0.96). BUN Lab Results Component Value Date/Time BUN 12 09/18/2018 03:51 AM  
  
WBC Lab Results Component Value Date/Time WBC 12.6 09/18/2018 03:51 AM  
  
H/H Lab Results Component Value Date/Time HGB 8.8 (L) 09/18/2018 03:51 AM  
  
Platelets Lab Results Component Value Date/Time PLATELET 269 29/68/6453 03:51 AM  
  
Temp 99.5 °F (37.5 °C) Pharmacy to follow daily and will make changes to dose and/or frequency based on clinical status. Pharmacist Abe Bowman 97

## 2018-09-19 NOTE — IP AVS SNAPSHOT
[address] [id number] 
[phone] Patient: Deja Crowder MRN: [id number] TWL:[birth year] A check johnathon indicates which time of day the medication should be taken. My Medications START taking these medications Instructions Each Dose to Equal  
 Morning Noon Evening Bedtime  
 amoxicillin-clavulanate 500-125 mg per tablet Commonly known as:  AUGMENTIN Take 1 Tab by mouth every twelve (12) hours for 5 days. 1 Tab  
    
  
   
   
   
  
  
 bisacodyl 10 mg suppository Commonly known as:  DULCOLAX (BISACODYL) Insert 10 mg into rectum daily as needed. 10 mg  
    
   
   
   
  
 dronabinol 2.5 mg capsule Commonly known as:  Johnice Nanny Take 1 Cap by mouth two (2) times a day. Max Daily Amount: 5 mg. 2.5 mg  
    
  
   
   
  
   
  
 lactobacillus sp. 50 billion cpu 50 billion cell -375 mg Cap capsule Commonly known as:  BIO-K PLUS Start taking on:  9/26/2018 Take 1 Cap by mouth daily for 7 days. 1 Cap  
    
  
   
   
   
  
 naloxone 4 mg/actuation nasal spray Commonly known as:  [employer] Use 1 spray intranasally, then discard. Repeat with new spray every 2 min as needed for opioid overdose symptoms, alternating nostrils. polyethylene glycol 17 gram packet Commonly known as:  Jacki Louis Take 1 Packet by mouth daily. 17 g CHANGE how you take these medications Instructions Each Dose to Equal  
 Morning Noon Evening Bedtime  
 metoprolol tartrate 25 mg tablet Commonly known as:  LOPRESSOR What changed:  how much to take Take 0.5 Tabs by mouth two (2) times a day. 12.5 mg  
    
  
   
   
   
  
  
 oxyCODONE-acetaminophen 5-325 mg per tablet Commonly known as:  PERCOCET What changed:  reasons to take this Take 1 Tab by mouth every six (6) hours as needed. Max Daily Amount: 4 Tabs. 1 Tab  
    
   
   
   
  
 senna-docusate 8.6-50 mg per tablet Commonly known as:  Michael Zavala What changed:   
- how much to take 
- additional instructions Take 2 Tabs by mouth daily. Take with a full glass of water. HOLD for loose stool. 2 Tab CONTINUE taking these medications Instructions Each Dose to Equal  
 Morning Noon Evening Bedtime  
 levothyroxine 75 mcg tablet Commonly known as:  SYNTHROID Take 1 Tab by mouth Daily (before breakfast). 75 mcg  
    
  
   
   
   
  
 oxyCODONE ER 10 mg ER tablet Commonly known as:  OxyCONTIN Take 1 Tab by mouth every twelve (12) hours. Max Daily Amount: 20 mg.  
 10 mg Where to Get Your Medications Information on where to get these meds will be given to you by the nurse or doctor. ! Ask your nurse or doctor about these medications  
  amoxicillin-clavulanate 500-125 mg per tablet  
 bisacodyl 10 mg suppository  
 dronabinol 2.5 mg capsule  
 lactobacillus sp. 50 billion cpu 50 billion cell -375 mg Cap capsule  
 naloxone 4 mg/actuation nasal spray  
 oxyCODONE ER 10 mg ER tablet  
 oxyCODONE-acetaminophen 5-325 mg per tablet  
 polyethylene glycol 17 gram packet  
 senna-docusate 8.6-50 mg per tablet

## 2018-09-19 NOTE — DISCHARGE SUMMARY
[facility] DISCHARGE SUMMARY Kavitha Silvestre 
MR#: [id number] : 1955 ACCOUNT #: [de-identified] ADMIT DATE: 2018 DISCHARGE DATE:  
 
DISCHARGE DIAGNOSES: 
1. Metastatic lung cancer, nonsquamous cell lung cancer with diffuse metastases. 2.  Recurrent pulmonary embolism despite IVC filter placement. 3.  History of deep venous thrombosis. 4.  Recent hemorrhagic pericardial effusion, status post tap procedure. 5.  Pleural effusion, status post thoracentesis during this admission. 6.  Anemia. 7.  Hyponatremia. 8.  Depression and anxiety. CONSULTANTS:  Here Dr. Momo Rae, critical care pulmonology, Dr. Duane Terry, cardiology Dr. Essence Flores and Dr. Mack Hurley nephrology. HOSPITAL SUMMARY:  The patient is 66years old. She has metastatic lung cancer. She was here in the hospital in August.  Unfortunately, her disease is diffuse and she has widespread metastases. When she was here during the last admission, she had a hemorrhagic pericardial effusion that required tapping by Cardiology. She was found to have a DVT and subsequently because she could not go on blood thinners, had an IVC filter placed. She had an extensive hospitalization during that time. She was treated for hypothyroidism, for Anemia due to acute blood loss and iron deficiency. She was seen for GI bleeding. She had a supraclavicular node biopsy that was done. The mets appear present in her liver, retroperitoneum, bone, adrenal glands and possibly in her uterus. With that said, she had been seen by Oncology during that stay as well. She had a hospitalization last August that was extensive, was discharged to home and then came back to the hospital with worsening shortness of breath. So during this hospitalization, she came in for shortness of breath. She was found to have a recurrent pulmonary embolism.   She was not able to start on blood thinners because of her history of hemorrhagic pericardial effusion. Cardiology recommended against it. She was seen by Oncology who recommended palliative treatments and hospice conversion. However, the patient is not amenable to that at this time. She remains a full code. Palliative Care has been working with her daily and she has agreed to sign some advanced directives, but at this point in time, has not changed her code status. She understands fully as well as does her son about her prognosis, but she wants to keep pressing on and try as many treatments as she possibly can and does not want to give up on her \"medical treatment\" at this point in time. Unfortunately this is incurable and she is in terminal condition. Her prognosis is very poor and all consultants agree on that. However, in the interim, she is experiencing recurrent pulmonary embolism that has caused her to be short of breath and symptomatic and with an IVC filter in place, at this point, she has failed that also so the recommendations by Pulmonology are to try anticoagulation for her. Of course, the concern is that she may have recurrent hemorrhagic pericardial effusion, so if she is going to trial blood thinners, the recommendation from Pulmonology was that she be at a facility where they have CT surgery that could perform a cardiac window if necessary if she has recurrent bleeding. In the interim, she has been on DVT prophylaxis here. Her hemoglobin has gone down slightly from admission where she was at a hemoglobin of 8.8 and then is down to 7.4, hematocrit 24.8. Her sodium was low on admission, it is now better to 132. BUN and creatinine 9 and 0.87. Her platelet count is stable at 320,000. Her white count is 13.5 thousand. Her current medications include Colace, Marinol, Synthroid, Lopressor, OxyContin, Zosyn, vancomycin and she is not on any blood thinners at this point in time.   With that said, again she had a thoracentesis performed of her left lung while she was here. She did seem to tolerate that well. She is currently stable with vital signs as follows: PHYSICAL EXAMINATION: 
VITAL SIGNS:  As follows:  Blood pressure is 119/66, temp is 99.4, pulse is 94, respiratory rate 22, SpO2 100%. She is on 2 liters nasal cannula. She appears as a chronically-ill debilitated black female. Oropharynx is dry. No lesions. LUNGS:  Diminished breath sounds at the bases, but no wheezes or rhonchi. CARDIOVASCULAR:  Regular rate and rhythm. No murmur noted. ABDOMEN:  Soft, nontender. EXTREMITIES:  Lower extremities:  Trace ankle edema. No rash on the skin. LABORATORY DATA:  Urinalysis on admission showed no signs for infection. Body fluid culture from the thoracentesis is no growth for 2 days. This is all end-stage metastatic lung cancer at this point in time, but with her continued request for continued treatment and especially considering a recurrent pulmonary embolism, trial of anticoagulation should be done in a facility that could manage the acute risks of that that we cannot here at [facility] so they have been kind enough to accept her at [address] [place] [address].  A discussion with Cardiothoracic Surgery was held this morning by the pulmonologist here and the hospitalist is accepting her to their facility. The pulmonary embolism is in the left main pulmonary artery and it extends into the left lobar arteries. There is large right upper lobe neoplasm with metastatic involvement in the right hilum, mediastinum, left clavicle and left adrenal gland and otherwise we plan on transferring her via ALS ambulance today. I spent 45 minutes in the discharge time today and discussion with the transfer center. SHE IS A FULL CODE STATUS at this point in time. She needs to wear oxygen chronically. DISPOSITION:  Transferring to [facility] 
D: 09/19/2018 12:41 T: 09/19/2018 13:30 
JOB #: [id number] CC:  Aretha ORTEGA

## 2018-09-19 NOTE — PROGRESS NOTES
Bedside and Verbal shift change report given to Marta Salinas RN (oncoming nurse) by Kaycee Kirkpatrick RN (offgoing nurse). Report included the following information SBAR, Kardex, Intake/Output, MAR, Recent Results and Cardiac Rhythm Sinus. Pt A&O x4, SPO2 100% on nasal cannula, complained of back pain, assisted with repositioning with pillow, verbalized some relief. Assessment documented in the appropriate flow sheets. 0000 Reassessment completed. No changes noted. Nursing cares continues. 0500 - The documentation for this period 0100 - 0500  is being entered following the guidelines as defined in the [address] [place] [address] downtime policy by Christina Tapia RN. Reassessment completed at 0400. Full CHG bath completed. Vital sign stable. Pt sleeping. Easily aroused. Able to make needs known. Will continue to monitor Bedside and Verbal shift change report given to Kaycee Kirkpatrick RN (oncoming nurse) by Marta Salinas RN (offgoing nurse). Report included the following information SBAR, Kardex, Intake/Output, MAR, Recent Results and Cardiac Rhythm Sinus Tach.

## 2018-09-19 NOTE — PROGRESS NOTES
Spoke with Jhony Tapia RN concerning pt outpt order for mediport. Patient is unable to lay flat. Appointment cancelled until further notification.

## 2018-09-19 NOTE — PROGRESS NOTES
[facility] Admitting time: 9/16/2018  4:53 PM 
Today 9/19/2018 Principal Problem: 
  SOB (shortness of breath) (9/16/2018) Active Problems: Metastatic lung cancer (metastasis from lung to other site) [facility]) (8/16/2018) Pericardial effusion (8/16/2018) Overview: Added automatically from request for surgery [id number] HTN (hypertension) (9/16/2018) Anemia (9/16/2018) Pleural effusion (9/17/2018) Lung infiltrate (9/17/2018) Pulmonary emboli ([address] (9/17/2018) Cachexia ([address] (9/17/2018) 
 
 
 
  
HPI:  
Robert Peralta is 65 yo aaf with PMHx of HTN and Lung ca who presented with progressive SOB. No fever or CP. Pt was recently diagnosed with metastatic Lung ca and was about to start chemotherapy. On initial evaluation CTA showed Left main pulmonary artery pulmonary embolism,  right upper lobe neoplasm with metastatic involvement in the right 
hilum, mediastinum, left clavicle, and left adrenal gland, and moderate left pleural effusion with associated left basilar Atelectasis. Labs showed SNa of 131 and normal Cr 0.8. Pt denies excessive water intake. Looking back She had mild hyponatremia last month that has resolved since then. Pt currently c/o SOB, otherwise no CP, fever. No N/V/D. No confusion or balance issue. Lab from this am showed SNa further down to 127. 
  
  
Impression: - Hyponatremia, possibly SIADH given lung ca +/- pain induced. Clinically seems euvolumic. SNa improved, stable 131=>132 today 
-- Thais 23/high Uosm, c/w SIADH 
- HTN 
- SOB sec to ? Lt pleural effusion vs ?PE vs ?met lung ca - Lung Ca, metastatic - PE 
- Anemia - Lt pleural effusion - Hx of DVT, s/p IVC filter 
  
Plan:  
- cont fluid restriction 1.2L/24 hrs 
- encourage po intake, on supplement 
- give marinol to stimulate appetite - I/O  
- Pain control 
- hospice following 
-No further recommendations, will sign off 
  
 
Subjective Pt has no new complaint today. SOB improved after thorocentesis. Very poor appetite. No F/C, N/V/D. Objective Blood pressure 110/60, pulse 100, temperature 99.4 °F (37.4 °C), resp. rate 19, height 5' 7\" (1.702 m), weight 79.8 kg (176 lb), SpO2 100 %, not currently breastfeeding. Intake/Output Summary (Last 24 hours) at 09/19/18 1256 Last data filed at 09/19/18 1247 Gross per 24 hour Intake             1837 ml Output             2150 ml Net             -313 ml Wt Readings from Last 3 Encounters:  
09/19/18 79.8 kg (176 lb) 09/01/18 83.5 kg (184 lb) 08/28/18 88.3 kg (194 lb 10.7 oz) Gen: NAD HEENT: NC/AT Neck: supple, no JVD Chest: CTAB 
CVS: RRR 
ABD: Soft, ND, NT 
EXT: no edema Medications [unfilled] Lab Basic Metabolic Profile Recent Labs  
   09/19/18 0431 09/18/18 0351 09/17/18 
 0701 NA  132*  131*  127* CO2  23  19*  22 BUN  9  12  11  
  
[unfilled] No components found for: PTHINT  
 
 
CBC w/Diff Recent Labs  
   09/19/18 
 3375  09/19/18 0431 09/18/18 0351 09/17/18 
 0757 WBC   --   13.5*  12.6  12.9 RBC   --   3.18*  3.58*  3.87* HCT  24.8*  26.1*  29.5*  31.5* MCV   --   82.1  82.4  81.4 MCH   --   24.5  24.6  25.1 MCHC   --   29.9*  29.8*  30.8* RDW   --   19.0*  19.2*  19.0* Recent Labs  
   09/19/18 
 0431 09/18/18 
 0351  09/17/18 
 0757 BANDS   --   2   -- MONOS  8  5  8 EOS  2  3  2 BASOS  0  0  1  
RDW  19.0*  19.2*  19.0*  
  
 
Kristofer Neri MD 
Pager: [phone] Office: [phone]

## 2018-09-19 NOTE — IP AVS SNAPSHOT
[address] [facility] 
 
 
 [address] [facility] [address] 
[phone] Patient: Nikki Cortez MRN: [id number] YPF:[id number] About your hospitalization You were admitted on:  September 19, 2018 You last received care in the:  [facility] 2 CV STEPDOWN You were discharged on:  September 25, 2018 Why you were hospitalized Your primary diagnosis was:  Not on File Your diagnoses also included:  Anemia, Metastatic Lung Cancer (Metastasis From Lung To Other Site) (Hcc), Chronic Respiratory Failure (Hcc), Pe (Physical Exam), Annual, Dvt (Deep Vein Thrombosis) In Pregnancy (Hcc) Follow-up Information Follow up With Details Comments Contact Info Ame Freeman DO On 9/28/2018 @ 9:30 [address] [place] [address] 
[phone] Nemiah Snellen, MD On 10/8/2018 @ [address] 
[facility] [address] [facility] 
[phone] Your Scheduled Appointments Tuesday October 23, 2018 10:00 AM EDT HOSPITAL DISCHARGE with Mago Fonseca NP  
[facility]  
 [address] [address] [facility]  
[phone] Discharge Orders None A check johnathon indicates which time of day the medication should be taken. My Medications START taking these medications Instructions Each Dose to Equal  
 Morning Noon Evening Bedtime  
 amoxicillin-clavulanate 500-125 mg per tablet Commonly known as:  AUGMENTIN Take 1 Tab by mouth every twelve (12) hours for 5 days. 1 Tab  
    
  
   
   
   
  
  
 bisacodyl 10 mg suppository Commonly known as:  DULCOLAX (BISACODYL) Insert 10 mg into rectum daily as needed. 10 mg  
    
   
   
   
  
 dronabinol 2.5 mg capsule Commonly known as:  Arcelia Chato Take 1 Cap by mouth two (2) times a day. Max Daily Amount: 5 mg.   
 2.5 mg  
    
  
   
   
  
   
  
 lactobacillus sp. 50 billion cpu 50 billion cell -375 mg Cap capsule Commonly known as:  BIO-K PLUS Start taking on:  9/26/2018 Take 1 Cap by mouth daily for 7 days. 1 Cap  
    
  
   
   
   
  
 naloxone 4 mg/actuation nasal spray Commonly known as:  [employer] Use 1 spray intranasally, then discard. Repeat with new spray every 2 min as needed for opioid overdose symptoms, alternating nostrils. polyethylene glycol 17 gram packet Commonly known as:  Trudy Collieron Take 1 Packet by mouth daily. 17 g CHANGE how you take these medications Instructions Each Dose to Equal  
 Morning Noon Evening Bedtime  
 metoprolol tartrate 25 mg tablet Commonly known as:  LOPRESSOR What changed:  how much to take Take 0.5 Tabs by mouth two (2) times a day. 12.5 mg  
    
  
   
   
   
  
  
 oxyCODONE-acetaminophen 5-325 mg per tablet Commonly known as:  PERCOCET What changed:  reasons to take this Take 1 Tab by mouth every six (6) hours as needed. Max Daily Amount: 4 Tabs. 1 Tab  
    
   
   
   
  
 senna-docusate 8.6-50 mg per tablet Commonly known as:  Nara Arreola What changed:   
- how much to take 
- additional instructions Take 2 Tabs by mouth daily. Take with a full glass of water. HOLD for loose stool. 2 Tab CONTINUE taking these medications Instructions Each Dose to Equal  
 Morning Noon Evening Bedtime  
 levothyroxine 75 mcg tablet Commonly known as:  SYNTHROID Take 1 Tab by mouth Daily (before breakfast). 75 mcg  
    
  
   
   
   
  
 oxyCODONE ER 10 mg ER tablet Commonly known as:  OxyCONTIN Take 1 Tab by mouth every twelve (12) hours. Max Daily Amount: 20 mg.  
 10 mg Where to Get Your Medications Information on where to get these meds will be given to you by the nurse or doctor. ! Ask your nurse or doctor about these medications  
  amoxicillin-clavulanate 500-125 mg per tablet  
 bisacodyl 10 mg suppository  
 dronabinol 2.5 mg capsule  
 lactobacillus sp. 50 billion cpu 50 billion cell -375 mg Cap capsule  
 naloxone 4 mg/actuation nasal spray  
 oxyCODONE ER 10 mg ER tablet  
 oxyCODONE-acetaminophen 5-325 mg per tablet  
 polyethylene glycol 17 gram packet  
 senna-docusate 8.6-50 mg per tablet Opioid Education Prescription Opioids: What You Need to Know: 
 
Prescription opioids can be used to help relieve moderate-to-severe pain and are often prescribed following a surgery or injury, or for certain health conditions. These medications can be an important part of treatment but also come with serious risks. Opioids are strong pain medicines. Examples include hydrocodone, oxycodone, fentanyl, and morphine. Heroin is an example of an illegal opioid. It is important to work with your health care provider to make sure you are getting the safest, most effective care. WHAT ARE THE RISKS AND SIDE EFFECTS OF OPIOID USE? Prescription opioids carry serious risks of addiction and overdose, especially with prolonged use. An opioid overdose, often marked by slow breathing, can cause sudden death. The use of prescription opioids can have a number of side effects as well, even when taken as directed. · Tolerance-meaning you might need to take more of a medication for the same pain relief · Physical dependence-meaning you have symptoms of withdrawal when the medication is stopped. Withdrawal symptoms can include nausea, sweating, chills, diarrhea, stomach cramps, and muscle aches. Withdrawal can last up to several weeks, depending on which drug you took and how long you took it. · Increased sensitivity to pain · Constipation · Nausea, vomiting, and dry mouth · Sleepiness and dizziness · Confusion · Depression · Low levels of testosterone that can result in lower sex drive, energy, and strength · Itching and sweating RISKS ARE GREATER WITH:      
· History of drug misuse, substance use disorder, or overdose · Mental health conditions (such as depression or anxiety) · Sleep apnea · Older age (72 years or older) · Pregnancy Avoid alcohol while taking prescription opioids. Also, unless specifically advised by your health care provider, medications to avoid include: · Benzodiazepines (such as Xanax or Valium) · Muscle relaxants (such as Soma or Flexeril) · Hypnotics (such as Ambien or Lunesta) · Other prescription opioids KNOW YOUR OPTIONS Talk to your health care provider about ways to manage your pain that don't involve prescription opioids. Some of these options may actually work better and have fewer risks and side effects. Consult your physician before adding or stopping any medications, treatments, or physical activity. Options may include: 
· Pain relievers such as acetaminophen, ibuprofen, and naproxen · Some medications that are also used for depression or seizures · Physical therapy and exercise · Counseling to help patients learn how to cope better with triggers of pain and stress. · Application of heat or cold compress · Massage therapy · Relaxation techniques Be Informed Make sure you know the name of your medication, how much and how often to take it, and its potential risks & side effects. IF YOU ARE PRESCRIBED OPIOIDS FOR PAIN: 
· Never take opioids in greater amounts or more often than prescribed. Remember the goal is not to be pain-free but to manage your pain at a tolerable level. · Follow up with your primary care provider to: · Work together to create a plan on how to manage your pain. · Talk about ways to help manage your pain that don't involve prescription opioids. · Talk about any and all concerns and side effects. · Help prevent misuse and abuse. · Never sell or share prescription opioids · Help prevent misuse and abuse. · Store prescription opioids in a secure place and out of reach of others (this may include visitors, children, friends, and family). · Safely dispose of unused/unwanted prescription opioids: Find your community drug take-back program or your pharmacy mail-back program, or flush them down the toilet, following guidance from the Food and Drug Administration (www.fda.gov/Drugs/ResourcesForYou). · Visit www.cdc.gov/drugoverdose to learn about the risks of opioid abuse and overdose. · If you believe you may be struggling with addiction, tell your health care provider and ask for guidance or call [employer] at 1-018-313-UJFM. Discharge Instructions Deep Vein Thrombosis: Care Instructions Your Care Instructions A deep vein thrombosis (DVT) is a blood clot in certain veins of the legs, pelvis, or arms. Blood clots in these veins need to be treated because they can get bigger, break loose, and travel through the bloodstream to the lungs. A blood clot in a lung can be life-threatening. The doctor may have given you a blood thinner (anticoagulant). A blood thinner can stop the blood clot from growing larger and prevent new clots from forming. You will need to take a blood thinner for 3 to 6 months or longer. The doctor has checked you carefully, but problems can develop later. If you notice any problems or new symptoms, get medical treatment right away. Follow-up care is a key part of your treatment and safety. Be sure to make and go to all appointments, and call your doctor if you are having problems. It's also a good idea to know your test results and keep a list of the medicines you take. How can you care for yourself at home? · Take your medicines exactly as prescribed.  Call your doctor if you think you are having a problem with your medicine. · If you are taking a blood thinner, be sure you get instructions about how to take your medicine safely. Blood thinners can cause serious bleeding problems. · Wear compression stockings if your doctor recommends them. These stockings are tighter at the feet than on the legs. They may reduce pain and swelling in your legs. But there are different types of stockings, and they need to fit right. So your doctor will recommend what you need. · When you sit, use a pillow to raise the arm or leg that has the blood clot. Try to keep it above the level of your heart. When should you call for help? Call 911 anytime you think you may need emergency care. For example, call if: 
  · You passed out (lost consciousness).  
  · You have symptoms of a blood clot in your lung (called a pulmonary embolism). These include: 
¨ Sudden chest pain. ¨ Trouble breathing. ¨ Coughing up blood.  
 Call your doctor now or seek immediate medical care if: 
  · You have new or worse trouble breathing.  
  · You are dizzy or lightheaded, or you feel like you may faint.  
  · You have symptoms of a blood clot in your arm or leg. These may include: 
¨ Pain in the arm, calf, back of the knee, thigh, or groin. ¨ Redness and swelling in the arm, leg, or groin.  
 Watch closely for changes in your health, and be sure to contact your doctor if: 
  · You do not get better as expected. Where can you learn more? Go to http://denis-wyatt.info/. Enter J234 in the search box to learn more about \"Deep Vein Thrombosis: Care Instructions. \" Current as of: November 21, 2017 Content Version: 11.7 © 8473-0227 Fil[employer]oop. Care instructions adapted under license by [employer] (which disclaims liability or warranty for this information).  If you have questions about a medical condition or this instruction, always ask your healthcare professional. [employer] disclaims any warranty or liability for your use of this information. Lung Cancer: Care Instructions Your Care Instructions Lung cancer occurs when abnormal cells grow out of control in the lung. Lung cancer can start anywhere in the lungs and spread to other parts of the body. Treatment for lung cancer depends on what type of lung cancer you have and how advanced it is. Treatment may include surgery to remove the cancer. It could also include medicines (chemotherapy) or radiation to destroy cancer cells. Being treated for cancer can weaken your body, and you may feel very tired. Home treatment and certain medicines can help you feel better. Finding out that you have cancer is scary. You may feel many emotions and may need some help coping. Seek out family, friends, and counselors for support. You also can do things at home to make yourself feel better while you go through treatment. Call the [employer] Chace Macdonald (2-610.150.1328) or visit its website at [employer] for more information. Follow-up care is a key part of your treatment and safety. Be sure to make and go to all appointments, and call your doctor if you are having problems. It's also a good idea to know your test results and keep a list of the medicines you take. How can you care for yourself at home? · Take your medicines exactly as prescribed. Call your doctor if you think you are having a problem with your medicine. You will get more details on the specific medicines your doctor prescribes. · Follow your doctor's instructions to relieve pain. Use pain medicine when you first feel pain, before it becomes severe. Taking pain medicines regularly is often the best way to keep pain under control. · Eat healthy food. If you do not feel like eating, try to eat food that has protein and extra calories to keep up your strength and prevent weight loss. Drink liquid meal replacements for extra calories and protein.  Try to eat your main meal early. Eating smaller portions more often may help as well. · Get some physical activity every day, but do not get too tired. Keep doing the hobbies you enjoy as your energy allows. · Do not smoke. Smoking can make your cancer symptoms worse. If you need help quitting, talk to your doctor about stop-smoking programs and medicines. These can increase your chances of quitting for good. · If you use oxygen, do not smoke, light a cigarette, or use a flame while your oxygen is on. Smoking while using oxygen can lead to fire and even explosion. · If you have nausea, try to eat several small meals a day. When you feel better, eat clear soups and mild foods until all symptoms are gone for 12 to 48 hours. Other good choices include dry toast, crackers, cooked cereal, and gelatin dessert, such as Jell-O. · If you are vomiting or have diarrhea: ¨ Drink plenty of fluids (enough so that your urine is light yellow or clear like water) to prevent dehydration. Choose water and other caffeine-free clear liquids. If you have kidney, heart, or liver disease and have to limit fluids, talk with your doctor before you increase the amount of fluids you drink. ¨ When you are able to eat, try clear soups, mild foods, and liquids until all symptoms are gone for 12 to 48 hours. Other good choices include dry toast, crackers, cooked cereal, and gelatin dessert, such as Jell-O. 
· Take steps to control your stress and workload. Learn relaxation techniques. ¨ Share your feelings. Stress and tension affect our emotions. By expressing your feelings to others, you may be able to understand and cope with them. ¨ Consider joining a support group. Talking about a problem with your spouse, a good friend, or other people with similar problems is a good way to reduce tension and stress. ¨ Express yourself with art.  Try writing, crafts, dance, or art to relieve stress. Some dance, writing, or art groups may be available just for people who have cancer. ¨ Be kind to your body and mind. Getting enough sleep, eating a healthy diet, and taking time to do things you enjoy can contribute to an overall feeling of balance in your life and help reduce stress. ¨ Get help if you need it. Discuss your concerns with your doctor or counselor. · If you have not already done so, prepare a list of advance directives. Advance directives are instructions to your doctor and family members about what kind of care you want if you become unable to speak or express yourself. When should you call for help? Call 911 anytime you think you may need emergency care. For example, call if: 
  · You passed out (lost consciousness).  
  · You have severe trouble breathing.  
  · You cough up a lot of blood.  
 Call your doctor now or seek immediate medical care if: 
  · You have a fever.  
  · You are short of breath.  
  · You have new or worse pain.  
  · You have a new or worse cough.  
  · You think you have an infection.  
 Watch closely for changes in your health, and be sure to contact your doctor if: 
  · You do not get better as expected. Where can you learn more? Go to http://denis-wyatt.info/. Enter V677 in the search box to learn more about \"Lung Cancer: Care Instructions. \" Current as of: May 12, 2017 Content Version: 11.7 © 9225-2418 [employer]. Care instructions adapted under license by [employer] (which disclaims liability or warranty for this information). If you have questions about a medical condition or this instruction, always ask your healthcare professional. [place] [address] any warranty or liability for your use of this information. DISCHARGE SUMMARY from Nurse PATIENT INSTRUCTIONS: 
 
 
F-face looks uneven A-arms unable to move or move unevenly S-speech slurred or non-existent T-time-call 911 as soon as signs and symptoms begin-DO NOT go Back to bed or wait to see if you get better-TIME IS BRAIN. Warning Signs of HEART ATTACK Call 911 if you have these symptoms: 
? Chest discomfort. Most heart attacks involve discomfort in the center of the chest that lasts more than a few minutes, or that goes away and comes back. It can feel like uncomfortable pressure, squeezing, fullness, or pain. ? Discomfort in other areas of the upper body. Symptoms can include pain or discomfort in one or both arms, the back, neck, jaw, or stomach. ? Shortness of breath with or without chest discomfort. ? Other signs may include breaking out in a cold sweat, nausea, or lightheadedness. Don't wait more than five minutes to call [address]! Fast action can save your life. Calling 911 is almost always the fastest way to get lifesaving treatment. Emergency Medical Services staff can begin treatment when they arrive  up to an hour sooner than if someone gets to the hospital by car. The discharge information has been reviewed with the patient. The patient verbalized understanding. Discharge medications reviewed with the patient and appropriate educational materials and side effects teaching were provided. ___________________________________________________________________________________________________________________________________ Gliohart Announcement We are excited to announce that we are making your provider's discharge notes available to you in [employer]axAg. You will see these notes when they are completed and signed by the physician that discharged you from your recent hospital stay.   If you have any questions or concerns about any information you see in Gliohart, please call the [facility] [employer] Department where you were seen or reach out to your Primary Care Provider for more information about your plan of care. Introducing \[facility]\"" & HEALTH SERVICES! [employer] introduces VipVentat patient portal. Now you can access parts of your medical record, email your doctor's office, and request medication refills online. 1. In your internet browser, go to [email or web address] [email or web address] 2. Click on the First Time User? Click Here link in the Sign In box. You will see the New Member Sign Up page. 3. Enter your Smartbill - Recurrence Backoffice Access Code exactly as it appears below. You will not need to use this code after youve completed the sign-up process. If you do not sign up before the expiration date, you must request a new code. · Smartbill [employer]ur[employer] Backoffice Access Code: [email or web address] Expires: 11/13/2018 12:21 PM 
 
4. Enter the last four digits of your Social Security Number (xxxx) and Date of Birth (mm/dd/yyyy) as indicated and click Submit. You will be taken to the next sign-up page. 5. Create a Smartbill - Recurrence Backoffice ID. This will be your Smartbill [employer] Recur[employer] Backoffice login ID and cannot be changed, so think of one that is secure and easy to remember. 6. Create a Smartbill [employer] Backoffice password. You can change your password at any time. 7. Enter your Password Reset Question and Answer. This can be used at a later time if you forget your password. 8. Enter your e-mail address. You will receive e-mail notification when new information is available in [address] 9. Click Sign Up. You can now view and download portions of your medical record. 10. Click the Download Summary menu link to download a portable copy of your medical information. If you have questions, please visit the Frequently Asked Questions section of the Smartbill - Recurrence Backoffice website. Remember, Smartbill [employer] Backoffice is NOT to be used for urgent needs. For medical emergencies, dial 911. Now available from your iPhone and Android! Introducing Kobi Shin As a [employer] patient, I wanted to make you aware of our electronic visit tool called Kobi Shin. [employer] 24/7 allows you to connect within minutes with a medical provider 24 hours a day, seven days a week via a mobile device or tablet or logging into a secure website from your computer. You can access Kobi Hollyfin from anywhere in the [place]. A virtual visit might be right for you when you have a simple condition and feel like you just dont want to get out of bed, or cant get away from work for an appointment, when your regular [employer] provider is not available (evenings, weekends or holidays), or when youre out of town and need minor care. Electronic visits cost only $49 and if the [employer] 24/7 provider determines a prescription is needed to treat your condition, one can be electronically transmitted to a nearby pharmacy*. Please take a moment to enroll today if you have not already done so. The enrollment process is free and takes just a few minutes. To enroll, please download the [employer] 24/7 eddie to your tablet or phone, or visit [email or web address] to enroll on your computer. And, as an [address] [place] [address] patient with a [employer] account, the results of your visits will be scanned into your electronic medical record and your primary care provider will be able to view the scanned results. We urge you to continue to see your regular [employer] provider for your ongoing medical care. And while your primary care provider may not be the one available when you seek a Kobi Pachecotawandafin virtual visit, the peace of mind you get from getting a real diagnosis real time can be priceless. For more information on Kobi Juniortawandafin, view our Frequently Asked Questions (FAQs) at [email or web address]. Sincerely, 
 
Mariana Woodson MD 
Chief Medical Officer [facility] *:  certain medications cannot be prescribed via Kobi Shin Providers Seen During Your Hospitalization Provider Specialty Primary office phone Lauri Campuzano MD Hospitalist [phone] Elaina Randhawa MD [facility] [phone] Wilber Mccarthy MD Internal Medicine [phone] Immunizations Administered for This Admission Name Date Influenza Vaccine (Quad) PF  Deferred () Your Primary Care Physician (PCP) Primary Care Physician Office Phone Office Fax Vicky Danger [phone] You are allergic to the following No active allergies Recent Documentation Weight Breastfeeding? BMI OB Status Smoking Status 85 kg No 29.34 kg/m2 Menopause Former Smoker Emergency Contacts Name Discharge Info Relation Home Work Mobile DerrickAnselmo DISCHARGE CAREGIVER [3] Son [22] [phone] Patient Belongings The following personal items are in your possession at time of discharge: 
  Dental Appliances: None  Visual Aid: None      Home Medications: None   Jewelry: None  Clothing: Footwear    Other Valuables: None  Personal Items Sent to Safe: none Discharge Instructions Attachments/References MEFS - AMOXICILLIN/CLAVULANATE POTASSIUM (AUGMENTIN, AUGMENTIN ES-600, AUGMENTIN XR) - (BY MOUTH) (ENGLISH) MEFS - BISACODYL (ALOPHEN, BISCOLAX, CORRECTOL, DULCOLAX) - (BY MOUTH) (ENGLISH) MEFS - DRONABINOL (MARINOL) - (BY MOUTH) (ENGLISH) MEFS - NALOXONE (NARCAN) - (INTO THE NOSE) (ENGLISH) MEFS - OXYCODONE, SLOW RELEASE (OXYCONTIN) - (BY MOUTH) (ENGLISH) MEFS - OXYCODONE/ACETAMINOPHEN (PERCOCET, ROXICET) - (BY MOUTH) (ENGLISH) MEFS - POLYETHYLENE GLYCOL 3350 (MIRALAX, HEALTHYLAX POLYETHYLENE GLYCOL 3350, GAVILAX, LEADER CLEARLAX) - (BY MOUTH) (ENGLISH) MEFS - SENNA (SENNA, SENNA-LAX) - (BY MOUTH) (ENGLISH) Patient Handouts Amoxicillin/Clavulanate Potassium (Augmentin, Augmentin ES-600, Augmentin XR) - (By mouth) Why this medicine is used:  
Treats infections. This medicine is a penicillin antibiotic. Contact a nurse or doctor right away if you have: · Blistering, peeling, red skin rash · Dark urine or pale stools, nausea, vomiting, loss of appetite, stomach pain, yellow eyes or skin · Severe diarrhea, especially if bloody or ongoing Common side effects: 
· Diarrhea, nausea, vomiting · Diaper rash · Tooth discoloration (in children) © 2017 [employer] Information is for End User's use only and may not be sold, redistributed or otherwise used for commercial purposes. Bisacodyl (Alophen, Biscolax, Correctol, Dulcolax) - (By mouth) Why this medicine is used:  
Treats constipation. Common side effects: 
· Stomach cramps or discomfort © 2017 [employer] Information is for End User's use only and may not be sold, redistributed or otherwise used for commercial purposes. Dronabinol (Marinol) - (By mouth) Why this medicine is used:  
Treats or prevents nausea and vomiting. Also used to increase appetite. Contact a nurse or doctor right away if you have: 
· Fast or slow, pounding, or uneven heartbeat · Lightheadedness or fainting · Seizures · Unusual changes in mood or behavior, feeling depressed Common side effects: 
· Drowsiness, confusion · New or worsening nausea, vomiting, or stomach pain · Problems with memory, trouble thinking clearly © 2017 [employer] Information is for End User's use only and may not be sold, redistributed or otherwise used for commercial purposes. Naloxone (Narcan) - (Into the nose) Why this medicine is used:  
Treats opioid overdose in an emergency situation. Contact a nurse or doctor right away if you have: 
· Fast, pounding, or uneven heartbeat, trouble breathing · Seizures, tremors, feeling restless, nervous, or irritable · Diarrhea, nausea, vomiting, stomach cramps · Crying more than the usual (in babies) · Fever, runny nose, sneezing, sweating, yawning © 2017 [facility] Information is for End User's use only and may not be sold, redistributed or otherwise used for commercial purposes. Oxycodone, Slow Release (OxyCONTIN) - (By mouth) Why this medicine is used:  
Treats moderate to severe pain. This medicine is a narcotic pain reliever. Contact a nurse or doctor right away if you have: 
· Extreme weakness, shallow breathing, slow heartbeat · Severe confusion, lightheadedness, dizziness, fainting · Sweating or cold, clammy skin · Severe constipation, stomach pain, vomiting Common side effects: · Mild constipation, nausea, vomiting · Sleepiness, tiredness · Itching, rash © 2017 [facility] Information is for End User's use only and may not be sold, redistributed or otherwise used for commercial purposes. Oxycodone/Acetaminophen (Percocet, Roxicet) - (By mouth) Why this medicine is used:  
Treats pain. This medicine contains a narcotic pain reliever. Contact a nurse or doctor right away if you have: 
· Extreme weakness, shallow breathing, slow heartbeat · Sweating or cold, clammy skin · Skin blisters, rash, or peeling Common side effects: 
· Constipation · Nausea, vomiting · Tiredness © 2017 [facility] Information is for End User's use only and may not be sold, redistributed or otherwise used for commercial purposes. Polyethylene Glycol 3350 (MiraLAX, Healthylax Polyethylene Glycol 3350, GaviLAX, Leader ClearLax) - (By mouth) Why this medicine is used:  
Treats occasional constipation. Contact a nurse or doctor right away if you have: · Severe stomach pain, bloating, vomiting, diarrhea Common side effects: · Mild cramps, bloating, diarrhea, gas © 2017 [facility] [place] [employer] Information is for End User's use only and may not be sold, redistributed or otherwise used for commercial purposes. Senna (Senna, Senna-lax) - (By mouth) Why this medicine is used:  
Relieves occasional constipation. Contact a nurse or doctor right away if you have: · Black, tarry stools · Stomach pain, nausea or vomiting © 2017 [facility] [place] [employer] Information is for End User's use only and may not be sold, redistributed or otherwise used for commercial purposes. Please provide this summary of care documentation to your next provider. Signatures-by signing, you are acknowledging that this After Visit Summary has been reviewed with you and you have received a copy. Patient Signature:  ____________________________________________________________ Date:  ____________________________________________________________  
  
Viktor Newcomer Provider Signature:  ____________________________________________________________ Date:  ____________________________________________________________

## 2018-09-19 NOTE — PROGRESS NOTES
Problem: Falls - Risk of 
Goal: *Absence of Falls Document Beatris Llamas Fall Risk and appropriate interventions in the flowsheet. Outcome: Progressing Towards Goal 
Fall Risk Interventions: 
Mobility Interventions: Patient to call before getting OOB Medication Interventions: Evaluate medications/consider consulting pharmacy Elimination Interventions: Call light in reach, Patient to call for help with toileting needs Problem: Pressure Injury - Risk of 
Goal: *Prevention of pressure injury Document Talib Scale and appropriate interventions in the flowsheet. Outcome: Progressing Towards Goal 
Pressure Injury Interventions: 
  
 
Moisture Interventions: Absorbent underpads Activity Interventions: Pressure redistribution bed/mattress(bed type) Mobility Interventions: HOB 30 degrees or less Nutrition Interventions: Offer support with meals,snacks and hydration, Document food/fluid/supplement intake

## 2018-09-19 NOTE — PROGRESS NOTES
Bedside shift change report given to Speedy Gomez (oncoming nurse) by Unknown Ades RN (offgoing nurse). Report included the following information SBAR, Kardex, Procedure Summary, Intake/Output, MAR and Recent Results.

## 2018-09-19 NOTE — DIABETES MGMT
GLYCEMIC CONTROL & NUTRITION: 
 
 
- discussed in rounds and chart reviewed; no known h/o DM, BG currently within target range. No glycemic control needs identified at this time. Recent Glucose Results:  
Lab Results Component Value Date/Time  (H) 09/19/2018 04:31 AM  
 GLUCPOC 133 (H) 09/18/2018 09:29 PM  
 
 
 
Micki Ghosh MS, RN, CDE Glycemic Control Team 
[phone] Pager [phone] (M-TH 8:30-5P) *After Hours pager [phone]

## 2018-09-19 NOTE — ROUTINE PROCESS
Patient just had complete echo on 9/17/2018 (two days ago).  Complete echo changed to limited echo to evaluate for pericardial effusion as a follow-up exam.

## 2018-09-19 NOTE — PROGRESS NOTES
PM team attempted to see patient to complete AMD. She was sleeping soundly, no family was at the bedside. We allowed her to rest at this time. We will return later as time allows . Bill Arora RN

## 2018-09-19 NOTE — PROGRESS NOTES
[facility] Specialists Pulmonary, Critical Care, and Sleep Medicine Name: Randolph Laboy MRN: [id number] : 1955 Hospital: [facility] [place] Date: 2018 Pulmonary Critical Care F/Up Note IMPRESSION:  
Principal Problem: 
  SOB (shortness of breath) (2018) Active Problems: Metastatic lung cancer (metastasis from lung to other site) [facility]) (2018) Pericardial effusion (2018) Overview: Added automatically from request for surgery [id number] HTN (hypertension) (2018) Anemia (2018) Pleural effusion (2018) Lung infiltrate (2018) Pulmonary emboli ([address] (2018) Cachexia ([address] (2018) · Left side exudate pleural effusion likely malignant, s/p thoracentesis · Left side PE, concern for risk of pericardial rebleed and tamponade on full anti-coagulation · No tamponade with small pericardial effusion per preliminary ECHO result today and earlier · Left basilar opacity being treated for any post-obstructive pneumonia · Anemia, drop in hgb, hemodynamically stable · Hx of DVT, s/p IVC filter for inability to anticoagulate from hemorrhagic pericardial effusion and tamponade requiring catheter drainage · PAD  
RECOMMENDATIONS:  
Continue supplemental O2 via NC, titrate flow and monitor for goal SPO2 > 90% Aspiration prevention bundle, head of the bed at 30' all times Bronchodilators PRN, pulmonary hygiene care Follow up pl fluid cytology Appreciate input from cardiology, CT sx; follow up ECHO appears stable Treatment with anticoagulation therapy is desired ideally given recurrence, mlg, IVC filter unable to protect with cardiothoracic back up; not available at this facility and hence need to transfer to [facility] following Antibiotic choice: Zosyn and stop Vancomycin. May stop Zosyn per clinical course Monitor hemodynamics; stable and compensated No evidence of metastatic activity on CT head Await PVL legs result. DVT prophylaxis - Heparin SQ on hold 2' to drop in Hgb. Diet as tolerated Palliative care consulted. Overall long term poor prognosis. Discussed with patient and son risk of any recurrent VTE and massive PE with risk of cardiac arrest, advanced lung cancer and related mortality and other, treatment options, limitations. Recommended DNR and possible hospice. Patient remained full code. Will await transfer to [facility] step down unit. Will defer respective systems problem management to primary and other consultant Quality Care: PPI, DVT prophylaxis, HOB elevated, Infection control all reviewed and addressed. PAIN AND SEDATION: percocet prn 
· Skin/Wound: none new · Nutrition: oral diet · Prophylaxis: DVT and GI Prophylaxis reviewed · PT/OT eval and treat: when more stable · Lines/Tubes: lines PIV; no howard ADVANCE DIRECTIVE: Full code. Palliative consulted FAMILY DISCUSSION: spoke with patient and family at bedside at her request 
Events and notes from last 24 hours reviewed. Care plan discussed with nursing Subjective/History:  
Patient is a 66 y.o. female with PMHx for metastatic adenocarcinoma of lung, hemorrhagic pericardial effusion, DVT s/p IVC filter, PAD presented with worsening in SOB over past 4-5 days with associated orthopnea leading to ED visit. The patient denies fever, chill, cough, chest pain, wheezing or hemoptysis. Reports of palpitation and lightheadedness. The patient denies any symptoms of neurological impairment or TIA's; abdominal or flank pain, nausea or vomiting, dysphagia, change in bowel habits or bleeding from any body orifices. In ED, work up with CTA chest showed known lung mass with pleural effusion, pericardial effusion and possible left side PE. She remained hemodynamically stable and on room air. At home she is not on any O2, worked with PT, used compression stockings intermittently. 9/19/18 Patient AAO x 3; off and on sinus tachycardia. On 2 Lpm o2 via NC Reports unchanged SOB from yesterday after improvement following thoracentesis The patient denies fever, chills, cough, chest pain, wheezing or hemoptysis. Remained hemodynamically stable, denies any palpitations, syncope, orthopnea, edema or pnd 
Hgb dropped in AM labs. The patient denies abnormal bruising or abnormal bleeding from any body orifices. Repeat ECHO [preliminary] and prior ECHO this admission no worsening in pericardial effusion Discussed with patient and son regarding VTE treatment and risk of bleeding. Discussed with cardiology this AM. While VTE treatment is desired, and IVC filter didn't help; risk of bleeding remained a very reasonable concern especially hemopericardium and treatment may be considered in facility with cardiothoracic surgery back up. This facility lacks that back up. Spoke with Dr. Romayne Schroeder [[facility]] who agrees. Spoke with Victoria Link. Transfer to [facility] process will be initiated. Discussed options of DNR, hospice. Patient remained full code. Explained to the patient and son that transfer to [facility] does not guarantee successful VTE treatment and any treatment needs consideration for any substantial associated risk. They verbalized understanding and agrees. Patient and son has spoken with cardiology, oncology, palliative care several times since admitted. Review of Systems: 
General ROS: negative for  - fever, chills, weight loss, fatigue and malaise Hematological and Lymphatic ROS: negative for - bleeding problems, jaundice, pallor Cardiovascular ROS: negative for - chest pain, edema, murmur, orthopnea, palpitations or paroxysmal nocturnal dyspnea Gastrointestinal ROS: no abdominal pain, change in bowel habits, or black or bloody stools Dermatological ROS: negative for - pruritus, rash or skin lesion changes Latest lactic acid:  
Lactic acid Date Value Ref Range Status 09/18/2018 3.4 (HH) 0.4 - 2.0 MMOL/L Final  
  Comment:  
  CALLED TO AND CORRECTLY REPEATED BY: 
AMA AGUILERA RN ICU TO   [facility] [id number] 
  
09/16/2018 2.1 (HH) 0.4 - 2.0 MMOL/L Final  
  Comment:  
  CALLED TO AND CORRECTLY REPEATED BY: 
Evert Chiu RN ICU TO [facility] AT 2308 09/16/18 09/16/2018 2.1 (HH) 0.4 - 2.0 MMOL/L Final  
  Comment:  
  CALLED TO AND CORRECTLY REPEATED BY: 
Chino Bentley RN ER TO [facility] AT 2011 ON [id number] Past Medical History: 
Past Medical History:  
Diagnosis Date  Cancer ([facility] lung cancer  HTN (hypertension) 9/16/2018  PAD (peripheral artery disease) ([facility]  Pericardial effusion Past Surgical History: 
Past Surgical History:  
Procedure Laterality Date  VASCULAR SURGERY PROCEDURE UNLIST Right   
 opened up vessels Medications: 
Prior to Admission medications Medication Sig Start Date End Date Taking? Authorizing Provider  
levothyroxine (SYNTHROID) 75 mcg tablet Take 1 Tab by mouth Daily (before breakfast). 8/29/18  Yes Cris Ch MD  
metoprolol tartrate (LOPRESSOR) 25 mg tablet Take 0.5 Tabs by mouth two (2) times a day. Patient taking differently: Take 25 mg by mouth two (2) times a day. 8/28/18   Cris Ch MD  
oxyCODONE ER (OXYCONTIN) 10 mg ER tablet Take 1 Tab by mouth every twelve (12) hours. Max Daily Amount: 20 mg. Patient not taking: Reported on 9/14/2018 8/28/18   Cris Ch MD  
oxyCODONE-acetaminophen (PERCOCET) 5-325 mg per tablet Take 1 Tab by mouth every six (6) hours as needed. Max Daily Amount: 4 Tabs. Patient taking differently: Take 1 Tab by mouth every six (6) hours as needed for Pain. 8/28/18   Cris Ch MD  
senna-docusate (PERICOLACE) 8.6-50 mg per tablet Take 1 Tab by mouth daily. 8/29/18   Cris Ch MD  
 
 
Current Facility-Administered Medications Medication Dose Route Frequency  dronabinol (MARINOL) capsule 2.5 mg  2.5 mg Oral BID  
  piperacillin-tazobactam (ZOSYN) 3.375 g in 0.9% sodium chloride (MBP/ADV) 100 mL MBP  3.375 g IntraVENous Q6H  
 docusate sodium (COLACE) capsule 100 mg  100 mg Oral BID  levothyroxine (SYNTHROID) tablet 75 mcg  75 mcg Oral ACB  metoprolol tartrate (LOPRESSOR) tablet 12.5 mg  12.5 mg Oral BID  
 oxyCODONE ER (OxyCONTIN) tablet 10 mg  10 mg Oral Q12H  Vancomycin- Pharmacy to dose  1 Each Other Rx Dosing/Monitoring  vancomycin (VANCOCIN) 1,000 mg in 0.9% sodium chloride (MBP/ADV) 250 mL adv  1,000 mg IntraVENous Q12H Allergy: No Known Allergies Social History: 
Social History Substance Use Topics  Smoking status: Former Smoker  Smokeless tobacco: Never Used  Alcohol use Yes Comment: occasionally Family History: 
History reviewed. No family history for lung cancer. Objective:  
Vital Signs:   
Blood pressure 110/60, pulse 100, temperature 99.4 °F (37.4 °C), resp. rate 19, height 5' 7\" (1.702 m), weight 79.8 kg (176 lb), SpO2 100 %, not currently breastfeeding. Body mass index is 27.57 kg/(m^2). O2 Device: Nasal cannula O2 Flow Rate (L/min): 2 l/min Temp (24hrs), Av.2 °F (37.3 °C), Min:98.8 °F (37.1 °C), Max:99.5 °F (37.5 °C) Intake/Output:  
Last shift:      701 - 1900 In: 840 [P.O.:840] Out: 550 [Urine:550] Last 3 shifts: 1901 -  0700 In: 2317 [P.O.:2117; I.V.:200] Out: 2500 [Urine:2500] Intake/Output Summary (Last 24 hours) at 18 1313 Last data filed at 18 1247 Gross per 24 hour Intake             1837 ml Output             1900 ml Net              -63 ml Physical Exam: 
General: AAO x 3, in no respiratory distress, cooperative, no distress, appears older than stated age, on O2 via NC 
HEENT: PERRLA, EOMI, fundi benign, throat normal without erythema or exudate Neck: No abnormally enlarged lymph nodes or thyroid, supple Chest: normal 
 Lungs: improved air exchange LLL, dullness to percussion L base, rhonchi L base, no tenderness/ rash Heart: Regular rate and rhythm, S1S2 present or without murmur or extra heart sounds Abdomen: non distended, bowel sounds normoactive, tympanic, soft without significant tenderness, masses, organomegaly or guarding, rigidity, rebound Extremity: negative for edema, cyanosis, clubbing Neuro: alert, oriented x 3, no defects noted in general exam. 
Skin: Skin color, texture, turgor fair. Skin dry, warm, non-diaphoretic Data:  
 
Recent Results (from the past 24 hour(s)) POTASSIUM Collection Time: 09/18/18  5:23 PM  
Result Value Ref Range Potassium 5.5 3.5 - 5.5 mmol/L  
GLUCOSE, POC Collection Time: 09/18/18  9:29 PM  
Result Value Ref Range Glucose (POC) 133 (H) 70 - 110 mg/dL Valentín Lion Collection Time: 09/18/18 11:46 PM  
Result Value Ref Range Vancomycin,trough 14.9 10.0 - 20.0 ug/mL MAGNESIUM Collection Time: 09/19/18  4:31 AM  
Result Value Ref Range Magnesium 2.1 1.6 - 2.6 mg/dL PHOSPHORUS Collection Time: 09/19/18  4:31 AM  
Result Value Ref Range Phosphorus 2.3 (L) 2.5 - 4.9 MG/DL  
CALCIUM, IONIZED Collection Time: 09/19/18  4:31 AM  
Result Value Ref Range Ionized Calcium 1.08 (L) 1.12 - 1.32 MMOL/L  
CBC WITH AUTOMATED DIFF Collection Time: 09/19/18  4:31 AM  
Result Value Ref Range WBC 13.5 (H) 4.6 - 13.2 K/uL  
 RBC 3.18 (L) 4.20 - 5.30 M/uL HGB 7.8 (L) 12.0 - 16.0 g/dL HCT 26.1 (L) 35.0 - 45.0 % MCV 82.1 74.0 - 97.0 FL  
 MCH 24.5 24.0 - 34.0 PG  
 MCHC 29.9 (L) 31.0 - 37.0 g/dL  
 RDW 19.0 (H) 11.6 - 14.5 % PLATELET 269 786 - 305 K/uL MPV 8.8 (L) 9.2 - 11.8 FL  
 NEUTROPHILS 76 (H) 40 - 73 % LYMPHOCYTES 14 (L) 21 - 52 % MONOCYTES 8 3 - 10 % EOSINOPHILS 2 0 - 5 % BASOPHILS 0 0 - 2 %  
 ABS. NEUTROPHILS 10.1 (H) 1.8 - 8.0 K/UL  
 ABS. LYMPHOCYTES 2.0 0.9 - 3.6 K/UL  
 ABS. MONOCYTES 1.1 0.05 - 1.2 K/UL ABS. EOSINOPHILS 0.3 0.0 - 0.4 K/UL  
 ABS. BASOPHILS 0.1 0.0 - 0.1 K/UL  
 DF AUTOMATED METABOLIC PANEL, BASIC Collection Time: 09/19/18  4:31 AM  
Result Value Ref Range Sodium 132 (L) 136 - 145 mmol/L Potassium 4.3 3.5 - 5.5 mmol/L Chloride 97 (L) 100 - 108 mmol/L  
 CO2 23 21 - 32 mmol/L Anion gap 12 3.0 - 18 mmol/L Glucose 127 (H) 74 - 99 mg/dL BUN 9 7.0 - 18 MG/DL Creatinine 0.87 0.6 - 1.3 MG/DL  
 BUN/Creatinine ratio 10 (L) 12 - 20 GFR est AA >60 >60 ml/min/1.73m2 GFR est non-AA >60 >60 ml/min/1.73m2 Calcium 9.0 8.5 - 10.1 MG/DL  
HGB & HCT Collection Time: 09/19/18  8:38 AM  
Result Value Ref Range HGB 7.4 (L) 12.0 - 16.0 g/dL HCT 24.8 (L) 35.0 - 45.0 % ECHO ADULT FOLLOW-UP OR LIMITED Collection Time: 09/19/18 11:31 AM  
Result Value Ref Range LV ED Vol A2C 71.2 mL  
 LV ES Vol A4C 22.8 mL  
 LV ES Vol BP 23.8 19 - 49 mL  
 BP EF 65.0 55 - 100 % LV Ejection Fraction MOD 4C 60 % LV Ejection Fraction MOD 2C 67 % LV ES Vol A2C 23.8 mL  
 LVES Vol Index BP 12.4 mL/m2 LV ED Vol A4C 57.6 mL  
 LVED Vol Index BP 35.6 mL/m2 Triscuspid Valve Regurgitation Peak Gradient 20.9 mmHg LV ED Vol BP 68.2 56 - 104 ml  
 TR Max Velocity 228.79 cm/s LVED Vol Index A4C 30.1 mL/m2 LVED Vol Index A2C 37.2 mL/m2 LVES Vol Index A4C 11.9 mL/m2 LVES Vol Index A2C 12.4 mL/m2 No results for input(s): FIO2I, IFO2, HCO3I, IHCO3, HCOPOC, PCO2I, PCOPOC, IPHI, PHI, PHPOC, PO2I, PO2POC in the last 72 hours. No lab exists for component: IPOC2 All Micro Results Procedure Component Value Units Date/Time CULTURE, BLOOD [[id number]] Collected:  09/16/18 1920 Order Status:  Completed Specimen:  Whole Blood from Blood Updated:  09/19/18 0941 Special Requests: NO SPECIAL REQUESTS Culture result: NO GROWTH 3 DAYS     
 CULTURE, BLOOD [[id number]] Collected:  09/16/18 2000 Order Status:  Completed Specimen:  Blood from Blood Updated:  09/19/18 0941 Special Requests: NO SPECIAL REQUESTS Culture result: NO GROWTH 3 DAYS     
 CULTURE, BODY FLUID [[id number]] Collected:  09/17/18 1445 Order Status:  Completed Specimen:  Pleural Fluid Updated:  09/19/18 2691 Special Requests: NO SPECIAL REQUESTS     
  GRAM STAIN FEW WBC'S     
   NO ORGANISMS SEEN Culture result: NO GROWTH 2 DAYS Telemetry: normal sinus rhythm, sinus tachycardia 9/19/18 ECHO [preliminary] · Estimated left ventricular ejection fraction is 61 - 65%. Biplane method used to measure ejection fraction. Normal left ventricular wall motion, no regional wall motion abnormality noted. Mild (grade 1) left ventricular diastolic dysfunction. · Mild tricuspid valve regurgitation is present. Pulmonary arterial systolic pressure is 23 mmHg. There is no evidence of pulmonary hypertension. · Trivial pericardial effusion. No indications of tamponade present. There is a large left pleural effusion 9/17/18 ECHO · Estimated left ventricular ejection fraction is 56 - 60%. Left ventricular mild concentric hypertrophy. Normal left ventricular wall motion, no regional wall motion abnormality noted. Mild (grade 1) left ventricular diastolic dysfunction. · Mild tricuspid valve regurgitation is present. Mild pulmonary hypertension is present. · Mild pulmonic valve regurgitation is present. · Trivial pericardial effusion. No indications of tamponade present. There is a left pleural effusion 8/22/18 ECHO · Trivial pericardial effusion adjacent to the right ventricle. Bilateral pleural effusion · Normal cavity size, wall thickness and systolic function (ejection fraction normal). The estimated ejection fraction is 61 - 65% Imaging: 
[x]I have personally reviewed the patients chest radiographs images and report Results from Hospital Encounter encounter on 09/16/18 XR CHEST PORT 
 Narrative Chest, single view Indication: Left pleural effusion status post thoracentesis. Comparison: 9/16/2018 Findings:  Portable upright AP view of the chest was obtained. Decrease in hazy density the left mid and lower lung in keeping with decreased 
left-sided pleural effusion status post thoracentesis. Persistent right upper 
lobe pulmonary mass is present. Right pleural space is clear. Cardiac size and 
mediastinal contours are normal. No acute osseous abnormality is present. Impression IMPRESSION: 
1. Decreased volume left-sided pleural fluid status post thoracentesis. No 
postprocedural pneumothorax. 2. Unchanged radiographic appearance to right upper lobe mass. Results from Hospital Encounter encounter on 09/16/18 CT HEAD WO CONT Narrative EXAM: CT head INDICATION: Non-small cell lung cancer, staging, hypertension, anemia. COMPARISON: Correlation brain MRI 8/17/2018. TECHNIQUE: Axial CT imaging of the head  was obtained from skull base to vertex 
without intravenous contrast. Coronal and sagittal reconstructions were 
obtained. One or more dose reduction techniques were used on this CT: automated exposure 
control, adjustment of the mAs and/or kVp according to patient's size, and 
iterative reconstruction techniques. The specific techniques utilized on this CT 
exam have been documented in the patient's electronic medical record. 
 
_______________ FINDINGS: 
 
BRAIN AND POSTERIOR FOSSA: The sulci, folia, ventricles and basal cisterns are 
within normal limits for the patient?s age. There is no intracranial hemorrhage, 
mass effect, or shift of midline structures. There are no areas of abnormal 
parenchymal attenuation. EXTRA-AXIAL SPACES AND MENINGES: There are no abnormal extra-axial fluid 
collections or mass. Small benign-appearing dural calcification right side 
tentorium. CALVARIUM: No acute osseous abnormality. SINUSES: Mild mucosal thickening roof of left ethmoid sinus. Mastoids clear. OTHER: None. 
 
_______________ Impression IMPRESSION: 
 
No evidence of intracranial metastatic disease or other acute or significant 
intracranial finding. [x]See my orders for details My assessment, plan of care, findings, medications, side effects etc were discussed with: 
[x]nursing []PT/OT   
[]respiratory therapy [x]Victoria Red, Paulino Para [x]family - son [x]Patient [x]High complexity decision making performed and > 50% time spent in face to face evaluation.  
 
Cezar Godinez MD

## 2018-09-19 NOTE — PROGRESS NOTES
0800 Assessment completed, patient alert, denies pain at present . 1045 new order for complete echo ASAP, called echo, they will be here within 10 minutes. Dr Vanesa Trimble notified. 1200 Reassessment completed, Patient denies any pain or distress , Family at bedside. Updated patient and son Ernesto Stewart that a room is ready and we will be preparing patient to transfer to [facility] 2308 within the hour. Sahil Thornton

## 2018-09-19 NOTE — PROGRESS NOTES
Cardiology Progress Note Patient: Jaime Gann        Sex: female          DOA: 9/16/2018 YOB: 1955      Age:  66 y.o.        LOS:  LOS: 2 days Patient seen and examined, chart reviewed. Assessment/Plan Patient Active Problem List  
Diagnosis Code  Metastatic lung cancer (metastasis from lung to other site) [facility]) C34.90  Leg DVT (deep venous thromboembolism), acute, left (HCC) I82.402  PAD (peripheral artery disease) (HCC) I73.9  Pericardial effusion I31.3  
 SOB (shortness of breath) R06.02  
 HTN (hypertension) I10  
 Anemia D64.9  Pleural effusion J90  
 Lung infiltrate R91.8  Pulmonary emboli ([address] I26.99  
 Cachexia ([address] R64 S/p IVC filter H/o Cardiac tamponade Echocardiogram revealed trivial pericardial effusion  
  
Plan: 
 
No anticoagulation as patient developed hemorrhagic pericardial effusion with tamponade on IV heparin last month Patient's condition and prognosis discussed with patient's son and family member at bed side. Family had questions and concerns and I answered all and they verbally understood. Continue management as per hospital medicine Subjective:  
 cc: 
Denies any chest pain or shortness of breath REVIEW OF SYSTEMS:  
 
General: No fevers or chills. Cardiovascular: No chest pain,No palpitations, No orthopnea, No PND, positive leg swelling, No claudication Pulmonary: No dyspnea. Gastrointestinal: No nausea, vomiting, bleeding Neurology: No Dizziness Objective:  
  
Visit Vitals  /74  Pulse (!) 114  Temp 99.1 °F (37.3 °C)  Resp 20  
 Ht 5' 7\" (1.702 m)  Wt 79.8 kg (176 lb)  SpO2 99%  Breastfeeding No  
 BMI 27.57 kg/m2 Body mass index is 27.57 kg/(m^2). Physical Exam: 
General Appearance: Comfortable, looks chronically ill, not using accessory muscles of respiration. HEENT: SILVER. HEAD: Atraumatic NECK: No JVD, no thyroidomeglay. CAROTIDS: No bruit LUNGS: Clear bilaterally. HEART: S1+S2 audible, no murmur, no pericardial rub. NEUROLOGICAL: Alert, awake, oriented times 3, No motor and sensory deficit Medication: 
Current Facility-Administered Medications Medication Dose Route Frequency  dronabinol (MARINOL) capsule 2.5 mg  2.5 mg Oral BID  Vancomycin Trough due 9/18/18 at 23:30 (30 minutes prior to 00:00 dose)  1 Each Other ONCE  polyethylene glycol (MIRALAX) packet 17 g  17 g Oral DAILY PRN  piperacillin-tazobactam (ZOSYN) 3.375 g in 0.9% sodium chloride (MBP/ADV) 100 mL MBP  3.375 g IntraVENous Q6H  
 acetaminophen (TYLENOL) tablet 650 mg  650 mg Oral Q4H PRN  
 naloxone (NARCAN) injection 0.4 mg  0.4 mg IntraVENous PRN  
 diphenhydrAMINE (BENADRYL) injection 12.5 mg  12.5 mg IntraVENous Q4H PRN  
 ondansetron (ZOFRAN) injection 4 mg  4 mg IntraVENous Q4H PRN  
 docusate sodium (COLACE) capsule 100 mg  100 mg Oral BID  heparin (porcine) injection 5,000 Units  5,000 Units SubCUTAneous Q8H  
 albuterol-ipratropium (DUO-NEB) 2.5 MG-0.5 MG/3 ML  3 mL Nebulization Q4H PRN  
 levothyroxine (SYNTHROID) tablet 75 mcg  75 mcg Oral ACB  metoprolol tartrate (LOPRESSOR) tablet 12.5 mg  12.5 mg Oral BID  
 oxyCODONE ER (OxyCONTIN) tablet 10 mg  10 mg Oral Q12H  
 oxyCODONE-acetaminophen (PERCOCET) 5-325 mg per tablet 1 Tab  1 Tab Oral Q6H PRN  Vancomycin- Pharmacy to dose  1 Each Other Rx Dosing/Monitoring  ELECTROLYTE REPLACEMENT PROTOCOL- Potassium  1 Each Other PRN  
 ELECTROLYTE REPLACEMENT PROTOCOL- Magnesium  1 Each Other PRN  
 ELECTROLYTE REPLACEMENT PROTOCOL- Phosphorus  1 Each Other PRN  
 ELECTROLYTE REPLACEMENT PROTOCOL- Calcium  1 Each Other PRN  
 vancomycin (VANCOCIN) 1,000 mg in 0.9% sodium chloride (MBP/ADV) 250 mL adv  1,000 mg IntraVENous Q12H  
 morphine injection 1 mg  1 mg IntraVENous Q2H PRN Lab/Data Reviewed: 
 
  
Recent Labs 09/18/18 
 623 623 148  09/17/18 
 0757  09/16/18 
 1820 WBC  12.6  12.9  12.9 HGB  8.8*  9.7*  8.5* HCT  29.5*  31.5*  27.5*  
PLT  304  291  262 Recent Labs  
   09/18/18 
 1723  09/18/18 
 0351  09/17/18 
 0701  09/16/18 
 1820 NA   --   131*  127*  131*  
K  5.5  5.6*  4.9  5.1 CL   --   97*  93*  95* CO2   --   19*  22  27 GLU   --   83  87  95 BUN   --   12  11  13 CREA   --   0.96  0.83  0.80 CA   --   8.9  9.0  9.1 Signed By: Gerhard Sadler MD   
 September 18, 2018

## 2018-09-19 NOTE — PROGRESS NOTES
Patient was visited by ANDRES. Volunteer followed up with patient and/or family and reported no needs to this . Chaplains will continue to follow and will provide pastoral care as needed or requested. Rev. Bozena Singer  Spiritual Care 
[phone]

## 2018-09-20 NOTE — PROGRESS NOTES
conducted an initial consultation and Spiritual Assessment for Milind, who is a 66 y.o.,female. Patients Primary Language is: [place]. According to the patients EMR Yarsani Affiliation is: Evangelical. The reason the Patient came to the hospital is:  
Patient Active Problem List  
 Diagnosis Date Noted  Chronic respiratory failure ([facility] 09/19/2018  PE (physical exam), annual 09/19/2018  DVT (deep vein thrombosis) in pregnancy ([facility] 09/19/2018  Pleural effusion 09/17/2018  Lung infiltrate 09/17/2018  Pulmonary emboli ([facility] 09/17/2018  Cachexia ([facility] 09/17/2018  SOB (shortness of breath) 09/16/2018  
 HTN (hypertension) 09/16/2018  Anemia 09/16/2018  Metastatic lung cancer (metastasis from lung to other site) [facility]) 08/16/2018  Leg DVT (deep venous thromboembolism), [facility], [facility] 08/16/2018  PAD (peripheral artery disease) [facility] 08/16/2018  Pericardial effusion 08/16/2018 The  provided the following Interventions: 
Initiated a relationship of care and support. Explored issues of eduardo, belief, spirituality and Restorationism/ritual needs while hospitalized. Listened empathically. Provided information about Spiritual Care Services. Offered prayer and assurance of continued prayers on patient's behalf. Chart reviewed. The following outcomes where achieved: 
Patient shared limited information about both her medical narrative and spiritual journey/beliefs.  confirmed Patient's Evangelical Yarsani Affiliation. Patient processed feeling about current hospitalization. Patient expressed gratitude for 's visit. Assessment: 
Patient does not have any Restorationism/cultural needs that will affect patients preferences in health care. There are no spiritual or Restorationism issues which require intervention at this time. Plan: 
Chaplains will continue to follow and will provide pastoral care on an as needed/requested basis.  recommends bedside caregivers page  on duty if patient shows signs of acute spiritual or emotional distress. Chaplain Resident Sandra Zapata [facility]  
[phone]

## 2018-09-20 NOTE — PROGRESS NOTES
NUTRITION Nursing Referral: [facility] 
  
RECOMMENDATIONS / PLAN:  
 
- Add preferences to diet. - Add supplements: Ensure Enlive TID.  
- Continue RD inpatient monitoring and evaluation. NUTRITION INTERVENTIONS & DIAGNOSIS:  
 
[x] Meals/snacks: modify composition, add preferences [x] Medical food supplement therapy: initiate Nutrition Diagnosis: Unintended weight loss related to inadequate energy intake as evidenced by 6 lb, 3% weight loss x several months. ASSESSMENT:  
 
Pt reports decreased appetite, encouraged po intake, discussed supplements and food preferences. Average po intake adequate to meet patients estimated nutritional needs:   [] Yes     [x] No   [] Unable to determine at this time Diet: DIET REGULAR Food Allergies: NKFA Current Appetite:   [] Good     [] Fair     [x] Poor     [] Other: 
Appetite/meal intake prior to admission:   [] Good     [] Fair     [x] Poor x several months     [] Other: 
Feeding Limitations:  [] Swallowing difficulty    [] Chewing difficulty    [] Other: 
Current Meal Intake: No data found. BM: 9/18 Skin Integrity: WDL Edema:   [x] No     [] Yes Pertinent Medications: Reviewed Recent Labs  
   09/20/18 
 0556  09/19/18 
 1425  09/19/18 
 0431  09/18/18 
 1723  09/18/18 
 0753 NA  133*   --   132*   --   131* K  4.4   --   4.3  5.5  5.6*  
CL  97*   --   97*   --   97* CO2  28   --   23   --   19* GLU  89   --   127*   --   83 BUN  7   --   9   --   12  
CREA  0.56*   --   0.87   --   0.96  
CA  8.8   --   9.0   --   8.9 MG  2.0   --   2.1   --   2.0 PHOS  3.3  3.1  2.3*   --   3.7 Intake/Output Summary (Last 24 hours) at 09/20/18 1448 Last data filed at 09/20/18 0330 Gross per 24 hour Intake              140 ml Output              600 ml Net             -460 ml Anthropometrics: 
Ht Readings from Last 1 Encounters:  
09/19/18 5' 7\" (1.702 m) Last 3 Recorded Weights in this Encounter  
 09/20/18 9380 Weight: 79.8 kg (175 lb 15.9 oz) Body mass index is 27.56 kg/(m^2). Weight History: patient reports recent weight loss over the past few months PTA, down from previous weight of 181 lb (6 lb, 3% weight loss x several months) Weight Metrics 9/20/2018 9/19/2018 9/19/2018 9/19/2018 9/1/2018 8/28/2018 8/16/2018 Weight 175 lb 15.9 oz - 176 lb - 184 lb 194 lb 10.7 oz -  
BMI - 27.56 kg/m2 - 27.57 kg/m2 28.82 kg/m2 - 30.49 kg/m2 Admitting Diagnosis: PE 
Metastatic lung cancer (metastasis from lung to other site) [facility]) 
PE (physical exam), annual 
DVT (deep vein thrombosis) in pregnancy ([facility] Chronic respiratory failure ([facility] Anemia Pertinent PMHx: metastatic lung cancer, HTN, PAD Education Needs:        [x] None identified  [] Identified - Not appropriate at this time  []  Identified and addressed - refer to education log Learning Limitations:   [x] None identified  [] Identified Cultural, Evangelical & ethnic food preferences:  [x] None identified    [] Identified and addressed ESTIMATED NUTRITION NEEDS:  
 
Calories: 6728-0300 kcal (HBEx1.2-1.4) based on  [x] Actual BW 80 kg     [] IBW Protein:  gm (1-1.5 gm/kg) based on  [x] Actual BW      [] IBW Fluid: 1 mL/kcal 
  
MONITORING & EVALUATION:  
 
Nutrition Goal(s): 1. Po intake of meals will meet >75% of patient estimated nutritional needs within the next 7 days. Outcome:  [] Met/Ongoing    []  Not Met    [x] New/Initial Goal  
 
Monitoring:   [x] Food and beverage intake   [x] Diet order   [x] Nutrition-focused physical findings   [x] Treatment/therapy   [] Weight   [] Enteral nutrition intake Previous Recommendations (for follow-up assessments only):     []   Implemented       []   Not Implemented (RD to address)      [] No Longer Appropriate     [] No Recommendation Made Discharge Planning: regular diet [x] Participated in care planning, discharge planning, & interdisciplinary rounds as appropriate Dwight [address], [address]  Pager: [phone]

## 2018-09-20 NOTE — PROGRESS NOTES
Reason for Admission:   PE, has metastatic lung cancer. RRAT Score:     15 Do you (patient/family) have any concerns for transition/discharge? Plan for utilizing home health:     Is active with [facility] Likelihood of readmission? Mod/yellow Transition of Care Plan:      Demographic information verified by patient. Patient has a walker with front wheels and a shower chair. Patients grandson and sister help with transportation. Patients son is Isaac Ornelas [phone]. Care Management Interventions PCP Verified by CM: Yes 
Palliative Care Criteria Met (RRAT>21 & CHF Dx)?: No 
Mode of Transport at Discharge: Other (see comment) (family) Transition of Care Consult (CM Consult): Home Health [address] [place] [address] Road: Yes Current Support Network: Lives Alone Confirm Follow Up Transport: Family Plan discussed with Pt/Family/Caregiver: Yes [employer] Information Provided?: No 
Discharge Location Discharge Placement: Home with home health

## 2018-09-20 NOTE — H&P
GENERAL GENERIC H&P Patient with a recent diagnosis of stage 4 lung cancer with Mets to the liver, bones and pleural space hemorrhagic pleural effusion, DVT/PE was transferred from [facility] for possible anticoagulation therapy initiation. Due to her recent hemopericardium there is a fear if she starts anticoagulation she might have reaccumulation of pericardial bleed. Patient came her to [facility] where there is cardiothoracic surgery available She was scheduled to follow oncology as out patient after her initial hospitalization but has to be readmitted for DVT PE over her IVC filter Eun Bush ro be called if fluid re accumulate Patent has a recent paracentesis for pleural effusion Subjective: 
 
Past Medical History:  
Diagnosis Date  Cancer ([facility] lung cancer  HTN (hypertension) 9/16/2018  PAD (peripheral artery disease) ([facility]  Pericardial effusion Past Surgical History:  
Procedure Laterality Date  VASCULAR SURGERY PROCEDURE UNLIST Right   
 opened up vessels Prior to Admission medications Medication Sig Start Date End Date Taking? Authorizing Provider  
levothyroxine (SYNTHROID) 75 mcg tablet Take 1 Tab by mouth Daily (before breakfast). 8/29/18   Jaleesa Jj MD  
metoprolol tartrate (LOPRESSOR) 25 mg tablet Take 0.5 Tabs by mouth two (2) times a day. Patient taking differently: Take 25 mg by mouth two (2) times a day. 8/28/18   Jaleesa Jj MD  
oxyCODONE ER (OXYCONTIN) 10 mg ER tablet Take 1 Tab by mouth every twelve (12) hours. Max Daily Amount: 20 mg. Patient not taking: Reported on 9/14/2018 8/28/18   Jaleesa Jj MD  
oxyCODONE-acetaminophen (PERCOCET) 5-325 mg per tablet Take 1 Tab by mouth every six (6) hours as needed. Max Daily Amount: 4 Tabs. Patient taking differently: Take 1 Tab by mouth every six (6) hours as needed for Pain.  8/28/18   Jaleesa Jj MD  
 senna-docusate (PERICOLACE) 8.6-50 mg per tablet Take 1 Tab by mouth daily. 8/29/18   Darshana Chen MD  
 
No Known Allergies Social History Substance Use Topics  Smoking status: Former Smoker  Smokeless tobacco: Never Used  Alcohol use Yes Comment: occasionally No family history on file. Review of Systems Constitutional: Negative for chills, fatigue and unexpected weight change. Respiratory: Positive for cough, chest tightness and shortness of breath. Negative for wheezing. Cardiovascular: Positive for leg swelling. Negative for chest pain. Gastrointestinal: Negative for abdominal pain, anal bleeding, blood in stool, constipation, diarrhea and nausea. Genitourinary: Negative for enuresis, flank pain, frequency and genital sores. Skin: Negative for pallor and rash. Objective: 
 
09/19 1901 - 09/20 0700 In: -  
Out: 400 [Urine:400] Patient Vitals for the past 8 hrs: 
 BP Temp Pulse Resp SpO2  
09/19/18 1800 120/69 99 °F (37.2 °C) (!) 110 20 100 % Physical Exam  
Constitutional: She is oriented to person, place, and time. She appears well-developed and well-nourished. She appears distressed. Cardiovascular: Normal rate, regular rhythm, normal heart sounds and intact distal pulses. Exam reveals no friction rub. No murmur heard. Pulmonary/Chest: Effort normal. No respiratory distress. She has no wheezes. She has no rales. Has decreased air entry over lower lung zones Abdominal: She exhibits no distension. There is no tenderness. There is no rebound and no guarding. Neurological: She is oriented to person, place, and time. No cranial nerve deficit. Coordination normal.  
S swelling over the left upper chest   
  
 
Labs:   
Recent Results (from the past 24 hour(s)) [place] Collection Time: 09/18/18 11:46 PM  
Result Value Ref Range Vancomycin,trough 14.9 10.0 - 20.0 ug/mL MAGNESIUM  Collection Time: 09/19/18  4:31 AM  
 Result Value Ref Range Magnesium 2.1 1.6 - 2.6 mg/dL PHOSPHORUS Collection Time: 09/19/18  4:31 AM  
Result Value Ref Range Phosphorus 2.3 (L) 2.5 - 4.9 MG/DL  
CALCIUM, IONIZED Collection Time: 09/19/18  4:31 AM  
Result Value Ref Range Ionized Calcium 1.08 (L) 1.12 - 1.32 MMOL/L  
CBC WITH AUTOMATED DIFF Collection Time: 09/19/18  4:31 AM  
Result Value Ref Range WBC 13.5 (H) 4.6 - 13.2 K/uL  
 RBC 3.18 (L) 4.20 - 5.30 M/uL HGB 7.8 (L) 12.0 - 16.0 g/dL HCT 26.1 (L) 35.0 - 45.0 % MCV 82.1 74.0 - 97.0 FL  
 MCH 24.5 24.0 - 34.0 PG  
 MCHC 29.9 (L) 31.0 - 37.0 g/dL  
 RDW 19.0 (H) 11.6 - 14.5 % PLATELET 414 858 - 832 K/uL MPV 8.8 (L) 9.2 - 11.8 FL  
 NEUTROPHILS 76 (H) 40 - 73 % LYMPHOCYTES 14 (L) 21 - 52 % MONOCYTES 8 3 - 10 % EOSINOPHILS 2 0 - 5 % BASOPHILS 0 0 - 2 %  
 ABS. NEUTROPHILS 10.1 (H) 1.8 - 8.0 K/UL  
 ABS. LYMPHOCYTES 2.0 0.9 - 3.6 K/UL  
 ABS. MONOCYTES 1.1 0.05 - 1.2 K/UL  
 ABS. EOSINOPHILS 0.3 0.0 - 0.4 K/UL  
 ABS. BASOPHILS 0.1 0.0 - 0.1 K/UL  
 DF AUTOMATED METABOLIC PANEL, BASIC Collection Time: 09/19/18  4:31 AM  
Result Value Ref Range Sodium 132 (L) 136 - 145 mmol/L Potassium 4.3 3.5 - 5.5 mmol/L Chloride 97 (L) 100 - 108 mmol/L  
 CO2 23 21 - 32 mmol/L Anion gap 12 3.0 - 18 mmol/L Glucose 127 (H) 74 - 99 mg/dL BUN 9 7.0 - 18 MG/DL Creatinine 0.87 0.6 - 1.3 MG/DL  
 BUN/Creatinine ratio 10 (L) 12 - 20 GFR est AA >60 >60 ml/min/1.73m2 GFR est non-AA >60 >60 ml/min/1.73m2 Calcium 9.0 8.5 - 10.1 MG/DL  
HGB & HCT Collection Time: 09/19/18  8:38 AM  
Result Value Ref Range HGB 7.4 (L) 12.0 - 16.0 g/dL HCT 24.8 (L) 35.0 - 45.0 % ECHO ADULT FOLLOW-UP OR LIMITED Collection Time: 09/19/18 11:31 AM  
Result Value Ref Range LV ED Vol A2C 71.2 mL  
 LV ES Vol A4C 22.8 mL  
 LV ES Vol BP 23.8 19 - 49 mL  
 BP EF 65.0 55 - 100 % LV Ejection Fraction MOD 4C 60 % LV Ejection Fraction MOD 2C 67 % LV ES Vol A2C 23.8 mL  
 LVES Vol Index BP 12.4 mL/m2 LV ED Vol A4C 57.6 mL  
 LVED Vol Index BP 35.6 mL/m2 Triscuspid Valve Regurgitation Peak Gradient 20.9 mmHg LV ED Vol BP 68.2 56 - 104 ml  
 TR Max Velocity 228.79 cm/s LVED Vol Index A4C 30.1 mL/m2 LVED Vol Index A2C 37.2 mL/m2 LVES Vol Index A4C 11.9 mL/m2 LVES Vol Index A2C 12.4 mL/m2 PHOSPHORUS Collection Time: 09/19/18  2:25 PM  
Result Value Ref Range Phosphorus 3.1 2.5 - 4.9 MG/DL  
CALCIUM, IONIZED Collection Time: 09/19/18  2:25 PM  
Result Value Ref Range Ionized Calcium 1.18 1.12 - 1.32 MMOL/L Assessment & Plan: Active Problems: Metastatic lung cancer (metastasis from lung to other site) [facility]) (8/16/2018) Anemia (9/16/2018) Chronic respiratory failure ([facility] (9/19/2018) PE (physical exam), annual (9/19/2018) DVT (deep vein thrombosis) in pregnancy ([facility] (9/19/2018) Acute on chronic respiratory failure Dyspnea Due to lung cancer vs PE vs possible pna plus  pleural effusion Sob better after  thoracentesis Breathing tx prn , 
Continue  vanc and zosyn for empiric treatment  
   
Lung cancer -stage 4 with Mets to Bone, Liver and Pleural space F/u with oncologist  
palliative care consult AM  
Pain control . 
   
  
DVT/PE recurrent/ developed PE while having IVC filter With hx of dvt with ivc filter Not a candidate for anticoagulant  due to the cardiac issue-'No anticoagulation as patient developed hemorrhagic pericardial effusion with tamponade on IV heparin last month \" discuss with Dr. Mikey Flores AM before starting Anticoagulation  
 
  
   
 HTN, accelerated Continue home medication. 
   
Pericardial effusion Repeated echo:Trivial pericardial effusion 
   
Anemia likely dur to chronic illness:  
Hemoglobin trending down Transfuse less than 7 Continue monitoring  
   
Hyponatremia cause? ? parneoplastic  
- patient asymptomatic Monitor sodium  
    
 DVT PPX: has IVC filter Diet: regualr Code status: full Code for now but I had a prolonged discussio with patient and son who understood and wanted to discuss with palliative care team in the [facility] Signed: 
Felisa Glez MD 9/19/2018

## 2018-09-20 NOTE — PROGRESS NOTES
Cardiology Progress Note Patient: Lisa Romero        Sex: female          DOA: 9/16/2018 YOB: 1955      Age:  66 y.o.        LOS:  LOS: 3 days Patient seen and examined, chart reviewed. Assessment/Plan Patient Active Problem List  
Diagnosis Code  Metastatic lung cancer (metastasis from lung to other site) [facility]) C34.90  Leg DVT (deep venous thromboembolism), acute, left ([facility]) I82.402  PAD (peripheral artery disease) ([facility]) I73.9  Pericardial effusion I31.3  
 SOB (shortness of breath) R06.02  
  Anemia D64.9  Pleural effusion J90  
 Lung infiltrate R91.8  Pulmonary emboli ([facility] I26.99  
 Cachexia ([facility] R64  Chronic respiratory failure ([facility]) J96.10  PE (physical exam), annual Z00.00  DVT (deep vein thrombosis) in pregnancy ([facility]) O22.30, I82.409 S/p IVC filter H/o Cardiac tamponade  
  
Echocardiogram done today revealed trivial pericardial effusion  
   
Plan: 
  
Continue management as per hospital medicine and pulmonary critical care Prognosis guarded Subjective:  
 cc: 
Denies any chest pain or shortness of breath at rest 
 
 
REVIEW OF SYSTEMS:  
 
General: No fevers or chills. Cardiovascular: No chest pain,No palpitations, No orthopnea, No PND, Positive leg swelling, No claudication Pulmonary: Dyspnea on exertion Gastrointestinal: No nausea, vomiting, bleeding Neurology: No Dizziness Objective:  
  
Visit Vitals  /60 (BP Patient Position: At rest)  Pulse 100  Temp 98.9 °F (37.2 °C)  Resp 19  
 Ht 5' 7\" (1.702 m)  Wt 79.8 kg (176 lb)  SpO2 100%  Breastfeeding No  
 BMI 27.57 kg/m2 Body mass index is 27.57 kg/(m^2). Physical Exam: 
General Appearance: Comfortable, Looks chronically ill, not using accessory muscles of respiration. HEENT: SILVER. HEAD: Atraumatic NECK: No JVD, no thyroidomeglay. CAROTIDS: No bruit LUNGS: Clear bilaterally. HEART: S1+S2 audible, no murmur, no pericardial rub. NEUROLOGICAL: AAO times 3 Medication: No current facility-administered medications for this encounter. No current outpatient prescriptions on file. Facility-Administered Medications Ordered in Other Encounters Medication Dose Route Frequency  morphine injection 2 mg  2 mg IntraVENous Q4H PRN  
 acetaminophen (TYLENOL) tablet 650 mg  650 mg Oral Q4H PRN  
 albuterol-ipratropium (DUO-NEB) 2.5 MG-0.5 MG/3 ML  3 mL Nebulization Q4H PRN  
 diphenhydrAMINE (BENADRYL) injection 12.5 mg  12.5 mg IntraVENous Q4H PRN  
 [START ON 9/20/2018] docusate sodium (COLACE) capsule 100 mg  100 mg Oral BID  [START ON 9/20/2018] dronabinol (MARINOL) capsule 2.5 mg  2.5 mg Oral BID  [START ON 9/20/2018] levothyroxine (SYNTHROID) tablet 75 mcg  75 mcg Oral ACB  [START ON 9/20/2018] metoprolol tartrate (LOPRESSOR) tablet 12.5 mg  12.5 mg Oral BID  morphine injection 1 mg  1 mg IntraVENous Q2H PRN  
 naloxone (NARCAN) injection 0.4 mg  0.4 mg IntraVENous PRN  
 ondansetron (ZOFRAN) injection 4 mg  4 mg IntraVENous Q4H PRN  
 oxyCODONE ER (OxyCONTIN) tablet 10 mg  10 mg Oral Q12H  
 oxyCODONE-acetaminophen (PERCOCET) 5-325 mg per tablet 1 Tab  1 Tab Oral Q6H PRN  piperacillin-tazobactam (ZOSYN) 3.375 g in 0.9% sodium chloride (MBP/ADV) 100 mL MBP  3.375 g IntraVENous Q6H  
 polyethylene glycol (MIRALAX) packet 17 g  17 g Oral DAILY PRN Lab/Data Reviewed: 
 
  
Recent Labs  
   09/19/18 
 5681  09/19/18 
 7547  09/18/18 
 4897  09/17/18 
 9089 WBC   --   13.5*  12.6  12.9 HGB  7.4*  7.8*  8.8*  9.7* HCT  24.8*  26.1*  29.5*  31.5* PLT   --   320  304  291 Recent Labs  
   09/19/18 
 0431  09/18/18 
 1723  09/18/18 
 0351  09/17/18 
 0701 NA  132*   --   131*  127* K  4.3  5.5  5.6*  4.9  
CL  97*   --   97*  93* CO2  23   --   19*  22 GLU  127*   --   83  87 BUN  9   --   12  11 CREA  0.87   --   0.96  0.83  
 CA  9.0   --   8.9  9.0 Signed By: Max Avendaño MD   
 September 19, 2018

## 2018-09-20 NOTE — PROGRESS NOTES
[facility] Hospitalist Group Progress Note Patient: Tracy Trent Age: 66 y.o. : 1955 MR#: [id number] SSN: [id number] Date/Time: 2018 Subjective:  
 
No F/C, N/V, CP, SOB. Chart reviewed. Assessment/Plan: 1. Recurrent PE, hx DVT in context of prior IVCFilter placement - no SOB c/o. Transferred here for initiation of anticoagulation. Hx of prior hemorrhagic pericardial effusion s/p pericardiocentesis, as well as acute blood loss anemia/GI bleed. Will d/w and consult CTSurgery. 2. NSCLung Ca with diffuse mets - heme/onc consulted. Incurable and terminal, per eval @ . There, palliative care and hospice discussions were held, with no change in code status and pt not being amenable to palliation. 3. HTN - BPs wnl, stable. 4. Anemia of chronic dz - follow. Will xfuse prn. 
5. HypoNa+ - mild. Following. 6. Acute on chronic hypoxic resp failure - O2 support. Continuing abx from xfer. 7. Will f/u CTSurgery eval re: feasibility of anticoagulating pt in light of the above. Additional Notes:   
ADDENDUM: Please note that pt had shortness of breath as dx, not acute hypoxic resp failure. Caleb Diggs MD 
2018 
4:26 PM 
 
Case discussed with:  [x]Patient  []Family  []Nursing  []Case Management DVT Prophylaxis:  []Lovenox  []Hep SQ  [x]SCDs  []Coumadin   []On Heparin gtt Objective:  
VS:  
Visit Vitals  /61  Pulse 94  Temp 98.2 °F (36.8 °C)  Resp 20  Wt 79.8 kg (175 lb 15.9 oz)  SpO2 100%  Breastfeeding No  
 BMI 27.56 kg/m2 Tmax/24hrs: Temp (24hrs), Av.7 °F (37.1 °C), Min:98.2 °F (36.8 °C), Max:99 °F (37.2 °C) Input/Output:  
Intake/Output Summary (Last 24 hours) at 18 1551 Last data filed at 18 1453 Gross per 24 hour Intake              740 ml Output              900 ml Net             -160 ml General:  Awake, alert, NAD. Cardiovascular:  RRR.  
Pulmonary:  CTA B. 
 GI:  Soft, NT/ND, NABS. Extremities:  No CT or edema. Additional:   
 
Labs:   
Recent Results (from the past 24 hour(s)) CALCIUM, IONIZED Collection Time: 09/20/18  5:56 AM  
Result Value Ref Range Ionized Calcium 1.17 1.12 - 1.32 MMOL/L  
CBC WITH AUTOMATED DIFF Collection Time: 09/20/18  5:56 AM  
Result Value Ref Range WBC 11.3 4.6 - 13.2 K/uL  
 RBC 3.09 (L) 4.20 - 5.30 M/uL HGB 7.7 (L) 12.0 - 16.0 g/dL HCT 25.3 (L) 35.0 - 45.0 % MCV 81.9 74.0 - 97.0 FL  
 MCH 24.9 24.0 - 34.0 PG  
 MCHC 30.4 (L) 31.0 - 37.0 g/dL  
 RDW 19.1 (H) 11.6 - 14.5 % PLATELET 182 488 - 122 K/uL MPV 8.9 (L) 9.2 - 11.8 FL  
 NEUTROPHILS 74 42 - 75 % LYMPHOCYTES 19 (L) 20 - 51 % MONOCYTES 6 2 - 9 % EOSINOPHILS 1 0 - 5 % BASOPHILS 0 0 - 3 %  
 ABS. NEUTROPHILS 8.4 (H) 1.8 - 8.0 K/UL  
 ABS. LYMPHOCYTES 2.1 0.8 - 3.5 K/UL  
 ABS. MONOCYTES 0.7 0 - 1.0 K/UL  
 ABS. EOSINOPHILS 0.1 0.0 - 0.4 K/UL  
 ABS. BASOPHILS 0.0 0.0 - 0.06 K/UL  
 DF AUTOMATED    
 RBC COMMENTS MACROCYTOSIS 1+ 
    
 RBC COMMENTS ANISOCYTOSIS 1+ 
    
 RBC COMMENTS POLYCHROMASIA 2+ MAGNESIUM Collection Time: 09/20/18  5:56 AM  
Result Value Ref Range Magnesium 2.0 1.6 - 2.6 mg/dL METABOLIC PANEL, BASIC Collection Time: 09/20/18  5:56 AM  
Result Value Ref Range Sodium 133 (L) 136 - 145 mmol/L Potassium 4.4 3.5 - 5.5 mmol/L Chloride 97 (L) 100 - 108 mmol/L  
 CO2 28 21 - 32 mmol/L Anion gap 8 3.0 - 18 mmol/L Glucose 89 74 - 99 mg/dL BUN 7 7.0 - 18 MG/DL Creatinine 0.56 (L) 0.6 - 1.3 MG/DL  
 BUN/Creatinine ratio 13 12 - 20 GFR est AA >60 >60 ml/min/1.73m2 GFR est non-AA >60 >60 ml/min/1.73m2 Calcium 8.8 8.5 - 10.1 MG/DL  
PHOSPHORUS Collection Time: 09/20/18  5:56 AM  
Result Value Ref Range Phosphorus 3.3 2.5 - 4.9 MG/DL Additional Data Reviewed:   
 
Signed By: Krzysztof Bridges MD   
 September 20, 2018 3:51 PM

## 2018-09-20 NOTE — PROGRESS NOTES
Problem: Falls - Risk of 
Goal: *Absence of Falls Document Sheri Bautista Fall Risk and appropriate interventions in the flowsheet. Outcome: Progressing Towards Goal 
Fall Risk prevention implemented by following: 
 
Fall Risk Interventions: 
Mobility Interventions: Communicate number of staff needed for ambulation/transfer, Patient to call before getting OOB Medication Interventions: Evaluate medications/consider consulting pharmacy, Patient to call before getting OOB, Teach patient to arise slowly Elimination Interventions: Bed/chair exit alarm, Call light in reach, Patient to call for help with toileting needs, Toilet paper/wipes in reach, Toileting schedule/hourly rounds Problem: Pressure Injury - Risk of 
Goal: *Prevention of pressure injury Document Talib Scale and appropriate interventions in the flowsheet. Outcome: Progressing Towards Goal 
Pressure injury prevention implemented by following: 
 
Pressure Injury Interventions: 
  
 
Moisture Interventions: Absorbent underpads, Maintain skin hydration (lotion/cream), Minimize layers Activity Interventions: Pressure redistribution bed/mattress(bed type) Mobility Interventions: Pressure redistribution bed/mattress (bed type), HOB 30 degrees or less Nutrition Interventions: Document food/fluid/supplement intake, Discuss nutritional consult with provider

## 2018-09-20 NOTE — PROGRESS NOTES
Bedside shift change report given to Guanaco Cantu RN (oncoming nurse) by Geena Sow RN (offgoing nurse). Report included the following information SBAR, Kardex, OR Summary, Intake/Output, MAR and Recent Results.

## 2018-09-21 NOTE — ACP (ADVANCE CARE PLANNING)
Assisted patient with the execution of Advance Medical Directive. Placed the copy into patient's \"like paper chart. \"  See Flow Sheet for other pastoral interventions. Chaplains will continue to follow and provide pastoral care on an as needed/requested basis. maldonado Medina [facility] [phone]

## 2018-09-21 NOTE — PROGRESS NOTES
[facility] Hospitalist Group Progress Note Patient: Lisa Romero Age: 66 y.o. : 1955 MR#: [id number] SSN: [id number] Date/Time: 2018 Subjective:  
 
mediport cancelled at [facility] as patient unable to lay flat. Hb 7.7 this am, stable. Na 135, improved. Extensive discussion held with brother and patient regarding duplex LE, CT scan + PE, and pathology results for pleural effusion. These were printed out and reviewed with patient and brother. They are requesting information regarding tx options for ca, and prognosis. They request VOA consult (called, Dr. Pires Single covering). They express understanding regarding the risk of anticoagulation and want more info regarding cancer prognosis prior to making a decision. Assessment/Plan: 1. Recurrent PE, hx DVT in context of prior IVC Filter placement - no SOB c/o. Transferred here for initiation of anticoagulation. Hx of prior hemorrhagic pericardial effusion s/p pericardiocentesis, as well as acute blood loss anemia/GI bleed. CT Surgery input appreciated. 2. NSCLung Ca with diffuse mets - heme/onc consulted. There, palliative care and hospice discussions were held, with no change in code status and pt not being amenable to palliation. 3. HTN - BPs wnl, stable. 4. Anemia of chronic dz - follow. Will xfuse prn. 
5. Hyponatremia, possible SIADH - Thais 23/high Uosm, c/w SIADH. cont fluid restriction 1.2L/24 hrs. 6. Acute on chronic hypoxic resp failure - O2 support. Continuing abx from xfer. 7. Left side exudative pleural effusion s/p thoracentesis 18: Metastatic adenocarcinoma with sigent ring features. 8. Left basilar opacity, possible post obstructive pneumonia 9. PAD 10. Full code. Primary oncologist Dr. Elva Sanders. Brendan Addison Job . [employer] [id number] Additional Notes: Case discussed with:  [x]Patient  [x]Family  [x]Nursing  []Case Management DVT Prophylaxis:  []Lovenox  []Hep SQ  []SCDs  []Coumadin   []On Heparin gtt Objective:  
VS:  
Visit Vitals  /90  Pulse (!) 114  Temp 98.5 °F (36.9 °C)  Resp 25  Wt 82 kg (180 lb 11.2 oz)  SpO2 100%  Breastfeeding No  
 BMI 28.3 kg/m2 Tmax/24hrs: Temp (24hrs), Av.4 °F (36.9 °C), Min:97.9 °F (36.6 °C), Max:98.8 °F (37.1 °C) Input/Output:  
 
Intake/Output Summary (Last 24 hours) at 18 8264 Last data filed at 18 6365 Gross per 24 hour Intake             1520 ml Output             1000 ml Net              520 ml General:  Awake, alert, NAD. Heent: ncat perrl Cardiovascular:  RRR. Pulmonary:  CTA B. 
GI:  Soft, NT/ND, NABS. Extremities:  No edema. Additional: no rash Labs:   
Recent Results (from the past 24 hour(s)) CBC WITH AUTOMATED DIFF Collection Time: 18  5:55 AM  
Result Value Ref Range WBC 12.4 4.6 - 13.2 K/uL  
 RBC 3.13 (L) 4.20 - 5.30 M/uL HGB 7.7 (L) 12.0 - 16.0 g/dL HCT 25.5 (L) 35.0 - 45.0 % MCV 81.5 74.0 - 97.0 FL  
 MCH 24.6 24.0 - 34.0 PG  
 MCHC 30.2 (L) 31.0 - 37.0 g/dL  
 RDW 19.2 (H) 11.6 - 14.5 % PLATELET 381 934 - 914 K/uL MPV 8.8 (L) 9.2 - 11.8 FL  
 NEUTROPHILS 76 (H) 42 - 75 % BAND NEUTROPHILS 2 0 - 5 % LYMPHOCYTES 15 (L) 20 - 51 % MONOCYTES 5 2 - 9 % EOSINOPHILS 0 0 - 5 % BASOPHILS 0 0 - 3 % METAMYELOCYTES 1 (H) 0 % MYELOCYTES 1 (H) 0 %  
 ABS. NEUTROPHILS 9.7 (H) 1.8 - 8.0 K/UL  
 ABS. LYMPHOCYTES 1.9 0.8 - 3.5 K/UL  
 ABS. MONOCYTES 0.6 0 - 1.0 K/UL  
 ABS. EOSINOPHILS 0.0 0.0 - 0.4 K/UL  
 ABS. BASOPHILS 0.0 0.0 - 0.06 K/UL  
 DF MANUAL PLATELET COMMENTS ADEQUATE PLATELETS    
 RBC COMMENTS ANISOCYTOSIS 2+ 
    
 RBC COMMENTS HYPOCHROMIA 1+ 
    
 RBC COMMENTS POLYCHROMASIA 1+ 
    
 RBC COMMENTS TARGET CELLS 
FEW 
    
CALCIUM, IONIZED  Collection Time: 18  5:55 AM  
 Result Value Ref Range Ionized Calcium 1.18 1.12 - 3.79 MMOL/L  
METABOLIC PANEL, BASIC Collection Time: 09/21/18  5:55 AM  
Result Value Ref Range Sodium 135 (L) 136 - 145 mmol/L Potassium 4.6 3.5 - 5.5 mmol/L Chloride 98 (L) 100 - 108 mmol/L  
 CO2 30 21 - 32 mmol/L Anion gap 7 3.0 - 18 mmol/L Glucose 98 74 - 99 mg/dL BUN 10 7.0 - 18 MG/DL Creatinine 0.66 0.6 - 1.3 MG/DL  
 BUN/Creatinine ratio 15 12 - 20 GFR est AA >60 >60 ml/min/1.73m2 GFR est non-AA >60 >60 ml/min/1.73m2 Calcium 8.9 8.5 - 10.1 MG/DL  
PHOSPHORUS Collection Time: 09/21/18  5:55 AM  
Result Value Ref Range Phosphorus 3.3 2.5 - 4.9 MG/DL MAGNESIUM Collection Time: 09/21/18  5:55 AM  
Result Value Ref Range Magnesium 2.1 1.6 - 2.6 mg/dL Additional Data Reviewed:   
 
Signed By: Lexus Zambrano MD   
 September 21, 2018 3:51 PM

## 2018-09-21 NOTE — CDMP QUERY
Dr. Higinio Sutton or Dr. Dangelo Gomes, Documentation of Acute on Chronic Respiratory Jania Ratliff is noted in the H&P/progress notes. Please document the clinical indicators that support this diagnosis or state that the diagnosis has been ruled out, or was on present only at [facility]. Current documentation: Transfer pt. from [facility] Positive for cough, chest tightness and shortness of breath. Negative for wheezing. Has decreased air entry over lower lung zones Sat 99% on 2L No ABGs Acute Respiratory Failure indicators include: - Respirations <12 or >25 - [employer] - Use of accessory muscles of respiration  
- Inability to speak in full sentences - Cyanosis AND 
 
- Pulse ox <90% RA or <95% on O2  
- pH <7.35 or >7.45  
- pO2 < 60 mm Hg (or 10mm below COPD patient's baseline) - pCO2 >50mm Hg (or 10mm above COPD patient's baseline) Please clarify and document your clinical opinion in the progress notes and discharge summary including the definitive and/or presumptive diagnosis, (suspected or probable), related to the above clinical findings. Please include clinical findings supporting your diagnosis. If you DECLINE this query or would like to communicate with [facility], please utilize the \"[phone] message box\" at the TOP of the Progress Note on the right. Thank you, Yaneth Beauchamp RN 
[phone]

## 2018-09-21 NOTE — PROGRESS NOTES
Assisted patient with the execution of Advance Medical Directive. Placed the copy into patient's \"like paper chart. \"  See Flow Sheet for other pastoral interventions. Chaplains will continue to follow and provide pastoral care on an as needed/requested basis. maldonado Marks [facility] [phone]

## 2018-09-21 NOTE — PROGRESS NOTES
Bedside turnover given to me by Selin Aguillon. Pt is on the cardiac telemetry monitor in stable condition. Asked for pain medications given morphine. Hourly rounded pt had a little confusion and delayed responses. Patient's son stayed the entire night with her and call bell was within reach. Wanted to get out of bed to use bedside commode, was offered bedpan due to pain weakness dvt and pe. In am son asked about patient's confusion we discussed the mets cancer and that it can affect the brain. He is in denial and didn't want to have a conversation about the cancer. Bedside turnover given to [employer] and we discussed the confusion and the sons response. Pt on cardiac telemetry monitor in stable condition, was given percocet recently, denies shortness of breath denies chest pain and denies pain currently in her flank. Call bell within reach.

## 2018-09-21 NOTE — ROUTINE PROCESS
Bedside and Verbal shift change report given to 19 E[address] Evaristo (oncoming nurse) by Maynor Sosa RN (offgoing nurse). Report included the following information SBAR, Intake/Output and Recent Results.

## 2018-09-21 NOTE — PROGRESS NOTES
Assisted patient with the execution of Advance Medical Directive. Placed the copy into patient's \"like paper chart. \"  See Flow Sheet for other pastoral interventions. Chaplains will continue to follow and provide pastoral care on an as needed/requested basis.  Javed Farooq [facility] [phone]

## 2018-09-21 NOTE — PROGRESS NOTES
conducted a Follow up consultation and Spiritual Assessment for [facility], who is a 66 y.o.,female. The  provided the following Interventions: 
Continued the relationship of care and support. Listened empathically. Offered prayer and assurance of continued prayer on patients behalf. Chart reviewed. The following outcomes were achieved: 
Patient expressed gratitude for 's visit. Assessment: 
There are no further spiritual or Lutheran issues which require Spiritual Care Services interventions at this time. Plan: 
Chaplains will continue to follow and will provide pastoral care on an as needed/requested basis.  recommends bedside caregivers page  on duty if patient shows signs of acute spiritual or emotional distress. Chaplain Rajendra Pitt [facility]  
[phone]

## 2018-09-22 NOTE — PROGRESS NOTES
Bedside and Verbal shift change report given to Mile Melgar RN (oncoming nurse) by Britta Madera RN (offgoing nurse). Report included the following information SBAR, Kardex, MAR and Recent Results.

## 2018-09-22 NOTE — PROGRESS NOTES
Hematology / Oncology Progress Note [address] Patient: Tom Dunham  Gender: female   MRN: [id number] YOB: 1955 Age: 66 y.o.   CSN: [id number] LOS:  LOS: 3 days   Admit Date: 2018 Assessment:  
 
Active Problems: Metastatic lung cancer (metastasis from lung to other site) [facility]) (2018) Anemia (2018) Chronic respiratory failure ([facility] [address] (2018) PE (physical exam), annual (2018) DVT (deep vein thrombosis) in pregnancy ([facility] [address] (2018) Plan: 1. Dr. Bertha Jones was in to see patient. 2.  Pt still wants to get chemo. 3.  Pt will need mediport. 4.  No anticoagulation. Subjective:  
 
Pt is breathing better at rest.  She is comfortable. No bleeding. Objective:  
 
Visit Vitals  /77  Pulse (!) 114  Temp 98.6 °F (37 °C)  Resp 22  Wt 82 kg (180 lb 11.2 oz)  SpO2 100%  Breastfeeding No  
 BMI 28.3 kg/m2 Temp (24hrs), Av.6 °F (37 °C), Min:98.3 °F (36.8 °C), Max:99.2 °F (37.3 °C) Intake/Output Summary (Last 24 hours) at 18 1053 Last data filed at 18 0300 Gross per 24 hour Intake              300 ml Output              450 ml Net             -150 ml Review of Systems:  
Constitutional: negative for fevers, chills, sweats and fatigue Eyes: negative for visual disturbance, redness and icterus Ears, Nose, Mouth, Throat, and Face: negative for tinnitus, epistaxis, sore mouth and hoarseness Respiratory: negative for cough, sputum, hemoptysis, pleurisy/chest pain or wheezing Cardiovascular: negative for chest pain, chest pressure/discomfort, palpitations, irregular heart beats, syncope, paroxysmal nocturnal dyspnea Gastrointestinal: negative for reflux symptoms, nausea, vomiting, change in bowel habits, melena, diarrhea, constipation and abdominal pain Genitourinary:negative for dysuria, nocturia, urinary incontinence, hesitancy and hematuria Integument: negative for rash, skin lesion(s) and pruritus Hematologic/Lymphatic: negative for easy bruising, bleeding and lymphadenopathy Musculoskeletal:negative for myalgias, arthralgias and bone pain Neurological: negative for headaches, dizziness, seizures, paresthesia and weakness Physical Exam: 
Constitutional: Alert, oriented, not in distress Eyes: PERRLA, anicteric, no redness Ears, nose, mouth, throat, and face: no palpable Lymph nodes, no mucositis, no thrush. Respiratory: symmetrical expansion, no rales, no rhonchi, no wheezing. Cardiovascular: S1S2, no pathologic murmur, no rub. Gastrointestinal: soft, benign, non tender, no HSM, normal bowel sounds, no mass. Integument: no rash, no petechiae, no ecchymosis. Musculoskeletal: no edema, no cyanosis, no clubbing. Neurological: intact, cranial nerves, no focal motor or sensory deficits. Labs: 
Basic Metabolic Profile Recent Labs  
   09/22/18 0238 09/21/18 
 0555  09/20/18 
 6442 NA  134*  135*  133* K  4.9  4.6  4.4  
CL  96*  98*  97* CO2  29  30  28 BUN  9  10  7 CREA  0.63  0.66  0.56* GLU  82  98  89  
CA  9.0  8.9  8.8 MG  2.0  2.1  2.0 PHOS  2.4*  3.3  3.3  
  
 
CBC w/Diff Recent Labs  
   09/22/18 0238 09/21/18 
 0555  09/20/18 
 1263 WBC  13.1  12.4  11.3 HGB  8.5*  7.7*  7.7* HCT  28.9*  25.5*  25.3*  
PLT  346  355  335 Recent Labs  
   09/22/18 0238 09/21/18 
 0555  09/20/18 
 8239 BANDS  6*  2   --   
GRANS  70  76*  74  
  
 
CHEMISTRY Lab Results Component Value Date ALB 1.9 (L) 09/16/2018 TP 7.7 09/16/2018 TBILI 0.7 09/16/2018 ALT 17 09/16/2018 SGOT 23 09/16/2018  (H) 09/16/2018 Coagulation Lab Results Component Value Date/Time  Prothrombin time 15.4 (H) 09/17/2018 10:10 AM  
 Prothrombin time 13.7 08/20/2018 10:51 AM  
 Prothrombin time 14.5 08/20/2018 03:10 AM  
 INR 1.2 09/17/2018 10:10 AM  
 INR 1.1 08/20/2018 10:51 AM  
 INR 1.2 08/20/2018 03:10 AM  
 aPTT 32.9 08/21/2018 04:00 AM  
 aPTT 28.2 08/20/2018 10:51 AM  
 aPTT 78.9 (H) 08/20/2018 03:10 AM  
  
 
 
Current Medications:  
Current Facility-Administered Medications:  
  bisacodyl (DULCOLAX) suppository 10 mg, 10 mg, Rectal, DAILY PRN, America Lobo MD 
  influenza vaccine 2018-19 (6 mos+)(PF) (FLUARIX QUAD/FLULAVAL QUAD) injection 0.5 mL, 0.5 mL, IntraMUSCular, PRIOR TO DISCHARGE, Oneida Freitas MD 
  polyethylene glycol [facility]) packet 17 g, 17 g, Oral, DAILY, Xena Wallace MD, 17 g at 09/22/18 4292   morphine injection 2 mg, 2 mg, IntraVENous, Q4H PRN, Oneida Freitas MD, 2 mg at 09/22/18 6454   acetaminophen (TYLENOL) tablet 650 mg, 650 mg, Oral, Q4H PRN, Oneida Freitas MD, 650 mg at 09/20/18 2104   albuterol-ipratropium (DUO-NEB) 2.5 MG-0.5 MG/3 ML, 3 mL, Nebulization, Q4H PRN, Oneida Freitas MD 
  diphenhydrAMINE (BENADRYL) injection 12.5 mg, 12.5 mg, IntraVENous, Q4H PRN, Oneida Freitas MD, 12.5 mg at 09/21/18 1947 
  docusate sodium (COLACE) capsule 100 mg, 100 mg, Oral, BID, Oneida Freitas MD, 100 mg at 09/22/18 3037   dronabinol (MARINOL) capsule 2.5 mg, 2.5 mg, Oral, BID, Oneida Freitas MD, 2.5 mg at 09/22/18 0123   levothyroxine (SYNTHROID) tablet 75 mcg, 75 mcg, Oral, ACB, Oneida Freitas MD, 75 mcg at 09/22/18 9916   metoprolol tartrate (LOPRESSOR) tablet 12.5 mg, 12.5 mg, Oral, BID, Oneida Freitas MD, 12.5 mg at 09/22/18 0855 
  naloxone [facility]) injection 0.4 mg, 0.4 mg, IntraVENous, PRN, Oneida Freitas MD 
  ondansetron [facility]) injection 4 mg, 4 mg, IntraVENous, Q4H PRN, Oneida Freitas MD 
  oxyCODONE ER (OxyCONTIN) tablet 10 mg, 10 mg, Oral, Q12H, Oneida Freitas MD, 10 mg at 09/22/18 6926   oxyCODONE-acetaminophen (PERCOCET) 5-325 mg per tablet 1 Tab, 1 Tab, Oral, Q6H PRN, Oneida Freitas MD, 1 Tab at 09/22/18 3835   piperacillin-tazobactam (ZOSYN) 3.375 g in 0.9% sodium chloride (MBP/ADV) 100 mL MBP, 3.375 g, IntraVENous, Q6H, Vincenzo Seip, MD, Last Rate: 200 mL/hr at 09/22/18 0601, 3.375 g at 09/22/18 0601   polyethylene glycol (MIRALAX) packet 17 g, 17 g, Oral, DAILY PRN, Vincenzo Seip, MD 
 
[place], [place] [address] Office [phone] Pt seen and examined. Lung ca- Dw son and pt. Still wants chemo. Will need mediport prior to chemo. DVT/PE. Had pericardial hem from heparin. Already has IVC filter. High risk for AC. Tried to page hospitalist. Will dw with her.  
Lynford Peabody, MD

## 2018-09-22 NOTE — PROGRESS NOTES
Problem: Pressure Injury - Risk of 
Goal: *Prevention of pressure injury Document Talib Scale and appropriate interventions in the flowsheet. Pressure Injury Interventions: 
Sensory Interventions: Assess changes in LOC, Chair cushion, Assess need for specialty bed, Avoid rigorous massage over bony prominences, Discuss PT/OT consult with provider Moisture Interventions: Absorbent underpads, Apply protective barrier, creams and emollients, Assess need for specialty bed, Check for incontinence Q2 hours and as needed, Contain wound drainage Activity Interventions: Assess need for specialty bed, Chair cushion, Increase time out of bed, Pressure redistribution bed/mattress(bed type), PT/OT evaluation Mobility Interventions: Assess need for specialty bed, Chair cushion, Float heels, HOB 30 degrees or less, Pressure redistribution bed/mattress (bed type), PT/OT evaluation Nutrition Interventions: Document food/fluid/supplement intake, Discuss nutritional consult with provider, Offer support with meals,snacks and hydration

## 2018-09-22 NOTE — ROUTINE PROCESS
Bedside and Verbal shift change report given to Sanford Chew (oncoming nurse) by Rosanne Sim (offgoing nurse). Report included the following information SBAR, MAR and Recent Results.

## 2018-09-22 NOTE — PROGRESS NOTES
[facility] Hospitalist Group Progress Note Patient: Yisel Mitchell Age: 66 y.o. : 1955 MR#: [id number] SSN: [id number] Date/Time: 2018 Subjective:  
 
Patient met with Dr. Maria Isabel Talley and has elected to pursue palliative chemo. She agrees to stay in [address] [facility] [address] [facility], hopefully for [facility]. She states she feels better than yesterday, breathing is better. She denies pain. Appetite okay. She requests that I update her son Mago Frey over the phone. Extensive discussion held, all questions answered to the best of my ability. Assessment/Plan: 1. Recurrent PE, hx DVT in context of prior IVC Filter placement - no anticoagulation. 2. NSCLung Ca with diffuse mets - patient has elected palliative chemo:  mediport and op f/u with . 3. HTN - BPs wnl, stable. 4. Anemia of chronic dz - follow. Will xfuse prn. 
5. Hyponatremia, possible SIADH - Thais 23/high Uosm, c/w SIADH. cont fluid restriction 1.2L/24 hrs. 6. Acute on chronic hypoxic resp failure - O2 support. Continuing abx from xfer. 7. Left side exudative pleural effusion s/p thoracentesis 18: Metastatic adenocarcinoma with signet ring features. 8. Left basilar opacity, possible post obstructive pneumonia 9. Recent hemorrhagic pericardial effusion in the setting of AC with heparin - s/p drainage, cytology negative - avoid anticoagulation. CT Surgery input appreciated. 10. PAD 11. Full code. Pt and ot. I discussed the case with Dr. Ian Blas. Primary oncologist Dr. Silva Condon. [facility] Vieyra . [employer] [phone] Additional Notes:   
 
Case discussed with:  [x]Patient  [x]Family  [x]Nursing  []Case Management DVT Prophylaxis:  []Lovenox  []Hep SQ  []SCDs  []Coumadin   []On Heparin gtt Objective:  
VS:  
Visit Vitals  /58  Pulse 100  Temp 98.2 °F (36.8 °C)  Resp 22  Wt 82 kg (180 lb 11.2 oz)  SpO2 96%  Breastfeeding No  
 BMI 28.3 kg/m2 Tmax/24hrs: Temp (24hrs), Av.5 °F (36.9 °C), Min:98.2 °F (36.8 °C), Max:99.2 °F (37.3 °C) Input/Output:  
 
Intake/Output Summary (Last 24 hours) at 18 1146 Last data filed at 18 1104 Gross per 24 hour Intake              300 ml Output              950 ml Net             -650 ml General:  Awake, alert, NAD. Heent: ncat perrl Cardiovascular:  RRR. Pulmonary:  CTA B. 
GI:  Soft, NT/ND, NABS. Extremities:  No edema. Additional: no rash Labs:   
Recent Results (from the past 24 hour(s)) CALCIUM, IONIZED Collection Time: 18  2:38 AM  
Result Value Ref Range Ionized Calcium 1.15 1.12 - 1.32 MMOL/L  
MAGNESIUM Collection Time: 18  2:38 AM  
Result Value Ref Range Magnesium 2.0 1.6 - 2.6 mg/dL PHOSPHORUS Collection Time: 18  2:38 AM  
Result Value Ref Range Phosphorus 2.4 (L) 2.5 - 4.9 MG/DL  
CBC WITH AUTOMATED DIFF Collection Time: 18  2:38 AM  
Result Value Ref Range WBC 13.1 4.6 - 13.2 K/uL  
 RBC 3.47 (L) 4.20 - 5.30 M/uL HGB 8.5 (L) 12.0 - 16.0 g/dL HCT 28.9 (L) 35.0 - 45.0 % MCV 83.3 74.0 - 97.0 FL  
 MCH 24.5 24.0 - 34.0 PG  
 MCHC 29.4 (L) 31.0 - 37.0 g/dL  
 RDW 19.1 (H) 11.6 - 14.5 % PLATELET 516 962 - 036 K/uL MPV 8.9 (L) 9.2 - 11.8 FL  
 NEUTROPHILS 70 42 - 75 % BAND NEUTROPHILS 6 (H) 0 - 5 % LYMPHOCYTES 14 (L) 20 - 51 % MONOCYTES 7 2 - 9 % EOSINOPHILS 3 0 - 5 % BASOPHILS 0 0 - 3 %  
 ABS. NEUTROPHILS 10.0 (H) 1.8 - 8.0 K/UL  
 ABS. LYMPHOCYTES 1.8 0.8 - 3.5 K/UL  
 ABS. MONOCYTES 0.9 0 - 1.0 K/UL  
 ABS. EOSINOPHILS 0.4 0.0 - 0.4 K/UL  
 ABS. BASOPHILS 0.0 0.0 - 0.06 K/UL  
 DF MANUAL PLATELET COMMENTS ADEQUATE PLATELETS    
 RBC COMMENTS ANISOCYTOSIS 1+ 
    
 RBC COMMENTS HYPOCHROMIA 1+ 
    
 RBC COMMENTS POLYCHROMASIA 1+ METABOLIC PANEL, BASIC Collection Time: 18  2:38 AM  
Result Value Ref Range Sodium 134 (L) 136 - 145 mmol/L Potassium 4.9 3.5 - 5.5 mmol/L Chloride 96 (L) 100 - 108 mmol/L  
 CO2 29 21 - 32 mmol/L Anion gap 9 3.0 - 18 mmol/L Glucose 82 74 - 99 mg/dL BUN 9 7.0 - 18 MG/DL Creatinine 0.63 0.6 - 1.3 MG/DL  
 BUN/Creatinine ratio 14 12 - 20 GFR est AA >60 >60 ml/min/1.73m2 GFR est non-AA >60 >60 ml/min/1.73m2 Calcium 9.0 8.5 - 10.1 MG/DL Additional Data Reviewed:   
 
Signed By: Steven Gasca MD   
 September 22, 2018 3:51 PM

## 2018-09-23 NOTE — ROUTINE PROCESS
1930-Bedside and verbal report from [facility]. Pt resting in bed, son at bedside, no apparent pain, Sinus tach on the monitor, call bell within reach. 2030-Pt resting, NAD, no apparent pain, call bell within reach, bed low for safety, pt asked for water. 2050-Pt called for scheduled OxyContin that is scheduled for 2100. 
 
2145-Pt requested PRN Morphine, Vitals stable, no SOB at this time. Pain assessment completed. 2230-Pt resting, NAD, bed alarm on, no apparent pain. 0000-Pt sleeping, NAD, no apparent pain, call bell within reach, bed alarm on, pt instructed to call for assistance. 0200-Pt resting, NAD, no apparent pain, call bell within reach, bed low for safety, bed alarm on.  
 
0400-Pt sleeping, NAD, no apparent pain, call bell within reach, bed alarm on.  
 
0530-Pt called for PRN Morphine, vitals stable, pain assessment completed. 0600-Pt called for assistance to bedside commode, wants to sit in recliner after, steady gait, no nausea, BP stable. 0700-Bedside and verbal report given to TALIA ALONZO.

## 2018-09-23 NOTE — PROGRESS NOTES
[facility] Hospitalist Group Progress Note Patient: Reji Alexander Age: 66 y.o. : 1955 MR#: [id number] SSN: [id number] Date/Time: 2018 Subjective:  
 
Declined PT eval twice today. She states she did not sleep well last night and she's not doing as well as yesterday. She still thinks she can lay flat for port, but feels more short of breath today. Her son Maicol Varela at bedside thinks she may need hospital bed at home, as she was very uncomfortable trying to sleep pta. It was discussed she needs to work with pt and ot to ascertain appropriate equipment needs. She feels slightly constipated, +flatus, requests prune juice. Assessment/Plan: 1. Recurrent PE, hx DVT in context of prior IVC Filter placement - no anticoagulation. 2. NSCLung Ca with diffuse mets - patient has elected palliative chemo:  mediport and op f/u with . 3. HTN - BPs wnl, stable. 4. Anemia of chronic dz - follow. Will xfuse prn. 
5. Hyponatremia, possible SIADH - Thais 23/high Uosm, c/w SIADH. cont fluid restriction 1.2L/24 hrs. 6. Acute on chronic hypoxic resp failure - O2 support. Continuing abx from xfer. 7. Left side exudative pleural effusion s/p thoracentesis 18: Metastatic adenocarcinoma with signet ring features. 8. Left basilar opacity, possible post obstructive pneumonia 9. Recent hemorrhagic pericardial effusion in the setting of AC with heparin - s/p drainage, cytology negative - avoid anticoagulation. CT Surgery input appreciated. 10. PAD 11. Recurrent left pleural effusion - pulmonary to consult, consider pleurx catheter, repeat thoracentesis. 12. Full code. Pt and ot. I discussed the case with Dr. Myron Solorzano. Primary oncologist Dr. Jake Sanchez. Brendan son Gill Orn . [employer] [phone] Additional Notes:   
 
Case discussed with:  [x]Patient  [x]Family  [x]Nursing  []Case Management DVT Prophylaxis:  []Lovenox  []Hep SQ  []SCDs  []Coumadin   []On Heparin gtt Objective:  
VS:  
Visit Vitals  /69  Pulse (!) 101  Temp 97.4 °F (36.3 °C)  Resp 20  Wt 88.7 kg (195 lb 9.6 oz)  SpO2 100%  Breastfeeding No  
 BMI 30.64 kg/m2 Tmax/24hrs: Temp (24hrs), Av.3 °F (36.8 °C), Min:97.4 °F (36.3 °C), Max:99.2 °F (37.3 °C) Input/Output:  
 
Intake/Output Summary (Last 24 hours) at 18 1311 Last data filed at 18 0600 Gross per 24 hour Intake             1370 ml Output             1000 ml Net              370 ml General:  Awake, alert, NAD. Appears pale and chronically ill. Heent: ncat perrl Cardiovascular:  RRR. Pulmonary: decreased bs to left base. GI:  Soft, NT/ND, NABS. Extremities:  No edema. Additional: no rash Labs:   
Recent Results (from the past 24 hour(s)) CALCIUM, IONIZED Collection Time: 18 12:59 AM  
Result Value Ref Range Ionized Calcium 1.11 (L) 1.12 - 1.32 MMOL/L  
MAGNESIUM Collection Time: 18 12:59 AM  
Result Value Ref Range Magnesium 2.0 1.6 - 2.6 mg/dL PHOSPHORUS Collection Time: 18 12:59 AM  
Result Value Ref Range Phosphorus 3.2 2.5 - 4.9 MG/DL  
METABOLIC PANEL, BASIC Collection Time: 18 12:59 AM  
Result Value Ref Range Sodium 135 (L) 136 - 145 mmol/L Potassium 4.7 3.5 - 5.5 mmol/L Chloride 97 (L) 100 - 108 mmol/L  
 CO2 29 21 - 32 mmol/L Anion gap 9 3.0 - 18 mmol/L Glucose 106 (H) 74 - 99 mg/dL BUN 11 7.0 - 18 MG/DL Creatinine 0.59 (L) 0.6 - 1.3 MG/DL  
 BUN/Creatinine ratio 19 12 - 20 GFR est AA >60 >60 ml/min/1.73m2 GFR est non-AA >60 >60 ml/min/1.73m2 Calcium 9.1 8.5 - 10.1 MG/DL  
CBC WITH AUTOMATED DIFF Collection Time: 18 12:59 AM  
Result Value Ref Range WBC 11.6 4.6 - 13.2 K/uL  
 RBC 3.05 (L) 4.20 - 5.30 M/uL HGB 7.6 (L) 12.0 - 16.0 g/dL HCT 25.7 (L) 35.0 - 45.0 %  MCV 84.3 74.0 - 97.0 FL  
 MCH 24.9 24.0 - 34.0 PG  
 MCHC 29.6 (L) 31.0 - 37.0 g/dL  
 RDW 19.3 (H) 11.6 - 14.5 % PLATELET 640 345 - 488 K/uL MPV 8.6 (L) 9.2 - 11.8 FL  
 NEUTROPHILS 69 42 - 75 % BAND NEUTROPHILS 5 0 - 5 % LYMPHOCYTES 12 (L) 20 - 51 % MONOCYTES 11 (H) 2 - 9 % EOSINOPHILS 3 0 - 5 % BASOPHILS 0 0 - 3 % NRBC 2.0 (H) 0  WBC  
 ABS. NEUTROPHILS 8.6 (H) 1.8 - 8.0 K/UL  
 ABS. LYMPHOCYTES 1.4 0.8 - 3.5 K/UL  
 ABS. MONOCYTES 1.3 (H) 0 - 1.0 K/UL  
 ABS. EOSINOPHILS 0.3 0.0 - 0.4 K/UL  
 ABS. BASOPHILS 0.0 0.0 - 0.06 K/UL  
 DF MANUAL PLATELET COMMENTS ADEQUATE PLATELETS    
 RBC COMMENTS ANISOCYTOSIS 2+ 
    
 RBC COMMENTS POLYCHROMASIA 1+ 
    
 RBC COMMENTS HYPOCHROMIA 1+ 
    
 RBC COMMENTS TARGET CELLS 
FEW Additional Data Reviewed:   
 
Signed By: Eric Craft MD   
 September 23, 2018 3:51 PM

## 2018-09-23 NOTE — PROGRESS NOTES
Hematology / Oncology Progress Note [address] Patient: Nida Schuler  Gender: female   MRN: [id number] YOB: 1955 Age: 66 y.o.   CSN: [id number] LOS:  LOS: 4 days   Admit Date: 2018 Assessment:  
 
Active Problems: Metastatic lung cancer (metastasis from lung to other site) [facility]) (2018) Anemia (2018) Chronic respiratory failure ([facility] [address] (2018) PE (physical exam), annual (2018) DVT (deep vein thrombosis) in pregnancy ([facility] [address] (2018) Plan: 1. Dr. Pillo Schmidt to follow. 2.  Lung CA-  Will get chemo out pt. Will need mediport placed. 3.  Anemia-will monitor. 4.  DVT/PE- has IVC filter. Will not anticoagulate. Subjective:  
 
Pt is feeling ok. She is breathing ok. Appetite is poor. Pain is controlled. Objective:  
 
Visit Vitals  /68  Pulse (!) 117  Temp 98.9 °F (37.2 °C)  Resp 21  
 Wt 88.7 kg (195 lb 9.6 oz)  SpO2 100%  Breastfeeding No  
 BMI 30.64 kg/m2 Temp (24hrs), Av.4 °F (36.9 °C), Min:97.5 °F (36.4 °C), Max:99.2 °F (37.3 °C) Intake/Output Summary (Last 24 hours) at 18 8932 Last data filed at 18 0600 Gross per 24 hour Intake             1370 ml Output             1500 ml Net             -130 ml Review of Systems:  
Constitutional: negative for fevers, chills, sweats and fatigue Eyes: negative for visual disturbance, redness and icterus Ears, Nose, Mouth, Throat, and Face: negative for tinnitus, epistaxis, sore mouth and hoarseness Respiratory: negative for cough, sputum, hemoptysis, pleurisy/chest pain or wheezing Cardiovascular: negative for chest pain, chest pressure/discomfort, palpitations, irregular heart beats, syncope, paroxysmal nocturnal dyspnea Gastrointestinal: negative for reflux symptoms, nausea, vomiting, change in bowel habits, melena, diarrhea, constipation and abdominal pain Genitourinary:negative for dysuria, nocturia, urinary incontinence, hesitancy and hematuria Integument: negative for rash, skin lesion(s) and pruritus Hematologic/Lymphatic: negative for easy bruising, bleeding and lymphadenopathy Musculoskeletal:negative for myalgias, arthralgias and bone pain Neurological: negative for headaches, dizziness, seizures, paresthesia and weakness Physical Exam: 
Constitutional: Alert, oriented, not in distress Eyes: PERRLA, anicteric, no redness Ears, nose, mouth, throat, and face: no palpable Lymph nodes, no mucositis, no thrush. Respiratory: symmetrical expansion, no rales, no rhonchi, no wheezing. Cardiovascular: S1S2, no pathologic murmur, no rub. Gastrointestinal: soft, benign, non tender, no HSM, normal bowel sounds, no mass. Integument: no rash, no petechiae, no ecchymosis. Musculoskeletal: no edema, no cyanosis, no clubbing. Neurological: intact, cranial nerves, no focal motor or sensory deficits. Labs: 
Basic Metabolic Profile Recent Labs  
   09/23/18 0059 09/22/18 0238 09/21/18 
 7338 NA  135*  134*  135* K  4.7  4.9  4.6 CL  97*  96*  98* CO2  29  29  30 BUN  11  9  10 CREA  0.59*  0.63  0.66 GLU  106*  82  98 CA  9.1  9.0  8.9 MG  2.0  2.0  2.1 PHOS  3.2  2.4*  3.3  
  
 
CBC w/Diff Recent Labs  
   09/23/18 0059 09/22/18 0238 09/21/18 
 6422 WBC  11.6  13.1  12.4 HGB  7.6*  8.5*  7.7* HCT  25.7*  28.9*  25.5*  
PLT  305  346  355 Recent Labs  
   09/23/18 0059 09/22/18 0238  09/21/18 
 9834 BANDS  5  6*  2 GRANS  69  70  76* CHEMISTRY Lab Results Component Value Date ALB 1.9 (L) 09/16/2018 TP 7.7 09/16/2018 TBILI 0.7 09/16/2018 ALT 17 09/16/2018 SGOT 23 09/16/2018  (H) 09/16/2018 Coagulation Lab Results Component Value Date/Time  Prothrombin time 15.4 (H) 09/17/2018 10:10 AM  
 Prothrombin time 13.7 08/20/2018 10:51 AM  
 Prothrombin time 14.5 08/20/2018 03:10 AM  
 INR 1.2 09/17/2018 10:10 AM  
 INR 1.1 08/20/2018 10:51 AM  
 INR 1.2 08/20/2018 03:10 AM  
 aPTT 32.9 08/21/2018 04:00 AM  
 aPTT 28.2 08/20/2018 10:51 AM  
 aPTT 78.9 (H) 08/20/2018 03:10 AM  
  
 
 
Current Medications:  
Current Facility-Administered Medications:  
  potassium, sodium phosphates (NEUTRA-PHOS) packet 1 Packet, 1 Packet, Oral, BID, Stefania Allen MD, 1 Packet at 09/23/18 3537   bisacodyl (DULCOLAX) suppository 10 mg, 10 mg, Rectal, DAILY PRN, Stefania Allen MD 
  influenza vaccine 2018-19 (6 mos+)(PF) (FLUARIX QUAD/FLULAVAL QUAD) injection 0.5 mL, 0.5 mL, IntraMUSCular, PRIOR TO DISCHARGE, Gali Rodriguez MD 
  polyethylene glycol [facility]) packet 17 g, 17 g, Oral, DAILY, Magaly Lopez MD, 17 g at 09/22/18 9066   morphine injection 2 mg, 2 mg, IntraVENous, Q4H PRN, Gali Rodriguez MD, 2 mg at 09/23/18 0530   acetaminophen (TYLENOL) tablet 650 mg, 650 mg, Oral, Q4H PRN, Gali Rodriguez MD, 650 mg at 09/20/18 2104   albuterol-ipratropium (DUO-NEB) 2.5 MG-0.5 MG/3 ML, 3 mL, Nebulization, Q4H PRN, Gali Rodriguez MD 
  diphenhydrAMINE (BENADRYL) injection 12.5 mg, 12.5 mg, IntraVENous, Q4H PRN, Gali Rodriguez MD, 12.5 mg at 09/21/18 1947 
  docusate sodium (COLACE) capsule 100 mg, 100 mg, Oral, BID, Gali Rodriguez MD, 100 mg at 09/23/18 0801 
  dronabinol (MARINOL) capsule 2.5 mg, 2.5 mg, Oral, BID, Gali Rodriguez MD, 2.5 mg at 09/23/18 6679   levothyroxine (SYNTHROID) tablet 75 mcg, 75 mcg, Oral, ACB, Gali Rodriguez MD, 75 mcg at 09/23/18 9941   metoprolol tartrate (LOPRESSOR) tablet 12.5 mg, 12.5 mg, Oral, BID, Gali Rodriguez MD, 12.5 mg at 09/23/18 0802 
  naloxone [facility]) injection 0.4 mg, 0.4 mg, IntraVENous, PRN, Gali Rodriguez MD 
  ondansetron [facility]) injection 4 mg, 4 mg, IntraVENous, Q4H PRN, Gali Rodriguez MD 
  oxyCODONE ER (OxyCONTIN) tablet 10 mg, 10 mg, Oral, Q12H, Isela Renee Jenna Mg MD, 10 mg at 09/23/18 0801 
  oxyCODONE-acetaminophen (PERCOCET) 5-325 mg per tablet 1 Tab, 1 Tab, Oral, Q6H PRN, Ania Pal MD, 1 Tab at 09/22/18 6879   piperacillin-tazobactam (ZOSYN) 3.375 g in 0.9% sodium chloride (MBP/ADV) 100 mL MBP, 3.375 g, IntraVENous, Q6H, Ania Pal MD, Last Rate: 200 mL/hr at 09/23/18 0530, 3.375 g at 09/23/18 0530   polyethylene glycol (MIRALAX) packet 17 g, 17 g, Oral, DAILY PRN, MD Nichole Staples, [place] [address] Office [phone]

## 2018-09-23 NOTE — ROUTINE PROCESS
Bedside and Verbal shift change report given to Kamari (oncoming nurse) by Carley Zabala (offgoing nurse). Report included the following information SBAR, Kardex and MAR.

## 2018-09-23 NOTE — PROGRESS NOTES
PT evaluation attempted for second time @ 9144. Pt reporting she still had not gotten any rest and did not want to get to the chair, reporting back pain, refusing PT evaluation. RN notified. Will continue to follow.  Thank for you for this referral.  
 
Gary Rosario PT DPT

## 2018-09-23 NOTE — PROGRESS NOTES
PT orders received and chart reviewed. PT evaluation attempted at 1029. Pt reporting she had just gotten back to bed and needed to sleep, asking PT to return after lunch. Will follow up as schedule allows.  Thank you for this referral.  
 
Pepe Espinosa PT DPT

## 2018-09-24 NOTE — PROCEDURES
Interventional Radiology Brief Procedure Note Patient: Katelynn Rivera MRN: [id number]  SSN: [id number] YOB: 1955  Age: 66 y.o. Sex: female Date of Procedure: 9/24/2018 Procedure(s): Left pleur-x catheter placement Performed By: KONSTANTIN Banerjee  
 
none Anesthesia: Moderate Sedation Estimated Blood Loss: Less than 10ml Specimens: None Findings:  
 
- Written and verbal informed consent was obtained. - Time Out was performed. - Permanent image storage performed on PACS. - Image-guided left pleur-x catheter placement and left thoracentesis with the removal of 500cc of citlaly fluid performed using maximum barrier precautions and sterile technique. - Please refer to report on PACS for full details. Implants: None Plan: Per primary team. Order for catheter supplies has been faxed to company. Signed By: KONSTANTIN Banerjee September 24, 2018

## 2018-09-24 NOTE — PROGRESS NOTES
TRANSFER - OUT REPORT: 
 
Verbal report given to Lillian MELGAR(name) on [place]  being transferred to CVTSD(unit) for routine post - op Report consisted of patients Situation, Background, Assessment and  
Recommendations(SBAR). Information from the following report(s) SBAR, Kardex, Procedure Summary and MAR was reviewed with the receiving nurse. Lines:  
Venous Access Device [facility]  09/24/18 Upper chest (subclavicular area, right (Active) Central Line Being Utilized Yes 9/24/2018  2:00 PM  
Criteria for Appropriate Use Irritant/vesicant medication 9/24/2018  2:00 PM  
Site Assessment Clean, dry, & intact 9/24/2018  2:00 PM  
Date of Last Dressing Change 09/24/18 9/24/2018  2:00 PM  
Dressing Status Clean, dry, & intact 9/24/2018  2:00 PM  
Dressing Type Topical skin adhesive 9/24/2018  2:00 PM  
   
Peripheral IV 09/19/18 Right; Inner Antecubital (Active) Site Assessment Clean, dry, & intact 9/24/2018  8:00 AM  
Phlebitis Assessment 0 9/24/2018  8:00 AM  
Infiltration Assessment 0 9/24/2018  8:00 AM  
Dressing Status Clean, dry, & intact 9/24/2018  8:00 AM  
Dressing Type Transparent;Tape 9/24/2018  8:00 AM  
Hub Color/Line Status Patent;Pink 9/24/2018  8:00 AM  
Action Taken Open ports on tubing capped 9/24/2018  8:00 AM  
Alcohol Cap Used Yes 9/22/2018  7:30 PM  
  
 
Opportunity for questions and clarification was provided. Patient transported with: 
 Influx

## 2018-09-24 NOTE — PROGRESS NOTES
Reason for Admission:    presented with worsening of shortness of breath over past 4-5 days. RRAT Score:     15 Do you (patient/family) have any concerns for transition/discharge? Family at bedside, patient is not in room at this time. Plan for utilizing home health:    [place] of choice list provided to family, list is of those home health agencies that can provide care in [address] [place] [address]. Likelihood of readmission?   moderate Transition of Care Plan:      Home with home health. Ms. Irving Posadas has a large family with a lot of support. All willing to provide transportation and assist her. CM to follow.

## 2018-09-24 NOTE — PROGRESS NOTES
[place] Hospitalist Group Progress Note Patient: Brandon Schafer Age: 66 y.o. : 1955 MR#: [id number]: [id number] Date/Time: 2018 Subjective: S/p Mediport placement with ultrasound and fluoroscopic guidance using conscious sedation, left Pleurx catheter placement, and left thoracentesis. Tolerated procedure well, having some pain (nsg aware). She is hungry, requests soup. nsg to do pleurx catheter teaching with patient and family including son Alissa Dillard at bedside. Patient states she's leaning toward home with hh, but wants to discuss SNF possibility with family - several family members at bedside. Assessment/Plan: 1. Recurrent PE, hx DVT in context of prior IVC Filter placement - no anticoagulation. 2. NSCLung Ca with diffuse mets - patient has elected palliative chemo:  mediport and op f/u with . 3. HTN - BPs wnl, stable. 4. Anemia of chronic dz - follow. Will xfuse prn. 
5. Hyponatremia, possible SIADH - Thais 23/high Uosm, c/w SIADH. cont fluid restriction 1.2L/24 hrs. 6. Acute on chronic hypoxic resp failure - O2 support. Continuing abx from xfer. 7. Recurrent left side exudative pleural effusion s/p thoracentesis 18: Metastatic adenocarcinoma with signet ring features. pleurx placed 18. 8. Left basilar opacity, possible post obstructive pneumonia 9. Recent hemorrhagic pericardial effusion in the setting of AC with heparin - s/p drainage, cytology negative - avoid anticoagulation. CT Surgery input appreciated. 10. PAD 11. Avoid chemical dvt prophylaxis 12. Full code. Pt and ot. Several family members at bedside including Brendan Dillard, all questions answered to the best of my ability. Primary oncologist Dr. Mehdi Salgado. Brendan Spear [employer] . [employer] [phone] Additional Notes:   
 
Case discussed with:  [x]Patient  [x]Family  [x]Nursing  [x]Case Management DVT Prophylaxis:  []Lovenox  []Hep SQ  []SCDs  []Coumadin   []On Heparin gtt Objective:  
VS:  
Visit Vitals  /68  Pulse 92  Temp 98.1 °F (36.7 °C)  Resp 19  Wt 88.5 kg (195 lb)  SpO2 97%  Breastfeeding No  
 BMI 30.54 kg/m2 Tmax/24hrs: Temp (24hrs), Av.4 °F (36.9 °C), Min:97.7 °F (36.5 °C), Max:99.3 °F (37.4 °C) Input/Output:  
 
Intake/Output Summary (Last 24 hours) at 18 1441 Last data filed at 18 0800 Gross per 24 hour Intake              120 ml Output              100 ml Net               20 ml General:  Awake, alert, NAD. Appears pale and chronically ill. Heent: ncat perrl Cardiovascular:  RRR. Chest wall: mediport to right chest, site clean Pulmonary: pleurx catheter to left, site clean. Improved air entry. GI:  Soft, NT/ND, NABS. Extremities: No edema. Additional: no rash Labs:   
Recent Results (from the past 24 hour(s)) CALCIUM, IONIZED Collection Time: 18  5:50 AM  
Result Value Ref Range Ionized Calcium 1.16 1.12 - 1.32 MMOL/L  
MAGNESIUM Collection Time: 18  5:50 AM  
Result Value Ref Range Magnesium 1.9 1.6 - 2.6 mg/dL CBC WITH AUTOMATED DIFF Collection Time: 18  5:50 AM  
Result Value Ref Range WBC 13.5 (H) 4.6 - 13.2 K/uL  
 RBC 3.04 (L) 4.20 - 5.30 M/uL HGB 7.4 (L) 12.0 - 16.0 g/dL HCT 24.6 (L) 35.0 - 45.0 % MCV 80.9 74.0 - 97.0 FL  
 MCH 24.3 24.0 - 34.0 PG  
 MCHC 30.1 (L) 31.0 - 37.0 g/dL  
 RDW 19.1 (H) 11.6 - 14.5 % PLATELET 038 194 - 549 K/uL MPV 8.8 (L) 9.2 - 11.8 FL  
 NEUTROPHILS 76 (H) 42 - 75 % BAND NEUTROPHILS 5 0 - 5 % LYMPHOCYTES 12 (L) 20 - 51 % MONOCYTES 5 2 - 9 % EOSINOPHILS 2 0 - 5 % BASOPHILS 0 0 - 3 %  
 ABS. NEUTROPHILS 10.9 (H) 1.8 - 8.0 K/UL  
 ABS. LYMPHOCYTES 1.6 0.8 - 3.5 K/UL  
 ABS. MONOCYTES 0.7 0 - 1.0 K/UL  
 ABS. EOSINOPHILS 0.3 0.0 - 0.4 K/UL  
 ABS. BASOPHILS 0.0 0.0 - 0.06 K/UL  
 DF MANUAL PLATELET COMMENTS ADEQUATE PLATELETS    
 RBC COMMENTS ANISOCYTOSIS 1+ 
    
 RBC COMMENTS POLYCHROMASIA 1+ 
    
 RBC COMMENTS HYPOCHROMIA 1+ 
    
 RBC COMMENTS STOMATOCYTES 
FEW 
TARGET CELLS 
FEW METABOLIC PANEL, BASIC Collection Time: 09/24/18  5:50 AM  
Result Value Ref Range Sodium 132 (L) 136 - 145 mmol/L Potassium 4.7 3.5 - 5.5 mmol/L Chloride 94 (L) 100 - 108 mmol/L  
 CO2 29 21 - 32 mmol/L Anion gap 9 3.0 - 18 mmol/L Glucose 90 74 - 99 mg/dL BUN 9 7.0 - 18 MG/DL Creatinine 0.46 (L) 0.6 - 1.3 MG/DL  
 BUN/Creatinine ratio 20 12 - 20 GFR est AA >60 >60 ml/min/1.73m2 GFR est non-AA >60 >60 ml/min/1.73m2 Calcium 9.0 8.5 - 10.1 MG/DL PROTHROMBIN TIME + INR Collection Time: 09/24/18  5:50 AM  
Result Value Ref Range Prothrombin time 15.5 (H) 11.5 - 15.2 sec INR 1.3 (H) 0.8 - 1.2 PTT Collection Time: 09/24/18  5:50 AM  
Result Value Ref Range aPTT 38.7 (H) 23.0 - 36.4 SEC Additional Data Reviewed:   
 
Signed By: Alice Adrian MD   
 September 24, 2018 3:51 PM

## 2018-09-24 NOTE — PROGRESS NOTES
Problem: Mobility Impaired (Adult and Pediatric) Goal: *Acute Goals and Plan of Care (Insert Text) Physical Therapy Goals Initiated 9/24/2018 and to be accomplished within 7 day(s) 1. Patient will move from supine to sit and sit to supine , scoot up and down and roll side to side in bed with supervision/set-up. 2.  Patient will transfer from bed to chair and chair to bed with supervision/set-up using the least restrictive device. 3.  Patient will perform sit to stand with supervision/set-up. 4.  Patient will ambulate with supervision/set-up for 100 feet with the least restrictive device. Outcome: Progressing Towards Goal 
physical Therapy EVALUATION Patient: Edison Araya [de-identified]66 y.o. female) Date: 9/24/2018 Primary Diagnosis: PE 
Metastatic lung cancer (metastasis from lung to other site) [facility]) 
PE (physical exam), annual 
DVT (deep vein thrombosis) in pregnancy ([facility] [address] Chronic respiratory failure ([facility] [address] Anemia Precautions:   Fall ASSESSMENT : 
PT orders received and patient cleared by nursing to participate with therapy. Patient is a 66 y.o. female admitted to the hospital due to SOB. Pt has history of lung CA stage 4. Patient consents to PT evaluation and treatment. Pt sitting in chair with OT present at beginning of PT session. Pt educated on energy conservation and breathing techniques throughout therapy. Pt performed sit to stands CGA. Gait 5 feet no AD with decreased step length CGA/Elza for safety. Pt because SOB with all activities and needs increased time. Pt will benefit from rollator for safety with gait and due to decreased activity tolerance. Pt ended therapy sitting in recliner with all needs met. Patient will benefit from skilled intervention to address the above impairments and increase functional independence. Patients rehabilitation potential is considered to be Fair Factors which may influence rehabilitation potential include:  
[]         None noted []         Mental ability/status [x]         Medical condition 
[]         Home/family situation and support systems 
[]         Safety awareness 
[]         Pain tolerance/management 
[]         Other: PLAN : 
Recommendations and Planned Interventions: 
[x]           Bed Mobility Training             [x]    Neuromuscular Re-Education 
[x]           Transfer Training                   []    Orthotic/Prosthetic Training 
[x]           Gait Training                          []    Modalities [x]           Therapeutic Exercises          []    Edema Management/Control 
[x]           Therapeutic Activities            [x]    Patient and Family Training/Education 
[]           Other (comment): Frequency/Duration: Patient will be followed by physical therapy 1-2 times per day to address goals. Discharge Recommendations: [facility] Further Equipment Recommendations for Discharge: [facility] SUBJECTIVE:  
Patient stated I keep getting short of breath.  OBJECTIVE DATA SUMMARY:  
 
Past Medical History:  
Diagnosis Date  Cancer ([facility] [address] lung cancer  HTN (hypertension) 9/16/2018  PAD (peripheral artery disease) ([facility] [address]  Pericardial effusion Past Surgical History:  
Procedure Laterality Date  VASCULAR SURGERY PROCEDURE UNLIST Right   
 opened up vessels Barriers to Learning/Limitations: yes;  altered mental status (i.e.Sedation, Confusion) Compensate with: Visual Cues, Verbal Cues and Tactile Cues Prior Level of Function/Home Situation: Independent with mobility including gait no AD in the home and RW outside the home. Home Situation Home Environment: Apartment # Steps to Enter:  (lives 2nd floor with an elevator) One/Two Story Residence: One story # of Interior Steps: 0 Living Alone: Yes Support Systems: Family member(s) Patient Expects to be Discharged to[de-identified] Private residence Current DME Used/Available at Home: [phone], [address], [address] [facility] chair Critical Behavior: 
Neurologic State: Alert Psychosocial 
Patient Behaviors: Calm; Cooperative Purposeful Interaction: Yes Pt Identified Daily Priority: Clinical issues (comment) Caritas Process: Nurture loving kindness Caring Interventions: Therapeutic modalities Reassure: Therapeutic listening Therapeutic Modalities: Intentional therapeutic touch Strength:   
Strength: Generally decreased, functional (B LE) Tone & Sensation:  
Tone: Normal (B LE) Sensation: Intact (B LE) Range Of Motion: 
AROM: Within functional limits (B LE) Functional Mobility: 
Bed Mobility: 
 pt already sitting in chair Transfers: 
Sit to Stand: Contact guard assistance Stand to Sit: Contact guard assistance Balance:  
Sitting: Intact Standing: Impaired; With support Standing - Static: Good Standing - Dynamic : Fair Ambulation/Gait Training: 
Distance (ft): 5 Feet (ft) Assistive Device:  (none but will benefit from rollator) Ambulation - Level of Assistance: Contact guard assistance Base of Support: Center of gravity altered Speed/Irma: Pace decreased (<100 feet/min) Therapeutic Exercises:  
Reviewed ankle pumps Pain: 
Pre: 0/10 Post: 0/10 Activity Tolerance:  
poor Please refer to the flowsheet for vital signs taken during this treatment. After treatment:  
[x] Patient left in no apparent distress sitting up in chair 
[] Patient left sitting on EOB [] Patient left in no apparent distress in bed 
[] Patient declined to be OOB at this time due to   
[x] Call bell left within reach [x] Nursing notified(Lillian) 
[] Caregiver present 
[] Bed alarm activated 
[x] Personal items in reach COMMUNICATION/EDUCATION:  
[x]         Fall prevention education was provided and the patient/caregiver indicated understanding. [x]         Patient/family have participated as able in goal setting and plan of care. [x]         Patient/family agree to work toward stated goals and plan of care. []         Patient understands intent and goals of therapy, but is neutral about his/her participation. []         Patient is unable to participate in goal setting and plan of care. Thank you for this referral. 
Kay Gayle, PT, DPT Time Calculation: 23 mins Mobility [id number] Current  CJ= 20-39%   Goal  CI= 1-19%. The severity rating is based on the Level of Assistance required for Functional Mobility and ADLs.  
 
Eval Complexity: History: MEDIUM  Complexity : 1-2 comorbidities / personal factors will impact the outcome/ POC Exam:HIGH Complexity : 4+ Standardized tests and measures addressing body structure, function, activity limitation and / or participation in recreation  Presentation: MEDIUM Complexity : Evolving with changing characteristics  Clinical Decision Making:Medium Complexity   Overall Complexity:MEDIUM

## 2018-09-24 NOTE — PROGRESS NOTES
Problem: Falls - Risk of 
Goal: *Absence of Falls Document Chiki Morel Fall Risk and appropriate interventions in the flowsheet. Outcome: Progressing Towards Goal 
Fall Risk Interventions: 
Mobility Interventions: Communicate number of staff needed for ambulation/transfer, PT Consult for mobility concerns, Patient to call before getting OOB Medication Interventions: Assess postural VS orthostatic hypotension, Evaluate medications/consider consulting pharmacy, Patient to call before getting OOB, Teach patient to arise slowly Elimination Interventions: Call light in reach, Bed/chair exit alarm, Patient to call for help with toileting needs, Toileting schedule/hourly rounds Problem: Pressure Injury - Risk of 
Goal: *Prevention of pressure injury Document Talib Scale and appropriate interventions in the flowsheet. Outcome: Progressing Towards Goal 
Pressure Injury Interventions: 
Sensory Interventions: Assess changes in LOC, Check visual cues for pain, Minimize linen layers, Maintain/enhance activity level Moisture Interventions: Absorbent underpads, Apply protective barrier, creams and emollients, Maintain skin hydration (lotion/cream) Activity Interventions: Increase time out of bed, Pressure redistribution bed/mattress(bed type) Mobility Interventions: HOB 30 degrees or less, Pressure redistribution bed/mattress (bed type), Turn and reposition approx. every two hours(pillow and wedges) Nutrition Interventions: Document food/fluid/supplement intake, Discuss nutritional consult with provider

## 2018-09-24 NOTE — PROCEDURES
Interventional Radiology Brief Post Procedure Note 
  
Procedure Performed: Mediport 
  
: Martin Baltazar PA-C 
  
Assistant: None Attending: Dr. Rabia Fritz 
  
Pre-operative Diagnosis: Stage 4 lung cancer requiring chemotherapy treatment 
  
Post-operative Diagnosis: Same  
  
Anesthesia: 1% lidocaine and IV moderate sedation with Versed and Fentanyl administered and monitored by qualified nursing staff.  
  
Findings: 
 
- Written and verbal informed consent was obtained. - Time Out was performed. - Permanent image storage performed on PACS. - Image-guided right chest 6Fr single-lumen Mediport placement performed using maximum barrier precautions and sterile technique. - Please refer to report on PACS for full details.  
  
Specimens: None 
  
Complications: No immediate 
  
Estimated Blood Loss:  Minimal 
  
Blood transfusions:  None. 
  
Implants: Please see PACS report. 
   
Condition: Stable 
   
Disposition: Nursing recovery unit  
  
Impression: Successful right chest Mediport placement 
  
Martin Baltazar PA-C [address] [place] [address].

## 2018-09-24 NOTE — PROGRESS NOTES
Hematology / Oncology Progress Note [address] Patient ID: 
Cam Mann 66 y.o. 
1955 Admit Date: 2018 Assessment:  
 
Active Problems: Metastatic lung cancer (metastasis from lung to other site) [facility]) (2018) Anemia (2018) Chronic respiratory failure ([facility] [address] (2018) PE (physical exam), annual (2018) DVT (deep vein thrombosis) in pregnancy ([facility] [address] (2018) Plan:  
 
Track cbc, lytes Await central line placement 
outpt chemo with dr Mihir Krueger thereafter Advance care planning Subjective: No new complaints No chest pain Objective:  
 
Visit Vitals  /65  Pulse (!) 108  Temp 98.4 °F (36.9 °C)  Resp 20  Wt 88.5 kg (195 lb)  SpO2 99%  Breastfeeding No  
 BMI 30.54 kg/m2 Temp (24hrs), Av.3 °F (36.8 °C), Min:97.4 °F (36.3 °C), Max:99.3 °F (37.4 °C) Intake/Output Summary (Last 24 hours) at 18 3409 Last data filed at 18 0214 Gross per 24 hour Intake              760 ml Output              400 ml Net              360 ml Review of Systems:  
Constitutional:  No Fever; No chills; No weight loss Skin:  No rash; No itching HEENT:  No changes in vision or hearing Cardiovascular:  No palpitations, chest pain, angina Respiratory:  No shortness of breath, no wheezing, no pleurisy, no cough, no  hemoptysis Gastrointestinal:  No nausea or vomiting; No diarrhea, no dyspepsis Genitourinary:  No dysuria; No increase in urinary frequency Musculoskeletal:  No weakness or muscle pains Endo:  No polyuria; no symptoms of thyroid dysfunction Heme:  No bruising; No bleeding, no adenopathy Neurological:  No Seizures; No focal weakness or sensory changes Psychiatric:  No mood changes; no suicidal ideation Physical Exam: 
Vss, mild distress Pale anicteric 
rrr 
Lungs good air entry ant 
abd soft, non tend Tr edema Labs: Basic Metabolic Profile Recent Labs  
   09/24/18 
 0550  09/23/18 
 0059  09/22/18 
 4767 NA  132*  135*  134* CO2  29  29  29 BUN  9  11  9 CBC w/Diff Recent Labs  
   09/24/18 
 0550  09/23/18 
 0059  09/22/18 
 5386 WBC  13.5*  11.6  13.1 HCT  24.6*  25.7*  28.9* Recent Labs  
   09/23/18 
 0059  09/22/18 
 6173 BANDS  5  6* Hepatic Panel Hepatic Function No results found for: ALBUMIN   
 
Coagulation Recent Labs  
   09/24/18 
 0550 INR  1.3* APTT  38.7* Current medications reviewed Pola Joshi MD  
[facility] Office [phone]

## 2018-09-24 NOTE — PROGRESS NOTES
Bedside shift change report given to Jarrett Saldaña RN (oncoming nurse) by Radha Samuel  (offgoing nurse). Report included the following information SBAR, Kardex, ED Summary, Intake/Output, MAR, Accordion, Recent Results, Med Rec Status, Cardiac Rhythm Sinus tach and Alarm Parameters . Patient is alert and oriented to all spheres, is tachypneic, has scattered wheezing throughout, Pulmonary MD was in to see her also. Patient had Thoracentesis about a week ago. Patient  Is on 2.5 liters of 02, pending port placement this morning,NPO since midnight. Patient pain is managed well on routine OxyContin. Mood euthymic, family at bedside. Image-guided left pleur-x catheter placement and left thoracentesis with the removal of 500cc of citlaly fluid performed using maximum barrier precautions and sterile technique. Port placement also done and successful. Patient denies any pain or distress, pain managed well throughout shift on routine oxycontin, and prn Morphine

## 2018-09-24 NOTE — CONSULTS
[employer] [facility] Pulmonary, Critical Care, and Sleep Medicine Name: Laurence Bearden MRN: [id number] : 1955 Hospital: [address] [place] [address] Dr Date: 2018 Pulmonary Medicine:  Initial Patient Consult IMPRESSION:  
1. Lung cancer- adenocarcinoma- right upper lobe- stage 4 with diffuse mets - Plan for mediport and palliative chemo: Oncologist . 2. Pleural effusion- Left: Malignant-  S/P thoracentesis 18- serosanguinous 1150cc drained- Cytology positive for Adenocarcinoma 3. Subclavicular lymph node mass- Left: s/p bx- + ademocarcinoma 4. Pericardial effusion- recent hemorrhagic effusion while on heparin- s/p drainage 18- cytology: Reactive; negative for malignancy- off anticoagulation- CT surgery consulted 5. Pulmonary embolism- recurrent 6. Bilateral DVT- with occlusive bilateral clot: US: 18- s/p IVC Filter placement - no anticoagulation dure to recent hemorraghic pericardial effusion 7. Chronic respiratory failure ([address] (2018)- on 2LPM 
8. Left lung, basilar opacity- ? Obstructive pneumonia?; leukocytosis with left shift and mild bandemia on antibioitcs 9. Anemia- chronic disease: Hb 7 range 10. Hyponatremia- Na: 132 11. Tachycardia- most likely multifactorial- afebrile, + PE, enlarged subclavian lymph nodes, pleural effusion, anemia. Etc. 12. Hypertension- stable 13. Hypothyroidism RECOMMENDATIONS:  
· Keep head of bed elevated · Aspiration precautions · Continue with supplemental oxygen · Electrolyte management per primary team 
· Plan for  Pleurx catheter- discussed with patient and IR Staff · Plan for mediport placement on the right · Antibiotics per primary tem · Prophylaxis per primary team 
· Will continue to follow. Subjective/History: This patient has been seen and evaluated at the request of Dr. Sejal Vargas  for metastatic lung cancer with pleural effusion.   Patient is a 66 y.o. female with advance RUL, adenocarcinoma with diffuse metastasis to include malignant, left pleural effusion. Clinical course complicated with  bilateral DVT, pulmonary embolism and hemorraghic pericardial effusion while on anticoagulation. IVC filter has been placed and patient remains off anticoagulation. Therapeutic thoracentesis recently performed on 9/17/18. Repeat CXR 9/23/18 looks like effusion is re-accumulating with. Patient pending palliative chemotherapy. This morning the patient is sitting upright in bed in SDU. She denies any pain at rest but she is easily winded. HR in low 100's. No cough, sputum production or hemoptysis. She reports that her breathing was better after her recent thoracentesis. She is winded just moving onto commode. No nausea or emesis. She expresses that she is scared about her condition. She had been living along but has several family members that are willing to help her as an OP I discussed possibly repeating thoracentesis vs Pleurx. Patient is willing to have either procedure performed. Given how quickly the effusion has reaccumulated, level of dyspnea and advance disease would favor Pleurx. I have discussed this with the patient and IR this morning Patient Vitals for the past 24 hrs: 
 Temp Pulse Resp BP SpO2  
09/24/18 0725 98.4 °F (36.9 °C) (!) 108 20 119/65 99 % 09/24/18 0400 99.3 °F (37.4 °C) (!) 111 20 132/78 -  
09/23/18 2332 97.7 °F (36.5 °C) 95 22 122/70 100 % 09/23/18 2051 98.4 °F (36.9 °C) 95 23 124/68 100 % 09/23/18 1606 98.3 °F (36.8 °C) (!) 106 20 132/67 100 % 09/23/18 1200 - (!) 104 23 141/76 99 % 09/23/18 1119 97.4 °F (36.3 °C) (!) 101 20 137/69 100 % Antibiotics: · Augmentin Past Medical History:  
Diagnosis Date  Cancer ([facility] lung cancer  HTN (hypertension) 9/16/2018  PAD (peripheral artery disease) ([facility] [address]  Pericardial effusion Past Surgical History:  
Procedure Laterality Date  VASCULAR SURGERY PROCEDURE UNLIST Right   
 opened up vessels Prior to Admission medications Medication Sig Start Date End Date Taking? Authorizing Provider  
levothyroxine (SYNTHROID) 75 mcg tablet Take 1 Tab by mouth Daily (before breakfast). 8/29/18  Yes Farzana Sierra MD  
metoprolol tartrate (LOPRESSOR) 25 mg tablet Take 0.5 Tabs by mouth two (2) times a day. Patient taking differently: Take 25 mg by mouth two (2) times a day. 8/28/18  Yes Farzana Sierra MD  
oxyCODONE ER (OXYCONTIN) 10 mg ER tablet Take 1 Tab by mouth every twelve (12) hours. Max Daily Amount: 20 mg. 8/28/18  Yes Farzana Sierra MD  
oxyCODONE-acetaminophen (PERCOCET) 5-325 mg per tablet Take 1 Tab by mouth every six (6) hours as needed. Max Daily Amount: 4 Tabs. Patient taking differently: Take 1 Tab by mouth every six (6) hours as needed for Pain. 8/28/18  Yes Farzana Sierra MD  
senna-docusate (PERICOLACE) 8.6-50 mg per tablet Take 1 Tab by mouth daily. 8/29/18  Yes Farzana Sierra MD  
 
Current Facility-Administered Medications Medication Dose Route Frequency  amoxicillin-clavulanate (AUGMENTIN) 500-125 mg per tablet 1 Tab  1 Tab Oral Q12H  
 lactobacillus sp. 50 billion cpu (BIO-K PLUS) capsule 1 Cap  1 Cap Oral DAILY  influenza vaccine 2018-19 (6 mos+)(PF) (FLUARIX QUAD/FLULAVAL QUAD) injection 0.5 mL  0.5 mL IntraMUSCular PRIOR TO DISCHARGE  polyethylene glycol (MIRALAX) packet 17 g  17 g Oral DAILY  docusate sodium (COLACE) capsule 100 mg  100 mg Oral BID  dronabinol (MARINOL) capsule 2.5 mg  2.5 mg Oral BID  levothyroxine (SYNTHROID) tablet 75 mcg  75 mcg Oral ACB  metoprolol tartrate (LOPRESSOR) tablet 12.5 mg  12.5 mg Oral BID  
 oxyCODONE ER (OxyCONTIN) tablet 10 mg  10 mg Oral Q12H No Known Allergies Social History Substance Use Topics  Smoking status: Former Smoker  Smokeless tobacco: Never Used  Alcohol use Yes Comment: occasionally No family history on file. Review of Systems: 
Pertinent items are noted in HPI. Objective:  
Vital Signs:   
Visit Vitals  /65  Pulse (!) 108  Temp 98.4 °F (36.9 °C)  Resp 20  Wt 88.5 kg (195 lb)  SpO2 99%  Breastfeeding No  
 BMI 30.54 kg/m2 O2 Device: Nasal cannula O2 Flow Rate (L/min): 3 l/min Temp (24hrs), Av.3 °F (36.8 °C), Min:97.4 °F (36.3 °C), Max:99.3 °F (37.4 °C) Intake/Output:  
Last shift:        
Last 3 shifts:  1901 -  0700 In: Roman Severino [P.O.:1390; I.V.:500] Out: 800 [Urine:800] Intake/Output Summary (Last 24 hours) at 18 0831 Last data filed at 18 0214 Gross per 24 hour Intake              760 ml Output              400 ml Net              360 ml Physical Exam: 
 
General:  Alert, cooperative, mild respiratory distress, thin appears stated age. Head:  Normocephalic, without obvious abnormality, atraumatic. Eyes:  Conjunctivae/corneas clear. PERRL, EOMs intact. Nose: Nares normal. Septum midline. Mucosa normal. No drainage or sinus tenderness. Throat: Lips, mucosa, and tongue normal. Teeth and gums normal.  
Neck: Supple, symmetrical, trachea midline, + mild adenopathy, thyroid: no enlargment/tenderness/nodules, no carotid bruit and no JVD. - No stridor Back:   Symmetric, no curvature. ROM normal.  
Lungs:   Rhonchi and wheezing right upper lung field, Decreased breath sounds lower left lung field. Chest wall:  Left upper palm size mass; left upper chest wall- clavicular level to base of neck- firm, tender with deep palpation, non-fluctuant, no erythema Heart:  Regular rhythm, rate 100's S1, no murmur, No rub Abdomen:   Soft, non-tender. Bowel sounds normal. No masses,  No organomegaly. Extremities: Extremities normal, atraumatic, no cyanosis or edema. Pulses: 2+ and symmetric all extremities. Skin: Skin color, texture, turgor normal. No rashes or lesions Lymph nodes:  Cervical- mild anterior lymphadenopathy. Neurologic: Grossly nonfocal  
 
 
Data:  
 
Results for Laisha Clark (MRN [id number]) as of 9/24/2018 08:50 Ref. Range 9/20/2018 05:56 9/21/2018 05:55 9/22/2018 02:38 9/23/2018 00:59 9/24/2018 05:50 HGB Latest Ref Range: 12.0 - 16.0 g/dL 7.7 (L) 7.7 (L) 8.5 (L) 7.6 (L) 7.4 (L) Results for Laisha Clark (MRN [id number]) as of 9/24/2018 08:50 Ref. Range 9/20/2018 05:56 9/21/2018 05:55 9/22/2018 02:38 9/23/2018 00:59 9/24/2018 05:50 Sodium Latest Ref Range: 136 - 145 mmol/L 133 (L) 135 (L) 134 (L) 135 (L) 132 (L) Recent Results (from the past 24 hour(s)) CALCIUM, IONIZED Collection Time: 09/24/18  5:50 AM  
Result Value Ref Range Ionized Calcium 1.16 1.12 - 1.32 MMOL/L  
MAGNESIUM Collection Time: 09/24/18  5:50 AM  
Result Value Ref Range Magnesium 1.9 1.6 - 2.6 mg/dL CBC WITH AUTOMATED DIFF Collection Time: 09/24/18  5:50 AM  
Result Value Ref Range WBC 13.5 (H) 4.6 - 13.2 K/uL  
 RBC 3.04 (L) 4.20 - 5.30 M/uL HGB 7.4 (L) 12.0 - 16.0 g/dL HCT 24.6 (L) 35.0 - 45.0 % MCV 80.9 74.0 - 97.0 FL  
 MCH 24.3 24.0 - 34.0 PG  
 MCHC 30.1 (L) 31.0 - 37.0 g/dL  
 RDW 19.1 (H) 11.6 - 14.5 % PLATELET 343 622 - 489 K/uL MPV 8.8 (L) 9.2 - 11.8 FL  
 NEUTROPHILS 76 (H) 42 - 75 % BAND NEUTROPHILS 5 0 - 5 % LYMPHOCYTES 12 (L) 20 - 51 % MONOCYTES 5 2 - 9 % EOSINOPHILS 2 0 - 5 % BASOPHILS 0 0 - 3 %  
 ABS. NEUTROPHILS 10.9 (H) 1.8 - 8.0 K/UL  
 ABS. LYMPHOCYTES 1.6 0.8 - 3.5 K/UL  
 ABS. MONOCYTES 0.7 0 - 1.0 K/UL  
 ABS. EOSINOPHILS 0.3 0.0 - 0.4 K/UL  
 ABS. BASOPHILS 0.0 0.0 - 0.06 K/UL  
 DF MANUAL PLATELET COMMENTS ADEQUATE PLATELETS    
 RBC COMMENTS ANISOCYTOSIS 1+ 
    
 RBC COMMENTS POLYCHROMASIA 1+ 
    
 RBC COMMENTS HYPOCHROMIA 1+ 
    
 RBC COMMENTS STOMATOCYTES 
FEW 
TARGET CELLS 
FEW METABOLIC PANEL, BASIC Collection Time: 09/24/18  5:50 AM  
Result Value Ref Range Sodium 132 (L) 136 - 145 mmol/L Potassium 4.7 3.5 - 5.5 mmol/L Chloride 94 (L) 100 - 108 mmol/L  
 CO2 29 21 - 32 mmol/L Anion gap 9 3.0 - 18 mmol/L Glucose 90 74 - 99 mg/dL BUN 9 7.0 - 18 MG/DL Creatinine 0.46 (L) 0.6 - 1.3 MG/DL  
 BUN/Creatinine ratio 20 12 - 20 GFR est AA >60 >60 ml/min/1.73m2 GFR est non-AA >60 >60 ml/min/1.73m2 Calcium 9.0 8.5 - 10.1 MG/DL PROTHROMBIN TIME + INR Collection Time: 09/24/18  5:50 AM  
Result Value Ref Range Prothrombin time 15.5 (H) 11.5 - 15.2 sec INR 1.3 (H) 0.8 - 1.2 PTT Collection Time: 09/24/18  5:50 AM  
Result Value Ref Range aPTT 38.7 (H) 23.0 - 36.4 SEC Lab Results Component Value Date/Time TSH 60.00 (H) 08/17/2018 08:29 AM  
 
 
Telemetry: sinus tachycardia on bedside monitor Imaging: 
I have personally reviewed the patients radiographs and have reviewed the reports: 
 
9/23/18 9/16/18- Before thoracentesis 9/17/18- Post thoracentesis CT Results  (Last 48 hours) None CT Chest: 9/16/18 IMPRESSION: 
  
1. Left main pulmonary artery pulmonary embolism extending into the left lobar 
arteries. No sugar evidence of right heart strain. Overall clot burden is 
difficult to ascertain due to suboptimal artery opacification. 2. Large right upper lobe neoplasm with metastatic involvement in the right 
hilum, mediastinum, left clavicle, and left adrenal gland. 3. Moderate-sized left pleural effusion with associated left basilar 
atelectasis. 
  
Critical results discussed with Dr. Divya Morris at 9:45 PM on 9/16/2018. CXR Results  (Last 48 hours) 09/23/18 1519  XR CHEST PORT Final result Impression:  IMPRESSION:    
1. New large left pleural parenchymal opacity may represent pleural effusion and  
consolidation. 2. Right upper lobe opacities unchanged.   
   
   
   
   
  
 Narrative:  EXAM:  XR CHEST PORT  
   
 INDICATION:  shortness of breath; assess for recurrence of pleural effusion COMPARISON: September 17, 2018. FINDINGS: A single frontal view of the chest was obtained. Cardiac silhouette is enlarged. There is infiltrate in the right upper lobe unchanged from the prior exam. Right  
costophrenic angle is sharp. There is extensive pleural opacity in the left mid  
and lower lung field. Left costophrenic angle is obscured. Cardiac Echo: 9/19/18 · Estimated left ventricular ejection fraction is 61 - 65%. Normal left ventricular wall motion, no regional wall motion abnormality noted. Mild (grade 1) left ventricular diastolic dysfunction. · Mild tricuspid valve regurgitation is present. Pulmonary arterial systolic pressure is 23 mmHg. There is no evidence of pulmonary hypertension. · Trivial pericardial effusion. No indications of tamponade present. There is a left pleural effusion. Doppler: US 9/19/18 · Non-occlusive thrombus present in the right profunda femoral vein. · Occlusive thrombus present in the right femoral vein. · Occlusive thrombus present in the right popliteal vein. · Occlusive thrombus present in one of the right peroneal vein. · Occlusive thrombus present in one of the right posterior tibial vein. · Non-occlusive thrombus present in the contralateral common femoral, profunda femoral vein and proximal femoral vein. · Occlusive present in the left femoral vein. Total clinical care time exclusive of procedures: 60 minutes Ally Hughes DO, [facility], Sleep, Critical Care Medicine

## 2018-09-24 NOTE — PROGRESS NOTES
Preprocedure Assessment Today 9/24/2018 Indication/Symptoms:   Stella Mahoney is a 66 y.o. female with a history of stage four lung cancer who presents to IR for an image-guided mediport placement and left pleur-x catheter with moderate sedation. Medications and labs reviewed. Patient has been NPO since midnight. Blood thinners held. The H & P and/or progress notes and any available imaging were reviewed. The risks, indications and possible alternatives to the procedure, including doing nothing, were discussed and informed consent was obtained. Physical Exam: 
  
 Heart:  Regular rate Lungs:  Normal respiratory effort The patient is an appropriate candidate to undergo the planned procedure and sedation.  
 
Albesa Kawasaki, [address]

## 2018-09-24 NOTE — PROGRESS NOTES
Problem: Self Care Deficits Care Plan (Adult) Goal: *Acute Goals and Plan of Care (Insert Text) Occupational Therapy Goals Initiated 9/24/2018 within 7 day(s). 1.  Patient will perform lower body dressing with modified independence and use of AE for energy conservation as needed. 3.  Patient will perform bathing with SUP and good safety awareness. 4.  Patient will perform toilet transfers with modified independence. 5.  Patient will perform all aspects of toileting with modified independence. 6.  Patient will participate in upper extremity therapeutic exercise/activities with supervision/set-up for 8 minutes. 7.  Patient will utilize energy conservation techniques during functional activities with min verbal cues. Occupational Therapy EVALUATION Patient: Marjorie Grayson [de-identified]66 y.o. female) Date: 9/24/2018 Primary Diagnosis: PE 
Metastatic lung cancer (metastasis from lung to other site) [facility]) 
PE (physical exam), annual 
DVT (deep vein thrombosis) in pregnancy ([facility] [address] Chronic respiratory failure ([facility] [address] Anemia Precautions:   Fall ASSESSMENT : 
Based on the objective data described below, the patient presents with generalized weakness and decreased activity tolerance affecting independence and safety with self care and functional ambulation to bathroom. Pt does have pain in RUE as well affecting AROM and strength (3-/5) in L shld. Pt requires CGA for bedside commode transfers and rest breaks between all activities due to SOB. Pt oxygen decreased to 66% (possibly due to pulse ox placement) during reaching/bending to doff/milton socks. Patient will benefit from skilled intervention to address the above impairments. Patients rehabilitation potential is considered to be Fair Factors which may influence rehabilitation potential include:  
[]             None noted []             Mental ability/status []             Medical condition [x]             Home/family situation and support systems [x]             Safety awareness []             Pain tolerance/management 
[]             Other: PLAN : 
Recommendations and Planned Interventions: 
[x]               Self Care Training                  [x]        Therapeutic Activities [x]               Functional Mobility Training    [x]        Cognitive Retraining 
[x]               Therapeutic Exercises           [x]        Endurance Activities [x]               Balance Training                   [x]        Neuromuscular Re-Education []               Visual/Perceptual Training     [x]   Home Safety Training 
[x]               Patient Education                 [x]        Family Training/Education []               Other (comment): Frequency/Duration: Patient will be followed by occupational therapy 1-2 times per day/4-7 days per week to address goals. Discharge Recommendations: [address] with increased home care Further Equipment Recommendations for Discharge: bedside commode Barriers to Learning/Limitations: None Compensate with: visual, verbal, tactile, kinesthetic cues/model PATIENT COMPLEXITY Eval Complexity: History: MEDIUM Complexity : Expanded review of history including physical, cognitive and psychosocial  history ; Examination: MEDIUM Complexity : 3-5 performance deficits relating to physical, cognitive , or psychosocial skils that result in activity limitations and / or participation restrictions; Decision Making:MEDIUM Complexity : Patient may present with comorbidities that affect occupational performnce. Miniml to moderate modification of tasks or assistance (eg, physical or verbal ) with assesment(s) is necessary to enable patient to complete evaluation  Assessment: moderate Complexity G-CODES:  
 
Self Care  Current  CI= 1-19%  Goal  CI= 1-19%.   The severity rating is based on the Level of Assistance required for Functional Mobility and ADLs. SUBJECTIVE:  
Patient stated I was getting short of breathe easy at home too.  OBJECTIVE DATA SUMMARY:  
 
Past Medical History:  
Diagnosis Date  Cancer ([facility] [address] lung cancer  HTN (hypertension) 9/16/2018  PAD (peripheral artery disease) ([facility] [address]  Pericardial effusion Past Surgical History:  
Procedure Laterality Date  VASCULAR SURGERY PROCEDURE UNLIST Right   
 opened up vessels Prior Level of Function/Home Situation: I in ADLs and assist with meal prep and home making. Home Situation Home Environment: Apartment # Steps to Enter:  (lives 2nd floor with an elevator) One/Two Story Residence: One story # of Interior Steps: 0 Living Alone: Yes Support Systems: Family member(s) Patient Expects to be Discharged to[de-identified] Private residence Current DME Used/Available at Home: [employer], [facility] Sleep Number Gautam chair Tub or Shower Type: Tub/Shower combination 
[x]  Right hand dominant   []  Left hand dominant Cognitive/Behavioral Status: 
Neurologic State: Alert Orientation Level: Oriented X4 Cognition: Follows commands; Appropriate decision making; Appropriate for age attention/concentration Safety/Judgement: Fall prevention Skin: intact BUEs Edema: none noted BUEs Coordination: 
Coordination: Within functional limits (BUEs) Fine Motor Skills-Upper: Left Intact; Right Intact Gross Motor Skills-Upper: Left Intact; Right Intact Balance: 
Sitting: Intact Standing: Impaired Standing - Static: Good Standing - Dynamic : Fair Strength: 
Strength: Generally decreased, functional (BUEs) Tone & Sensation: 
Tone: Normal (BUEs) Sensation: Intact (BUEs) Range of Motion: 
AROM: Within functional limits (BUEs) Functional Mobility and Transfers for ADLs: 
Transfers: 
Sit to Stand: Contact guard assistance Toilet Transfer : Contact guard assistance ADL Assessment: 
Feeding: Modified independent Oral Facial Hygiene/Grooming: Modified Independent Bathing: Supervision Upper Body Dressing: Modified independent Lower Body Dressing: Supervision Toileting: Supervision ADL Intervention: 
 Cognitive Retraining Safety/Judgement: Fall prevention Pain: 
Pt reports 0/10 pain or discomfort prior to treatment.   
Pt reports 0/10 pain or discomfort post treatment. Activity Tolerance:  
fair Please refer to the flowsheet for vital signs taken during this treatment. After treatment:  
[x] Patient left in no apparent distress sitting up in chair 
[] Patient left in no apparent distress in bed 
[x] Call bell left within reach 
[] Nursing notified 
[] Caregiver present 
[] Bed alarm activated COMMUNICATION/EDUCATION:  
[x] Home safety education was provided and the patient/caregiver indicated understanding. [x] Patient/family have participated as able in goal setting and plan of care. [x] Patient/family agree to work toward stated goals and plan of care. [] Patient understands intent and goals of therapy, but is neutral about his/her participation. [] Patient is unable to participate in goal setting and plan of care. Thank you for this referral. 
Chidi Gandhi OT Time Calculation: 28 mins

## 2018-09-24 NOTE — ROUTINE PROCESS
Bedside shift change report given to TALIA Snyder (oncoming nurse) by Geo Gallegos RN (offgoing nurse). Report included the following information SBAR, Intake/Output and MAR.

## 2018-09-25 NOTE — PROGRESS NOTES
[facility] Pulmonary, Critical Care, and Sleep Medicine Name: Tammy Turk MRN: [id number] : 1955 Hospital: [address] [place] [address]  Date: 2018 Pulmonary Medicine: Follow Up IMPRESSION:  
1. Lung cancer- adenocarcinoma- right upper lobe- stage 4 with diffuse mets - Mediport placed right upper chest wall 18 by IR. Plan for  palliative chemo: Oncologist . 2. Pleural effusion- Left: Malignant-  S/P thoracentesis 18- serosanguinous 1150cc drained- Cytology positive for Adenocarcinoma- S/p Pleurx placement 18 3. Subclavicular lymph node mass- Left: s/p bx- + ademocarcinoma 4. Pericardial effusion- recent hemorrhagic effusion while on heparin- s/p drainage 18- cytology: Reactive; negative for malignancy- off anticoagulation- CT surgery consulted 5. Pulmonary embolism- recurrent 6. Bilateral DVT- with occlusive bilateral clot: US: 18- s/p IVC Filter placement - no anticoagulation dure to recent hemorraghic pericardial effusion 7. Chronic respiratory failure ([address] (2018)- on 2LPM 
8. Left lung, basilar opacity- ? Obstructive pneumonia?; leukocytosis with left shift and mild bandemia on antibioitcs 9. Anemia- chronic disease: Hb 7 range 10. Hyponatremia- Na: 134 today 11. Tachycardia- most likely multifactorial- afebrile, + PE, enlarged subclavian lymph nodes, pleural effusion, anemia. Etc. 12. Hypertension- stable 13. Hypothyroidism RECOMMENDATIONS:  
· Keep head of bed elevated · Aspiration precautions · SpO2 goal: >90 
· Continue with supplemental oxygen · Electrolyte management per primary team 
· Pleurx cath in place- drain PRN 
· Antibiotics per primary tem · Prophylaxis per primary team 
· If plant is to d/c home - ensure appropriate supplies for pleurx and training for home drainage. · Will continue to follow. Subjective/History: This patient has been seen and evaluated at the request of Dr. Tonny Sorensen  for metastatic lung cancer with pleural effusion. Patient is a 66 y.o. female with advance RUL, adenocarcinoma with diffuse metastasis to include malignant, left pleural effusion. Clinical course complicated with  bilateral DVT, pulmonary embolism and hemorraghic pericardial effusion while on anticoagulation. IVC filter has been placed and patient remains off anticoagulation. Therapeutic thoracentesis recently performed on 9/17/18. Repeat CXR 9/23/18 looks like effusion is re-accumulating with. Patient pending palliative chemotherapy. This morning the patient is sitting upright in bed in SDU. She denies any pain at rest but she is easily winded. HR in low 100's. No cough, sputum production or hemoptysis. She reports that her breathing was better after her recent thoracentesis. She is winded just moving onto commode. No nausea or emesis. She expresses that she is scared about her condition. She had been living along but has several family members that are willing to help her as an OP I discussed possibly repeating thoracentesis vs Pleurx. Patient is willing to have either procedure performed. Given how quickly the effusion has reaccumulated, level of dyspnea and advance disease would favor Pleurx. I have discussed this with the patient and IR this morning 
 
 
09/25/18 · Patient resting comfortably in bed- denies pain at this time- received morphine earlier · States her breathing is improved from yesterday · S/p mediport placement right chest wall 9/24/18-IR 
· S/p left pleurx cath placement 9/24/18-IR: 500 mls of pleural fluid drained at time of procedure · Afebrile · Persistent, tachycardia Patient Vitals for the past 24 hrs: 
 Temp Pulse Resp BP SpO2  
09/25/18 0837 97.9 °F (36.6 °C) (!) 106 20 124/71 100 % 09/25/18 0402 - (!) 113 22 106/66 -  
09/25/18 0400 98.4 °F (36.9 °C) - - - -  
 09/25/18 0000 98.8 °F (37.1 °C) - - - -  
09/24/18 2000 98.8 °F (37.1 °C) - - - -  
09/24/18 1652 97.8 °F (36.6 °C) (!) 107 19 128/69 99 % 09/24/18 1555 - (!) 103 14 174/72 100 % 09/24/18 1425 - (!) 102 26 142/74 100 % 09/24/18 1116 98.1 °F (36.7 °C) 92 19 117/68 97 % Antibiotics: · Augmentin Past Medical History:  
Diagnosis Date  Cancer ([facility] lung cancer  HTN (hypertension) 9/16/2018  PAD [facility]per[facility]  Pericardial effusion Past Surgical History:  
Procedure Laterality Date  VASCULAR SURGERY PROCEDURE UNLIST Right   
 opened up vessels Prior to Admission medications Medication Sig Start Date End Date Taking? Authorizing Provider  
levothyroxine (SYNTHROID) 75 mcg tablet Take 1 Tab by mouth Daily (before breakfast). 8/29/18  Yes Chelsea Lazaro MD  
metoprolol tartrate (LOPRESSOR) 25 mg tablet Take 0.5 Tabs by mouth two (2) times a day. Patient taking differently: Take 25 mg by mouth two (2) times a day. 8/28/18  Yes Chelsea Lazaro MD  
oxyCODONE ER (OXYCONTIN) 10 mg ER tablet Take 1 Tab by mouth every twelve (12) hours. Max Daily Amount: 20 mg. 8/28/18  Yes Chelsea Lazaro MD  
oxyCODONE-acetaminophen (PERCOCET) 5-325 mg per tablet Take 1 Tab by mouth every six (6) hours as needed. Max Daily Amount: 4 Tabs. Patient taking differently: Take 1 Tab by mouth every six (6) hours as needed for Pain. 8/28/18  Yes Chelsea Lazaro MD  
senna-docusate (PERICOLACE) 8.6-50 mg per tablet Take 1 Tab by mouth daily. 8/29/18  Yes Chelsea Lazaro MD  
 
Current Facility-Administered Medications Medication Dose Route Frequency  amoxicillin-clavulanate (AUGMENTIN) 500-125 mg per tablet 1 Tab  1 Tab Oral Q12H  
 lactobacillus sp. 50 billion cpu (BIO-K PLUS) capsule 1 Cap  1 Cap Oral DAILY  influenza vaccine 2018-19 (6 mos+)(PF) (FLUARIX QUAD/FLULAVAL QUAD) injection 0.5 mL  0.5 mL IntraMUSCular PRIOR TO DISCHARGE  
  polyethylene glycol (MIRALAX) packet 17 g  17 g Oral DAILY  docusate sodium (COLACE) capsule 100 mg  100 mg Oral BID  dronabinol (MARINOL) capsule 2.5 mg  2.5 mg Oral BID  levothyroxine (SYNTHROID) tablet 75 mcg  75 mcg Oral ACB  metoprolol tartrate (LOPRESSOR) tablet 12.5 mg  12.5 mg Oral BID  
 oxyCODONE ER (OxyCONTIN) tablet 10 mg  10 mg Oral Q12H No Known Allergies Social History Substance Use Topics  Smoking status: Former Smoker  Smokeless tobacco: Never Used  Alcohol use Yes Comment: occasionally No family history on file. Review of Systems: 
Pertinent items are noted in HPI. Objective:  
Vital Signs:   
Visit Vitals  /71  Pulse (!) 106  Temp 97.9 °F (36.6 °C)  Resp 20  Wt 85 kg (187 lb 4.8 oz)  SpO2 100%  Breastfeeding No  
 BMI 29.34 kg/m2 O2 Device: Nasal cannula O2 Flow Rate (L/min): 3 l/min Temp (24hrs), Av.3 °F (36.8 °C), Min:97.8 °F (36.6 °C), Max:98.8 °F (37.1 °C) Intake/Output:  
Last shift:        
Last 3 shifts:  1901 -  0700 In: 360 [P.O.:360] Out: 1200 [Urine:700; Drains:500] Intake/Output Summary (Last 24 hours) at 18 1112 Last data filed at 18 7614 Gross per 24 hour Intake              240 ml Output             1100 ml Net             -860 ml Physical Exam: 
 
General:  Alert, cooperative, no respiratory distress, thin appears stated age. Head:  Normocephalic, without obvious abnormality, atraumatic. Eyes:  Conjunctivae/corneas clear. PERRL, EOMs intact. Nose: Nares normal. Septum midline. Mucosa normal. No drainage or sinus tenderness. Throat: Lips, mucosa, and tongue normal. Teeth and gums normal.  
Neck: Supple, symmetrical, trachea midline, + mild adenopathy, thyroid: no enlargment/tenderness/nodules, no carotid bruit and no JVD. - No stridor Back:   Symmetric, no curvature.  ROM normal.  
 Lungs:   Rhonchi and wheezing right upper lung field, Decreased breath sounds lower left lung field. Chest wall:  Left upper palm size mass; left upper chest wall- clavicular level to base of neck- firm, tender with deep palpation, non-fluctuant, no erythema, left pleurx cath in place mid chest wall- covered with clear dressing- no oozing, swelling or erythema, right upper chest wall- new mediport - no erythema or edema,no oozing- site not accessed. Heart:  Regular rhythm, rate 100's S1, no murmur, No rub Abdomen:   Soft, non-tender. Bowel sounds normal. No masses,  No organomegaly. Extremities: Extremities normal, atraumatic, no cyanosis or edema. Pulses: 2+ and symmetric all extremities. Skin: Skin color, texture, turgor normal. No rashes or lesions Lymph nodes:  Cervical- mild anterior lymphadenopathy. Neurologic: Grossly nonfocal  
 
 
Data:  
 
Results for Madeline Mccollum (MRN [id number]) as of 9/24/2018 08:50 Ref. Range 9/20/2018 05:56 9/21/2018 05:55 9/22/2018 02:38 9/23/2018 00:59 9/24/2018 05:50 HGB Latest Ref Range: 12.0 - 16.0 g/dL 7.7 (L) 7.7 (L) 8.5 (L) 7.6 (L) 7.4 (L) Results for Madeline Mccollum (MRN [id number]) as of 9/24/2018 08:50 Ref. Range 9/20/2018 05:56 9/21/2018 05:55 9/22/2018 02:38 9/23/2018 00:59 9/24/2018 05:50 Sodium Latest Ref Range: 136 - 145 mmol/L 133 (L) 135 (L) 134 (L) 135 (L) 132 (L) Recent Results (from the past 24 hour(s)) CALCIUM, IONIZED Collection Time: 09/25/18  5:40 AM  
Result Value Ref Range Ionized Calcium 1.16 1.12 - 1.32 MMOL/L  
MAGNESIUM Collection Time: 09/25/18  5:40 AM  
Result Value Ref Range Magnesium 2.1 1.6 - 2.6 mg/dL CBC WITH AUTOMATED DIFF Collection Time: 09/25/18  5:40 AM  
Result Value Ref Range WBC 11.7 4.6 - 13.2 K/uL  
 RBC 3.04 (L) 4.20 - 5.30 M/uL HGB 7.4 (L) 12.0 - 16.0 g/dL HCT 24.6 (L) 35.0 - 45.0 %  MCV 80.9 74.0 - 97.0 FL  
 MCH 24.3 24.0 - 34.0 PG  
 MCHC 30.1 (L) 31.0 - 37.0 g/dL  
 RDW 19.0 (H) 11.6 - 14.5 % PLATELET 429 936 - 183 K/uL MPV 8.8 (L) 9.2 - 11.8 FL  
 NEUTROPHILS 75 (H) 40 - 73 % LYMPHOCYTES 15 (L) 21 - 52 % MONOCYTES 8 3 - 10 % EOSINOPHILS 2 0 - 5 % BASOPHILS 0 0 - 2 %  
 ABS. NEUTROPHILS 8.8 (H) 1.8 - 8.0 K/UL  
 ABS. LYMPHOCYTES 1.7 0.9 - 3.6 K/UL  
 ABS. MONOCYTES 1.0 0.05 - 1.2 K/UL  
 ABS. EOSINOPHILS 0.2 0.0 - 0.4 K/UL  
 ABS. BASOPHILS 0.0 0.0 - 0.1 K/UL  
 DF AUTOMATED METABOLIC PANEL, BASIC Collection Time: 09/25/18  5:40 AM  
Result Value Ref Range Sodium 134 (L) 136 - 145 mmol/L Potassium 4.9 3.5 - 5.5 mmol/L Chloride 95 (L) 100 - 108 mmol/L  
 CO2 30 21 - 32 mmol/L Anion gap 9 3.0 - 18 mmol/L Glucose 81 74 - 99 mg/dL BUN 10 7.0 - 18 MG/DL Creatinine 0.56 (L) 0.6 - 1.3 MG/DL  
 BUN/Creatinine ratio 18 12 - 20 GFR est AA >60 >60 ml/min/1.73m2 GFR est non-AA >60 >60 ml/min/1.73m2 Calcium 8.8 8.5 - 10.1 MG/DL Lab Results Component Value Date/Time TSH 60.00 (H) 08/17/2018 08:29 AM  
 
 
Telemetry: sinus tachycardia on bedside monitor Imaging: 
I have personally reviewed the patients radiographs and have reviewed the reports: 
 
9/23/18 9/16/18- Before thoracentesis 9/17/18- Post thoracentesis CT Results  (Last 48 hours) None CT Chest: 9/16/18 IMPRESSION: 
  
1. Left main pulmonary artery pulmonary embolism extending into the left lobar 
arteries. No sugar evidence of right heart strain. Overall clot burden is 
difficult to ascertain due to suboptimal artery opacification. 2. Large right upper lobe neoplasm with metastatic involvement in the right 
hilum, mediastinum, left clavicle, and left adrenal gland. 3. Moderate-sized left pleural effusion with associated left basilar 
atelectasis. 
  
Critical results discussed with Dr. Liza Moncada at 9:45 PM on 9/16/2018. CXR Results  (Last 48 hours) 09/23/18 1519  XR CHEST PORT Final result Impression:  IMPRESSION:    
1. New large left pleural parenchymal opacity may represent pleural effusion and  
consolidation. 2. Right upper lobe opacities unchanged. Narrative:  EXAM:  XR CHEST PORT INDICATION:  shortness of breath; assess for recurrence of pleural effusion COMPARISON: September 17, 2018. FINDINGS: A single frontal view of the chest was obtained. Cardiac silhouette is enlarged. There is infiltrate in the right upper lobe unchanged from the prior exam. Right  
costophrenic angle is sharp. There is extensive pleural opacity in the left mid  
and lower lung field. Left costophrenic angle is obscured. Cardiac Echo: 9/19/18 · Estimated left ventricular ejection fraction is 61 - 65%. Normal left ventricular wall motion, no regional wall motion abnormality noted. Mild (grade 1) left ventricular diastolic dysfunction. · Mild tricuspid valve regurgitation is present. Pulmonary arterial systolic pressure is 23 mmHg. There is no evidence of pulmonary hypertension. · Trivial pericardial effusion. No indications of tamponade present. There is a left pleural effusion. Doppler: US 9/19/18 · Non-occlusive thrombus present in the right profunda femoral vein. · Occlusive thrombus present in the right femoral vein. · Occlusive thrombus present in the right popliteal vein. · Occlusive thrombus present in one of the right peroneal vein. · Occlusive thrombus present in one of the right posterior tibial vein. · Non-occlusive thrombus present in the contralateral common femoral, profunda femoral vein and proximal femoral vein. · Occlusive present in the left femoral vein. Total clinical care time exclusive of procedures: 25 minutes Ally Hughes DO, [facility], [facility], Critical Care [facility]
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
## 2018-09-25 NOTE — PROGRESS NOTES
Per Cyndi Michel (RN) sent home health referral to [phone] in [facility] Informed Christina referral was placed in que. Informed Cyndi Michel referral was sent.

## 2018-09-25 NOTE — DISCHARGE INSTRUCTIONS
Deep Vein Thrombosis: Care Instructions  Your Care Instructions    A deep vein thrombosis (DVT) is a blood clot in certain veins of the legs, pelvis, or arms. Blood clots in these veins need to be treated because they can get bigger, break loose, and travel through the bloodstream to the lungs. A blood clot in a lung can be life-threatening. The doctor may have given you a blood thinner (anticoagulant). A blood thinner can stop the blood clot from growing larger and prevent new clots from forming. You will need to take a blood thinner for 3 to 6 months or longer. The doctor has checked you carefully, but problems can develop later. If you notice any problems or new symptoms, get medical treatment right away. Follow-up care is a key part of your treatment and safety. Be sure to make and go to all appointments, and call your doctor if you are having problems. It's also a good idea to know your test results and keep a list of the medicines you take. How can you care for yourself at home? · Take your medicines exactly as prescribed. Call your doctor if you think you are having a problem with your medicine. · If you are taking a blood thinner, be sure you get instructions about how to take your medicine safely. Blood thinners can cause serious bleeding problems. · Wear compression stockings if your doctor recommends them. These stockings are tighter at the feet than on the legs. They may reduce pain and swelling in your legs. But there are different types of stockings, and they need to fit right. So your doctor will recommend what you need. · When you sit, use a pillow to raise the arm or leg that has the blood clot. Try to keep it above the level of your heart. When should you call for help? Call 911 anytime you think you may need emergency care. For example, call if:    · You passed out (lost consciousness).     · You have symptoms of a blood clot in your lung (called a pulmonary embolism).  These include:  ¨ Sudden chest pain. ¨ Trouble breathing. ¨ Coughing up blood.    Call your doctor now or seek immediate medical care if:    · You have new or worse trouble breathing.     · You are dizzy or lightheaded, or you feel like you may faint.     · You have symptoms of a blood clot in your arm or leg. These may include:  ¨ Pain in the arm, calf, back of the knee, thigh, or groin. ¨ Redness and swelling in the arm, leg, or groin.    Watch closely for changes in your health, and be sure to contact your doctor if:    · You do not get better as expected. Where can you learn more? Go to http://denis-wyatt.info/. Enter S784 in the search box to learn more about \"Deep Vein Thrombosis: Care Instructions. \"  Current as of: November 21, 2017  Content Version: 11.7  © 1065-8214 [employer]. Care instructions adapted under license by [employer] (which disclaims liability or warranty for this information). If you have questions about a medical condition or this instruction, always ask your healthcare professional. [address] any warranty or liability for your use of this information. Lung Cancer: Care Instructions  Your Care Instructions    Lung cancer occurs when abnormal cells grow out of control in the lung. Lung cancer can start anywhere in the lungs and spread to other parts of the body. Treatment for lung cancer depends on what type of lung cancer you have and how advanced it is. Treatment may include surgery to remove the cancer. It could also include medicines (chemotherapy) or radiation to destroy cancer cells. Being treated for cancer can weaken your body, and you may feel very tired. Home treatment and certain medicines can help you feel better. Finding out that you have cancer is scary. You may feel many emotions and may need some help coping. Seek out family, friends, and counselors for support.  You also can do things at home to make yourself feel better while you go through treatment. Call the [facility] ([phone]) or visit its website at [email or web address] for more information. Follow-up care is a key part of your treatment and safety. Be sure to make and go to all appointments, and call your doctor if you are having problems. It's also a good idea to know your test results and keep a list of the medicines you take. How can you care for yourself at home? · Take your medicines exactly as prescribed. Call your doctor if you think you are having a problem with your medicine. You will get more details on the specific medicines your doctor prescribes. · Follow your doctor's instructions to relieve pain. Use pain medicine when you first feel pain, before it becomes severe. Taking pain medicines regularly is often the best way to keep pain under control. · Eat healthy food. If you do not feel like eating, try to eat food that has protein and extra calories to keep up your strength and prevent weight loss. Drink liquid meal replacements for extra calories and protein. Try to eat your main meal early. Eating smaller portions more often may help as well. · Get some physical activity every day, but do not get too tired. Keep doing the hobbies you enjoy as your energy allows. · Do not smoke. Smoking can make your cancer symptoms worse. If you need help quitting, talk to your doctor about stop-smoking programs and medicines. These can increase your chances of quitting for good. · If you use oxygen, do not smoke, light a cigarette, or use a flame while your oxygen is on. Smoking while using oxygen can lead to fire and even explosion. · If you have nausea, try to eat several small meals a day. When you feel better, eat clear soups and mild foods until all symptoms are gone for 12 to 48 hours.  Other good choices include dry toast, crackers, cooked cereal, and gelatin dessert, such as Jell-O.  · If you are vomiting or have diarrhea:  ¨ Drink plenty of fluids (enough so that your urine is light yellow or clear like water) to prevent dehydration. Choose water and other caffeine-free clear liquids. If you have kidney, heart, or liver disease and have to limit fluids, talk with your doctor before you increase the amount of fluids you drink. ¨ When you are able to eat, try clear soups, mild foods, and liquids until all symptoms are gone for 12 to 48 hours. Other good choices include dry toast, crackers, cooked cereal, and gelatin dessert, such as Jell-O.  · Take steps to control your stress and workload. Learn relaxation techniques. ¨ Share your feelings. Stress and tension affect our emotions. By expressing your feelings to others, you may be able to understand and cope with them. ¨ Consider joining a support group. Talking about a problem with your spouse, a good friend, or other people with similar problems is a good way to reduce tension and stress. ¨ Express yourself with art. Try writing, crafts, dance, or art to relieve stress. Some dance, writing, or art groups may be available just for people who have cancer. ¨ Be kind to your body and mind. Getting enough sleep, eating a healthy diet, and taking time to do things you enjoy can contribute to an overall feeling of balance in your life and help reduce stress. ¨ Get help if you need it. Discuss your concerns with your doctor or counselor. · If you have not already done so, prepare a list of advance directives. Advance directives are instructions to your doctor and family members about what kind of care you want if you become unable to speak or express yourself. When should you call for help? Call 911 anytime you think you may need emergency care.  For example, call if:    · You passed out (lost consciousness).     · You have severe trouble breathing.     · You cough up a lot of blood.    Call your doctor now or seek immediate medical care if:    · You have a fever.     · You are short of breath.     · You have new or worse pain.     · You have a new or worse cough.     · You think you have an infection.    Watch closely for changes in your health, and be sure to contact your doctor if:    · You do not get better as expected. Where can you learn more? Go to http://denis-wyatt.info/. Enter E715 in the search box to learn more about \"Lung Cancer: Care Instructions. \"  Current as of: May 12, 2017  Content Version: 11.7  © 0551-3990 [employer]. Care instructions adapted under license by [employer] (which disclaims liability or warranty for this information). If you have questions about a medical condition or this instruction, always ask your healthcare professional. [address] any warranty or liability for your use of this information. DISCHARGE SUMMARY from Nurse    PATIENT INSTRUCTIONS:    After general anesthesia or intravenous sedation, for 24 hours or while taking prescription Narcotics:  · Limit your activities  · Do not drive and operate hazardous machinery  · Do not make important personal or business decisions  · Do  not drink alcoholic beverages  · If you have not urinated within 8 hours after discharge, please contact your surgeon on call. Report the following to your surgeon:  · Excessive pain, swelling, redness or odor of or around the surgical area  · Temperature over 100.5  · Nausea and vomiting lasting longer than 4 hours or if unable to take medications  · Any signs of decreased circulation or nerve impairment to extremity: change in color, persistent  numbness, tingling, coldness or increase pain  · Any questions    What to do at Home:  Recommended activity: Activity as tolerated, Ambulate in house and use portable oxygen with ambulation    *  Please give a list of your current medications to your Primary Care Provider.     *  Please update this list whenever your medications are discontinued, doses are      changed, or new medications (including over-the-counter products) are added. *  Please carry medication information at all times in case of emergency situations. These are general instructions for a healthy lifestyle:    No smoking/ No tobacco products/ Avoid exposure to second hand smoke  Surgeon General's Warning:  Quitting smoking now greatly reduces serious risk to your health. Obesity, smoking, and sedentary lifestyle greatly increases your risk for illness    A healthy diet, regular physical exercise & weight monitoring are important for maintaining a healthy lifestyle    You may be retaining fluid if you have a history of heart failure or if you experience any of the following symptoms:  Weight gain of 3 pounds or more overnight or 5 pounds in a week, increased swelling in our hands or feet or shortness of breath while lying flat in bed. Please call your doctor as soon as you notice any of these symptoms; do not wait until your next office visit. Recognize signs and symptoms of STROKE:    F-face looks uneven  A-arms unable to move or move unevenly  S-speech slurred or non-existent  T-time-call 911 as soon as signs and symptoms begin-DO NOT go       Back to bed or wait to see if you get better-TIME IS BRAIN. Warning Signs of HEART ATTACK     Call 911 if you have these symptoms:   Chest discomfort. Most heart attacks involve discomfort in the center of the chest that lasts more than a few minutes, or that goes away and comes back. It can feel like uncomfortable pressure, squeezing, fullness, or pain.  Discomfort in other areas of the upper body. Symptoms can include pain or discomfort in one or both arms, the back, neck, jaw, or stomach.  Shortness of breath with or without chest discomfort.  Other signs may include breaking out in a cold sweat, nausea, or lightheadedness. Don't wait more than five minutes to call 911 - MINUTES MATTER! Fast action can save your life. Calling 911 is almost always the fastest way to get lifesaving treatment. Emergency Medical Services staff can begin treatment when they arrive -- up to an hour sooner than if someone gets to the hospital by car. The discharge information has been reviewed with the patient. The patient verbalized understanding. Discharge medications reviewed with the patient and appropriate educational materials and side effects teaching were provided.   ___________________________________________________________________________________________________________________________________

## 2018-09-25 NOTE — PROGRESS NOTES
I have reviewed discharge instructions and prescriptions and pleurex supplies with the patient and son. The patient and son verbalized understanding. Pt d/c'd with portable oxygen set at 3L. Patient armband removed and shredded. Transported pt via wheelchair to private vehicle.

## 2018-09-25 NOTE — PROGRESS NOTES
Patient's sats % SITTING on room air. With ambulation: 
O2 sat drops to 78-80% on room air O2 on 2L= 88% O2 on 3L = 92% O2 on 4L = 98% Patient is dyspenic on exertion.  Had to sit multiple times during walk test.

## 2018-09-25 NOTE — PROGRESS NOTES
Bedside shift report given to IRWIN CROOKS [facility] and TALIA Topete in [employer], all questions answered fully. Patient denies any pain or distress, Patient tolerated port placement well. and Pluerix drain. Patient is alert and oriented to all spheres, family at bedside.

## 2018-09-25 NOTE — HOME CARE
Pt is active to [facility]  For SN/PT/OT/MSW/Aide - Resume care orders for all noted - pt now with Pleurx cath and mediport - pt has 4 bottles in the room to take home, and family informed of phone number to order additional supplies once they get her home - son plans to take pt to his home at [address] [place] [address] - [facility] [phone] -  Will plan for SN to see pt 9/26 as ordered for pleurx drainage - called [facility] supplies and verified fax was received - [facility] will resume care for SN/PT/OT/MSW Deysi Tran RN

## 2018-09-25 NOTE — PROGRESS NOTES
Problem: Mobility Impaired (Adult and Pediatric) Goal: *Acute Goals and Plan of Care (Insert Text) Physical Therapy Goals Initiated 9/24/2018 and to be accomplished within 7 day(s) 1. Patient will move from supine to sit and sit to supine , scoot up and down and roll side to side in bed with supervision/set-up. 2.  Patient will transfer from bed to chair and chair to bed with supervision/set-up using the least restrictive device. 3.  Patient will perform sit to stand with supervision/set-up. 4.  Patient will ambulate with supervision/set-up for 100 feet with the least restrictive device. 9972 - Nursing in room to administer morphine and pt was requesting to rest.  Will f/u later in a.m  
 
1020- Pt sleeping at this time. Will f/u at later date.

## 2018-09-25 NOTE — PROGRESS NOTES
conducted a Follow up consultation and Spiritual Assessment for [facility], who is a 66 y.o.,female. The  provided the following Interventions: 
Continued the relationship of care and support. She said she goes to a chapel at her place of work. Listened empathically to patient. She said she was confused after yesterday's surgery with the anesthesia wearing off and her medications. She had no concerns. Patient gradually dozed off.  stayed a while and offered a silent prayer and left. Chart reviewed. Assessment: 
There are no further spiritual or Anabaptist issues which require Spiritual Care Services interventions at this time. Plan: 
Chaplains will continue to follow and provide pastoral care as needed or requested.  recommends bedside caregivers page  on duty if patient shows signs of acute spiritual or emotional distress. The Rev. Muhammad January [address] Spiritual Care Services [address] [facility] [facility] [phone] / [facility] [phone]

## 2018-09-25 NOTE — NURSE NAVIGATOR
CM spoke with patient and son (michelle) to discuss transitions of care. The plan is to patients home with family while her son has time to set up a bedroom for her. He has requested until Wednesday niight. FOC for home health obtained and they have chosen [facility]. [facility] home health was called and Adam Briceno has been notified to place referral.  
Family requested medications be filled at [facility], CM took prescriptions to OP pharmacy to have filled. O2 ambulation done, Ms. Tu Prabhakar will need home O2. Ivonne Alvarado DME liaison notified of O2 order, and sara purvis. Benoit Darnell her son will transport her home, first to  
[place] [address] [address] [facility] [place] [address]  Then to [facility] on Wednesday evening [facility]  
[facility] [address]  [place].

## 2018-09-25 NOTE — PROGRESS NOTES
NUTRITION Nursing Referral: [facility] 
  
RECOMMENDATIONS / PLAN:  
 
- Continue current nutrition interventions. Encourage po intake. - Continue RD inpatient monitoring and evaluation. NUTRITION INTERVENTIONS & DIAGNOSIS:  
 
[x] Meals/snacks: general/healthful diet [x] Medical food supplement therapy: Ensure Enlive TID Nutrition Diagnosis: Unintended weight loss related to inadequate energy intake as evidenced by 6 lb, 3% weight loss x several months. ASSESSMENT:  
 
9/25: Pt tolerating diet, appetite remains poor with fair to poor meal intake. Likes and is consuming supplements. Encouraged po intake. 9/20: Pt reports decreased appetite, encouraged po intake, discussed supplements and food preferences. Average po intake adequate to meet patients estimated nutritional needs:   [] Yes     [x] No   [] Unable to determine at this time Diet: DIET NUTRITIONAL SUPPLEMENTS Breakfast, Lunch, Dinner; ENSURE ENLIVE 
DIET REGULAR Food Allergies: NKFA Current Appetite:   [] Good     [] Fair     [x] Poor     [] Other: 
Appetite/meal intake prior to admission:   [] Good     [] Fair     [x] Poor x several months     [] Other: 
Feeding Limitations:  [] Swallowing difficulty    [] Chewing difficulty    [] Other: 
Current Meal Intake:  
Patient Vitals for the past 100 hrs: 
 % Diet Eaten  
09/24/18 1837 30 % 09/24/18 1600 25 % 09/22/18 1800 100 % BM: 9/24 Skin Integrity: WDL Edema:   [x] No     [] Yes Pertinent Medications: Reviewed: colace, Marinol, lactobacillus, miralax Recent Labs  
   09/25/18 
 0540  09/24/18 
 0550  09/23/18 
 0059 NA  134*  132*  135* K  4.9  4.7  4.7 CL  95*  94*  97* CO2  30  29  29 GLU  81  90  106* BUN  10  9  11 CREA  0.56*  0.46*  0.59* CA  8.8  9.0  9.1 MG  2.1  1.9  2.0 PHOS   --    --   3.2 Intake/Output Summary (Last 24 hours) at 09/25/18 1448 Last data filed at 09/25/18 7390 Gross per 24 hour Intake              240 ml  
 Output             1100 ml Net             -860 ml Anthropometrics: 
Ht Readings from Last 1 Encounters:  
09/19/18 5' 7\" (1.702 m) Last 3 Recorded Weights in this Encounter  
 09/23/18 0635 09/24/18 0429 09/25/18 0400 Weight: 88.7 kg (195 lb 9.6 oz) 88.5 kg (195 lb) 85 kg (187 lb 4.8 oz) Body mass index is 29.34 kg/(m^2). Weight History: patient reports recent weight loss over the past few months PTA, down from previous weight of 181 lb (6 lb, 3% weight loss x several months) Weight Metrics 9/25/2018 9/19/2018 9/19/2018 9/19/2018 9/1/2018 8/28/2018 8/16/2018 Weight 187 lb 4.8 oz - 176 lb - 184 lb 194 lb 10.7 oz -  
BMI - 29.34 kg/m2 - 27.57 kg/m2 28.82 kg/m2 - 30.49 kg/m2 Admitting Diagnosis: PE 
Metastatic lung cancer (metastasis from lung to other site) [facility]) 
PE (physical exam), annual 
DVT (deep vein thrombosis) in pregnancy ([facility] [address] Chronic respiratory failure ([facility] [address] Anemia Pertinent PMHx: metastatic lung cancer, HTN, PAD Education Needs:        [x] None identified  [] Identified - Not appropriate at this time  []  Identified and addressed - refer to education log Learning Limitations:   [x] None identified  [] Identified Cultural, Alevism & ethnic food preferences:  [x] None identified    [] Identified and addressed ESTIMATED NUTRITION NEEDS:  
 
Calories: 5877-2238 kcal (HBEx1.2-1.4) based on  [x] Actual BW 80 kg     [] IBW Protein:  gm (1-1.5 gm/kg) based on  [x] Actual BW      [] IBW Fluid: 1 mL/kcal 
  
MONITORING & EVALUATION:  
 
Nutrition Goal(s): 1. Po intake of meals will meet >75% of patient estimated nutritional needs within the next 7 days. Outcome:  [] Met/Ongoing    [x]  Not Met/Progressing    [] New/Initial Goal  
 
Monitoring:   [x] Food and beverage intake   [x] Diet order   [x] Nutrition-focused physical findings   [x] Treatment/therapy   [] Weight   [] Enteral nutrition intake Previous Recommendations (for follow-up assessments only):     [x]   Implemented       []   Not Implemented (RD to address)      [] No Longer Appropriate     [] No Recommendation Made Discharge Planning: regular diet + Ensure Enlive 2-3 times daily (supplement coupons provided to pt) [x] Participated in care planning, discharge planning, & interdisciplinary rounds as appropriate Hellen Zuniga RD, [address]  Pager: [phone]

## 2018-09-25 NOTE — PROGRESS NOTES
Hematology / Oncology Progress Note [address] Patient ID: 
Reji Alexander 66 y.o. 
1955 Admit Date: 2018 Assessment:  
 
Active Problems: Metastatic lung cancer (metastasis from lung to other site) [facility]) (2018) Anemia (2018) Chronic respiratory failure ([facility] [address] (2018) PE (physical exam), annual (2018) [facility] (2018) Plan:  
 
Track cbc, lytes Note process for disposition Cont current mx Subjective: Worried about lines coming off last noc Denies chest pain today No increase sob at rest 
 
 
Objective:  
 
Visit Vitals  /71  Pulse (!) 106  Temp 97.9 °F (36.6 °C)  Resp 20  Wt 85 kg (187 lb 4.8 oz)  SpO2 100%  Breastfeeding No  
 BMI 29.34 kg/m2 Temp (24hrs), Av.3 °F (36.8 °C), Min:97.8 °F (36.6 °C), Max:98.8 °F (37.1 °C) Intake/Output Summary (Last 24 hours) at 18 0900 Last data filed at 18 0700 Gross per 24 hour Intake              240 ml Output             1100 ml Net             -860 ml Review of Systems:  
Constitutional:  No Fever; No chills; No weight loss Skin:  No rash; No itching HEENT:  No changes in vision or hearing Cardiovascular:  No palpitations, chest pain, angina Respiratory:  No shortness of breath, no wheezing, no pleurisy, no cough, no  hemoptysis Gastrointestinal:  No nausea or vomiting; No diarrhea, no dyspepsis Genitourinary:  No dysuria; No increase in urinary frequency Musculoskeletal:  No weakness or muscle pains Endo:  No polyuria; no symptoms of thyroid dysfunction Heme:  No bruising; No bleeding, no adenopathy Neurological:  No Seizures; No focal weakness or sensory changes Psychiatric:  No mood changes; no suicidal ideation Physical Exam: 
Vss, nad Anxious Pale anicteric 
rrr 
Lungs clear left, dec right 
abd soft, non tend Tr edema Labs: 
Basic Metabolic Profile Recent Labs  
   09/25/18 
 0540  09/24/18 
 0550  09/23/18 
 0059 NA  134*  132*  135* CO2  30  29  29 BUN  10  9  11 CBC w/Diff Recent Labs  
   09/25/18 
 0540  09/24/18 
 0550  09/23/18 
 0059 WBC  11.7  13.5*  11.6 HCT  24.6*  24.6*  25.7* Recent Labs  
   09/24/18 
 0550  09/23/18 
 0059 BANDS  5  5 Hepatic Panel Hepatic Function No results found for: ALBUMIN   
 
Coagulation Recent Labs  
   09/24/18 
 0550 INR  1.3* APTT  38.7* Current medications reviewed Casey Camargo MD  
[facility] Office [phone]

## 2018-09-25 NOTE — ROUTINE PROCESS
Dressing dry and intact to left chest.  Morphine effective for discomfort associated with pleural drain. No c/o SOB or resp distress noted.

## 2018-09-26 NOTE — LETTER
[facility] [place] 
THE [facility] EMERGENCY DEPT 
[address] [address] 
[phone] Work/School Note Date: 9/26/2018 To Whom It May concern: 
 
Luciana Valencia was seen and treated today in the emergency room by the following provider(s): 
Attending Provider: Chinmay Ayala MD. Troy Sabi attended patient to the ED. He may return to work on 9/28/18. Sincerely, 
 
 
 
 
 
 
Nels Runner.  Jose Acevedo MD

## 2018-09-27 NOTE — ED PROVIDER NOTES
EMERGENCY DEPARTMENT HISTORY AND PHYSICAL EXAM 
 
Date: 9/26/2018 Patient Name: Tammy Quiñonez History of Presenting Illness Chief Complaint Patient presents with  Shortness of Breath History Provided By: Patient and son Chief Complaint: SOB Duration: 8 days Timing:  Worsening Location: Lungs Associated Symptoms: denies any other associated signs or symptoms Additional History (Context):  
1:02 AM 
Tammy Quioñnez is a 66 y.o. female with PMHx of stage 4 lung CA with metastasis to her liver and bones, DVT, and PE who presents to the emergency department C/O SOB, onset 8 days ago. Notes no other associated sxs. Pt admitted 8 days ago at [facility] with cachexia and was discharged 2 days ago. Pt has a Pleurx catheter to her left chest and has thoracentesis at home. Pt was supposed to have thoracentesis today but son states that home health nurse did not come and was instructed to come to the ER to have fluid drained. Pt was last drained 3 days ago. Pt takes Oxycodone every 12 hours and Percocet PRN (last dose 6.5 hours ago). Pt is a former tobacco user. Pt denies any other sxs or complaints. PCP: Hira Alex, DO 
 
Current Outpatient Prescriptions Medication Sig Dispense Refill  dronabinol (MARINOL) 2.5 mg capsule Take 1 Cap by mouth two (2) times a day. Max Daily Amount: 5 mg. 60 Cap 0  
 polyethylene glycol (MIRALAX) 17 gram packet Take 1 Packet by mouth daily. 30 Packet 2  
 amoxicillin-clavulanate (AUGMENTIN) 500-125 mg per tablet Take 1 Tab by mouth every twelve (12) hours for 5 days. 10 Tab 0  
 lactobacillus sp. 50 billion cpu (BIO-K PLUS) 50 billion cell -375 mg cap capsule Take 1 Cap by mouth daily for 7 days. 7 Cap 0  
 senna-docusate (PERICOLACE) 8.6-50 mg per tablet Take 2 Tabs by mouth daily. Take with a full glass of water. HOLD for loose stool.  30 Tab 0  
 bisacodyl (DULCOLAX, BISACODYL,) 10 mg suppository Insert 10 mg into rectum daily as needed. 20 Each 0  
 oxyCODONE ER (OXYCONTIN) 10 mg ER tablet Take 1 Tab by mouth every twelve (12) hours. Max Daily Amount: 20 mg. 30 Tab 0  
 oxyCODONE-acetaminophen (PERCOCET) 5-325 mg per tablet Take 1 Tab by mouth every six (6) hours as needed. Max Daily Amount: 4 Tabs. 25 Tab 0  
 naloxone (NARCAN) 4 mg/actuation nasal spray Use 1 spray intranasally, then discard. Repeat with new spray every 2 min as needed for opioid overdose symptoms, alternating nostrils. 2 Each 1  
 levothyroxine (SYNTHROID) 75 mcg tablet Take 1 Tab by mouth Daily (before breakfast). 30 Tab 0  
 metoprolol tartrate (LOPRESSOR) 25 mg tablet Take 0.5 Tabs by mouth two (2) times a day. (Patient taking differently: Take 25 mg by mouth two (2) times a day.) 60 Tab 0 Past History Past Medical History: 
Past Medical History:  
Diagnosis Date  Acute deep vein thrombosis (DVT) (HCC)  [facility] ([facility]  Cancer ([facility] lung cancer  HTN (hypertension) 9/16/2018  PAD (peripheral artery disease) ([facility]  Pericardial effusion Past Surgical History: 
Past Surgical History:  
Procedure Laterality Date  VASCULAR SURGERY PROCEDURE UNLIST Right   
 opened up vessels Family History: 
History reviewed. No pertinent family history. Social History: 
Social History Substance Use Topics  Smoking status: Former Smoker  Smokeless tobacco: Never Used  Alcohol use Yes Comment: occasionally Allergies: 
No Known Allergies Review of Systems Review of Systems Constitutional: Negative for chills, diaphoresis, fever and unexpected weight change. HENT: Negative for congestion, drooling, ear pain, rhinorrhea, sore throat, tinnitus and trouble swallowing. Eyes: Negative for photophobia, pain, redness and visual disturbance. Respiratory: Negative for cough, choking, chest tightness, shortness of breath, wheezing and stridor. Cardiovascular: Negative for chest pain, palpitations and leg swelling. Gastrointestinal: Negative for abdominal distention, abdominal pain, anal bleeding, blood in stool, constipation, diarrhea, nausea and vomiting. Genitourinary: Negative for difficulty urinating, dysuria, flank pain, frequency, hematuria and urgency. Musculoskeletal: Negative for arthralgias, back pain and neck pain. Skin: Negative for color change, rash and wound. Neurological: Negative for dizziness, seizures, syncope, speech difficulty, light-headedness and headaches. Hematological: Does not bruise/bleed easily. Psychiatric/Behavioral: Negative for agitation, behavioral problems, hallucinations, self-injury and suicidal ideas. The patient is not hyperactive. Physical Exam  
 
Vitals:  
 09/27/18 0430 09/27/18 0500 09/27/18 0530 09/27/18 9471 BP: 142/85 120/70 146/75 Pulse: (!) 108 (!) 114 (!) 122 (!) 121 Resp: 21 24 28 25 Temp:      
SpO2:  100%  99% Weight:      
Height:      
 
Physical Exam  
Constitutional: She is oriented to person, place, and time. No distress. She is not intubated. Cachectic and chronically ill appearing HENT:  
Head: Normocephalic and atraumatic. Right Ear: External ear normal.  
Left Ear: External ear normal.  
Mouth/Throat: Oropharynx is clear and moist. No oropharyngeal exudate. Alopecia Dry mucus membranes Eyes: Conjunctivae and EOM are normal. Pupils are equal, round, and reactive to light. No scleral icterus. positive pallor Neck: Normal range of motion. Neck supple. No JVD present. No tracheal deviation present. No thyromegaly present. Cardiovascular: Regular rhythm, S1 normal and S2 normal.  Tachycardia present. PMI is not displaced. Murmur heard. Systolic murmur is present with a grade of 2/6 Pulmonary/Chest: Effort normal. No stridor. No apnea, no tachypnea and no bradypnea. She is not intubated. No respiratory distress.  She has rales in the right lower field. Abdominal: Soft. Bowel sounds are normal. She exhibits no distension. There is no tenderness. There is no rebound and no guarding. Musculoskeletal: Normal range of motion. She exhibits no edema or tenderness. No soft tissue injuries Lymphadenopathy:  
Large Virchow's node Neurological: She is alert and oriented to person, place, and time. She has normal reflexes. No cranial nerve deficit. Coordination normal.  
Skin: Skin is warm and dry. No rash noted. She is not diaphoretic. No cyanosis or erythema. There is pallor. Nails show no clubbing. Poor skin turgor Psychiatric: Judgment and thought content normal. Her mood appears anxious. Her speech is not rapid and/or pressured and not slurred. She is slowed. She is not agitated. Nursing note and vitals reviewed. Diagnostic Study Results Labs - No results found for this or any previous visit (from the past 12 hour(s)). Radiologic Studies -   
XR CHEST PORT    (Results Pending) CT Results  (Last 48 hours) None CXR Results  (Last 48 hours) 09/25/18 1045  XR CHEST PORT Final result Impression:  Impression:  Interval placement of a left pleural drain which appears  
well-positioned. Moderate reduction in left pleural effusion. No pneumothorax. Narrative:  Description:  Portable semierect chest radiograph, single view Clinical Indication:  Check left pleural effusion after Pleurx catheter  
placement Comparison: September 23, 2018. Findings:  Interval placement of a pleural joint and at the left lung base. Moderate reduction in left pleural effusion. No pneumothorax is seen. Persistent  
opacity in the right upper lobe. Mild cardiomegaly, stable. No subdiaphragmatic  
free air. Medical Decision Making I am the first provider for this patient.  
 
I reviewed the vital signs, available nursing notes, past medical history, past surgical history, family history and social history. Vital Signs-Reviewed the patient's vital signs. Pulse Oximetry Analysis - 99% on RA Cardiac Monitor: 
Rate: 102 bpm 
Rhythm: Sinus tachycardia Records Reviewed: Nursing Notes Provider Notes (Medical Decision Making):  
Ddx: anxious elderly female with known mets and non functional Pleurx cath, unable to drain despite multiple efforts, discharged to see her surgeon Procedures: 
Procedures MEDICATIONS GIVEN: 
Medications  
morphine injection 4 mg (4 mg IntraVENous Given 9/27/18 0307) [facility] injection 4 mg (4 mg IntraVENous Given 9/27/18 0307) ED Course:  
1:02 AM  
Initial assessment performed. The patients presenting problems have been discussed, and they are in agreement with the care plan formulated and outlined with them. I have encouraged them to ask questions as they arise throughout their visit. 
 
5:07 AM Discussed patient's history, exam, and available diagnostics results with Constance Beltran MD, cardiothoracic surgery, who states she is appropriate for outpatient follow up, will reconnect machinery, replace dressing, and will follow up today. Diagnosis and Disposition DISCHARGE NOTE: 
5:19 AM 
Melinda Mims's  results have been reviewed with her. She has been counseled regarding her diagnosis, treatment, and plan. She verbally conveys understanding and agreement of the signs, symptoms, diagnosis, treatment and prognosis and additionally agrees to follow up as discussed. She also agrees with the care-plan and conveys that all of her questions have been answered. I have also provided discharge instructions for her that include: educational information regarding their diagnosis and treatment, and list of reasons why they would want to return to the ED prior to their follow-up appointment, should her condition change. She has been provided with education for proper emergency department utilization. CLINICAL IMPRESSION: 
 
1. Shortness of breath 2. Pleural effusion 3. Primary malignant neoplasm of right lung metastatic to other site [facility]) PLAN: 
1. D/C Home 2. Discharge Medication List as of 9/27/2018  5:19 AM  
  
CONTINUE these medications which have NOT CHANGED Details  
dronabinol (MARINOL) 2.5 mg capsule Take 1 Cap by mouth two (2) times a day. Max Daily Amount: 5 mg., Print, Disp-60 Cap, R-0  
  
polyethylene glycol (MIRALAX) 17 gram packet Take 1 Packet by mouth daily. , Print, Disp-30 Packet, R-2  
  
amoxicillin-clavulanate (AUGMENTIN) 500-125 mg per tablet Take 1 Tab by mouth every twelve (12) hours for 5 days. , Print, Disp-10 Tab, R-0  
  
lactobacillus sp. 50 billion cpu (BIO-K PLUS) 50 billion cell -375 mg cap capsule Take 1 Cap by mouth daily for 7 days. , Print, Disp-7 Cap, R-0  
  
senna-docusate (PERICOLACE) 8.6-50 mg per tablet Take 2 Tabs by mouth daily. Take with a full glass of water. HOLD for loose stool. , Print, Disp-30 Tab, R-0  
  
bisacodyl (DULCOLAX, BISACODYL,) 10 mg suppository Insert 10 mg into rectum daily as needed. , Print, Disp-20 Each, R-0  
  
oxyCODONE ER (OXYCONTIN) 10 mg ER tablet Take 1 Tab by mouth every twelve (12) hours. Max Daily Amount: 20 mg., Print, Disp-30 Tab, R-0  
  
oxyCODONE-acetaminophen (PERCOCET) 5-325 mg per tablet Take 1 Tab by mouth every six (6) hours as needed. Max Daily Amount: 4 Tabs., Print, Disp-25 Tab, R-0  
  
naloxone (NARCAN) 4 mg/actuation nasal spray Use 1 spray intranasally, then discard. Repeat with new spray every 2 min as needed for opioid overdose symptoms, alternating nostrils. , Print, Disp-2 Each, R-1  
  
levothyroxine (SYNTHROID) 75 mcg tablet Take 1 Tab by mouth Daily (before breakfast). , Print, Disp-30 Tab, R-0  
  
metoprolol tartrate (LOPRESSOR) 25 mg tablet Take 0.5 Tabs by mouth two (2) times a day., Print, Disp-60 Tab, R-0  
  
  
 
3. Follow-up Information Follow up With Details Comments Contact Info Constance Beltran MD Schedule an appointment as soon as possible for a visit today For follow up with cardiothoracic surgeon [facility] [address] [facility], [facility] [address] 
[phone] [facility] EMERGENCY DEPT  As needed, If symptoms worsen [address] [place] [address] 
[phone]  
  
 
_______________________________ Attestations: This note is prepared by Khari Carias, acting as Scribe for Rony. Marry Artis MD. Rony. Marry Artis MD:  The scribe's documentation has been prepared under my direction and personally reviewed by me in its entirety. I confirm that the note above accurately reflects all work, treatment, procedures, and medical decision making performed by me. 
_______________________________

## 2018-09-27 NOTE — ED PROVIDER NOTES
EMERGENCY DEPARTMENT HISTORY AND PHYSICAL EXAM 
 
9:11 AM 
 
 
Date: 9/27/2018 Patient Name: Carley Goddard History of Presenting Illness Chief Complaint Patient presents with  Shortness of Breath History Provided By: Patient and Patient's Son Chief Complaint: SOB Duration:  Yesterday Timing:  Worsening Location: N/A Quality: N/A Severity: Moderate Modifying Factors: none reported Associated Symptoms: denies any other associated signs or symptoms 9:19 AM Carley Goddard is a 66 y.o. female with h/o PE, DVT and stage 4 lung cancer, with recent hospital admission for large left malignant effusion s/p placement of pleurex catheter with plan to have home nurse drain catheter regularly, who presents to ED complaining of worsening SOB onset yesterday. When the home nurse came out, the catheter was not drained. Last night, they visited [facility] to have pt's catheter drained out, however they were unable to do so. Patient states they were sent here for further care, stating they were told to see Dr. Ramos Mosqueda. Denies any further complaints or symptoms at the moment. PCP: Oralee Patience, DO 
 
 
 
Current Outpatient Prescriptions Medication Sig Dispense Refill  dronabinol (MARINOL) 2.5 mg capsule Take 1 Cap by mouth two (2) times a day. Max Daily Amount: 5 mg. 60 Cap 0  
 polyethylene glycol (MIRALAX) 17 gram packet Take 1 Packet by mouth daily. 30 Packet 2  
 amoxicillin-clavulanate (AUGMENTIN) 500-125 mg per tablet Take 1 Tab by mouth every twelve (12) hours for 5 days. 10 Tab 0  
 lactobacillus sp. 50 billion cpu (BIO-K PLUS) 50 billion cell -375 mg cap capsule Take 1 Cap by mouth daily for 7 days. 7 Cap 0  
 senna-docusate (PERICOLACE) 8.6-50 mg per tablet Take 2 Tabs by mouth daily. Take with a full glass of water. HOLD for loose stool. 30 Tab 0  
 bisacodyl (DULCOLAX, BISACODYL,) 10 mg suppository Insert 10 mg into rectum daily as needed.  20 Each 0  
  oxyCODONE ER (OXYCONTIN) 10 mg ER tablet Take 1 Tab by mouth every twelve (12) hours. Max Daily Amount: 20 mg. 30 Tab 0  
 oxyCODONE-acetaminophen (PERCOCET) 5-325 mg per tablet Take 1 Tab by mouth every six (6) hours as needed. Max Daily Amount: 4 Tabs. 25 Tab 0  
 naloxone (NARCAN) 4 mg/actuation nasal spray Use 1 spray intranasally, then discard. Repeat with new spray every 2 min as needed for opioid overdose symptoms, alternating nostrils. 2 Each 1  
 levothyroxine (SYNTHROID) 75 mcg tablet Take 1 Tab by mouth Daily (before breakfast). 30 Tab 0  
 metoprolol tartrate (LOPRESSOR) 25 mg tablet Take 0.5 Tabs by mouth two (2) times a day. (Patient taking differently: Take 25 mg by mouth two (2) times a day.) 60 Tab 0 Past History Past Medical History: 
Past Medical History:  
Diagnosis Date  Acute deep vein thrombosis (DVT) (HCC)  Bone cancer ([facility] [address]  Cancer ([facility] [address] lung cancer  HTN (hypertension) 9/16/2018  PAD (peripheral artery disease) ([facility]  Pericardial effusion Past Surgical History: 
Past Surgical History:  
Procedure Laterality Date  VASCULAR SURGERY PROCEDURE UNLIST Right   
 opened up vessels Family History: No family history on file. Social History: 
Social History Substance Use Topics  Smoking status: Former Smoker  Smokeless tobacco: Never Used  Alcohol use Yes Comment: occasionally Allergies: 
No Known Allergies Review of Systems Review of Systems Constitutional: Negative for activity change and appetite change. HENT: Negative for congestion. Eyes: Negative for visual disturbance. Respiratory: Positive for shortness of breath. Negative for cough. Cardiovascular: Negative for chest pain. Gastrointestinal: Negative for abdominal pain, diarrhea, nausea and vomiting. Genitourinary: Negative for dysuria. Musculoskeletal: Negative for arthralgias and myalgias. Skin: Negative for rash. Neurological: Negative for weakness and numbness. Physical Exam  
 
Visit Vitals  /54  Pulse 93  Temp 97.8 °F (36.6 °C)  Resp 17  SpO2 99% Physical Exam  
Constitutional: She is oriented to person, place, and time. She appears well-developed and well-nourished. HENT:  
Head: Normocephalic and atraumatic. Mouth/Throat: Oropharynx is clear and moist.  
Eyes: Conjunctivae are normal.  
Neck: Normal range of motion. Neck supple. No JVD present. Cardiovascular: Regular rhythm, normal heart sounds and intact distal pulses. Tachycardia present. No murmur heard. Pulmonary/Chest: Effort normal. She has decreased breath sounds in the left lower field. She has rales in the left middle field. Abdominal: Soft. Bowel sounds are normal. She exhibits no distension. There is no tenderness. Musculoskeletal: Normal range of motion. She exhibits no deformity. Lymphadenopathy:  
  She has no cervical adenopathy. Neurological: She is alert and oriented to person, place, and time. Coordination normal.  
Skin: Skin is warm and dry. No rash noted. Psychiatric: She has a normal mood and affect. Nursing note and vitals reviewed. Diagnostic Study Results Labs - No results found for this or any previous visit (from the past 12 hour(s)). Radiologic Studies - No orders to display Medical Decision Making I am the first provider for this patient. I reviewed the vital signs, available nursing notes, past medical history, past surgical history, family history and social history. Vital Signs-Reviewed the patient's vital signs. Pulse Oximetry Analysis -  100% on room air, stable Records Reviewed: Nursing Notes (Time of Review: 9:11 AM) ED Course: Progress Notes, Reevaluation, and Consults: 
 
9:39 AM 
Spoke with Dr. Peter Wesley, he will send PA down to check on patient's catheter.   
 
2:19 PM 
 Thoracic came and flushed and drained the cather, were able to remove 500 cc of pleural fluid. Patient was given her home medications. Her symptoms have now resolved, her tachycardia resolved to baseline. Was unable to make appointment with pulmonology, so will refer patient to Dr. Nacho Echeverria for f/u. The patient will be discharged home. Findings were discussed at length and questions were answered. Information on all newly prescribed medications was given. the patient was instructed to follow-up with pulmonology, or return to the Emergency Department with any worsened symptoms or concerns. Return precautions were given. Provider Notes (Medical Decision Making): Tracy Trent is a 66 y.o. female with h/o PE, DVT and stage 4 lung cancer, with recent hospital admission for large left malignant effusion s/p placement of pleurex catheter with plan to have home nurse drain catheter regularly, who presents to ED complaining of worsening SOB onset yesterday after home nurse did not drain the catheter when they came yesterday. Unable to drain catheter at [facility], so sent here after briefly discussing the case over the phone with Dr. Noah Tello. The patient has diminished breath sounds over the left lung base. Pleurex in place on left chest. Tachycardic, tachypneic, O2 saturation stable on NC. I discussed the case with Dr. Noah Tello briefly over the phone. He will send his PA to come look at and try to trouble-shoot the catheter, as it is likely clogged (CXR from this AM shows appropriate position of the catheter). Differential Diagnosis: Likely clog in pleurex catheter, allowing worsened pleural effusion, based on CXR and labs completed at [facility] this morning. If fluid adequately drained and patient returns to baseline, will be stable for DC. If no change, will need to evaluate for more significant anemia, pneumonia, worsened PE alternatively. Testing: none at this time, completed this AM at [facility] Treatments: pending eval by CT surgery Diagnosis Clinical Impression: 1. Pleural effusion Disposition: Discharged Follow-up Information Follow up With Details Comments Contact Info Jaxson Andujar DO Schedule an appointment as soon as possible for a visit  [address] [place] [address] [address] [address] 
[phone] [facility] EMERGENCY DEPT  If symptoms worsen [address] [place] [address] [facility] [address] Patient's Medications Start Taking No medications on file Continue Taking AMOXICILLIN-CLAVULANATE (AUGMENTIN) 500-125 MG PER TABLET    Take 1 Tab by mouth every twelve (12) hours for 5 days. BISACODYL (DULCOLAX, BISACODYL,) 10 MG SUPPOSITORY    Insert 10 mg into rectum daily as needed. DRONABINOL (MARINOL) 2.5 MG CAPSULE    Take 1 Cap by mouth two (2) times a day. Max Daily Amount: 5 mg. LACTOBACILLUS SP. 50 BILLION CPU (BIO-K PLUS) 50 BILLION CELL -375 MG CAP CAPSULE    Take 1 Cap by mouth daily for 7 days. LEVOTHYROXINE (SYNTHROID) 75 MCG TABLET    Take 1 Tab by mouth Daily (before breakfast). METOPROLOL TARTRATE (LOPRESSOR) 25 MG TABLET    Take 0.5 Tabs by mouth two (2) times a day. NALOXONE (NARCAN) 4 MG/ACTUATION NASAL SPRAY    Use 1 spray intranasally, then discard. Repeat with new spray every 2 min as needed for opioid overdose symptoms, alternating nostrils. OXYCODONE ER (OXYCONTIN) 10 MG ER TABLET    Take 1 Tab by mouth every twelve (12) hours. Max Daily Amount: 20 mg. OXYCODONE-ACETAMINOPHEN (PERCOCET) 5-325 MG PER TABLET    Take 1 Tab by mouth every six (6) hours as needed. Max Daily Amount: 4 Tabs. POLYETHYLENE GLYCOL (MIRALAX) 17 GRAM PACKET    Take 1 Packet by mouth daily. SENNA-DOCUSATE (PERICOLACE) 8.6-50 MG PER TABLET    Take 2 Tabs by mouth daily. Take with a full glass of water. HOLD for loose stool. These Medications have changed No medications on file Stop Taking No medications on file  
 
_______________________________ Attestations: 
Scribe Attestation Jose Benavidez acting as a scribe for and in the presence of Opal Ferguson MD     
September 27, 2018 at 9:11 AM 
    
Provider Attestation:     
I personally performed the services described in the documentation, reviewed the documentation, as recorded by the scribe in my presence, and it accurately and completely records my words and actions. September 27, 2018 at 9:11 AM - Opal Ferguson MD   
_______________________________

## 2018-09-27 NOTE — ED NOTES
MD at bedside attempting to use pt's pleural vac that's located lower left side draining via 60 mL syringe, unable to make syringe connect with vac. Used sterile technique. Updated nursing supervisor.

## 2018-09-27 NOTE — ED NOTES
Discharge instructions   Given to patient and son and both verbalized understanding. Patient denies pain or shortness of breath at this time. Patient taken to ED  area via wheelchair with home oxygen machine. Assisted into son's truck with assistance.

## 2018-09-27 NOTE — PROGRESS NOTES
CARDIOTHORACIC SURGERY CONSULTATION NOTE 
 
9/27/2018 11:39 AM 
 
This patient is well-known to our service and I am seeing her now in consultation at the request of Dr. Ian Berman from the [facility] ED, with whom I have discussed the patient's history and test results. I have also reviewed her records and pertinent studies, and have obtained additional information on the patient's history from the patient's son who was present during the evaluation. Roxana Martínez is a 66 y.o. female with advanced stage right lung cancer who was recently discharged after placement of a Pleurex catheter for a left pleural effusion. She went home and the [address] [place] [address] saw her the next day but the Pleurex catheter was not accessed and drained. As she became progressively short of breath the past 24 hours, she then presented to the [facility] ED where her CXR demonstrated a moderate reaccumulation of her left pleural effusion from two days prior, and she was then sent here to address her Pleurex catheter. She has become more fatigued and short of breath since being home. Cardiac risk factors include hypertension. There is no recent history of smoking/ tobacco exposure, family history, dyslipidemia, diabetes mellitus. Past Medical History:  
Diagnosis Date  Acute deep vein thrombosis (DVT) (HCC)  Bone cancer ([facility]  Cancer ([facility] lung cancer  HTN (hypertension) 9/16/2018  PAD (peripheral artery disease) ([facility]  Pericardial effusion Past Surgical History:  
Procedure Laterality Date  VASCULAR SURGERY PROCEDURE UNLIST Right   
 opened up vessels Present medications include Current Facility-Administered Medications Medication Dose Route Frequency Provider Last Rate Last Dose  levothyroxine (SYNTHROID) tablet 75 mcg  75 mcg Oral ONCE Erwin Beltran MD      
 metoprolol tartrate (LOPRESSOR) tablet 25 mg  25 mg Oral ONCE Erwin Beltran MD      
  oxyCODONE ER (OxyCONTIN) tablet 10 mg  10 mg Oral ONCE Andrew Baltazar, MD      
 oxyCODONE-acetaminophen (PERCOCET) 5-325 mg per tablet 1 Tab  1 Tab Oral NOW Andrew Lunch, MD      
 
Current Outpatient Prescriptions Medication Sig Dispense Refill  dronabinol (MARINOL) 2.5 mg capsule Take 1 Cap by mouth two (2) times a day. Max Daily Amount: 5 mg. 60 Cap 0  
 polyethylene glycol (MIRALAX) 17 gram packet Take 1 Packet by mouth daily. 30 Packet 2  
 amoxicillin-clavulanate (AUGMENTIN) 500-125 mg per tablet Take 1 Tab by mouth every twelve (12) hours for 5 days. 10 Tab 0  
 lactobacillus sp. 50 billion cpu (BIO-K PLUS) 50 billion cell -375 mg cap capsule Take 1 Cap by mouth daily for 7 days. 7 Cap 0  
 senna-docusate (PERICOLACE) 8.6-50 mg per tablet Take 2 Tabs by mouth daily. Take with a full glass of water. HOLD for loose stool. 30 Tab 0  
 bisacodyl (DULCOLAX, BISACODYL,) 10 mg suppository Insert 10 mg into rectum daily as needed. 20 Each 0  
 oxyCODONE ER (OXYCONTIN) 10 mg ER tablet Take 1 Tab by mouth every twelve (12) hours. Max Daily Amount: 20 mg. 30 Tab 0  
 oxyCODONE-acetaminophen (PERCOCET) 5-325 mg per tablet Take 1 Tab by mouth every six (6) hours as needed. Max Daily Amount: 4 Tabs. 25 Tab 0  
 naloxone (NARCAN) 4 mg/actuation nasal spray Use 1 spray intranasally, then discard. Repeat with new spray every 2 min as needed for opioid overdose symptoms, alternating nostrils. 2 Each 1  
 levothyroxine (SYNTHROID) 75 mcg tablet Take 1 Tab by mouth Daily (before breakfast). 30 Tab 0  
 metoprolol tartrate (LOPRESSOR) 25 mg tablet Take 0.5 Tabs by mouth two (2) times a day. (Patient taking differently: Take 25 mg by mouth two (2) times a day.) 60 Tab 0 Drug allergies: No Known Allergies Family History: There is not a history of heart disease in the family. Social History: History of smoking. . 
  
REVIEW OF SYSTEMS:  
Chronic cough PHYSICAL EXAMINATION: 
Vital signs: BP Readings from Last 3 Encounters:  
18 121/64  
18 146/75  
18 116/60 Temp (24hrs), Av.1 °F (36.2 °C), Min:96.3 °F (35.7 °C), Max:97.8 °F (36.6 °C) Wt Readings from Last 3 Encounters:  
18 77.6 kg (171 lb)  
18 85 kg (187 lb 4.8 oz) 18 79.8 kg (176 lb) Ht Readings from Last 3 Encounters:  
18 5' 7\" (1.702 m) 18 5' 7\" (1.702 m) 18 5' 7\" (1.702 m) General appearance:  She appeared well-nourished and of medium build. Integument: The skin was warm and dry and had good turgor. Head and Neck: The head was normocephalic and was atraumatic. The neck was supple with good range of motion. There was a large soft immobile mass just above her left clavicle medially. EENMT: Conjunctivae were not injected. The sclerae were anicteric. Lungs: Respirations were not labored, and the lungs were clear to auscultation bilaterally, without rales, without rhonchi, and without wheezes. Heart: The rate and rhythm were regular (rate 96), without an S3, without JVD, and without a murmur. Neurologic: She was alert and oriented, and was a fair historian. Psychiatric: She was pleasant, calm, and had a normal affect. LABORATORIES:  
Lab Results Component Value Date/Time WBC 11.7 2018 05:40 AM  
 HCT 24.6 (L) 2018 05:40 AM  
  2018 05:40 AM  
  
Lab Results Component Value Date/Time  (L) 2018 05:40 AM  
 K 4.9 2018 05:40 AM  
 CL 95 (L) 2018 05:40 AM  
 CO2 30 2018 05:40 AM  
 GLU 81 2018 05:40 AM  
 BUN 10 2018 05:40 AM  
 CREA 0.56 (L) 2018 05:40 AM  
 CREA 0.46 (L) 2018 05:50 AM  
 CREA 0.59 (L) 2018 12:59 AM  
 
 
The chest x-ray image, which I have personally reviewed, demonstrates reaccumulation of the left pleural effusion to a moderate degree. With flushing, the Pleurex catheter began to work, producing over 100 cc of serosanguinous fluid. Impression: 
Diagnoses: 
1. Cancer of the lung, right 2. Pleural effusion, left, recurrent 3. Essential hypertension The Pleurex catheter should be continually connected to drainage. Recommendations:  As above. I gave her my contact info and asked her to call and se me directly should she have any concerns about the Pleurex catheter. Thank you for involving me in her care. I spent over 30 minutes seeing and examining the patient and reviewing her pertinent studies and speaking with her and her family, and also with her nurses and physicians.  
 
 
Cameron Humphrey MD

## 2018-09-27 NOTE — DISCHARGE INSTRUCTIONS
Lung Cancer: Care Instructions  Your Care Instructions    Lung cancer occurs when abnormal cells grow out of control in the lung. Lung cancer can start anywhere in the lungs and spread to other parts of the body. Treatment for lung cancer depends on what type of lung cancer you have and how advanced it is. Treatment may include surgery to remove the cancer. It could also include medicines (chemotherapy) or radiation to destroy cancer cells. Being treated for cancer can weaken your body, and you may feel very tired. Home treatment and certain medicines can help you feel better. Finding out that you have cancer is scary. You may feel many emotions and may need some help coping. Seek out family, friends, and counselors for support. You also can do things at home to make yourself feel better while you go through treatment. Call the [employer] ([phone]) or visit its website at [address] for more information. Follow-up care is a key part of your treatment and safety. Be sure to make and go to all appointments, and call your doctor if you are having problems. It's also a good idea to know your test results and keep a list of the medicines you take. How can you care for yourself at home? · Take your medicines exactly as prescribed. Call your doctor if you think you are having a problem with your medicine. You will get more details on the specific medicines your doctor prescribes. · Follow your doctor's instructions to relieve pain. Use pain medicine when you first feel pain, before it becomes severe. Taking pain medicines regularly is often the best way to keep pain under control. · Eat healthy food. If you do not feel like eating, try to eat food that has protein and extra calories to keep up your strength and prevent weight loss. Drink liquid meal replacements for extra calories and protein. Try to eat your main meal early. Eating smaller portions more often may help as well.   · Get some physical activity every day, but do not get too tired. Keep doing the hobbies you enjoy as your energy allows. · Do not smoke. Smoking can make your cancer symptoms worse. If you need help quitting, talk to your doctor about stop-smoking programs and medicines. These can increase your chances of quitting for good. · If you use oxygen, do not smoke, light a cigarette, or use a flame while your oxygen is on. Smoking while using oxygen can lead to fire and even explosion. · If you have nausea, try to eat several small meals a day. When you feel better, eat clear soups and mild foods until all symptoms are gone for 12 to 48 hours. Other good choices include dry toast, crackers, cooked cereal, and gelatin dessert, such as Jell-O.  · If you are vomiting or have diarrhea:  ¨ Drink plenty of fluids (enough so that your urine is light yellow or clear like water) to prevent dehydration. Choose water and other caffeine-free clear liquids. If you have kidney, heart, or liver disease and have to limit fluids, talk with your doctor before you increase the amount of fluids you drink. ¨ When you are able to eat, try clear soups, mild foods, and liquids until all symptoms are gone for 12 to 48 hours. Other good choices include dry toast, crackers, cooked cereal, and gelatin dessert, such as Jell-O.  · Take steps to control your stress and workload. Learn relaxation techniques. ¨ Share your feelings. Stress and tension affect our emotions. By expressing your feelings to others, you may be able to understand and cope with them. ¨ Consider joining a support group. Talking about a problem with your spouse, a good friend, or other people with similar problems is a good way to reduce tension and stress. ¨ Express yourself with art. Try writing, crafts, dance, or art to relieve stress. Some dance, writing, or art groups may be available just for people who have cancer. ¨ Be kind to your body and mind.  Getting enough sleep, eating a healthy diet, and taking time to do things you enjoy can contribute to an overall feeling of balance in your life and help reduce stress. ¨ Get help if you need it. Discuss your concerns with your doctor or counselor. · If you have not already done so, prepare a list of advance directives. Advance directives are instructions to your doctor and family members about what kind of care you want if you become unable to speak or express yourself. When should you call for help? Call 911 anytime you think you may need emergency care. For example, call if:    · You passed out (lost consciousness).     · You have severe trouble breathing.     · You cough up a lot of blood.    Call your doctor now or seek immediate medical care if:    · You have a fever.     · You are short of breath.     · You have new or worse pain.     · You have a new or worse cough.     · You think you have an infection.    Watch closely for changes in your health, and be sure to contact your doctor if:    · You do not get better as expected. Where can you learn more? Go to http://denis-wyatt.info/. Enter E476 in the search box to learn more about \"Lung Cancer: Care Instructions. \"  Current as of: May 12, 2017  Content Version: 11.7  © 6017-5986 [employer]. Care instructions adapted under license by [employer] (which disclaims liability or warranty for this information). If you have questions about a medical condition or this instruction, always ask your healthcare professional. [place] [address] any warranty or liability for your use of this information. Shortness of Breath: Care Instructions  Your Care Instructions  Shortness of breath has many causes. Sometimes conditions such as anxiety can lead to shortness of breath. Some people get mild shortness of breath when they exercise.  Trouble breathing also can be a symptom of a serious problem, such as asthma, lung disease, emphysema, heart problems, and pneumonia. If your shortness of breath continues, you may need tests and treatment. Watch for any changes in your breathing and other symptoms. Follow-up care is a key part of your treatment and safety. Be sure to make and go to all appointments, and call your doctor if you are having problems. It's also a good idea to know your test results and keep a list of the medicines you take. How can you care for yourself at home? · Do not smoke or allow others to smoke around you. If you need help quitting, talk to your doctor about stop-smoking programs and medicines. These can increase your chances of quitting for good. · Get plenty of rest and sleep. · Take your medicines exactly as prescribed. Call your doctor if you think you are having a problem with your medicine. · Find healthy ways to deal with stress. ¨ Exercise daily. ¨ Get plenty of sleep. ¨ Eat regularly and well. When should you call for help? Call 911 anytime you think you may need emergency care. For example, call if:    · You have severe shortness of breath.     · You have symptoms of a heart attack. These may include:  ¨ Chest pain or pressure, or a strange feeling in the chest.  ¨ Sweating. ¨ Shortness of breath. ¨ Nausea or vomiting. ¨ Pain, pressure, or a strange feeling in the back, neck, jaw, or upper belly or in one or both shoulders or arms. ¨ Lightheadedness or sudden weakness. ¨ A fast or irregular heartbeat. After you call 911, the  may tell you to chew 1 adult-strength or 2 to 4 low-dose aspirin. Wait for an ambulance. Do not try to drive yourself.    Call your doctor now or seek immediate medical care if:    · Your shortness of breath gets worse or you start to wheeze.  Wheezing is a high-pitched sound when you breathe.     · You wake up at night out of breath or have to prop your head up on several pillows to breathe.     · You are short of breath after only light activity or while at rest.    Watch closely for changes in your health, and be sure to contact your doctor if:    · You do not get better over the next 1 to 2 days. Where can you learn more? Go to http://denis-wyatt.info/. Enter S780 in the search box to learn more about \"Shortness of Breath: Care Instructions. \"  Current as of: December 6, 2017  Content Version: 11.7  © 1290-2278 [employer]. Care instructions adapted under license by [employer] (which disclaims liability or warranty for this information). If you have questions about a medical condition or this instruction, always ask your healthcare professional. [address] any warranty or liability for your use of this information.

## 2018-09-27 NOTE — ED NOTES
Pt hourly rounding competed. Safety Pt (x) resting on stretcher with side rails up and call bell in reach. () in chair 
  () in parents arms. Toileting Pt offered ()Bedpan 
   (x)Assistance to Restroom 
   ()Urinal 
Ongoing Updates Updated on plan of care and status of test results. Pain Management Inquired as to comfort and offered comfort measures: 
  (x) warm blankets (x) dimmed lights Pt resting in stretcher at 90 degrees in NAD. Improvement in HR and RR noted. Pt remains on 2 L/min NC oxygen. No further needs expressed.  Son speaking with MD.

## 2018-09-27 NOTE — ED TRIAGE NOTES
Per son \"Pt having shortness of breath and has fluid in her lungs that were supposed to be drained. She needs to have her fluid drained off her lungs. \" Stage 4 lung cancer spread to her bones.

## 2018-09-27 NOTE — DISCHARGE INSTRUCTIONS
Indwelling Catheter Drainage for Chest or Abdomen: Care Instructions  Your Care Instructions    An indwelling chest or belly (abdominal) catheter is a soft, flexible tube that runs under your skin to an area next to your lungs or in your belly. One end of the tube stays outside your body. When fluid builds up around your lung (pleural effusion), it can cause discomfort and make it hard for you to breathe. Other symptoms can include fever or cough. When fluid collects in the space between the organs in your belly and the belly wall (ascites), you may feel bloated or overly full. Other symptoms of ascites (say \"uh-SY-teez\") include belly pain and weight gain. Draining the fluid through a tube helps relieve these symptoms. \"Indwelling\" means the tube stays in place for as long as you need it. You don't have to get a new catheter put in every time fluid builds up. You can drain the fluid at home-or have someone else do it-whenever you need to. The doctor may use stitches to hold the catheter in place where it exits your body. The site may be sore for a day or two. Follow-up care is a key part of your treatment and safety. Be sure to make and go to all appointments, and call your doctor if you are having problems. It's also a good idea to know your test results and keep a list of the medicines you take. How can you care for yourself at home? · Follow the instructions for taking care of your catheter. Your doctor or nurse will teach you or your caregivers what to do. How to drain fluid  1. Wash your hands well. Clean your work area with a disinfecting wipe or alcohol. Place everything you will need on your work area, including the drain tube and the bag or bottle that will collect the fluid. 2. Wear disposable gloves if they are part of your kit or if your doctor told you to.  3. Make sure the drainage tube is closed. It may have a clip on it. 4. Remove the cap at the end of your catheter.  Wipe the end of the catheter and the end of the drain tube with alcohol. 5. Attach the catheter to the drain tube. 6. Make sure the bottle or bag is at least an arm's length below your chest or belly. If your system is vacuum-sealed or uses a pump, you may not need to lower the container. 7. Depending on what type of drainage kit you have, push the plunger or squeeze the pump to start the drainage. Some kits include a clamp you need to release before the drainage starts. 8. Your doctor will tell you how much fluid to drain at one time. In general, don't drain more than 1,000 milliliters (mL) from your chest or more than 2,000 ml from your belly in a 24-hour period. 9. If you feel pain during drainage, you may be able to ease the pain by slowing down the drainage rate. Do this by raising the bag or bottle. Some drainage kits include a clamp you can pinch to slow the drainage. How to empty the drainage bag or bottle  1. Disconnect the drain tube from your catheter. Let all the fluid in the drain tube flow into the bag or bottle. 2. Wipe the end of your catheter with alcohol. Put a new valve cap on the catheter, and make sure it's tight. 3. Tape the end of the catheter to your skin wherever it's most comfortable. 4. Empty the bag or bottle into the toilet or sink. 5. If you spill some fluid on clothes or skin, clean it up with soap and warm water. You may want to use bleach for spills on some surfaces. 6. Keep a drainage record sheet so that your doctor can see how much fluid is coming from the tube. Your doctor or nurse will give you a record sheet. Or you can just write down the date, time, amount, and color of the drainage. Save the record sheet to show to your doctor. How to change the bandage (dressing)  1. Follow your doctor's instructions for how often to change the bandage. 2. Carefully remove the bandage. 3. Check your skin for signs of infection.   4. Clean the skin with an alcohol pad.  5. Open a new dressing kit.  6. Put the first layer of gauze or foam on the skin, under the catheter. Put the second layer on top of that. Put the clear sticky bandage over everything. When should you call for help? Call 911 anytime you think you may need emergency care. For example, call if:    · You passed out (lost consciousness).     · You have severe trouble breathing.    Call your doctor now or seek immediate medical care if:    · You have new or worse trouble breathing.     · You have symptoms of infection, such as:  ¨ Increased pain, swelling, warmth, or redness. ¨ Red streaks leading from the area. ¨ Pus draining from the area. ¨ A fever.     · You have new or worse pain.     · The fluid looks different from what you normally see.     · The tube comes out of your body.    Watch closely for changes in your health, and be sure to contact your doctor if:    · There is little or no fluid draining from the catheter.     · You do not get better as expected. Where can you learn more? Go to http://denis-wyatt.info/. Enter Q566 in the search box to learn more about \"Indwelling Catheter Drainage for Chest or Abdomen: Care Instructions. \"  Current as of: December 6, 2017  Content Version: 11.7  © 0245-8908 [employer]. Care instructions adapted under license by [employer] (which disclaims liability or warranty for this information). If you have questions about a medical condition or this instruction, always ask your healthcare professional. [place] [address] any warranty or liability for your use of this information.

## 2018-09-27 NOTE — ED NOTES
Discharge instructions reviewed with opportunity for questions provided. Pt vocalized understanding. Provided support for family and pt. Pt instructed to go straight to specialist office at 8 am. Armband removed and shredded. Pt stable condition at time of discharge.

## 2018-09-27 NOTE — PROGRESS NOTES
[facility] Asked to see patient in ED to assess patient we followed last admission for pericardial effusion. Apparently the patient had left a chest pleurX (?ordered by Dr. Misbah Vargas)  placed last admission and was discharged home 9/25/18 with supplies and orders to drain at home. [facility] had seen patient on 9/26/18 but did not drain PleurX even though there was an order and the patient was becoming dyspneic. The patient's son brought the patient to THE [facility] ED then [facility] ED. CXR shows enlarging left pleural effusion. Patient seen in room 9 in the [facility]  ED. Left chest pleurX site clean and dry. Catheter accessed and slow drainage noted. PleurX then forward flushed with sterile saline without difficulty then placed back on vacuum suction. 500 ml very light serosanguinous fluid drained over 25 minutes. Follow up with Dr. Misbah Vargas ordered to manage PleurX and multiple calls and orders placed to home health to help ensure QOD PleurX drainages. No follow up with our service needed.   Discussed with ED MD.

## 2018-09-27 NOTE — ED TRIAGE NOTES
Patient was visiting someone here in the hospital and ran out of oxygen in her tank. Patient is on 3LNC at home. HX: stage 4 lung cancer. Pt has chest tube to left lower lung area. Patient was evaluated and released from [facility] this morning.

## 2018-10-01 NOTE — TELEPHONE ENCOUNTER
Per Pina Silva at [facility], she needs to speak to nurse about pt's pleurex drainage that she received inpt. She states she called pt's pcp and was told to call our office. Please call Yumi at [phone].

## 2018-10-01 NOTE — TELEPHONE ENCOUNTER
Ray Chaudhry nurse Hortensia Salon calling with questions re: PleurX orders, cleaning and plushing. Referred nurse to note 9/27/18 from [facility] indicating multiple calls and orders placed to [place]. She will review orders and if any further questions she will call the ordering doctor.

## 2018-10-01 NOTE — TELEPHONE ENCOUNTER
Kimberly Trinh from [facility] called ([phone]) to report pleurx catheter drainage on pt. Per KONSTANTIN Borges, they are to call pulmonary for this. [address] the phone # to Pulm.

## 2018-10-02 PROBLEM — R06.00 DYSPNEA: Status: ACTIVE | Noted: 2018-01-01

## 2018-10-02 NOTE — ED NOTES
Family at bedside. Son, [place], at bedside Al[place], case management, recommends palliative care, at this time.

## 2018-10-02 NOTE — H&P
History and Physical 
 
 
NAME:  Milind :   1955 MRN:   [id number] Date/Time:  10/2/2018 CHIEF COMPLAINT: SOB HISTORY OF PRESENT ILLNESS:    
Ms. Kevon Stokes is a 66 y.o.   female with a PMH of metastatic Lung cancer, malignant pleural effusion s/p pleur-x catheter placement, hemorrhagic pericardial effusion s/p pericardiocentesis, PE, DVT s/p IVC filter placement, HTN and PAD who presents with c/c of shortness of breathing. Patient initially went to [facility] with worsening of shortness of breathing. She has pleur-x catheter placement on 18. Home health was managing and her son also is taking care of her at home. The catheter is not draining anything for the past few days. She was getting more short of breath. Pulmonary has seen her over [facility] and recommend to transfer [facility] where her cardiothoracic surgery is. She was on bipap in their ED but here she is on O2 by NS and O2 sat is at 100%. She is confused. No fever or chills reported. She had CTA on 18 that showed Left main pulmonary artery pulmonary embolism extending into the left lobar arteries. she is not on anticoagulation due to the hemorrhagic pericardial effusion. She was recently d/lauryn on 18. During this time she was suspected to have postobstructive pneumonia and discharged with Augmentin. Family is thinking about hospice but they want to keep her full code for now. Past Medical History:  
Diagnosis Date  Acute deep vein thrombosis (DVT) (HCC)  Bone cancer ([facility]  Cancer ([facility] [address] lung cancer  HTN (hypertension) 2018  PAD (peripheral artery disease) ([facility]  Pericardial effusion Past Surgical History:  
Procedure Laterality Date  VASCULAR SURGERY PROCEDURE UNLIST Right   
 opened up vessels Social History Substance Use Topics  Smoking status: Former Smoker  Smokeless tobacco: Never Used  Alcohol use Yes Comment: occasionally History reviewed. No pertinent family history. No Known Allergies Prior to Admission medications Medication Sig Start Date End Date Taking? Authorizing Provider OXYGEN-AIR DELIVERY SYSTEMS 3 L/min by Nasal route continuous. Margret Major MD  
dronabinol (MARINOL) 2.5 mg capsule Take 1 Cap by mouth two (2) times a day. Max Daily Amount: 5 mg. 9/25/18   Rasheeda Haque MD  
polyethylene glycol (MIRALAX) 17 gram packet Take 1 Packet by mouth daily. 9/25/18   Rasheeda Haque MD  
lactobacillus sp. 50 billion cpu (BIO-K PLUS) 50 billion cell -375 mg cap capsule Take 1 Cap by mouth daily for 7 days. 9/26/18 10/3/18  Rasheeda Haque MD  
senna-docusate (PERICOLACE) 8.6-50 mg per tablet Take 2 Tabs by mouth daily. Take with a full glass of water. HOLD for loose stool. 9/25/18   Rasheeda Haque MD  
bisacodyl (DULCOLAX, BISACODYL,) 10 mg suppository Insert 10 mg into rectum daily as needed. 9/25/18   Rasheeda Haque MD  
oxyCODONE ER (OXYCONTIN) 10 mg ER tablet Take 1 Tab by mouth every twelve (12) hours. Max Daily Amount: 20 mg. 9/25/18   Rasheeda Haque MD  
oxyCODONE-acetaminophen (PERCOCET) 5-325 mg per tablet Take 1 Tab by mouth every six (6) hours as needed. Max Daily Amount: 4 Tabs. 9/25/18   Rasheeda Haque MD  
naloxone [facility]) 4 mg/actuation nasal spray Use 1 spray intranasally, then discard. Repeat with new spray every 2 min as needed for opioid overdose symptoms, alternating nostrils. 9/25/18   Rasheeda Haque MD  
levothyroxine (SYNTHROID) 75 mcg tablet Take 1 Tab by mouth Daily (before breakfast). 8/29/18   Margret Major MD  
metoprolol tartrate (LOPRESSOR) 25 mg tablet Take 0.5 Tabs by mouth two (2) times a day. Patient taking differently: Take 25 mg by mouth two (2) times a day. 8/28/18   Margret Major MD  
 
 
REVIEW OF SYSTEMS:  
 
CONSTITUTIONAL: No Fever, No chills, No weight loss, No Night sweats HEENT:  No epistaxis, No diff in swallowing CVS: No chest pain, No palpitations, No syncope, No peripheral edema, No PND, No orthopnea RS: shortness of breath, No cough, No hemoptysis, No pleuritic chest pain GI: No abd pain, No vomitting, No diarrhea, No hematemesis, No rectal bleeding, No acid reflux or heartburn NEURO: No focal weakness, No headaches, No seizures PSYCH: No anxiety, No depression MUSCULOSKLETAL: No joint pain or swelling : No hematuria or dysuria SKIN: No rash Physical Exam: VITALS:   
Vital signs reviewed; most recent are: 
 
Visit Vitals  /79  Pulse (!) 118  Resp 30  SpO2 100% SpO2 Readings from Last 6 Encounters:  
10/02/18 100% 10/02/18 99% 10/01/18 97% 09/28/18 93% 09/27/18 99%  
09/27/18 99% No intake or output data in the 24 hours ending 10/02/18 1838 GENERAL: respiratory distress distress, use of accessory muscle HEENT: pink conjunctiva, un icteric sclera, NECK: No lymphadenopthy or thyroid swelling, JVD not seen LYMPH: No supraclavicular or cervical or axillary nodes on both sides CVS: S1S2, No murmurs, No gallop or rub RS: decreased breathing sounds bilaterally Abd: Soft, non tender, not distended, No guarding, No rigidity NEURO:  No focal neurologic deficits Extrm: no leg edema or swelling Skin: No rash Labs: 
Recent Results (from the past 24 hour(s)) EKG, 12 LEAD, INITIAL Collection Time: 10/02/18  8:42 AM  
Result Value Ref Range Ventricular Rate 153 BPM  
 Atrial Rate 153 BPM  
 P-R Interval 126 ms  
 QRS Duration 68 ms Q-T Interval 132 ms QTC Calculation (Bezet) 210 ms Calculated P Axis 79 degrees Calculated R Axis 41 degrees Calculated T Axis 172 degrees Diagnosis Sinus tachycardia with premature supraventricular complexes Anterior infarct , age undetermined Abnormal ECG When compared with ECG of 16-SEP-2018 18:12, 
premature supraventricular complexes are now present Non-specific change in ST segment in Inferior leads Nonspecific T wave abnormality, worse in Inferior leads T wave inversion no longer evident in Lateral leads METABOLIC PANEL, COMPREHENSIVE Collection Time: 10/02/18  8:48 AM  
Result Value Ref Range Sodium 134 (L) 136 - 145 mmol/L Potassium 4.8 3.5 - 5.5 mmol/L Chloride 95 (L) 100 - 108 mmol/L  
 CO2 28 21 - 32 mmol/L Anion gap 11 3.0 - 18 mmol/L Glucose 102 (H) 74 - 99 mg/dL BUN 13 7.0 - 18 MG/DL Creatinine 0.75 0.6 - 1.3 MG/DL  
 BUN/Creatinine ratio 17 12 - 20 GFR est AA >60 >60 ml/min/1.73m2 GFR est non-AA >60 >60 ml/min/1.73m2 Calcium 9.7 8.5 - 10.1 MG/DL Bilirubin, total 0.6 0.2 - 1.0 MG/DL  
 ALT (SGPT) 16 13 - 56 U/L  
 AST (SGOT) 25 15 - 37 U/L Alk. phosphatase 267 (H) 45 - 117 U/L Protein, total 8.4 (H) 6.4 - 8.2 g/dL Albumin 2.0 (L) 3.4 - 5.0 g/dL Globulin 6.4 (H) 2.0 - 4.0 g/dL A-G Ratio 0.3 (L) 0.8 - 1.7    
CBC WITH AUTOMATED DIFF Collection Time: 10/02/18  8:48 AM  
Result Value Ref Range WBC 11.4 4.6 - 13.2 K/uL  
 RBC 3.78 (L) 4.20 - 5.30 M/uL HGB 9.3 (L) 12.0 - 16.0 g/dL HCT 31.8 (L) 35.0 - 45.0 % MCV 84.1 74.0 - 97.0 FL  
 MCH 24.6 24.0 - 34.0 PG  
 MCHC 29.2 (L) 31.0 - 37.0 g/dL  
 RDW 19.9 (H) 11.6 - 14.5 % PLATELET 762 748 - 093 K/uL MPV 9.1 (L) 9.2 - 11.8 FL  
 NEUTROPHILS 76 (H) 40 - 73 % LYMPHOCYTES 15 (L) 21 - 52 % MONOCYTES 7 3 - 10 % EOSINOPHILS 1 0 - 5 % BASOPHILS 1 0 - 2 %  
 ABS. NEUTROPHILS 8.7 (H) 1.8 - 8.0 K/UL  
 ABS. LYMPHOCYTES 1.7 0.9 - 3.6 K/UL  
 ABS. MONOCYTES 0.8 0.05 - 1.2 K/UL  
 ABS. EOSINOPHILS 0.1 0.0 - 0.4 K/UL  
 ABS. BASOPHILS 0.1 0.0 - 0.1 K/UL  
 DF AUTOMATED    
LACTIC ACID Collection Time: 10/02/18  8:48 AM  
Result Value Ref Range Lactic acid 2.5 (HH) 0.4 - 2.0 MMOL/L  
POC G3 Collection Time: 10/02/18 10:32 AM  
Result Value Ref Range Device: Non rebreather FIO2 (POC) 100 % pH (POC) 7.428 7.35 - 7.45    
 pCO2 (POC) 49.2 (H) 35.0 - 45.0 MMHG  
 pO2 (POC) 261 (H) 80 - 100 MMHG  
 HCO3 (POC) 32.5 (H) 22 - 26 MMOL/L  
 sO2 (POC) 100 (H) 92 - 97 % Base excess (POC) 8 mmol/L Allens test (POC) YES Total resp. rate 28 Site RIGHT BRACHIAL Specimen type (POC) ARTERIAL Performed by Carlita Suresh Active Problems: Dyspnea (10/2/2018) Assessment: 1. Acute hypoxic respiratory failure 2. Metastatic Lung cancer,  
3. Malignant pleural effusion s/p pleur-x catheter placement, 09/24/18 4. Recent hemorrhagic pericardial effusion s/p pericardiocentesis,  
5. Recent Left main pulmonary artery pulmonary embolism extending into the left lobar arteries, not on anticoagulation secondary to #4 6. DVT s/p IVC filter placement,  
7. HTN, controlled 8. PAD Plan:  
 
 
· Patient will be admitted to stepdown unit · Continue supplemental O2 
· Dr Derek Myers consulted · Pulmonary also consulted, d/w Dr Angelica Wright · Family wanted her to check the pleur-x catheter and drainage of the fluids. They are thinking about hospice, but would like to keep her full code for now. · Patient not on anticoagulation due to hemorrhagic pericardial effusion. · Will repeat echo · D/w her son and sister at bedside Total time:  67 minutes 
 
        
_______________________________________________________________________ Attending Physician:  Xavi Moreno MD  
 
 
Copies: Lowell Dakins, DO

## 2018-10-02 NOTE — ED TRIAGE NOTES
Patient arrives via EMS from home residence with c/o SOB. Patient seen here recently with same complaint. Patient with stage 4 lung CA. No chemo received d/t blood clots.

## 2018-10-02 NOTE — ED NOTES
Darrell Hager MD consulted with patient and family at bedside. Recommends transport to [facility] for evaluation and possible pericardiocentesis

## 2018-10-02 NOTE — PROGRESS NOTES
Patient received in ER SOB. Placed on Bipap. Ativan given by RN to help patient tolerate. BP dropped so Setting decreased on Bipap to 14/5 with genny increase in BP post change. Dr Brandan Beatty aware

## 2018-10-02 NOTE — ED TRIAGE NOTES
EMS reports pt is a transfer from [facility]; developed SOB this am; anxiety last HS; Hx of stage 4 Lung Ca; pleural effusion need pericardial window; pt refused BIPAP; R Mediport access; given 0.5 Ativan @ 900 am; resp 36; retraction;6 L NC;

## 2018-10-02 NOTE — ED PROVIDER NOTES
EMERGENCY DEPARTMENT HISTORY AND PHYSICAL EXAM 
 
Date: 10/2/2018 Patient Name: Paris Brannon History of Presenting Illness Chief Complaint Patient presents with  Shortness of Breath History Provided By: Patient, EMS Chief Complaint: shortness of breath Duration: starting last night Timing:  Improving Location: pulmonary Modifying Factors: supplemental O2 with relief Associated Symptoms: denies any other associated signs or symptoms Additional History (Context):  
8:27 AM 
Paris Brannon is a 66 y.o. female with PMHX of lung CA stage 4 (diagnosed 1 month ago), pericardial effusion, HTN, who presents to the emergency department C/O shortness of breath starting this morning. Pt reports she had anxiety since last night which may have contributed to her sxs. Pt was placed on supplemental O2 en-route with some relief. [facility] and pleural catheter issues [facility] days [facility]. Pt has not been able to start chemotherapy for her lung CA due to hx of DVT. Denies blood thinner use. Pt denies chest pain, and any other sxs or complaints. Pt states she is a full code. PCP: Amber Rebollar, DO 
 
Current Outpatient Prescriptions Medication Sig Dispense Refill  OXYGEN-AIR DELIVERY SYSTEMS 3 L/min by Nasal route continuous.  dronabinol (MARINOL) 2.5 mg capsule Take 1 Cap by mouth two (2) times a day. Max Daily Amount: 5 mg. 60 Cap 0  
 polyethylene glycol (MIRALAX) 17 gram packet Take 1 Packet by mouth daily. 30 Packet 2  
 lactobacillus sp. 50 billion cpu (BIO-K PLUS) 50 billion cell -375 mg cap capsule Take 1 Cap by mouth daily for 7 days. 7 Cap 0  
 senna-docusate (PERICOLACE) 8.6-50 mg per tablet Take 2 Tabs by mouth daily. Take with a full glass of water. HOLD for loose stool. 30 Tab 0  
 bisacodyl (DULCOLAX, BISACODYL,) 10 mg suppository Insert 10 mg into rectum daily as needed.  20 Each 0  
  oxyCODONE ER (OXYCONTIN) 10 mg ER tablet Take 1 Tab by mouth every twelve (12) hours. Max Daily Amount: 20 mg. 30 Tab 0  
 oxyCODONE-acetaminophen (PERCOCET) 5-325 mg per tablet Take 1 Tab by mouth every six (6) hours as needed. Max Daily Amount: 4 Tabs. 25 Tab 0  
 naloxone (NARCAN) 4 mg/actuation nasal spray Use 1 spray intranasally, then discard. Repeat with new spray every 2 min as needed for opioid overdose symptoms, alternating nostrils. 2 Each 1  
 levothyroxine (SYNTHROID) 75 mcg tablet Take 1 Tab by mouth Daily (before breakfast). 30 Tab 0  
 metoprolol tartrate (LOPRESSOR) 25 mg tablet Take 0.5 Tabs by mouth two (2) times a day. (Patient taking differently: Take 25 mg by mouth two (2) times a day.) 60 Tab 0 Past History Past Medical History: 
Past Medical History:  
Diagnosis Date  Acute deep vein thrombosis (DVT) (HCC)  Bone cancer ([facility] [address]  Cancer ([facility] [address] lung cancer  HTN (hypertension) 9/16/2018  PAD (peripheral artery disease) ([facility] [address]  Pericardial effusion Past Surgical History: 
Past Surgical History:  
Procedure Laterality Date  VASCULAR SURGERY PROCEDURE UNLIST Right   
 opened up vessels Family History: 
History reviewed. No pertinent family history. Social History: 
Social History Substance Use Topics  Smoking status: Former Smoker  Smokeless tobacco: Never Used  Alcohol use Yes Comment: occasionally Allergies: 
No Known Allergies Review of Systems Review of Systems Respiratory: Positive for shortness of breath. Cardiovascular: Negative for chest pain. All other systems reviewed and are negative. Physical Exam  
 
Vitals:  
 10/02/18 1115 10/02/18 1210 10/02/18 1238 10/02/18 1350 BP:  129/78  129/71 Pulse:  (!) 125  (!) 125 Resp:  26  25 Temp:      
SpO2: 99% Weight:   75.8 kg (167 lb) Height:      
 
Physical Exam  
Nursing note and vitals reviewed. Constitutional: Alert. In moderate distress Head: Normocephalic, Atraumatic Eyes: Pupils are equal, round, and reactive to light, EOMI 
ENT: Moist mucous membranes, oropharynx clear. Neck: Supple, non-tender Cardiovascular: Tachycardic rate and rhythm, no murmurs, rubs, or gallops Chest: Increased work of breathing. No reproducible chest tenderness. Lungs: Diffuse crackles to lower fields bilaterally, clear to upper lobes. Abdomen: Soft, non tender, non distended Back: No evidence of trauma or deformity. No CVA Tenderness. Extremities: No evidence of trauma or deformity, no LE edema Skin: Warm and dry Neuro: Alert and appropriate, facial movement symmetric, normal speech, strength and sensation full and symmetric bilaterally, normal gait, normal coordination Psychiatric: Normal mood and affect Diagnostic Study Results Labs - Recent Results (from the past 12 hour(s)) EKG, 12 LEAD, INITIAL Collection Time: 10/02/18  8:42 AM  
Result Value Ref Range Ventricular Rate 153 BPM  
 Atrial Rate 153 BPM  
 P-R Interval 126 ms  
 QRS Duration 68 ms Q-T Interval 132 ms QTC Calculation (Bezet) 210 ms Calculated P Axis 79 degrees Calculated R Axis 41 degrees Calculated T Axis 172 degrees Diagnosis Sinus tachycardia with premature supraventricular complexes Anterior infarct , age undetermined Abnormal ECG When compared with ECG of 16-SEP-2018 18:12, 
premature supraventricular complexes are now present Non-specific change in ST segment in Inferior leads Nonspecific T wave abnormality, worse in Inferior leads T wave inversion no longer evident in Lateral leads METABOLIC PANEL, COMPREHENSIVE Collection Time: 10/02/18  8:48 AM  
Result Value Ref Range Sodium 134 (L) 136 - 145 mmol/L Potassium 4.8 3.5 - 5.5 mmol/L Chloride 95 (L) 100 - 108 mmol/L  
 CO2 28 21 - 32 mmol/L Anion gap 11 3.0 - 18 mmol/L Glucose 102 (H) 74 - 99 mg/dL BUN 13 7.0 - 18 MG/DL Creatinine 0.75 0.6 - 1.3 MG/DL  
 BUN/Creatinine ratio 17 12 - 20 GFR est AA >60 >60 ml/min/1.73m2 GFR est non-AA >60 >60 ml/min/1.73m2 Calcium 9.7 8.5 - 10.1 MG/DL Bilirubin, total 0.6 0.2 - 1.0 MG/DL  
 ALT (SGPT) 16 13 - 56 U/L  
 AST (SGOT) 25 15 - 37 U/L Alk. phosphatase 267 (H) 45 - 117 U/L Protein, total 8.4 (H) 6.4 - 8.2 g/dL Albumin 2.0 (L) 3.4 - 5.0 g/dL Globulin 6.4 (H) 2.0 - 4.0 g/dL A-G Ratio 0.3 (L) 0.8 - 1.7    
CBC WITH AUTOMATED DIFF Collection Time: 10/02/18  8:48 AM  
Result Value Ref Range WBC 11.4 4.6 - 13.2 K/uL  
 RBC 3.78 (L) 4.20 - 5.30 M/uL HGB 9.3 (L) 12.0 - 16.0 g/dL HCT 31.8 (L) 35.0 - 45.0 % MCV 84.1 74.0 - 97.0 FL  
 MCH 24.6 24.0 - 34.0 PG  
 MCHC 29.2 (L) 31.0 - 37.0 g/dL  
 RDW 19.9 (H) 11.6 - 14.5 % PLATELET 991 235 - 054 K/uL MPV 9.1 (L) 9.2 - 11.8 FL  
 NEUTROPHILS 76 (H) 40 - 73 % LYMPHOCYTES 15 (L) 21 - 52 % MONOCYTES 7 3 - 10 % EOSINOPHILS 1 0 - 5 % BASOPHILS 1 0 - 2 %  
 ABS. NEUTROPHILS 8.7 (H) 1.8 - 8.0 K/UL  
 ABS. LYMPHOCYTES 1.7 0.9 - 3.6 K/UL  
 ABS. MONOCYTES 0.8 0.05 - 1.2 K/UL  
 ABS. EOSINOPHILS 0.1 0.0 - 0.4 K/UL  
 ABS. BASOPHILS 0.1 0.0 - 0.1 K/UL  
 DF AUTOMATED    
LACTIC ACID Collection Time: 10/02/18  8:48 AM  
Result Value Ref Range Lactic acid 2.5 (HH) 0.4 - 2.0 MMOL/L  
POC G3 Collection Time: 10/02/18 10:32 AM  
Result Value Ref Range Device: Non rebreather FIO2 (POC) 100 % pH (POC) 7.428 7.35 - 7.45    
 pCO2 (POC) 49.2 (H) 35.0 - 45.0 MMHG  
 pO2 (POC) 261 (H) 80 - 100 MMHG  
 HCO3 (POC) 32.5 (H) 22 - 26 MMOL/L  
 sO2 (POC) 100 (H) 92 - 97 % Base excess (POC) 8 mmol/L Allens test (POC) YES Total resp. rate 28 Site RIGHT BRACHIAL Specimen type (POC) ARTERIAL Performed by Donna Arrington Radiologic Studies -  
 
CT Results  (Last 48 hours) None CXR Results  (Last 48 hours) 10/02/18 0914  XR CHEST PORT Final result Impression:  IMPRESSION:  
   
Stable consolidative changes in the right upper lobe and left lung base. Small/moderate left pleural effusion and pleural drainage catheter again noted,  
fairly stable. Slightly increased haziness/indistinctness in the left suprahilar location could  
represent developing nonspecific airspace disease. Narrative:  EXAM: One-view chest  
   
CLINICAL HISTORY: Shortness of breath ,  
   
COMPARISON: 9/27/2018 FINDINGS:  
   
Frontal view of the chest demonstrate stable bands of consolidation in the right  
upper lobe and left lung base. Probable small/moderate left pleural effusion  
with pleural drainage catheter in stable position. Increasing left suprahilar  
opacities, and indistinctness along the mediastinal silhouette in this location. Cardiac silhouette is normal in size and contour. No acute bony or soft tissue  
abnormality. Medications given in the ED- Medications  
iopamidol (ISOVUE 300) 61 % contrast injection 100 mL (100 mL IntraVENous Given 10/2/18 0940) LORazepam (ATIVAN) injection 0.5 mg (0.5 mg IntraVENous Given 10/2/18 2339) Medical Decision Making I am the first provider for this patient. I reviewed the vital signs, available nursing notes, past medical history, past surgical history, family history and social history. Vital Signs-Reviewed the patient's vital signs. Pulse Oximetry Analysis - 97% on 2 lpm O2 via NC Cardiac Monitor: 
Rate: 147 bpm 
Rhythm: A. flutter EKG interpretation: (Preliminary) 8:42 AM  
Sinus tachycardia with premature supraventricular complexes. Rate 153 bpm. QTc: 210 ms. EKG read by Jean Claude Stanton MD at 8:48 AM  
 
Records Reviewed: Nursing Notes and Old Medical Records Provider Notes (Medical Decision Making): 66year old female with stage 4 lung cancer presenting for worsening shortness of breath and pericardial effusion on ECHO with no signs of tamponade. Concern at this time for worsening lung cancer vs. blood clot. She has a hx of fluid accumulating in the lungs. This could be cause of her symptoms. Will place the patient on BIPAP as she is struggling to breathe on the nasal cannula and her increased work of breathing is starting to tire her out. Procedures: 
Bedside US Date/Time: 10/2/2018 8:37 AM 
Performed by: Noelle Squires Authorized by: Noelle Squires Verbal consent obtained: Yes Given by:  Patient Performed by: Attending Type of procedure: Focused cardiac (Echo) Left leg: Indications:  Dyspnea Subxiphoid (4 chamber):  Limited Parasternal long axis:  Adequate Parasternal short axis:  Limited Subxiphoid (long axis, IVC view):  Limited Apical four-chamber:  Limited Pericardial effusion:  Present Pericardial effusion size:  Small Interpretation:  Pericardial effusion ED Course:  
8:27 AM Initial assessment performed. The patients presenting problems have been discussed, and they are in agreement with the care plan formulated and outlined with them. I have encouraged them to ask questions as they arise throughout their visit. 9:18 AM The patient started to have worsening work of breathing and was placed on BIPAP 
 
10:04 AM Discussed patient's history, exam, vitals, and plan to intubate with Raeann Veras MD, internal medicine. He will come and evaluate the patient. 10:20 AM The patient declines intubation at this time. 10:45 AM I spoke to the patient's son over the telephone and discussed the possibility of intubation. He will come to the ED. 10:51 AM Discussed the patient with Alina Bryan MD, intensivist, who is in the ED.  
 
11:25 AM Updated Raeann Veras MD on the patient's treatment course. 12:29 PM Alina Bryan MD spoke to the family and the patient. They decline hospice care and want everything to be done.  Plan to transfer to [facility] as the patients cardiothoracic surgeon is there. 12:50 PM Discussed patient's history, exam, vitals, and treatment course with Rick Worthy MD, ED Attending at [facility], who states they are unsure if they have a bed for the patient. They will call back. 1:50 PM Rick Worthy MD called back and states that they will accept the patient for transfer for continuity of care 2:46 PM 
Excessive fluids not given due to concern of worsening work of breathing. Diagnosis and Disposition Critical Care Time:  
I have spent 60 minutes of critical care time involved in lab review, consultations with specialist, family decision-making, and documentation. During this entire length of time I was immediately available to the patient. Critical Care: The reason for providing this level of medical care for this critically ill patient was due a critical illness that impaired one or more vital organ systems such that there was a high probability of imminent or life threatening deterioration in the patients condition. This care involved high complexity decision making to assess, manipulate, and support vital system functions, to treat this degreee vital organ system failure and to prevent further life threatening deterioration of the patients condition. TRANSFER NOTE: 
 
2:01 PM 
Discussed impending transfer with Patient and/or family. Pt and/or family instructed that Pt would be transferred to [facility]. Discussed reasoning for transfer and future treatment plan. Family and Pt understands and agrees with care plan. Labs Reviewed METABOLIC PANEL, COMPREHENSIVE - Abnormal; Notable for the following:   
    Result Value Sodium 134 (*) Chloride 95 (*) Glucose 102 (*) Alk. phosphatase 267 (*) Protein, total 8.4 (*) Albumin 2.0 (*) Globulin 6.4 (*) A-G Ratio 0.3 (*) All other components within normal limits CBC WITH AUTOMATED DIFF - Abnormal; Notable for the following: RBC 3.78 (*) HGB 9.3 (*) HCT 31.8 (*) MCHC 29.2 (*)   
 RDW 19.9 (*) MPV 9.1 (*) NEUTROPHILS 76 (*) LYMPHOCYTES 15 (*)   
 ABS. NEUTROPHILS 8.7 (*) All other components within normal limits LACTIC ACID - Abnormal; Notable for the following:   
 Lactic acid 2.5 (*) All other components within normal limits POC G3 - Abnormal; Notable for the following:   
 pCO2 (POC) 49.2 (*)   
 pO2 (POC) 261 (*) HCO3 (POC) 32.5 (*)   
 sO2 (POC) 100 (*) All other components within normal limits Recent Results (from the past 12 hour(s)) EKG, 12 LEAD, INITIAL Collection Time: 10/02/18  8:42 AM  
Result Value Ref Range Ventricular Rate 153 BPM  
 Atrial Rate 153 BPM  
 P-R Interval 126 ms  
 QRS Duration 68 ms Q-T Interval 132 ms QTC Calculation (Bezet) 210 ms Calculated P Axis 79 degrees Calculated R Axis 41 degrees Calculated T Axis 172 degrees Diagnosis Sinus tachycardia with premature supraventricular complexes Anterior infarct , age undetermined Abnormal ECG When compared with ECG of 16-SEP-2018 18:12, 
premature supraventricular complexes are now present Non-specific change in ST segment in Inferior leads Nonspecific T wave abnormality, worse in Inferior leads T wave inversion no longer evident in Lateral leads METABOLIC PANEL, COMPREHENSIVE Collection Time: 10/02/18  8:48 AM  
Result Value Ref Range Sodium 134 (L) 136 - 145 mmol/L Potassium 4.8 3.5 - 5.5 mmol/L Chloride 95 (L) 100 - 108 mmol/L  
 CO2 28 21 - 32 mmol/L Anion gap 11 3.0 - 18 mmol/L Glucose 102 (H) 74 - 99 mg/dL BUN 13 7.0 - 18 MG/DL Creatinine 0.75 0.6 - 1.3 MG/DL  
 BUN/Creatinine ratio 17 12 - 20 GFR est AA >60 >60 ml/min/1.73m2 GFR est non-AA >60 >60 ml/min/1.73m2 Calcium 9.7 8.5 - 10.1 MG/DL  Bilirubin, total 0.6 0.2 - 1.0 MG/DL  
 ALT (SGPT) 16 13 - 56 U/L  
 AST (SGOT) 25 15 - 37 U/L Alk. phosphatase 267 (H) 45 - 117 U/L Protein, total 8.4 (H) 6.4 - 8.2 g/dL Albumin 2.0 (L) 3.4 - 5.0 g/dL Globulin 6.4 (H) 2.0 - 4.0 g/dL A-G Ratio 0.3 (L) 0.8 - 1.7    
CBC WITH AUTOMATED DIFF Collection Time: 10/02/18  8:48 AM  
Result Value Ref Range WBC 11.4 4.6 - 13.2 K/uL  
 RBC 3.78 (L) 4.20 - 5.30 M/uL HGB 9.3 (L) 12.0 - 16.0 g/dL HCT 31.8 (L) 35.0 - 45.0 % MCV 84.1 74.0 - 97.0 FL  
 MCH 24.6 24.0 - 34.0 PG  
 MCHC 29.2 (L) 31.0 - 37.0 g/dL  
 RDW 19.9 (H) 11.6 - 14.5 % PLATELET 151 993 - 580 K/uL MPV 9.1 (L) 9.2 - 11.8 FL  
 NEUTROPHILS 76 (H) 40 - 73 % LYMPHOCYTES 15 (L) 21 - 52 % MONOCYTES 7 3 - 10 % EOSINOPHILS 1 0 - 5 % BASOPHILS 1 0 - 2 %  
 ABS. NEUTROPHILS 8.7 (H) 1.8 - 8.0 K/UL  
 ABS. LYMPHOCYTES 1.7 0.9 - 3.6 K/UL  
 ABS. MONOCYTES 0.8 0.05 - 1.2 K/UL  
 ABS. EOSINOPHILS 0.1 0.0 - 0.4 K/UL  
 ABS. BASOPHILS 0.1 0.0 - 0.1 K/UL  
 DF AUTOMATED    
LACTIC ACID Collection Time: 10/02/18  8:48 AM  
Result Value Ref Range Lactic acid 2.5 (HH) 0.4 - 2.0 MMOL/L  
POC G3 Collection Time: 10/02/18 10:32 AM  
Result Value Ref Range Device: Non rebreather FIO2 (POC) 100 % pH (POC) 7.428 7.35 - 7.45    
 pCO2 (POC) 49.2 (H) 35.0 - 45.0 MMHG  
 pO2 (POC) 261 (H) 80 - 100 MMHG  
 HCO3 (POC) 32.5 (H) 22 - 26 MMOL/L  
 sO2 (POC) 100 (H) 92 - 97 % Base excess (POC) 8 mmol/L Allens test (POC) YES Total resp. rate 28 Site RIGHT BRACHIAL Specimen type (POC) ARTERIAL Performed by Liz Cisneros CLINICAL IMPRESSION 1. Malignant neoplasm of lung, unspecified laterality, unspecified part of lung ([facility] [address] 2. Acute on chronic respiratory failure, unspecified whether with hypoxia or hypercapnia ([address]   
  
 
_______________________________ Attestations:  
This note is prepared by Carlota Hill, acting as Scribe for Mary Lou Garcia MD. 
 
 Lizbeth Valles MD:  The scribe's documentation has been prepared under my direction and personally reviewed by me in its entirety. I confirm that the note above accurately reflects all work, treatment, procedures, and medical decision making performed by me. 
_______________________________

## 2018-10-02 NOTE — PROGRESS NOTES
Patient transported to CT on BIPAP. Once patient moved to table she did not tolerate lying down with HOB slightly elevated. Patient ripped off BIPAP mask and was trying to crawl off table.  Patient placed back on 4lpm nc and test wasn't performed and patient brought back to ER

## 2018-10-02 NOTE — ED PROVIDER NOTES
EMERGENCY DEPARTMENT HISTORY AND PHYSICAL EXAM 
 
4:38 PM 
 
 
Date: 10/2/2018 Patient Name: Neto Arnett History of Presenting Illness Chief Complaint Patient presents with  Shortness of Breath  Anxiety History Provided By: Patient and Son Chief Complaint: shortness of breath Duration: 1 day Timing: unimproved Location: pulmonary Quality: not reported Severity: moderate Modifying Factors: at home O2 without relief Associated Symptoms: mild cough with brown sputum, mild chest pain Additional History (Context): Neto Arnett is a 66 y.o. female with a history of Lung CA presenting to the ED for the evaluation of moderate, unimproved, shortness of breath x 1 day. Patient is a transfer from THE [facility] where they did a CXR that showed left pleural effusion. Patient was supposed to have a CT scan done but could not tolerate it. She was put on BiPAP but also started grabbing at it so she was removed from it prior to transport to this ED [facility]). She was put on nasal canula 5 L (baseline at home is 3 L). A bedside ECHO was performed by the ED physician and pulmonary intensivist, showing small pericardial effusion with no evidence of tamponade. The decision was made to transfer patient to [facility]. Patient upon arrival to this ED stating that her shortness of breath is unchanged from when she left THE [facility]. She also reports a mild productive cough with brown sputum and mild chest pain. The son explains that the patient was discharged with a left pleurX catheter. He reports that visiting nurse came about 5 days ago and was not able to drain any fluid and that he again tried over the weekend, but could not drain any off the catheter. Son is concerned that there may still be fluid leftover. No fevers, nausea, vomiting. PCP: Zoltan Joshi, DO 
 
Current Outpatient Prescriptions Medication Sig Dispense Refill  OXYGEN-AIR DELIVERY SYSTEMS 3 L/min by Nasal route continuous.  dronabinol (MARINOL) 2.5 mg capsule Take 1 Cap by mouth two (2) times a day. Max Daily Amount: 5 mg. 60 Cap 0  
 polyethylene glycol (MIRALAX) 17 gram packet Take 1 Packet by mouth daily. 30 Packet 2  
 lactobacillus sp. 50 billion cpu (BIO-K PLUS) 50 billion cell -375 mg cap capsule Take 1 Cap by mouth daily for 7 days. 7 Cap 0  
 senna-docusate (PERICOLACE) 8.6-50 mg per tablet Take 2 Tabs by mouth daily. Take with a full glass of water. HOLD for loose stool. 30 Tab 0  
 bisacodyl (DULCOLAX, BISACODYL,) 10 mg suppository Insert 10 mg into rectum daily as needed. 20 Each 0  
 oxyCODONE ER (OXYCONTIN) 10 mg ER tablet Take 1 Tab by mouth every twelve (12) hours. Max Daily Amount: 20 mg. 30 Tab 0  
 oxyCODONE-acetaminophen (PERCOCET) 5-325 mg per tablet Take 1 Tab by mouth every six (6) hours as needed. Max Daily Amount: 4 Tabs. 25 Tab 0  
 naloxone (NARCAN) 4 mg/actuation nasal spray Use 1 spray intranasally, then discard. Repeat with new spray every 2 min as needed for opioid overdose symptoms, alternating nostrils. 2 Each 1  
 levothyroxine (SYNTHROID) 75 mcg tablet Take 1 Tab by mouth Daily (before breakfast). 30 Tab 0  
 metoprolol tartrate (LOPRESSOR) 25 mg tablet Take 0.5 Tabs by mouth two (2) times a day. (Patient taking differently: Take 25 mg by mouth two (2) times a day.) 60 Tab 0 Past History Past Medical History: 
Past Medical History:  
Diagnosis Date  Acute deep vein thrombosis (DVT) (HCC)  Bone cancer ([facility]  Cancer ([facility] lung cancer  HTN (hypertension) 9/16/2018  PAD (peripheral artery disease) ([facility] [facility]  Pericardial effusion Past Surgical History: 
Past Surgical History:  
Procedure Laterality Date  VASCULAR SURGERY PROCEDURE UNLIST Right   
 opened up vessels Family History: No family history on file. Social History: 
Social History Substance Use Topics  Smoking status: Former Smoker  Smokeless tobacco: Never Used  Alcohol use Yes Comment: occasionally Allergies: 
No Known Allergies Review of Systems Review of Systems Constitutional: Negative for chills, fatigue and fever. HENT: Negative for congestion, ear pain, rhinorrhea and sore throat. Eyes: Negative for pain, redness and itching. Respiratory: Positive for cough and shortness of breath. Negative for chest tightness. Cardiovascular: Positive for chest pain (mild). Negative for palpitations and leg swelling. Gastrointestinal: Negative for abdominal pain, diarrhea, nausea and vomiting. Genitourinary: Negative for decreased urine volume, dysuria, flank pain, hematuria and pelvic pain. Musculoskeletal: Negative for arthralgias, back pain, joint swelling and myalgias. Skin: Negative for color change, pallor and rash. Neurological: Negative for dizziness, weakness and headaches. Hematological: Negative for adenopathy. Does not bruise/bleed easily. All other systems reviewed and are negative. Physical Exam  
 
Visit Vitals  /79  Pulse (!) 122  Resp 30  SpO2 100% Physical Exam  
Constitutional: Moderate distress HENT:  
Head: Normocephalic and atraumatic. Mouth/Throat: Oropharynx is clear and moist.  
Eyes: Conjunctivae and EOM are normal. Pupils are equal, round, and reactive to light. Neck: Normal range of motion. Neck supple. Cardiovascular: Regular rhythm and normal heart sounds. Tachycardia present. No murmur heard. Pulmonary/Chest: Effort normal. Tachypnea noted. She has decreased breath sounds in the left upper field, the left middle field and the left lower field. She has no wheezes. She has rales in the right lower field and the left lower field. Catheter in left chest wall, taped to the chest.  
Abdominal: Soft. Bowel sounds are normal. She exhibits no distension. There is no tenderness. Musculoskeletal: Normal range of motion. She exhibits no edema or deformity. Lymphadenopathy:  
  She has no cervical adenopathy. Neurological: She is alert. She exhibits normal muscle tone. Coordination normal.  
Skin: Skin is warm and dry. No rash noted. She is not diaphoretic. No erythema. Psychiatric: She has a normal mood and affect. Her behavior is normal.  
Nursing note and vitals reviewed. Diagnostic Study Results Labs - Recent Results (from the past 12 hour(s)) EKG, 12 LEAD, INITIAL Collection Time: 10/02/18  8:42 AM  
Result Value Ref Range Ventricular Rate 153 BPM  
 Atrial Rate 153 BPM  
 P-R Interval 126 ms  
 QRS Duration 68 ms Q-T Interval 132 ms QTC Calculation (Bezet) 210 ms Calculated P Axis 79 degrees Calculated R Axis 41 degrees Calculated T Axis 172 degrees Diagnosis Sinus tachycardia with premature supraventricular complexes Anterior infarct , age undetermined Abnormal ECG When compared with ECG of 16-SEP-2018 18:12, 
premature supraventricular complexes are now present Non-specific change in ST segment in Inferior leads Nonspecific T wave abnormality, worse in Inferior leads T wave inversion no longer evident in Lateral leads METABOLIC PANEL, COMPREHENSIVE Collection Time: 10/02/18  8:48 AM  
Result Value Ref Range Sodium 134 (L) 136 - 145 mmol/L Potassium 4.8 3.5 - 5.5 mmol/L Chloride 95 (L) 100 - 108 mmol/L  
 CO2 28 21 - 32 mmol/L Anion gap 11 3.0 - 18 mmol/L Glucose 102 (H) 74 - 99 mg/dL BUN 13 7.0 - 18 MG/DL Creatinine 0.75 0.6 - 1.3 MG/DL  
 BUN/Creatinine ratio 17 12 - 20 GFR est AA >60 >60 ml/min/1.73m2 GFR est non-AA >60 >60 ml/min/1.73m2 Calcium 9.7 8.5 - 10.1 MG/DL Bilirubin, total 0.6 0.2 - 1.0 MG/DL  
 ALT (SGPT) 16 13 - 56 U/L  
 AST (SGOT) 25 15 - 37 U/L Alk. phosphatase 267 (H) 45 - 117 U/L Protein, total 8.4 (H) 6.4 - 8.2 g/dL Albumin 2.0 (L) 3.4 - 5.0 g/dL Globulin 6.4 (H) 2.0 - 4.0 g/dL A-G Ratio 0.3 (L) 0.8 - 1.7 CBC WITH AUTOMATED DIFF Collection Time: 10/02/18  8:48 AM  
Result Value Ref Range WBC 11.4 4.6 - 13.2 K/uL  
 RBC 3.78 (L) 4.20 - 5.30 M/uL HGB 9.3 (L) 12.0 - 16.0 g/dL HCT 31.8 (L) 35.0 - 45.0 % MCV 84.1 74.0 - 97.0 FL  
 MCH 24.6 24.0 - 34.0 PG  
 MCHC 29.2 (L) 31.0 - 37.0 g/dL  
 RDW 19.9 (H) 11.6 - 14.5 % PLATELET 012 069 - 328 K/uL MPV 9.1 (L) 9.2 - 11.8 FL  
 NEUTROPHILS 76 (H) 40 - 73 % LYMPHOCYTES 15 (L) 21 - 52 % MONOCYTES 7 3 - 10 % EOSINOPHILS 1 0 - 5 % BASOPHILS 1 0 - 2 %  
 ABS. NEUTROPHILS 8.7 (H) 1.8 - 8.0 K/UL  
 ABS. LYMPHOCYTES 1.7 0.9 - 3.6 K/UL  
 ABS. MONOCYTES 0.8 0.05 - 1.2 K/UL  
 ABS. EOSINOPHILS 0.1 0.0 - 0.4 K/UL  
 ABS. BASOPHILS 0.1 0.0 - 0.1 K/UL  
 DF AUTOMATED    
LACTIC ACID Collection Time: 10/02/18  8:48 AM  
Result Value Ref Range Lactic acid 2.5 (HH) 0.4 - 2.0 MMOL/L  
POC G3 Collection Time: 10/02/18 10:32 AM  
Result Value Ref Range Device: Non rebreather FIO2 (POC) 100 % pH (POC) 7.428 7.35 - 7.45    
 pCO2 (POC) 49.2 (H) 35.0 - 45.0 MMHG  
 pO2 (POC) 261 (H) 80 - 100 MMHG  
 HCO3 (POC) 32.5 (H) 22 - 26 MMOL/L  
 sO2 (POC) 100 (H) 92 - 97 % Base excess (POC) 8 mmol/L Allens test (POC) YES Total resp. rate 28 Site RIGHT BRACHIAL Specimen type (POC) ARTERIAL Performed by Fidel Lerner Radiologic Studies -  
XR CHEST PORT    (Results Pending) (my interpretation) left pleural effusion, grossly unchanged from previous study Medical Decision Making I am the first provider for this patient. I reviewed the vital signs, available nursing notes, past medical history, past surgical history, family history and social history. Vital Signs-Reviewed the patient's vital signs. Pulse Oximetry Analysis -  100% 3L NC (Interpretation) wnl 
 
Cardiac Monitor: 
Rate: 122 bpm  
Rhythm: sinus tachycardia EKG: 
 
 
Records Reviewed: Nursing Notes (Time of Review: 4:38 PM) ED Course: Progress Notes, Reevaluation, and Consults: 
 
 
5:01 PM -- Discussed with Dr. Paige Oseguera, accepts admit to stepdown unit 5:14 PM - Consult:  Discussed care with Dr. Daxa Zavaleta, Cardiac Surgery . Standard discussion; including history of patients chief complaint, available diagnostic results, and treatment course. He agrees with the plan for admission. Given that there was no signifcant pericardial effusion on US, we will need to assess the functioning of PleurX catheter. Provider Notes (Medical Decision Making): MDM Patient with advanced lung CA and recurrent pleural effusion, PE p/w worsening SOB. In the ER, oxygenating well on 4L NC (baseline 3L NC at home). Echocardiogram done at [facility] showed small pericardial effusion without tamponade. Patient stable for step down unit, no critical care requirements at this time. Patient is currently full code. For Hospitalized Patients: 1. Critical Care Time: < 35 min 2. Hospitalization Decision Time: The decision to hospitalize the patient was made by Dr. Wayne Garibay at 17:00 on 10/2/2018 3. Aspirin: Aspirin was not given because the patient is a bleeding risk Diagnosis Clinical Impression: 1. Dyspnea, unspecified type 2. Pleural effusion 3. Primary malignant neoplasm of lung metastatic to other site, unspecified laterality ([facility] [address] Disposition: Admit Follow-up Information None Patient's Medications Start Taking No medications on file Continue Taking BISACODYL (DULCOLAX, BISACODYL,) 10 MG SUPPOSITORY    Insert 10 mg into rectum daily as needed. DRONABINOL (MARINOL) 2.5 MG CAPSULE    Take 1 Cap by mouth two (2) times a day. Max Daily Amount: 5 mg. LACTOBACILLUS SP. 50 BILLION CPU (BIO-K PLUS) 50 BILLION CELL -375 MG CAP CAPSULE    Take 1 Cap by mouth daily for 7 days. LEVOTHYROXINE (SYNTHROID) 75 MCG TABLET    Take 1 Tab by mouth Daily (before breakfast). METOPROLOL TARTRATE (LOPRESSOR) 25 MG TABLET    Take 0.5 Tabs by mouth two (2) times a day. NALOXONE (NARCAN) 4 MG/ACTUATION NASAL SPRAY    Use 1 spray intranasally, then discard. Repeat with new spray every 2 min as needed for opioid overdose symptoms, alternating nostrils. OXYCODONE ER (OXYCONTIN) 10 MG ER TABLET    Take 1 Tab by mouth every twelve (12) hours. Max Daily Amount: 20 mg. OXYCODONE-ACETAMINOPHEN (PERCOCET) 5-325 MG PER TABLET    Take 1 Tab by mouth every six (6) hours as needed. Max Daily Amount: 4 Tabs. OXYGEN-AIR DELIVERY SYSTEMS    3 L/min by Nasal route continuous. POLYETHYLENE GLYCOL (MIRALAX) 17 GRAM PACKET    Take 1 Packet by mouth daily. SENNA-DOCUSATE (PERICOLACE) 8.6-50 MG PER TABLET    Take 2 Tabs by mouth daily. Take with a full glass of water. HOLD for loose stool. These Medications have changed No medications on file Stop Taking No medications on file  
 
_______________________________ Attestations: 
Scribe Attestation Ronna Simon acting as a scribe for and in the presence of Nadia Humphrey MD     
October 02, 2018 at 4:38 PM 
    
Provider Attestation:     
I personally performed the services described in the documentation, reviewed the documentation, as recorded by the scribe in my presence, and it accurately and completely records my words and actions. October 02, 2018 at 4:38 PM - Nadia Humphrey MD  
_______________________________

## 2018-10-02 NOTE — ED NOTES
TRANSFER - OUT REPORT: 
 
Verbal report given to [facility] RN(name) on Britton Barnard  [facility] Report consisted of patients Situation, Background, Assessment and  
Recommendations(SBAR). Information from the following report(s) SBAR, Kardex, ED Summary, MAR, Recent Results and Cardiac Rhythm a flutter was reviewed with the receiving nurse. Lines:  
Venous Access Device [facility] AT [facility]  09/24/18 Upper chest (subclavicular area, right (Active) Access Needle Size (Site #1) 20 G 10/2/2018 10:12 AM  
Access Needle Length (Medial Site) 1 inch 10/2/2018 10:12 AM  
Positive Blood Return (Medial Site) Yes 10/2/2018 10:12 AM  
Action Taken (Medial Site) Alcohol;Benzoin;Flushed 10/2/2018 10:12 AM  
  
 
Opportunity for questions and clarification was provided. Patient transported with: 
 Monitor O2 @ 6 liters [facility] at bedside. Report given. VISHAL completed, signed, copied.

## 2018-10-03 PROBLEM — R53.81 DEBILITY: Status: ACTIVE | Noted: 2018-01-01

## 2018-10-03 PROBLEM — Z71.89 ADVANCED CARE PLANNING/COUNSELING DISCUSSION: Status: ACTIVE | Noted: 2018-01-01

## 2018-10-03 NOTE — PROGRESS NOTES
Reason for Admission:   Shortness of Breath, Multifactorial with progressive  stage 4 adenocarcinoma of lung RRAT Score:     15, however may be higher as patient is a readmit and diagnosis. Do you (patient/family) have any concerns for transition/discharge? Yes, lives with son, Nona Cesar. Ms. Melissa Kessler reports Dimitri Ardon is very supportive and helps me a lot\". Plan for utilizing home health:   Ms. Melissa Kessler is active with [facility], and would like to continue with them. There is an order for Palliative consult, and notes mention family thinking about Hospice. Likelihood of readmission? Moderate Care Conference scheduled:    no 
    
Resources/supports as identified by patient/family:   Palliative has been ordered. Top Challenges facing patient (as identified by patient/family and CM): Finances/Medication cost?     no 
Transportation  no Support system or lack thereof? No   
Living arrangements? No  
Self-care/ADLs/Cognition? No  
    
Current Advanced Directive/Advance Care Plan:  No she reports she doesn't have one but her and her son need to talk about it. Transition of Care Plan:    Based on readmission, the patient's previous Plan of Care 
 has been evaluated and/or modified. The current Transition of Care Plan is:     
Pending meeting with palliative- Home with [facility] and personal aid. Son Nona Cesar to transport. Has walker at home that she reports using if its a long walk. She reports using \"lyft/uber\" to get to her appointments.

## 2018-10-03 NOTE — PROGRESS NOTES
[facility] Patient is know to our service. 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 A pericardial drain was placed at [facility] and subsequently removed. [facility] while anticoagulation was resumed. At [facility], it was decided not to anticoagulate her but a left Pleurx catheter was placed in a left pleural effusion prior to her discharge on 9/25/18. This is her second episode of respiratory decompensation requiring emergency room visit since discharge. A bedside echo by the [facility] emergency room physician yesterday revealed a small pericardial effusion without tamponade. She was unable to lay flat for CT scan. No attempt to drain the left chest Pleurx was made during this encounter but the patient reports visiting nurses draining only scant amount of fluid every other day. She was transferred to [facility] emergency room then admitted. Chest x-ray yesterday revealed a stable left lower and right upper lobe opacity. On exam, patient has sitting upright, tachypneic and anxious. Left chest Pleurx was accessed and easily flushed forward with 20 mL of saline. Catheter was attached to an atrium at 20 cm of water suction with scant serous fluid drained into the tubing only. Catheter was left to atrium suction as her evaluation and treatment for shortness of breath continues by the medical team.  No intervention needed on small pericardial effusion . Palliative Medicine in the room to see patient.

## 2018-10-03 NOTE — MED STUDENT NOTES
*ATTENTION:  This note has been created by a medical student for educational purposes only. Please do not refer to the content of this note for clinical decision-making, billing, or other purposes. Please see attending physicians note to obtain clinical information on this patient. * Student Progress Note Please refer to attendings daily rounding note for full details Patient: Srinath Pichardo MRN: [id number]  CSN: [id number] YOB: 1955  Age: 66 y.o. Sex: female DOA: 10/2/2018 LOS:  LOS: 1 day Chief Complaint:  dyspnea Subjective:  
Srinath Pichardo is a 65 yo female with pmh stage 4Lung cancer, malignant pleural effusion s/p pleur-x catheter placement, hemorrhagic pericardial effusion s/p pericardiocentesis, PE, DVT s/p IVC filter placement, HTN and PAD who presents with c/c of shortness of breathing. Pt originally presented at [facility] with worsening SOB. She recently had a pleur-x cath placed 9/24/18. Over the past few days, the cath has not been draining any fluid. She was transferred to [facility] d/t her CT surgeon being here. In the ER, she was on 5L of O2, though normally she is only on 2L. On 9/16/18, CTA showed Left main pulmonary artery pulmonary embolism extending into the left lobar arteries. D/t her recent hemorrhagic pericardial effusion (8/20/18), she was not placed on anticoagulation. Objective:  
  
Visit Vitals  /69  Pulse (!) 101  Temp 97.8 °F (36.6 °C)  Resp 28  Wt 78.9 kg (174 lb)  SpO2 93%  Breastfeeding No  
 BMI 27.25 kg/m2 Physical Exam: 
Gen:  
HEENT:  
CV:  
Resp: 
Abd:  
Extrem:   
Skin:  
Neuro:  
 
 
I have reviewed the patient's Labs and Radiology studies. Assessment/Plan: 1. Acute hypoxic respiratory failure- Likely 2/2 stage 4 adenocarcinoma of lung, PEs pleural effusion. Pulmonary consulted. Give O2, bronchodilators and continue pleurx draining. May need to replace tube. 2. Stage 4 Adenocarcinoma Lung Cancer with driss to liver. Consulted palliative and Cardiothoracic. 3. Recurring PE. Hold anticoagulation d/t hemorrhagic pericardial effusion 4. Extensive B/L DVT s/p IVC filter placement Ligia Mar 
10/3/2018 12:16 PM

## 2018-10-03 NOTE — ROUTINE PROCESS
Spoke with palliative and family. Patient and family  agreeable to hospice/ comfort care. Morphine 1mg IVP given per patient request. Pt on 6L NC, appears more comfortable. L Pleural pigtail catheter intact and attached to chest tube. Family at bedside, Pt with out complaints. 1700/ 2mg IVP morphine given for c/o air hunger. 1715/ Decrease work of breathing noted with 2mg IVP morphine. Pt resting with eyes closed, appears comfortable 
1800/ Per pt request, 18F howard catheter placed without difficulty. Pt tolerated well, 200ml clear urine output. 1830/ Lorazepam 0.5mg Oral given for c/o anxiety. 1900/ morphine 2mg IVP given for S+S of air hunger, C/o SOB. 1927/ Pt transfer to bed 547. Report given to [facility]. Ativan multi-dose vial hand delivered by my self and given to gail for Pt dosing.

## 2018-10-03 NOTE — CONSULTS
CARDIOTHORACIC SURGERY CONSULTATION NOTE 
 
10/3/2018 
1:59 PM 
 
I am seeing this patient again in consultation at the request of Dr. Olamide Cano, with whom I have discussed the patient's history and test results, specifically the chest radiograph. I have also reviewed her records and pertinent studies. Tammy Quiñonez is a 66 y.o. female who presents with worsening SOB. 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 As she was felt to need anticoagulation for this, she was transferred to here for cardiac surgical backup in case the effusion recurred. She was eventually not anticoagulated, and given a large left pleural effusion, a left Pleurex catheter was placed and she was discharged home. She presented last week with increasing SOB due to no attempts to use the Pleurex catheter while at home, and once this was resumed, her SOB improved and she was discharged home. She now present again with worsening SOB which she senses is mainly from anxiety. A bedside echo at the [facility] ED yesterday revealed a small pericardial effusion without tamponade. However, she was unable to lie flat for CT scan, and she was again transferred here. She states that the home health nurses had tried to remove fluid from the Pleurex catheter twice the past week without much success. Her CXR meanwhile demonstrates an increase in the left effusion. Cardiac risk factors include hypertension. There is no recent history of smoking/ tobacco exposure, family history, dyslipidemia, diabetes mellitus. Past Medical History:  
Diagnosis Date  Acute deep vein thrombosis (DVT) (HCC)  Bone [facility] ([facility] [facility]  Cancer ([facility] [address] lung cancer  HTN (hypertension) 9/16/2018  PAD (peripheral artery disease) [facility] [address] [facility]  Pericardial effusion Past Surgical History:  
Procedure Laterality Date  VASCULAR SURGERY PROCEDURE UNLIST Right   
 opened up vessels She has had a Mediport placed recently but it has not been used. Present medications include Current Facility-Administered Medications Medication Dose Route Frequency Provider Last Rate Last Dose  ondansetron (ZOFRAN) injection 4 mg  4 mg IntraVENous Q4H PRN Eliseo Felton NP      
 LORazepam (INTENSOL) 2 mg/mL oral concentrate 0.5 mg  0.5 mg Oral Q1H PRN Eliseo Felton NP      
 senna-docusate (PERICOLACE) 8.6-50 mg per tablet 2 Tab  2 Tab Oral BID PRN Eliseo Felton NP      
 morphine injection 1 mg  1 mg IntraVENous Q2H PRN Eliseo Felton NP      
 acetaminophen (TYLENOL) suppository 325 mg  325 mg Rectal Q4H PRN Eliseo Felton NP      
 bisacodyl (DULCOLAX) suppository 10 mg  10 mg Rectal DAILY PRN Castro Rosenberg MD      
 dronabinol (MARINOL) capsule 2.5 mg  2.5 mg Oral BID Castro Rosenberg MD   2.5 mg at 10/03/18 3594  levothyroxine (SYNTHROID) tablet 75 mcg  75 mcg Oral ACB Castro Rosenberg MD   75 mcg at 10/03/18 0929  
 polyethylene glycol (MIRALAX) packet 17 g  17 g Oral DAILY Castro Rosenberg MD      
 0.9% sodium chloride infusion  25 mL/hr IntraVENous CONTINUOUS Eliseo Felton NP 50 mL/hr at 10/03/18 0124 50 mL/hr at 10/03/18 0124  
 metoprolol tartrate (LOPRESSOR) tablet 12.5 mg  12.5 mg Oral Q12H Castro Rosenberg MD   12.5 mg at 10/03/18 5692 Drug allergies: No Known Allergies Family History: There is not a history of heart disease in the family. Social History:  She is a former smoker and uses alcohol rarely. REVIEW OF SYSTEMS:  
She notes a persistent cough and some weight loss; otherwise, the remainder of a 12-domain Review of Systems was completed and was negative. PHYSICAL EXAMINATION: 
Vital signs:  
BP Readings from Last 3 Encounters:  
10/03/18 120/69 10/02/18 129/71  
10/01/18 112/75 Temp (24hrs), Av.8 °F (36.6 °C), Min:97.6 °F (36.4 °C), Max:98.2 °F (36.8 °C) Wt Readings from Last 3 Encounters:  
10/03/18 78.9 kg (174 lb) 10/02/18 75.8 kg (167 lb)  
18 77.6 kg (171 lb) Ht Readings from Last 3 Encounters:  
10/02/18 5' 7\" (1.702 m) 18 5' 7\" (1.702 m) 18 5' 7\" (1.702 m) General appearance:  She appeared well-nourished and of medium build. Integument: The skin was cool and dry and had good turgor. There were not lower extremity venous stasis changes. Head and Neck: The head was normocephalic and was not atraumatic. The neck was supple with good range of motion with a large solid and minimally mobile left sided supraclavicular mass present with no external skin breakdown. Thyromegaly was absent. EENMT: Conjunctivae were not injected. The sclerae were icteric, and the nares were patent bilaterally. Oral mucosa were not moist.  Dentition was poor. Back:  She was a bit kyphotic. Lungs: Respirations were labored, and the lungs were not clear to auscultation bilaterally, with a few rhonchi on the left and diminished breath sounds bilaterally. Heart: The rate and rhythm were regular and rapid, without an S3, without JVD, and without a murmur. Abdomen: The abdomen was globoid and was soft and was not tender, with scant bowel sounds. Vascular: Carotid pulses were 1+ bilaterally without bruits Extremities: Pedal edema was absent. Neurologic: She was alert and oriented, and was a fair historian. Psychiatric: She was pleasant, anxious, and had a normal affect. LABORATORIES:  
Lab Results Component Value Date/Time WBC 11.6 10/03/2018 02:45 AM  
 HCT 24.7 (L) 10/03/2018 02:45 AM  
  10/03/2018 02:45 AM  
  
Lab Results Component Value Date/Time   10/03/2018 02:45 AM  
 K 4.5 10/03/2018 02:45 AM  
 CL 98 (L) 10/03/2018 02:45 AM  
 CO2 28 10/03/2018 02:45 AM  
 GLU 95 10/03/2018 02:45 AM  
 BUN 13 10/03/2018 02:45 AM  
 CREA 0.44 (L) 10/03/2018 02:45 AM  
 CREA 0.75 10/02/2018 08:48 AM  
 CREA 0.56 (L) 09/25/2018 05:40 AM  
 
The chest x-ray image, which I have personally reviewed, demonstrates no significant change, with a left pleural effusion that has re-accumulated and a right upper lobe mass. The EKG print-out, which I have personally reviewed, shows sinus tachycardia with PACs. Impression: 
Diagnoses: 
1. Cancer of the lung, right upper lobe 2. Pleural effusion, left malignant 3. Essential hypertension 4. Pulmonary embolus 5. DVT Her SOB is likely from re-accumulation of her left pleural effusion; this needs to be confirmed by CT scanning it the Pleurex catheter can not be used to drain the effusion. It should be connected to continuous suction at this point. The SOB may also be from re-accumulation of her pericardial effusion, so an echo is in order. Also could be from new or extension of past PE. Recommendations:   
Obtain echo. Chest CT scan if able. Pleurex catheter to suction. I spent over 50 minutes seeing and examining the patient and reviewing her pertinent studies and speaking with her, and also with her ED physician.  
 
 
Coreen Barger MD

## 2018-10-03 NOTE — CONSULTS
[facility] Pulmonary, Critical Care, and Sleep Medicine Initial Patient Consult Name: Luigi Phalen MRN: [id number] : 1955 Hospital: [address] [place] [address] Dr Date: 10/3/2018 IMPRESSION:  
· Worsening dyspnea - Multifactorial with progressive  stage 4 adenocarcinoma of lung (with diffuse mets, left malignant effusion), recurrent PE not on anticoagulation, pericardial effusion (recent hemorrhagic paracentesis on 18 while on heparin, cytology negative) and component of anxiety-panic. · Adenocarcinoma of RUL with diffuse mets, malignant left pleural effusion. mediport placed on 18. Awaiting for palliative chemotherapy with Dr. Bettye Bolton. · Pericardial effusion, drained 18 hemorrhagic while on heparin for DVT/PE, cytology negative. Still it could be malignant vs reactive. Recurrence of effusion on echo · Recurrent PE (CTA 18: left main PA PE with extension into JUHI. · B/l DVT s/p IVC filter. Not on anticoagulation due to hemorrhagic pericardial effusion. · Chronic hypoxic respiratory failure. · Anemia 
· HTN 
· hypothyroidism RECOMMENDATIONS:  
· Oxygen- titrate to SaO2 goals >92% · Bronchodilators prn · Continue Pleurx drainage- may have self pleurodhesed · Aspiration precautions · Await cardiothoracic recommendations after pericardial drainage · High risk to resume anticoagulation · Treatment aimed at palliation · Assess home Oxygen, Pleurx needs at discharge · DVT, PUD prophylaxis Subjective: This patient has been seen and evaluated at the request of Dr. Gerardo Nelson for  SOB, Pleural effusion. Patient is a 66 y.o. female with a PMH of metastatic Lung cancer, malignant pleural effusion s/p pleur-x catheter placement, hemorrhagic pericardial effusion s/p pericardiocentesis, PE, DVT s/p IVC filter placement, HTN and PAD who presents with c/c of shortness of breathing. Patient initially went to [facility] with worsening of shortness of breathing. She has pleur-x catheter placement on 09/24/18. Home health was managing and her son also is taking care of her at home. The catheter is not draining anything for the past few days. She was getting more short of breath. Pulmonary has seen her over [facility] and recommend to transfer to [facility] where her cardiothoracic surgery is. She was on bipap in their ED but here she is on O2 by NS and O2 sat is at 100%. She is confused. No fever or chills reported. She had CTA on 09/16/18 that showed Left main pulmonary artery pulmonary embolism extending into the left lobar arteries. she is not on anticoagulation due to the hemorrhagic pericardial effusion. She was recently d/lauryn on 09/25/18. During this time she was suspected to have postobstructive pneumonia and discharged with Augmentin. Family is thinking about hospice but they want to keep her full code for now. Patient tells me she gets very anxious and panic when she cant breathe. Past Medical History:  
Diagnosis Date  Acute deep vein thrombosis (DVT) (HCC)  Bone cancer ([facility] [address]  Cancer ([facility] [address] lung cancer  HTN (hypertension) 9/16/2018  PAD (peripheral artery disease) ([facility] [address]  Pericardial effusion Past Surgical History:  
Procedure Laterality Date  VASCULAR SURGERY PROCEDURE UNLIST Right   
 opened up vessels Prior to Admission medications Medication Sig Start Date End Date Taking? Authorizing Provider OXYGEN-AIR DELIVERY SYSTEMS 3 L/min by Nasal route continuous. Jyothi Salazar MD  
dronabinol (MARINOL) 2.5 mg capsule Take 1 Cap by mouth two (2) times a day. Max Daily Amount: 5 mg. 9/25/18   Ezekiel Zelaya MD  
polyethylene glycol (MIRALAX) 17 gram packet Take 1 Packet by mouth daily. 9/25/18   Ezekiel Zelaya MD  
lactobacillus sp. 50 billion cpu (BIO-K PLUS) 50 billion cell -375 mg cap capsule Take 1 Cap by mouth daily for 7 days.  9/26/18 10/3/18  Tyler Foil MD Rambo  
senna-docusate (PERICOLACE) 8.6-50 mg per tablet Take 2 Tabs by mouth daily. Take with a full glass of water. HOLD for loose stool. 9/25/18   Ezekiel Zelaya MD  
bisacodyl (DULCOLAX, BISACODYL,) 10 mg suppository Insert 10 mg into rectum daily as needed. 9/25/18   Ezekiel Zelaya MD  
oxyCODONE ER (OXYCONTIN) 10 mg ER tablet Take 1 Tab by mouth every twelve (12) hours. Max Daily Amount: 20 mg. 9/25/18   Ezekiel Zelaya MD  
oxyCODONE-acetaminophen (PERCOCET) 5-325 mg per tablet Take 1 Tab by mouth every six (6) hours as needed. Max Daily Amount: 4 Tabs. 9/25/18   Ezekiel Zelaya MD  
naloxone [facility]) 4 mg/actuation nasal spray Use 1 spray intranasally, then discard. Repeat with new spray every 2 min as needed for opioid overdose symptoms, alternating nostrils. 9/25/18   Ezekiel Zelaya MD  
levothyroxine (SYNTHROID) 75 mcg tablet Take 1 Tab by mouth Daily (before breakfast). 8/29/18   Jyothi Salazar MD  
metoprolol tartrate (LOPRESSOR) 25 mg tablet Take 0.5 Tabs by mouth two (2) times a day. Patient taking differently: Take 25 mg by mouth two (2) times a day. 8/28/18   Jyothi Salazar MD  
 
No Known Allergies Social History Substance Use Topics  Smoking status: Former Smoker  Smokeless tobacco: Never Used  Alcohol use Yes Comment: occasionally History reviewed. No pertinent family history. Current Facility-Administered Medications Medication Dose Route Frequency  dronabinol (MARINOL) capsule 2.5 mg  2.5 mg Oral BID  levothyroxine (SYNTHROID) tablet 75 mcg  75 mcg Oral ACB  polyethylene glycol (MIRALAX) packet 17 g  17 g Oral DAILY  0.9% sodium chloride infusion  50 mL/hr IntraVENous CONTINUOUS  
 heparin (porcine) injection 5,000 Units  5,000 Units SubCUTAneous Q8H  
 metoprolol tartrate (LOPRESSOR) tablet 12.5 mg  12.5 mg Oral Q12H Review of Systems: A comprehensive review of systems was negative except for that written in the HPI. Objective:  
Vital Signs:   
Visit Vitals  /89  Pulse 98  Temp 97.8 °F (36.6 °C)  Resp 16  Wt 78.9 kg (174 lb)  SpO2 100%  Breastfeeding No  
 BMI 27.25 kg/m2 O2 Device: Nasal cannula O2 Flow Rate (L/min): 6 l/min Temp (24hrs), Av.8 °F (36.6 °C), Min:97.4 °F (36.3 °C), Max:98.2 °F (36.8 °C) Intake/Output:  
Last shift:        
Last 3 shifts: 10/01 190 - 10/03 0700 In: 602.5 [I.V.:602.5] Out: 150 [Urine:150] Intake/Output Summary (Last 24 hours) at 10/03/18 9026 Last data filed at 10/03/18 7054 Gross per 24 hour Intake            602.5 ml Output              150 ml Net            452.5 ml Physical Exam:  
General:  Alert, cooperative, no distress, appears stated age. Head:  Normocephalic, without obvious abnormality, atraumatic. Eyes:  Conjunctivae/corneas clear. PERRL, EOMs intact. Nose: Nares normal. Septum midline. Mucosa normal. No drainage or sinus tenderness. Throat: Lips, mucosa, and tongue normal. Teeth and gums normal.  
Neck: Supple, symmetrical, trachea midline, no adenopathy, thyroid: no enlargment/tenderness/nodules, no carotid bruit and no JVD. Back:   Symmetric, no curvature. ROM normal.  
Lungs:   Clear to auscultation bilaterally. - decreased breath sounds at bases Chest wall:  mediport on right, pleurx on left Heart:  Regular rate and rhythm, S1, S2 normal, no murmur, click, rub or gallop. Abdomen:   Soft, non-tender. Bowel sounds normal. No masses,  No organomegaly. Extremities: Extremities normal, atraumatic, no cyanosis or edema. Pulses: 2+ and symmetric all extremities. Skin: Skin color, texture, turgor normal. No rashes or lesions Lymph nodes: Cervical, supraclavicular, and axillary nodes normal.  
Neurologic: Grossly nonfocal  
 
Data review:  
 
Recent Results (from the past 24 hour(s)) POC G3 Collection Time: 10/02/18 10:32 AM  
Result Value Ref Range Device: Non rebreather FIO2 (POC) 100 % pH (POC) 7.428 7.35 - 7.45    
 pCO2 (POC) 49.2 (H) 35.0 - 45.0 MMHG  
 pO2 (POC) 261 (H) 80 - 100 MMHG  
 HCO3 (POC) 32.5 (H) 22 - 26 MMOL/L  
 sO2 (POC) 100 (H) 92 - 97 % Base excess (POC) 8 mmol/L Allens test (POC) YES Total resp. rate 28 Site RIGHT BRACHIAL Specimen type (POC) ARTERIAL Performed by Fiordaliza Clear URINALYSIS W/MICROSCOPIC Collection Time: 10/02/18  6:50 PM  
Result Value Ref Range Color DARK YELLOW Appearance CLOUDY Specific gravity 1.026 1.005 - 1.030    
 pH (UA) 5.0 5.0 - 8.0 Protein 30 (A) NEG mg/dL Glucose NEGATIVE  NEG mg/dL Ketone TRACE (A) NEG mg/dL Bilirubin SMALL (A) NEG Blood NEGATIVE  NEG Urobilinogen 1.0 0.2 - 1.0 EU/dL Nitrites NEGATIVE  NEG Leukocyte Esterase NEGATIVE  NEG    
 WBC 1 to 3 0 - 4 /hpf  
 RBC NONE 0 - 5 /hpf Epithelial cells 3+ 0 - 5 /lpf Bacteria 2+ (A) NEG /hpf Mucus 2+ (A) NEG /lpf Amorphous Crystals FEW (A) NEG Hyaline cast 4 to 6 0 - 2 /lpf METABOLIC PANEL, BASIC Collection Time: 10/03/18  2:45 AM  
Result Value Ref Range Sodium 136 136 - 145 mmol/L Potassium 4.5 3.5 - 5.5 mmol/L Chloride 98 (L) 100 - 108 mmol/L  
 CO2 28 21 - 32 mmol/L Anion gap 10 3.0 - 18 mmol/L Glucose 95 74 - 99 mg/dL BUN 13 7.0 - 18 MG/DL Creatinine 0.44 (L) 0.6 - 1.3 MG/DL  
 BUN/Creatinine ratio 30 (H) 12 - 20 GFR est AA >60 >60 ml/min/1.73m2 GFR est non-AA >60 >60 ml/min/1.73m2 Calcium 8.7 8.5 - 10.1 MG/DL  
CBC WITH AUTOMATED DIFF Collection Time: 10/03/18  2:45 AM  
Result Value Ref Range WBC 11.6 4.6 - 13.2 K/uL  
 RBC 2.99 (L) 4.20 - 5.30 M/uL HGB 7.5 (L) 12.0 - 16.0 g/dL HCT 24.7 (L) 35.0 - 45.0 % MCV 82.6 74.0 - 97.0 FL  
 MCH 25.1 24.0 - 34.0 PG  
 MCHC 30.4 (L) 31.0 - 37.0 g/dL  
 RDW 19.7 (H) 11.6 - 14.5 % PLATELET 333 509 - 482 K/uL MPV 9.5 9.2 - 11.8 FL  
 NEUTROPHILS 75 (H) 40 - 73 % LYMPHOCYTES 16 (L) 21 - 52 % MONOCYTES 8 3 - 10 % EOSINOPHILS 1 0 - 5 % BASOPHILS 0 0 - 2 %  
 ABS. NEUTROPHILS 8.6 (H) 1.8 - 8.0 K/UL  
 ABS. LYMPHOCYTES 1.8 0.9 - 3.6 K/UL  
 ABS. MONOCYTES 1.0 0.05 - 1.2 K/UL  
 ABS. EOSINOPHILS 0.2 0.0 - 0.4 K/UL  
 ABS. BASOPHILS 0.0 0.0 - 0.1 K/UL  
 DF AUTOMATED Imaging: 
I have personally reviewed the patients radiographs and have reviewed the reports: XR Results (most recent): 
 
Results from Hospital Encounter encounter on 10/02/18 XR CHEST PORT Narrative EXAM: PORTABLE CHEST EXAM: PORTABLE CHEST 1648 hours CLINICAL HISTORY/INDICATION: Increasing shortness of breath, nondraining left 
pleural evacuation catheter, known malignant effusion, pericardial effusion, 
lung cancer COMPARISON: Chest x-ray 10/2/2018 at 0904 hours, September 23, 2018, September 1, 2018. TECHNIQUE: Single AP view FINDINGS:  
 
No change in the right jugular approach MediPort terminating at the distal 
superior vena cava. No change in the position of the left basal pleural 
evacuation catheter. Likely no change in the large left pleural effusion 
compared to the exam earlier same day but increased compared to September 27 and September 25, 2018. Focal airspace disease right upper lobe unchanged. Right 
costophrenic angle is sharp. Pulmonary vascularity is normal. The heart remains 
enlarged. Mercedes Rodriguez Impression IMPRESSION: 
 
No change in large left pleural effusion compared to the exam performed earlier 
same day but increased compared to earlier exams. Persistent dense consolidation/airspace disease throughout the left lower lobe 
as well as the right upper lobe. Stable cardiomegaly. CT Results (most recent): 
 
Results from Hospital Encounter encounter on 09/16/18 CT HEAD WO CONT Narrative EXAM: CT head INDICATION: Non-small cell lung cancer, staging, hypertension, anemia. COMPARISON: Correlation brain MRI 8/17/2018. TECHNIQUE: Axial CT imaging of the head  was obtained from skull base to vertex 
without intravenous contrast. Coronal and sagittal reconstructions were 
obtained. One or more dose reduction techniques were used on this CT: automated exposure 
control, adjustment of the mAs and/or kVp according to patient's size, and 
iterative reconstruction techniques. The specific techniques utilized on this CT 
exam have been documented in the patient's electronic medical record. 
 
_______________ FINDINGS: 
 
BRAIN AND POSTERIOR FOSSA: The sulci, folia, ventricles and basal cisterns are 
within normal limits for the patient?s age. There is no intracranial hemorrhage, 
mass effect, or shift of midline structures. There are no areas of abnormal 
parenchymal attenuation. EXTRA-AXIAL SPACES AND MENINGES: There are no abnormal extra-axial fluid 
collections or mass. Small benign-appearing dural calcification right side 
tentorium. CALVARIUM: No acute osseous abnormality. SINUSES: Mild mucosal thickening roof of left ethmoid sinus. Mastoids clear. OTHER: None. 
 
_______________ Impression IMPRESSION: 
 
No evidence of intracranial metastatic disease or other acute or significant 
intracranial finding. 09/16/18 ECHO ADULT FOLLOW-UP OR LIMITED 09/19/2018 9/19/2018 Narrative · Estimated left ventricular ejection fraction is 61 - 65%. Normal left  
ventricular wall motion, no regional wall motion abnormality noted. Mild  
(grade 1) left ventricular diastolic dysfunction. · Mild tricuspid valve regurgitation is present. Pulmonary arterial  
systolic pressure is 23 mmHg. There is no evidence of pulmonary  
hypertension. · Trivial pericardial effusion. No indications of tamponade present. There  
is a left pleural effusion.  
   
  Signed by: Kennyth Linden, MD Lacy Schilder, MD

## 2018-10-03 NOTE — PROGRESS NOTES
[facility] Hospitalist Group Progress Note Patient: Kulwant Code Age: 66 y.o. : 1955 MR#: [id number] SSN: [id number] Date: 10/3/2018 Subjective:  
 
Pt reports SOB and air hunger and is requesting Morphine. No chest pain. No abd pain, N/V. D/w hospice nurse and palliative NP. D/w son at bedside. Assessment/Plan: 1. Acute hypoxic respiratory failure: likely advanced lung CA. Cont NC O2 
2. Metastatic Lung cancer: follows [facility] as OP. Mediport placed on 18. Now considering hospice, on comfort measures. 3. Malignant pleural effusion s/p pleur-x catheter placement, 18 4. Recent hemorrhagic pericardial effusion s/p pericardiocentesis, echo done on ED with mild effusion with no signs of tamponade. 5. Recent Left main pulmonary artery pulmonary embolism extending into the left lobar arteries, not on anticoagulation secondary to #4 6. DVT s/p IVC filter placement 7. HTN, controlled: BB per hold parameters 8. PAD 9. Lactic Acidosis Goals of care: DNR, comfort measures, family to discuss hospice. POST formed signed. Palliative team following. Increase Morphine to 2mg q 2 hrs. D/w palliative Disposition:     [x] Case management referral 
 
Case discussed with:  [x]Patient  [x]Family  [x]Nursing  []Case Management DVT Prophylaxis:  []Lovenox  []Hep SQ  [x]SCDs  []Coumadin   []On Heparin gtt Objective:  
VS:  
Visit Vitals  /69  Pulse (!) 101  Temp 97.8 °F (36.6 °C)  Resp 28  Wt 78.9 kg (174 lb)  SpO2 93%  Breastfeeding No  
 BMI 27.25 kg/m2 Tmax/24hrs: Temp (24hrs), Av.8 °F (36.6 °C), Min:97.6 °F (36.4 °C), Max:98.2 °F (36.8 °C) Intake/Output Summary (Last 24 hours) at 10/03/18 1721 Last data filed at 10/03/18 1703 Gross per 24 hour Intake          1066.24 ml Output              210 ml Net           856.24 ml General:  Awake, alert, NAD, appears to be in mild distress Cardiovascular:  Tachycardic, regular rhythm Pulmonary: Coarse breath sounds, mild resp distress GI:  NT, normal BS Extremities:  Trace edema, no cyanosis Additional: yellow pleural fluid noted in vacutainer. Labs:   
Recent Results (from the past 24 hour(s)) URINALYSIS W/MICROSCOPIC Collection Time: 10/02/18  6:50 PM  
Result Value Ref Range Color DARK YELLOW Appearance CLOUDY Specific gravity 1.026 1.005 - 1.030    
 pH (UA) 5.0 5.0 - 8.0 Protein 30 (A) NEG mg/dL Glucose NEGATIVE  NEG mg/dL Ketone TRACE (A) NEG mg/dL Bilirubin SMALL (A) NEG Blood NEGATIVE  NEG Urobilinogen 1.0 0.2 - 1.0 EU/dL Nitrites NEGATIVE  NEG Leukocyte Esterase NEGATIVE  NEG    
 WBC 1 to 3 0 - 4 /hpf  
 RBC NONE 0 - 5 /hpf Epithelial cells 3+ 0 - 5 /lpf Bacteria 2+ (A) NEG /hpf Mucus 2+ (A) NEG /lpf Amorphous Crystals FEW (A) NEG Hyaline cast 4 to 6 0 - 2 /lpf CULTURE, URINE Collection Time: 10/02/18  6:50 PM  
Result Value Ref Range Special Requests: NO SPECIAL REQUESTS Culture result: CULTURE IN PROGRESS,FURTHER UPDATES TO FOLLOW METABOLIC PANEL, BASIC Collection Time: 10/03/18  2:45 AM  
Result Value Ref Range Sodium 136 136 - 145 mmol/L Potassium 4.5 3.5 - 5.5 mmol/L Chloride 98 (L) 100 - 108 mmol/L  
 CO2 28 21 - 32 mmol/L Anion gap 10 3.0 - 18 mmol/L Glucose 95 74 - 99 mg/dL BUN 13 7.0 - 18 MG/DL Creatinine 0.44 (L) 0.6 - 1.3 MG/DL  
 BUN/Creatinine ratio 30 (H) 12 - 20 GFR est AA >60 >60 ml/min/1.73m2 GFR est non-AA >60 >60 ml/min/1.73m2 Calcium 8.7 8.5 - 10.1 MG/DL  
CBC WITH AUTOMATED DIFF Collection Time: 10/03/18  2:45 AM  
Result Value Ref Range WBC 11.6 4.6 - 13.2 K/uL  
 RBC 2.99 (L) 4.20 - 5.30 M/uL HGB 7.5 (L) 12.0 - 16.0 g/dL HCT 24.7 (L) 35.0 - 45.0 % MCV 82.6 74.0 - 97.0 FL  
 MCH 25.1 24.0 - 34.0 PG  
 MCHC 30.4 (L) 31.0 - 37.0 g/dL  
 RDW 19.7 (H) 11.6 - 14.5 % PLATELET 183 609 - 616 K/uL MPV 9.5 9.2 - 11.8 FL  
 NEUTROPHILS 75 (H) 40 - 73 % LYMPHOCYTES 16 (L) 21 - 52 % MONOCYTES 8 3 - 10 % EOSINOPHILS 1 0 - 5 % BASOPHILS 0 0 - 2 %  
 ABS. NEUTROPHILS 8.6 (H) 1.8 - 8.0 K/UL  
 ABS. LYMPHOCYTES 1.8 0.9 - 3.6 K/UL  
 ABS. MONOCYTES 1.0 0.05 - 1.2 K/UL  
 ABS. EOSINOPHILS 0.2 0.0 - 0.4 K/UL  
 ABS. BASOPHILS 0.0 0.0 - 0.1 K/UL  
 DF AUTOMATED Signed By: Pema Franz PA-C  October 3, 2018 5:21 PM

## 2018-10-03 NOTE — PROGRESS NOTES
Pt is SOB on exertion. Incontinent care done. Respiiration 26-32 -140 for few seconds. /100 Dr Marce Collazo made aware with order rec'd. Morphine given as ordered pls see MAR. Will continue to monitor pt. 
0610 Pt is resting quietly. Breathing regular and non labored. 0740 Bedside shift change report given to Milton Macdonald (oncoming nurse) by Ernesto Anderson (offgoing nurse). Report given with SBAR, Kardex, Intake/Output, MAR and Recent Results. Kept pt NPO. Pleural X cath in place left lateral dressing to site dry and intact. Patient get easily SOB when voiding but pt refused catheter or purwick

## 2018-10-03 NOTE — PROGRESS NOTES
NUTRITION Nutrition Screen RECOMMENDATIONS / PLAN:  
 
- Provide soft solid diet as tolerated. - Add supplements: Ensure Enlive TID per pt request. 
- Continue RD inpatient monitoring and evaluation. Provide nutrition interventions consistent with plan and goals of care. NUTRITION INTERVENTIONS & DIAGNOSIS:  
 
[x] Meals/snacks: modify composition, initiate- obtained verbal order from NP to start diet per pt preferences [x] Medical food supplement therapy: initiate [x] Collaboration and referral of nutrition care: pt discussed with Palliative Nutrition Diagnosis: Unintended weight loss related to inadequate energy intake as evidenced by 14 lb, 8% weight loss x several months. Patient meets criteria for Severe Protein Calorie Malnutrition as evidenced by:  
ASPEN Malnutrition Criteria Acute Illness, Chronic Illness, or Social/Enviornmental: Chronic illness Energy Intake: <75% est energy req for greater than/equal to 1 month Weight Loss: Greater than 7.5% x 3 mos ASPEN Malnutrition Score - Chronic Illness: 7 Chronic Illness - Malnutrition Diagnosis: Severe malnutrition. ASSESSMENT:  
 
Hospice and Palliative following, pt to receive comfort measures only and may have diet as tolerated per pt preferences. Pt asking for baked chicken and Ensure supplements. Family at bedside with patient. Average po intake adequate to meet patients estimated nutritional needs:   [] Yes     [x] No   [] Unable to determine at this time Diet: DIET ONE TIME MESSAGE Food Allergies: NKFA Current Appetite:   [] Good     [] Fair     [x] Poor     [] Other: 
Appetite/meal intake prior to admission:   [] Good     [] Fair     [x] Poor x several months, not eating or consuming 50% or less of usual meal intake      [] Other: 
Feeding Limitations:  [] Swallowing difficulty    [] Chewing difficulty    [] Other: 
Current Meal Intake: No data found. BM: 10/1 Skin Integrity: WDL 
 Edema:   [x] No     [] Yes Pertinent Medications: Reviewed Recent Labs 10/03/18 
 0245  10/02/18 
 0848 NA  136  134* K  4.5  4.8  
CL  98*  95* CO2  28  28 GLU  95  102* BUN  13  13 CREA  0.44*  0.75 CA  8.7  9.7 ALB   --   2.0*  
SGOT   --   25 ALT   --   16 Intake/Output Summary (Last 24 hours) at 10/03/18 1346 Last data filed at 10/03/18 4490 Gross per 24 hour Intake            602.5 ml Output              150 ml Net            452.5 ml Anthropometrics: 
Ht Readings from Last 1 Encounters:  
10/02/18 5' 7\" (1.702 m) Last 3 Recorded Weights in this Encounter 10/02/18 1940 10/03/18 0400 Weight: 77.3 kg (170 lb 6.4 oz) 78.9 kg (174 lb) Body mass index is 27.25 kg/(m^2). Weight History: patient reports usual weight of 181 lb from several months PTA and weight loss down to 167 lb (14 lb, 8% weight loss x several months) Weight Metrics 10/3/2018 10/2/2018 10/2/2018 10/2/2018 9/26/2018 9/25/2018 9/19/2018 Weight 174 lb - 167 lb - 171 lb 187 lb 4.8 oz -  
BMI - 27.25 kg/m2 - 26.16 kg/m2 26.78 kg/m2 - 29.34 kg/m2 Admitting Diagnosis: Dyspnea Pertinent PMHx: lung cancer, HTN, PAD, PE, DVT Education Needs:        [x] None identified  [] Identified - Not appropriate at this time  []  Identified and addressed - refer to education log Learning Limitations:   [x] None identified  [] Identified Cultural, Zoroastrian & ethnic food preferences:  [x] None identified    [] Identified and addressed ESTIMATED NUTRITION NEEDS:  
 
Calories: 6155-0356 kcal (HBEx1.2-1.3) based on  [x] Actual BW 79 kg     [] IBW Protein:  gm (1-1.5 gm/kg) based on  [x] Actual BW      [] IBW Fluid: 1 mL/kcal 
  
MONITORING & EVALUATION:  
 
Nutrition Goal(s): 1. Po intake of meals will meet >75% of patient estimated nutritional needs within the next 7 days.   Outcome:  [] Met/Ongoing    []  Not Met    [x] New/Initial Goal  
 
 Monitoring:   [x] Food and beverage intake   [x] Diet order   [x] Nutrition-focused physical findings   [x] Treatment/therapy   [] Weight   [] Enteral nutrition intake Previous Recommendations (for follow-up assessments only):     []   Implemented       []   Not Implemented (RD to address)      [] No Longer Appropriate     [] No Recommendation Made Discharge Planning: regular diet [x] Participated in care planning, discharge planning, & interdisciplinary rounds as appropriate Tien Rueda RD, [address]  Pager: [phone]

## 2018-10-03 NOTE — PROGRESS NOTES
Palliative Medicine Consult DR. HIRSCH'S \[facility]\"": [phone]) [facility]: [phone] [facility]/[address]: [phone] Patient Name: Nathalia Saavedra YOB: 1955 Date of Initial Consult: 10/3/2018 Reason for Consult: care decisions Requesting Provider: Dr. Elizabeth Carrillo Primary Care Physician: Carmel Baez DO 
  
 SUMMARY:  
Nathalia Saavedra is a 66y.o. year old with a past history of metastatic lung cancer to liver and bone, PE, not able to be anticoagulated due hemorrhagic pericardial effusion, hypertension, PAD, who was admitted on 10/2/2018 as a transfer from [facility] for CVT surgical support. She is very short of breath has not been able to start systemic chemotherapy. Pleur x flushed by CVT surgery, and connected to chest tube with very little drainage. Palliative medicine is consulted for care decisions and goals of care. PALLIATIVE DIAGNOSES:  
1. Advanced care plan discussions 2. Metastatic lung cancer 3. Shortness of breath 4. Debility PLAN:  
1. Advanced care plan discussions patient has executed an AMD naming her son Nirmal Mendoza as MPOA. Discussions today with patient and son Nirmal Mendoza at bedside. Patient upon entry to room was very short of breath, anxious. Placed on non re breather and given 1 mg of Morphine. Although able to answer most questions she did have a little confusion with conversation. Nirmal Mendoza ( son) was also at bedside. Ms. Ramya Gonzalez shared with us \" I feel like a pin cushion\" \" I am tired of suffering\" Son shared an excellent understanding of her disease progression. He tells us he does not wish to see her suffer and would not want to subject her to therapies which will not help her be comfortable. Goals of care discussed at some length including benefits and burdens. Nirmal Mendoza does not wish for his mother to have chest compressions, shock or intubation for any reason. DNR/DNI. Care decisions;  Very supportive family, Radha Espinosa feels she is suffering and would like to be \" comfortable\". Discussed this level of care at some length, including stopping all treatments which do not relate to her comfort. No IVAB, no further labs or x rays. Using Morphine for pain and dyspnea relief, understanding this is not curative. Ativan used for relief of anxiety. Side effects of medications including somnolence discussed. Family is interested in hospice serviced at home. POST form was signed DNR/DNI,  comfort measures and no feeding tubes. Hospice referral placed. Comfort measures order set in place. If helpful would keep Pleur x cath for comfort. Allow to eat and drink what is tolerable and pleasurable. She retains ability to swallow, would continue current oral medications. Worry air hunger will be hard to control. Will start with IV morphine may need PCA to help with symptoms. 2. Metastatic lung cancer has been seen by H/O. Goal was to start palliative chemo, but has not been able to do so. Family has decided to transition to a more comfort directed approach and home with hospice. 3. Shortness of breath had good relief with Morphine 1 mg much more comfortable appearing. Able to tolerate nasal cannula. Refused Bipap. 4. Debility shared with son she is no longer able to live alone. Son is willing to be primary care giver. 5. Initial consult note routed to primary continuity provider 6. Communicated plan of care with: Palliative IDT, attending, CM, RN, patient, family including son Rama Díaz GOALS OF CARE: 
Patient/Health Care Proxy Stated Goals: Comfort TREATMENT PREFERENCES:  
Code Status: DNR Advance Care Planning: 
Advance Care Planning 10/2/2018 Patient's Healthcare Decision Maker is: Named in scanned ACP document Primary Decision Maker Name Sarah Corrina. [place] Primary Decision Maker Phone Number [phone] Primary Decision Maker Relationship to Patient Adult child Secondary Decision Maker Name -  
 Secondary Decision Maker Phone Number - Secondary Decision Maker Relationship to Patient -  
Confirm Advance Directive Yes, not on file Patient Would Like to Complete Advance Directive - Does the patient have other document types - Medical Interventions: Comfort measures Artificially Administered Nutrition: No feeding tube Other: As far as possible, the palliative care team has discussed with patient / health care proxy about goals of care / treatment preferences for patient. HISTORY:  
 
History obtained from: chart, family, patient CHIEF COMPLAINT: shortness of breath HPI/SUBJECTIVE: The patient is:  
[x] Verbal and participatory somewhat limited due to conversational confusion  
[] Non-participatory due to:  
 
 
Clinical Pain Assessment (nonverbal scale for nonverbal patients): Clinical Pain Assessment Severity: 0 Duration: for how long has pt been experiencing pain (e.g., 2 days, 1 month, years) Frequency: how often pain is an issue (e.g., several times per day, once every few days, constant) FUNCTIONAL ASSESSMENT:  
 
Palliative Performance Scale (PPS): PPS: 20 
 
ECOG 
ECOG Status : Limited self-care PSYCHOSOCIAL/SPIRITUAL SCREENING:  
  
Any spiritual / Yazdanism concerns: 
[] Yes /  [x] No 
 
Caregiver Burnout: 
[] Yes /  [x] No /  [] No Caregiver Present Anticipatory grief assessment:  
[x] Normal  / [] Maladaptive REVIEW OF SYSTEMS:  
 
Positive and pertinent negative findings in ROS are noted above in HPI. The following systems were [] reviewed / [x] unable to be reviewed as noted in HPI Other findings are noted below. Systems: constitutional, ears/nose/mouth/throat, respiratory, gastrointestinal, genitourinary, musculoskeletal, integumentary, neurologic, psychiatric, endocrine. Positive findings noted below. Modified ESAS Completed by: provider Fatigue: 5 Pain: 0 Anxiety: 7 Dyspnea: 8 PHYSICAL EXAM:  
 
Wt Readings from Last 3 Encounters:  
10/03/18 78.9 kg (174 lb) 10/02/18 75.8 kg (167 lb)  
09/26/18 77.6 kg (171 lb) Blood pressure 120/69, pulse (!) 101, temperature 97.8 °F (36.6 °C), resp. rate 28, weight 78.9 kg (174 lb), SpO2 93 %, not currently breastfeeding. Pain: 
Pain Scale 1: Visual 
Pain Intensity 1: 5 Pain Location 1: Chest 
Pain Orientation 1: Mid 
Pain Description 1: Heaviness Pain Intervention(s) 1: Medication (see MAR) Constitutional: ill appearing female who is in mild distress due to shortness of breath but in NAD Eyes: pupils equal, anicteric ENMT: no nasal discharge, moist mucous membranes Cardiovascular: tachycardiac Respiratory: short of breath much improved after Morphine Skin: warm, dry Neurologic: alert oriented x 2 / 3 gets confused with some conversation Psychiatric: full affect HISTORY:  
 
Active Problems: Dyspnea (10/2/2018) Past Medical History:  
Diagnosis Date  Acute deep vein thrombosis (DVT) (HCC)  Bone cancer ([facility]  Cancer ([facility] lung cancer  HTN (hypertension) 9/16/2018  PAD (peripheral artery disease) ([facility]  Pericardial effusion Past Surgical History:  
Procedure Laterality Date  VASCULAR SURGERY PROCEDURE UNLIST Right   
 opened up vessels History reviewed. No pertinent family history. History reviewed, no pertinent family history. Social History Substance Use Topics  Smoking status: Former Smoker  Smokeless tobacco: Never Used  Alcohol use Yes Comment: occasionally No Known Allergies Current Facility-Administered Medications Medication Dose Route Frequency  ondansetron (ZOFRAN) injection 4 mg  4 mg IntraVENous Q4H PRN  
 LORazepam (INTENSOL) 2 mg/mL oral concentrate 0.5 mg  0.5 mg Oral Q1H PRN  
 senna-docusate (PERICOLACE) 8.6-50 mg per tablet 2 Tab  2 Tab Oral BID PRN  
 morphine injection 1 mg  1 mg IntraVENous Q2H PRN  
  acetaminophen (TYLENOL) suppository 325 mg  325 mg Rectal Q4H PRN  
 bisacodyl (DULCOLAX) suppository 10 mg  10 mg Rectal DAILY PRN  
 dronabinol (MARINOL) capsule 2.5 mg  2.5 mg Oral BID  levothyroxine (SYNTHROID) tablet 75 mcg  75 mcg Oral ACB  polyethylene glycol (MIRALAX) packet 17 g  17 g Oral DAILY  0.9% sodium chloride infusion  25 mL/hr IntraVENous CONTINUOUS  
 metoprolol tartrate (LOPRESSOR) tablet 12.5 mg  12.5 mg Oral Q12H  
 
 
 LAB AND IMAGING FINDINGS:  
 
Lab Results Component Value Date/Time WBC 11.6 10/03/2018 02:45 AM  
 HGB 7.5 (L) 10/03/2018 02:45 AM  
 PLATELET 487 32/31/7349 02:45 AM  
 
Lab Results Component Value Date/Time Sodium 136 10/03/2018 02:45 AM  
 Potassium 4.5 10/03/2018 02:45 AM  
 Chloride 98 (L) 10/03/2018 02:45 AM  
 CO2 28 10/03/2018 02:45 AM  
 BUN 13 10/03/2018 02:45 AM  
 Creatinine 0.44 (L) 10/03/2018 02:45 AM  
 Calcium 8.7 10/03/2018 02:45 AM  
 Magnesium 2.1 09/25/2018 05:40 AM  
 Phosphorus 3.2 09/23/2018 12:59 AM  
  
Lab Results Component Value Date/Time AST (SGOT) 25 10/02/2018 08:48 AM  
 Alk. phosphatase 267 (H) 10/02/2018 08:48 AM  
 Protein, total 8.4 (H) 10/02/2018 08:48 AM  
 Albumin 2.0 (L) 10/02/2018 08:48 AM  
 Globulin 6.4 (H) 10/02/2018 08:48 AM  
 
Lab Results Component Value Date/Time INR 1.3 (H) 09/24/2018 05:50 AM  
 Prothrombin time 15.5 (H) 09/24/2018 05:50 AM  
 aPTT 38.7 (H) 09/24/2018 05:50 AM  
  
Lab Results Component Value Date/Time Iron 16 (L) 08/20/2018 03:10 AM  
 TIBC 203 (L) 08/20/2018 03:10 AM  
 Iron % saturation 8 08/20/2018 03:10 AM  
  
No results found for: PH, PCO2, PO2 No components found for: [place] Lab Results Component Value Date/Time CK 37 09/16/2018 06:20 PM  
 CK - MB <1.0 09/16/2018 06:20 PM  
  
 
   
 
Total time: 110 minutes Counseling / coordination time, spent as noted above: 100 minutes  
> 50% counseling / coordination: yes with patient, family, attending Prolonged service was provided for  [x]30 min   []75 min in face to face time in the presence of the patient, spent as noted above. Time Start:  0993 Time End:    1200 Note: this can only be billed with 35544 (initial) or 98781 (follow up). If multiple start / stop times, list each separately.

## 2018-10-03 NOTE — ROUTINE PROCESS
Rec'd pt alert and oriented x4. Left side chest dressing dry and intact. Pt easiyt get SOB on exertion. O2 4L n/c Sat 99 %. Family at bedside. Call bell within reach. 2522 Dr Sunshine Fernandez here and notified Pleur X cath dressing dry and intact. MD aware the cath not connected to drainage at this time. No drainage noted to site. 0230 Pt easily get SOB on exertion.

## 2018-10-03 NOTE — PROGRESS NOTES
Spoke with palliative and family is now agreeable to hospice. Referral sent to [facility]. Spoke with pt's son, Hannah Leal, and is available to speak with hospice [phone]. Anticipate d/c on Friday. Bryan Araya, [phone]

## 2018-10-04 NOTE — PROGRESS NOTES
conducted an initial consultation and Spiritual Assessment for Milind, who is a 66 y.o.,female. Patients Primary Language is: [place]. According to the patients EMR Holiness Affiliation is: [facility].  
 
The reason the Patient came to the hospital is:  
Patient Active Problem List  
 Diagnosis Date Noted  Advanced care planning/counseling discussion 10/03/2018  Debility 10/03/2018  Dyspnea 10/02/2018  Chronic respiratory failure ([address] 09/19/2018  PE (physical exam), annual 09/19/2018  DVT (deep vein thrombosis) in pregnancy ([address] 09/19/2018  Pleural effusion 09/17/2018  Lung infiltrate 09/17/2018  Pulmonary emboli ([address] 09/17/2018  Cachexia ([address] 09/17/2018  SOB (shortness of breath) 09/16/2018  
 HTN (hypertension) 09/16/2018  Anemia 09/16/2018  Metastatic lung cancer (metastasis from lung to other site) [facility]) 08/16/2018  Leg DVT (deep venous thromboembolism), acute, left ([address] 08/16/2018  PAD (peripheral artery disease) ([address] 08/16/2018  Pericardial effusion 08/16/2018 The  provided the following Interventions: 
Initiated a relationship of care and support. Explored issues of eduardo, belief, spirituality and Shinto/ritual needs while hospitalized. Listened empathically. Provided chaplaincy education. Offered prayer and assurance of continued prayers on patient's behalf. Chart reviewed. The following outcomes where achieved: 
Patient shared limited information about both their medical narrative and spiritual journey/beliefs. Patient processed feeling about current hospitalization. Patient expressed gratitude for 's visit. Assessment: 
Patient does not have any Shinto/cultural needs that will affect patients preferences in health care. There are no spiritual or Shinto issues which require intervention at this time.   
 
Plan: 
Chaplains will continue to follow and will provide pastoral care on an as needed/requested basis.  recommends bedside caregivers page  on duty if patient shows signs of acute spiritual or emotional distress. [address] [facility]  
[phone]

## 2018-10-04 NOTE — PROGRESS NOTES
[facility] Hospitalist Group Progress Note Patient: Ashu Rae Age: 66 y.o. : 1955 MR#: [id number] SSN: [id number] Date: 10/4/2018 Subjective:  
 
Pt is resting. ROS is unobtainable. D/w son and sister at bedside. Assessment/Plan: 1. Acute hypoxic respiratory failure: likely advanced lung CA. Cont NC O2.  
2. Metastatic Lung cancer: follows [facility] as OP. Mediport placed on 18. Home with home hospice tomorrow, on comfort measures. 3. Malignant pleural effusion: s/p pleur-x catheter placement, 18. 4. Recent hemorrhagic pericardial effusion: s/p pericardiocentesis, echo done on ED with mild effusion with no signs of tamponade. 5. Recent Left main pulmonary artery pulmonary embolism extending into the left lobar arteries, not on anticoagulation secondary to #4 6. DVT s/p IVC filter placement 7. HTN, controlled: BB per hold parameters 8. PAD 9. Lactic Acidosis 
  
  
Goals of care: DNR, comfort measures, Home with home hospice tomorrow. POST formed signed. Palliative team following. on PCA pump per palliative. D/w palliative. Disposition:     [x] Case management referral 
  
Case discussed with:  [x]Patient  [x]Family  [x]Nursing  []Case Management DVT Prophylaxis:  []Lovenox  []Hep SQ  [x]SCDs  []Coumadin   []On Heparin gtt Objective:  
VS:  
Visit Vitals  BP (!) 169/93 (BP 1 Location: Left arm, BP Patient Position: At rest)  Pulse (!) 102  Temp 97.5 °F (36.4 °C)  Resp 19  Wt 78.9 kg (174 lb)  SpO2 99%  Breastfeeding No  
 BMI 27.25 kg/m2 Tmax/24hrs: Temp (24hrs), Av.5 °F (36.4 °C), Min:97.5 °F (36.4 °C), Max:97.5 °F (36.4 °C) Intake/Output Summary (Last 24 hours) at 10/04/18 1747 Last data filed at 10/04/18 1549 Gross per 24 hour Intake           488.33 ml Output             1150 ml Net          -661.67 ml General:  Resting, NAD Cardiovascular:  RRR 
 Pulmonary: CTA, mild labored breathing. Much improved from yesterday since PCA. GI:  NT, normal BS Extremities:  No edema or cyanosis Labs:   
No results found for this or any previous visit (from the past 24 hour(s)). Signed By: Joan Torres PA-C  October 4, 2018 5:47 PM

## 2018-10-04 NOTE — PALLIATIVE CARE
Full note to follow Follow up on Ms Mims this am. Sister was at bedside and stayed the night. Just received dose of Ativan. Sister describes difficult night with dyspnea. Discussed at length use of Morphine PCA for relief of dyspnea, Will add PCA at 1 mg / hr with titration up to 4 mg / hr if needed. Patient has mediport. Also increased Ativan to 1 mg oral/ SL  1mg q 1 hr PRN. Attempted to call son Alayna Cerna with update, no ability to leave message.

## 2018-10-04 NOTE — CDMP QUERY
Please clarify if this patient is being treated/managed for: 
 
=> Severe Protein Calorie Malnutrition =>Other Explanation of clinical findings =>Unable to Determine (no explanation of clinical findings) The medical record reflects the following: 
 
Risk::63 y.o. female with a history of Lung CA presenting to the ED for the evaluation of moderate, unimproved, shortness of breath x 1 day Clinical Indicators: 
Treatment: - Provide soft solid diet as tolerated. - Add supplements: Ensure Enlive TID per pt request. 
- Continue RD inpatient monitoring and evaluation. Provide nutrition interventions consistent with plan and goals of care Please clarify and document your clinical opinion in the progress notes and discharge summary including the definitive and/or presumptive diagnosis, (suspected or probable), related to the above clinical findings. Please include clinical findings supporting your diagnosis. If you DECLINE this query or would like to communicate with [facility], please utilize the \"[employer] message box\" at the TOP of the Progress Note on the right. Thank you,[facility] SULTANA ext [phone]

## 2018-10-04 NOTE — ROUTINE PROCESS
Bedside and Verbal shift change report given to TALIA Watkins (oncoming nurse) by Caren Khan RN (offgoing nurse). Report included the following information SBAR, Kardex, MAR and Recent Results. SITUATION: 
Code Status: DNR Reason for Admission: Dyspnea Hospital day: 2 Problem List:  
   
Hospital Problems  Date Reviewed: 9/19/2018 Codes Class Noted POA Advanced care planning/counseling discussion ICD-10-CM: Z71.89 ICD-9-CM: V65.49  10/3/2018 Unknown Debility ICD-10-CM: R53.81 ICD-9-CM: 799.3  10/3/2018 Unknown Dyspnea ICD-10-CM: R06.00 
ICD-9-CM: 786.09  10/2/2018 Unknown BACKGROUND: 
 Past Medical History:  
Past Medical History:  
Diagnosis Date  Acute deep vein thrombosis (DVT) (HCC)  Bone [facility] [address]  Cancer ([facility] [address] lung cancer  HTN (hypertension) 9/16/2018  PAD (peripheral artery disease) ([facility] [address]  Pericardial effusion Patient taking anticoagulants no Patient has a defibrillator: no  
 History of shots YES for example, flu, pneumonia, tetanus Isolation History NO for example, MRSA, CDiff ASSESSMENT: 
Changes in Assessment Throughout Shift: none Significant Changes in 24 hours (for example, RR/code, fall) Patient has Central Line: yes Reasons if yes: irritant/vessicant drugs, limited vessels Patient has Meza Cath: yes Reasons if yes: Comfort Measure / End of life care Mobility Issues PT 
IV Patency OR Checklist 
Pending Tests Last Vitals: 
Vitals w/ MEWS Score (last day) Date/Time MEWS Score Pulse Resp Temp BP Level of Consciousness SpO2  
 10/03/18 1946 2 100 22 97.5 °F (36.4 °C) 163/69 Alert 100 % 10/03/18 1204 -- (!)  101 28 -- -- -- 93 %  10/03/18 1200 -- 90 28 98 °F (36.7 °C) 120/69 -- --  
 10/03/18 1100 -- 86 18 -- -- -- --  
 10/03/18 1000 -- (!)  108 24 -- 111/84 -- --  
 10/03/18 0818 -- (!)  106 28 -- 150/75 -- --  
 10/03/18 0803 -- 97 23 -- (!)  60/12 -- --  
 10/03/18 0800 -- -- -- -- -- -- 94 % 10/03/18 0729 1 98 16 97.8 °F (36.6 °C) 132/89 Alert --  
 10/03/18 0626 -- 93 -- -- 155/89 Alert --  
 10/03/18 0510 -- 100 24 -- (!)  148/100 Alert 100 % 10/03/18 0415 1 93 19 97.6 °F (36.4 °C) 107/61 Alert 100 % 10/03/18 0400 -- -- -- 97.6 °F (36.4 °C) -- -- --  
 10/03/18 0346 -- 90 19 -- 106/63 Alert 100 % 10/03/18 0322 -- 93 21 -- -- Alert 100 % 10/03/18 0200 -- 97 27 -- 136/69 -- 100 % 10/03/18 0029 -- 84 24 -- 115/67 Alert 100 % 10/03/18 0003 -- 90 23 -- 115/67 Alert --  
 10/03/18 0000 2 92 21 97.6 °F (36.4 °C) 115/67 Alert 99 % PAIN Pain Assessment Pain Intensity 1: 0 (10/04/18 0417) Pain Location 1: Chest 
  Pain Intervention(s) 1: Medication (see MAR) Patient Stated Pain Goal: 0 Intervention effective: yes Time of last intervention: 0751 Reassessment Completed: yes Other actions taken for pain: Distraction Last 3 Weights: 
Last 3 Recorded Weights in this Encounter 10/02/18 1940 10/03/18 0400 Weight: 77.3 kg (170 lb 6.4 oz) 78.9 kg (174 lb) Weight change: INTAKE/OUPUT Current Shift: 10/03 1901 - 10/04 0700 In: 27 [P.O.:30] Out: - Last three shifts: 10/02 0701 - 10/03 1900 In: 1066.2 [I.V.:1066.2] Out: 410 [Urine:350; Drains:60] RECOMMENDATIONS AND DISCHARGE PLANNING Patient needs and requests: Pain Control, Palliative/Hospice care Pending tests/procedures: none Discharge plan for patient: Home Discharge planning Needs or Barriers: Palliative/ Hospice Care Estimated Discharge Date: 10/05/2018 Posted on Whiteboard in [address] [place] [address] Room: yes \"HEALS\" SAFETY CHECK A safety check occurred in the patient's room between off going nurse and oncoming nurse listed above. The safety check included the below items: 
 
H High Alert Medications Verify all high alert medication drips (heparin, PCA, etc.) E Equipment Suction is set up for ALL patients (with yung) Red plugs utilized for all equipment (IV pumps, etc.) WOWs wiped down at end of shift. Room stocked with oxygen, suction, and other unit-specific supplies A Alarms Bed alarm is set for fall risk patients Ensure chair alarm is in place and activated if patient is up in a chair L Lines Check IV for any infiltration Meza bag is empty if patient has a Meza Tubing and IV bags are labeled Randy Sun Safety Room is clean, patient is clean, and equipment is clean. Hallways are clear from equipment besides carts. Fall bracelet on for fall risk patients Ensure room is clear and free of clutter Suction is set up for ALL patients (with yung) Hallways are clear from equipment besides carts. Isolation precautions followed, supplies available outside room, sign posted Laura Street RN

## 2018-10-04 NOTE — PROGRESS NOTES
Problem: Falls - Risk of 
Goal: *Absence of Falls Document Gely Ronen Fall Risk and appropriate interventions in the flowsheet. Outcome: Progressing Towards Goal 
Fall Risk Interventions: 
Mobility Interventions: Bed/chair exit alarm Medication Interventions: Bed/chair exit alarm Elimination Interventions: Bed/chair exit alarm, Call light in reach, Patient to call for help with toileting needs, Toilet paper/wipes in reach, Toileting schedule/hourly rounds Problem: Pressure Injury - Risk of 
Goal: *Prevention of pressure injury Document Talib Scale and appropriate interventions in the flowsheet. Outcome: Progressing Towards Goal 
Pressure Injury Interventions: 
Sensory Interventions: Assess changes in LOC, Assess need for specialty bed, Check visual cues for pain, Float heels, Keep linens dry and wrinkle-free, Minimize linen layers Moisture Interventions: Absorbent underpads, Check for incontinence Q2 hours and as needed, Internal/External urinary devices, Minimize layers Activity Interventions: Pressure redistribution bed/mattress(bed type) Mobility Interventions: Assess need for specialty bed, Float heels, HOB 30 degrees or less, Suspension boots Nutrition Interventions: Document food/fluid/supplement intake Friction and Shear Interventions: HOB 30 degrees or less, Lift sheet, Minimize layers

## 2018-10-04 NOTE — PROGRESS NOTES
This pt will discharged 10/4/18 at 11:30 a.m. Goal to start hospice at home. This writer will set up transportation in the a.m. To accommodate this pt and her family [employer]  [phone].

## 2018-10-04 NOTE — PROGRESS NOTES
Palliative Medicine Consult [facility]: [phone] ([phone] [facility]: [phone] [facility]: [phone] Patient Name: Seble Franklin YOB: 1955 Date of Initial Consult: 10/3/2018 Reason for Consult: care decisions Requesting Provider: Dr. Tong Ingram Primary Care Physician: Radha Khan DO 
  
 SUMMARY:  
Seble Franklin is a 66y.o. year old with a past history of metastatic lung cancer to liver and bone, PE, not able to be anticoagulated due hemorrhagic pericardial effusion, hypertension, PAD, who was admitted on 10/2/2018 as a transfer from [facility] for CVT surgical support. She is very short of breath has not been able to start systemic chemotherapy. Pleur x flushed by CVT surgery, and connected to chest tube with very little drainage. Palliative medicine is consulted for care decisions and goals of care. 10/4/2018 after discussion with family including son Cynthia Cole who is MPOA yesterday family decided to pursue comfort measures. POST form signed by Cynthia Cole for DNR/DNI. Comfort measures. This am, eyes closed, sister at bedside ( slept at bedside last night) just received Ativan 0.5 mg orally. Sister reports difficult night last night due to dyspnea, anxiety. Breathing shallow this am, using accessory muscles. PALLIATIVE DIAGNOSES:  
1. Advanced care plan discussions 2. Metastatic lung cancer 3. Shortness of breath 4. Debility PLAN:  
1. 10/4/2018 Advanced care plan discussions no change from yesterday POST form has been signed by her son Cynthia Cole who is MPOA. Goals of care;DNR/DNI; Comfort measures. 2. Shortness of breath / symptom management continues with air hunger, will place on Morphine PCA. Per sister's report had difficult night due to dyspnea. Will start with Morphine 1mg/ hr allow titration up by 1mg/ hr with max of 4mg/ hr. She has a mediport.  Discussed at some length with sister benefits of PCA with basal rate and side effects of sedation. We have also discussed with Bindu Farris ( yesterday) side effects including sedation with Ativan and Morphine. Goal remains her comfort. Have also discussed with hospice and appreciate assistance. They are aware about PCA. We also reviewed MAR very frequent use of Ativan will increase dose to 1 mg q 1 hr PRN. Son returned our phone call updated on PCA and increase dose to 1 mg of Ativan. Again, side effects especially sedation reviewed. 3. Debility home hospice serviced discussed at some length with son Bindu Farris yesterday and again today. He is agreeable to services gave verbal OK for [facility], left message for liaison. 4. 10/3/2018 Advanced care plan discussions patient has executed an AMD naming her son Bindu Farris as MPOA. Discussions today with patient and son Bindu Farris at bedside. Patient upon entry to room was very short of breath, anxious. Placed on non re breather and given 1 mg of Morphine. Although able to answer most questions she did have a little confusion with conversation. Bindu Farris ( son) was also at bedside. Ms. Rolan Soto shared with us \" I feel like a pin cushion\" \" I am tired of suffering\" Son shared an excellent understanding of her disease progression. He tells us he does not wish to see her suffer and would not want to subject her to therapies which will not help her be comfortable. Goals of care discussed at some length including benefits and burdens. Bindu Farris does not wish for his mother to have chest compressions, shock or intubation for any reason. DNR/DNI. Care decisions; Very supportive family, Rk Sims feels she is suffering and would like to be \" comfortable\". Discussed this level of care at some length, including stopping all treatments which do not relate to her comfort. No IVAB, no further labs or x rays. Using Morphine for pain and dyspnea relief, understanding this is not curative.  Ativan used for relief of anxiety. Side effects of medications including somnolence discussed. Family is interested in hospice serviced at home. POST form was signed DNR/DNI,  comfort measures and no feeding tubes. Hospice referral placed. Comfort measures order set in place. If helpful would keep Pleur x cath for comfort. Allow to eat and drink what is tolerable and pleasurable. She retains ability to swallow, would continue current oral medications. Worry air hunger will be hard to control. Will start with IV morphine may need PCA to help with symptoms. 5. Metastatic lung cancer has been seen by H/O. Goal was to start palliative chemo, but has not been able to do so. Family has decided to transition to a more comfort directed approach and home with hospice. 6. Shortness of breath had good relief with Morphine 1 mg much more comfortable appearing. Able to tolerate nasal cannula. Refused Bipap. 7. Debility shared with son she is no longer able to live alone. Son is willing to be primary care giver. 8. Initial consult note routed to primary continuity provider 9. Communicated plan of care with: Palliative IDT, attending, CM, RN, patient, family including son Lindsey Cowan GOALS OF CARE: 
Patient/Health Care Proxy Stated Goals: Comfort TREATMENT PREFERENCES:  
Code Status: DNR Advance Care Planning: 
Advance Care Planning 10/2/2018 Patient's Healthcare Decision Maker is: Named in scanned ACP document Primary Decision Maker Name Ayesha Quinn. King Terry Primary Decision Maker Phone Number [phone] Primary Decision Maker Relationship to Patient Adult child Secondary Decision Maker Name - Secondary Decision Maker Phone Number - Secondary Decision Maker Relationship to Patient -  
Confirm Advance Directive Yes, not on file Patient Would Like to Complete Advance Directive - Does the patient have other document types - Medical Interventions: Comfort measures Artificially Administered Nutrition: No feeding tube Other: As far as possible, the palliative care team has discussed with patient / health care proxy about goals of care / treatment preferences for patient. HISTORY:  
 
History obtained from: chart, family, patient CHIEF COMPLAINT: shortness of breath HPI/SUBJECTIVE: The patient is:  
[x] Verbal and participatory somewhat limited due to conversational confusion  
[] Non-participatory due to:  
 
 
Clinical Pain Assessment (nonverbal scale for nonverbal patients): Clinical Pain Assessment Severity: 0 Duration: for how long has pt been experiencing pain (e.g., 2 days, 1 month, years) Frequency: how often pain is an issue (e.g., several times per day, once every few days, constant) FUNCTIONAL ASSESSMENT:  
 
Palliative Performance Scale (PPS): PPS: 20 
 
ECOG 
ECOG Status : Limited self-care PSYCHOSOCIAL/SPIRITUAL SCREENING:  
  
Any spiritual / Voodoo concerns: 
[] Yes /  [x] No 
 
Caregiver Burnout: 
[] Yes /  [x] No /  [] No Caregiver Present Anticipatory grief assessment:  
[x] Normal  / [] Maladaptive REVIEW OF SYSTEMS:  
 
Positive and pertinent negative findings in ROS are noted above in HPI. The following systems were [] reviewed / [x] unable to be reviewed as noted in HPI Other findings are noted below. Systems: constitutional, ears/nose/mouth/throat, respiratory, gastrointestinal, genitourinary, musculoskeletal, integumentary, neurologic, psychiatric, endocrine. Positive findings noted below. Modified ESAS Completed by: provider Fatigue 8 Pain: 0 Anxiety: 7 Dyspnea: 8 PHYSICAL EXAM:  
 
Wt Readings from Last 3 Encounters:  
10/03/18 78.9 kg (174 lb) 10/02/18 75.8 kg (167 lb)  
09/26/18 77.6 kg (171 lb) Blood pressure (!) 169/93, pulse (!) 102, temperature 97.5 °F (36.4 °C), resp. rate 19, weight 78.9 kg (174 lb), SpO2 99 %, not currently breastfeeding.  
Pain: 
Pain Scale 1: Visual 
Pain Intensity 1: 0 
  
 Pain Location 1: Chest 
Pain Orientation 1: Mid 
Pain Description 1: Heaviness Pain Intervention(s) 1: Medication (see MAR) Constitutional: ill appearing, eyes closed fairly comfortable appearing Eyes: pupils equal, anicteric ENMT: no nasal discharge, moist mucous membranes Cardiovascular: tachycardiac Respiratory: shallow breaths, some accessory muscle use. Skin: warm, dry Neurologic: eyes closed at time of my visit, did not awaken just received ativan HISTORY:  
 
Active Problems: Dyspnea (10/2/2018) Advanced care planning/counseling discussion (10/3/2018) Debility (10/3/2018) Past Medical History:  
Diagnosis Date  Acute deep vein thrombosis (DVT) (HCC)  Bone cancer ([facility] [address]  Cancer ([facility] [address] lung cancer  HTN (hypertension) 9/16/2018  PAD (peripheral artery disease) ([facility] [address]  Pericardial effusion Past Surgical History:  
Procedure Laterality Date  VASCULAR SURGERY PROCEDURE UNLIST Right   
 opened up vessels History reviewed. No pertinent family history. History reviewed, no pertinent family history. Social History Substance Use Topics  Smoking status: Former Smoker  Smokeless tobacco: Never Used  Alcohol use Yes Comment: occasionally No Known Allergies Current Facility-Administered Medications Medication Dose Route Frequency  LORazepam (INTENSOL) 2 mg/mL oral concentrate 1 mg  1 mg Oral Q1H PRN  
 sodium chloride (NS) flush 5-10 mL  5-10 mL IntraVENous Q8H  
 sodium chloride (NS) flush 5-10 mL  5-10 mL IntraVENous PRN  
 morphine (PF)  mg/30 ml   IntraVENous TITRATE  ondansetron (ZOFRAN) injection 4 mg  4 mg IntraVENous Q4H PRN  
 senna-docusate (PERICOLACE) 8.6-50 mg per tablet 2 Tab  2 Tab Oral BID PRN  
 acetaminophen (TYLENOL) suppository 325 mg  325 mg Rectal Q4H PRN  
 morphine injection 2 mg  2 mg IntraVENous Q2H PRN  
 bisacodyl (DULCOLAX) suppository 10 mg  10 mg Rectal DAILY PRN  
  dronabinol (MARINOL) capsule 2.5 mg  2.5 mg Oral BID  levothyroxine (SYNTHROID) tablet 75 mcg  75 mcg Oral ACB  polyethylene glycol (MIRALAX) packet 17 g  17 g Oral DAILY  0.9% sodium chloride infusion  25 mL/hr IntraVENous CONTINUOUS  
 metoprolol tartrate (LOPRESSOR) tablet 12.5 mg  12.5 mg Oral Q12H  
 
 
 LAB AND IMAGING FINDINGS:  
 
Lab Results Component Value Date/Time WBC 11.6 10/03/2018 02:45 AM  
 HGB 7.5 (L) 10/03/2018 02:45 AM  
 PLATELET 653 61/03/5921 02:45 AM  
 
Lab Results Component Value Date/Time Sodium 136 10/03/2018 02:45 AM  
 Potassium 4.5 10/03/2018 02:45 AM  
 Chloride 98 (L) 10/03/2018 02:45 AM  
 CO2 28 10/03/2018 02:45 AM  
 BUN 13 10/03/2018 02:45 AM  
 Creatinine 0.44 (L) 10/03/2018 02:45 AM  
 Calcium 8.7 10/03/2018 02:45 AM  
 Magnesium 2.1 09/25/2018 05:40 AM  
 Phosphorus 3.2 09/23/2018 12:59 AM  
  
Lab Results Component Value Date/Time AST (SGOT) 25 10/02/2018 08:48 AM  
 Alk. phosphatase 267 (H) 10/02/2018 08:48 AM  
 Protein, total 8.4 (H) 10/02/2018 08:48 AM  
 Albumin 2.0 (L) 10/02/2018 08:48 AM  
 Globulin 6.4 (H) 10/02/2018 08:48 AM  
 
Lab Results Component Value Date/Time INR 1.3 (H) 09/24/2018 05:50 AM  
 Prothrombin time 15.5 (H) 09/24/2018 05:50 AM  
 aPTT 38.7 (H) 09/24/2018 05:50 AM  
  
Lab Results Component Value Date/Time Iron 16 (L) 08/20/2018 03:10 AM  
 TIBC 203 (L) 08/20/2018 03:10 AM  
 Iron % saturation 8 08/20/2018 03:10 AM  
  
No results found for: PH, PCO2, PO2 No components found for: [place] Lab Results Component Value Date/Time CK 37 09/16/2018 06:20 PM  
 CK - MB <1.0 09/16/2018 06:20 PM  
  
 
   
 
Total time: 35  minutes Counseling / coordination time, spent as noted above:25 minutes  
> 50% counseling / coordination: yes with patient, family, attending Prolonged service was provided for  []30 min   []75 min in face to face time in the presence of the patient, spent as noted above. Time Start: Time End:     
Note: this can only be billed with 87539 (initial) or [id number] (follow up). If multiple start / stop times, list each separately.

## 2018-10-04 NOTE — PROGRESS NOTES
Order received from MD to reduce PCA morphine basal rate to 0.5mg /hr from 1mg/hr following patient's  son concern that 1mg/hr might be making pt sleepy. Rate change per new order.

## 2018-10-04 NOTE — ROUTINE PROCESS
Bedside shift change report [facility] (oncoming nurse) by Flip Gaviria RN (offgoing nurse). Report included the following information SBAR, as reported by Clay Valle RN from CVT. Pt is being transferred to [facility] and will be here soon.

## 2018-10-05 NOTE — PROGRESS NOTES
visited with the family of Doug Brown, who is a 66 y.o.,female. The  provided the following Interventions: 
Initiated a relationship of care and support. Provided family escort as they left the patient's room. Offered assurance of continued prayers for the death of the patient. Plan: 
Chaplains will continue to follow and will provide pastoral care on an as needed/requested basis. Family will contact hospital with final  arrangements.

## 2018-10-05 NOTE — DISCHARGE SUMMARY
[facility] Patient: Dawood Caro Age: 66 y.o. : 1955 MR#: [id number] SSN: [id number] PCP on record: Jaswinder Aguillon DO Admit date: 10/2/2018 Discharge date: 10/5/2018 Disposition:  
 [x] Admission Diagnoses: Dyspnea Discharge Diagnoses:                            
1. Acute hypoxic respiratory failure 2. Metastatic Lung cancer, including mets to adrenal glands. 3. Malignant pleural effusion 4. Recent hemorrhagic pericardial effusion 5. Acute PE:  Left main pulmonary artery pulmonary embolism extending into the left lobar arteries 6. DVT s/p IVC filter placement 7. HTN, controlled 8. PAD 9. Lactic Acidosis 10. Metabolic encephalopathy POA 
   
 
Consults:  Palliative Medicine, Hospice, CT surgery, Pulmonary -  
Procedures: none - Significant Diagnostic Studies:  
Chest Xray IMPRESSION: 
  
No change in large left pleural effusion compared to the exam performed earlier 
same day but increased compared to earlier exams. Persistent dense consolidation/airspace disease throughout the left lower lobe 
as well as the right upper lobe. Stable cardiomegaly. Hospital Course by Problem 1. Acute hypoxic respiratory failure: likely advanced lung CA, on O2 supplementation while inpt, pulm cnsulted and recommended Bronchodilators prn, Continue Pleurx drainage. 2. Metastatic Lung cancer: treatment as above. Pt and son agreed to comfort measures and home with hospice. Pt had air hunger and palliative helped comfort measures medications including PCA which improved her symptoms. Patient passed while inpt. 3. Malignant pleural effusion: managed with Pleurx drainage. 4. Recent hemorrhagic pericardial effusion: A bedside echo by the [facility] emergency room physician prior to transfer to [facility] which revealed a small pericardial effusion without tamponade.  CT surgery was consulted, Left chest Pleurx catheter was attached to atrium at 20cm of water suction. No intervention was needed. 5. Recent Left main pulmonary artery pulmonary embolism extending into the left lobar arteries 6. DVT s/p IVC filter placement: pt was not on anticoagulation due to hemorrhagic pericardial effusion. 7. HTN, controlled 8. PAD 9. Lactic Acidosis 10. Severe Protein Calorie Malnutrition as evidenced by:  
ASPEN Malnutrition Criteria Acute Illness, Chronic Illness, or Social/Enviornmental: Chronic illness Energy Intake: <75% est energy req for greater than/equal to 1 month Weight Loss: Greater than 7.5% x 3 mos ASPEN Malnutrition Score - Chronic Illness: 7 Chronic Illness - Malnutrition Diagnosis: Severe malnutrition.   
--> she was on multivit. Also on supplements, nutritionist followed over hospital stay. >30 minutes spent coordinating this discharge (review instructions/follow-up, prescriptions, preparing report for sign off) Signed: 
Jacki Murray PA-C 
10/5/2018 
5:22 PM

## 2018-10-05 NOTE — PROGRESS NOTES
responded to Death of  Yu Beltran, who was a 66 y.o.,female, The  provided the following Interventions: 
Provided crisis pastoral care, pastoral support and grief interventions. Offered prayers on behalf of the patient. Chart reviewed. Plan: 
Chaplains will continue to follow and will provide pastoral care on an as needed/requested basis and grief support for the family. Son Frida Armstrong will handle  arrangements and will select a  home this morning.  [facility]  
[phone]

## 2018-10-05 NOTE — HOSPICE
Chart review in process. 1445- Bedside visit made. Patient requested writer come back to explain hospice when son was present. Will f/u. Thank you for the referral to [facility]. Any questions or concerns please contact [phone].
Patient to d/c to home with [facility] tomorrow, 10/5. DME equipment scheduled for delivery 10/5 @ 10am. Discussed with [address] [place] [address] () transportation arranged 10/5 for 11:30am. 
 
Any further questions please contact [phone] @ [phone].
Pt/family pursuing hospice:Other Admission dx: Metastatic Lung cancer stage 4 Anticipated hospital d/c date:105/2018 Narrative: Family meeting with patient, son, and girlfriend present. [facility] philosophy, criteria, services, and IDT explained. All questions answered. Brochure given to son with 24/7 contact number. Family to discuss hospice. Will notify [address]. Patient anxious and SOB. Discussed [place] hunger\" with family. Patient requested floor RN for more pain medication. RN and PA notified. Thank you for the referral to [facility]. Please contact [phone] for any further questions or concerns.
Spoke with son on phone, [facility]. Informed son that [facility] bereavement services are available for the family. Thank you for the opportunity to care for this patient and family.
No

## 2018-10-05 NOTE — PROGRESS NOTES
DEATH PRONOUNCEMENT NOTE 
[facility] EVMS Service Patient: Tashia Laboy MRN: [id number] SSN: [id number]  YOB: 1955 Age: 66 y.o. Sex: female Admission Date: 10/2/2018  3:55 PM Time of Death: 03:12 AM  
    
Comments:  
Called to patient's bedside for apnea and pulselessness. Patient lying in bed, eyes closed, mouth open, unresponsive to voice or painful stimuli. No spontaneous respirations and chest rise absent. No palpable carotid pulse bilaterally. No heart sounds or breath sounds. Pupils fixed and dilated bilaterally. Corneal reflexes absent. Family at bedside, questions answered to their satisfaction. Attending physician, Dr. Guillermo Courtney, to be notified by nursing. The [facility] House Officer can be reached at pager  #[phone] Pj Mathew DO October 5, 2018 3:15 AM

## 2018-10-26 LAB
ATRIAL RATE: 110 BPM
CALCULATED P AXIS, ECG09: 44 DEGREES
CALCULATED R AXIS, ECG10: 10 DEGREES
CALCULATED T AXIS, ECG11: 122 DEGREES
DIAGNOSIS, 93000: NORMAL
P-R INTERVAL, ECG05: 140 MS
Q-T INTERVAL, ECG07: 352 MS
QRS DURATION, ECG06: 78 MS
QTC CALCULATION (BEZET), ECG08: 476 MS
VENTRICULAR RATE, ECG03: 110 BPM

## 2018-12-22 LAB
ATRIAL RATE: 153 BPM
CALCULATED P AXIS, ECG09: 79 DEGREES
CALCULATED R AXIS, ECG10: 41 DEGREES
CALCULATED T AXIS, ECG11: 172 DEGREES
DIAGNOSIS, 93000: NORMAL
P-R INTERVAL, ECG05: 126 MS
Q-T INTERVAL, ECG07: 132 MS
QRS DURATION, ECG06: 68 MS
QTC CALCULATION (BEZET), ECG08: 210 MS
VENTRICULAR RATE, ECG03: 153 BPM

## 2021-02-02 NOTE — PROGRESS NOTES
02-Feb-2021 15:18 Assessment:     1. Hyponatremia  2. Anemia  3. Hypochloremia  4. Hypocalcemia  5. Acute DVT  6. Metastatic lung cancer  7. RF      Plan:      Cr and Na are better  I will sign off, please contact me with any questions. D/w pt and family at bedside.        CC: Hyponatremia and ARF  Interval History: no new issues, in ICU s/p filter placement. Review of Systems:  Left leg edema, thelma SOB        Blood pressure 130/86, pulse (!) 108, temperature 97.6 °F (36.4 °C), resp. rate 27, height 5' 7\" (1.702 m), weight 87.1 kg (192 lb), SpO2 98 %, not currently breastfeeding. awake and alert   NAD  Left leg edema.      Intake/Output Summary (Last 24 hours) at 08/21/18 2050  Last data filed at 08/21/18 0981   Gross per 24 hour   Intake           518.33 ml   Output              650 ml   Net          -131.67 ml      Recent Labs      08/20/18   2156   WBC  11.3     Lab Results   Component Value Date/Time    Sodium 135 (L) 08/20/2018 09:56 PM    Potassium 4.6 08/20/2018 09:56 PM    Chloride 102 08/20/2018 09:56 PM    CO2 25 08/20/2018 09:56 PM    Anion gap 8 08/20/2018 09:56 PM    Glucose 90 08/20/2018 09:56 PM    BUN 10 08/20/2018 09:56 PM    Creatinine 0.81 08/20/2018 09:56 PM    BUN/Creatinine ratio 12 08/20/2018 09:56 PM    GFR est AA >60 08/20/2018 09:56 PM    GFR est non-AA >60 08/20/2018 09:56 PM    Calcium 9.3 08/20/2018 09:56 PM        Current Facility-Administered Medications   Medication Dose Route Frequency Provider Last Rate Last Dose    sodium chloride (NS) flush 5-10 mL  5-10 mL IntraVENous Q8H Miguelangel Edwards MD   10 mL at 08/21/18 1438    sodium chloride (NS) flush 5-10 mL  5-10 mL IntraVENous PRN Miguelangel Edwards MD        famotidine (PF) (PEPCID) 20 mg in sodium chloride 0.9% 10 mL injection  20 mg IntraVENous Q12H Aidee Oswald MD   20 mg at 08/21/18 0846    flumazenil (ROMAZICON) 0.1 mg/mL injection 0.2 mg  0.2 mg IntraVENous Rad Multiple Eunice Valle MD        naloxone [facility]) injection 0.2 mg 0.2 mg IntraVENous PRN Torri Jimenez MD        0.9% sodium chloride infusion 250 mL  250 mL IntraVENous PRN Linward Pro, DO        LORazepam (ATIVAN) tablet 1 mg  1 mg Oral Q4H PRN Linward Pro, DO   1 mg at 08/19/18 2215    albuterol-ipratropium (DUO-NEB) 2.5 MG-0.5 MG/3 ML  3 mL Nebulization Q4H PRN Linward Pro, DO   3 mL at 08/20/18 0716    levothyroxine (SYNTHROID) tablet 75 mcg  75 mcg Oral ACB Linward Pro, DO   75 mcg at 08/21/18 0845    0.9% sodium chloride infusion 250 mL  250 mL IntraVENous PRN Linward Pro, DO        acetaminophen (TYLENOL) tablet 650 mg  650 mg Oral QID Linward Pro, DO   650 mg at 08/21/18 1713    sodium chloride tablet 1 g  1 g Oral BID WITH MEALS Amy Cunha MD   1 g at 08/21/18 1713    morphine injection 2 mg  2 mg IntraVENous Q4H PRN Linward Pro, DO   2 mg at 08/21/18 1545    traMADol (ULTRAM) tablet 50 mg  50 mg Oral Q6H PRN Lida Richardson MD   50 mg at 08/21/18 0011

## 2023-01-01 NOTE — CONSULTS
[facility] Pulmonary, Critical Care, and Sleep Medicine Name: Giovanny Powell MRN: [id number] : 1955 Hospital: [facility] [place] Date: 10/2/2018 [facility] Note Consult requesting physician: Dr. Issa Lizarraga Reason for Consult: dyspnea IMPRESSION:  
· Worsening dyspnea could be due to stage 4 adenocarcinoma of lung (with diffuse mets, left malignant effusion), recurrent PE not on anticoagulation, pericardia effusion (recent hemorrhagic paracentesis on 18 while on heparin, cytology negative). · Adenocarcinoma of RUL with diffuse mets, malignant left pleural effusion. mediport placed onn 18. Awaiting for palliative care chemotherapy with Dr. Mina Gonzalez. · Pericardial effusion, drained 18 hemorrhagic while on heparin for DVT/PE, cytology negative. Still it could be malignant vs reactive. Recurrence of effusion on echo at bedside by Dr. Issa Lizarraga in [facility] 19. · Recurrent PE (CTA 18: left main PA PE with extension into JUHI. · B/l DVT s/p IVC filter. Not on anticoagulation due to hemorrhagic pericardial effusion. · Chronic hypoxic respiratory failure. · Anemia 
· HTN 
· hypothyroidism RECOMMENDATIONS:  
PLAN: 
Her worsening dyspnea could be due to recurrent pericardial effusion, recurrent PE. She could not tolerate BiPAP, but now maintaining SPO2 well on NC. Her PCO2 is slowly increasing and she has persistent tachypnea. She could not lie flat for the CTA and so she returned from radiology department. I d/w pt, her son, sister at length at multiple times in ER, they wants the pericardial effusion to be drained and see if her dyspnea improves. She would like to be intubated if required. SBP in 150's range. She will need to be seen by CT surgeon for this. She was seen by Dr. Oz Juarez last month.  She was d/c home not on anticoagulation due to hemorrhagic pericardial effusion. She agreed to be transferred to facility where she can have further care. D/w Dr. Tyler Martinez who will arrange for the ER to ER transfer. All questions answered to their satisfaction. CC time spent in ER 65 min. Subjective/History of Present Illness:  
 
Ellis Thompson has been seen and evaluated in ER room 1. Patient is a 66 y.o. female with PMHx significant for anemia, HTN, hypothyroidism, stage 4 adenocarcinoma of RUL with multiple mets including malignant pleural effusion,  hemorrhagic pericardial effusion s/p pericardiocentesis in 08/2018, recurrent PE, DVT s/p IVC filter and not on anticoagulation, came to ER with worsening dyspnea since last couple of days. Persistent pedal edema. No cp. Felt mild light headedness and dizziness this am, resolved now. No fever chills cp cough sputum production hemoptysis wheezing stridors. Could not tolerate bipap in er. Couldn't lie flat to get CTA done. She has signed code status/ACP paperwork at [facility] in 09/2018 and is scanned in the media. The patient is critically ill History taken from patient, EMR< son, sister, Dr. Tyler Martinez Review of Systems: 
ROS is limited due to dyspnea limiting her to talk No Known Allergies Past Medical History:  
Diagnosis Date  Acute deep vein thrombosis (DVT) (HCC)  Bone cancer ([address]  Cancer ([facility] lung cancer  HTN (hypertension) 9/16/2018  PAD (peripheral artery disease) ([address]  Pericardial effusion Past Surgical History:  
Procedure Laterality Date  VASCULAR SURGERY PROCEDURE UNLIST Right   
 opened up vessels Social History Substance Use Topics  Smoking status: Former Smoker  Smokeless tobacco: Never Used  Alcohol use Yes Comment: occasionally History reviewed. No pertinent family history. Prior to Admission medications Medication Sig Start Date End Date Taking? Authorizing Provider OXYGEN-AIR DELIVERY SYSTEMS 3 L/min by Nasal route continuous. Batsheva Barboza MD  
dronabinol (MARINOL) 2.5 mg capsule Take 1 Cap by mouth two (2) times a day. Max Daily Amount: 5 mg. 9/25/18   Joshua Soliman MD  
polyethylene glycol (MIRALAX) 17 gram packet Take 1 Packet by mouth daily. 9/25/18   Joshua Soliman MD  
lactobacillus sp. 50 billion cpu (BIO-K PLUS) 50 billion cell -375 mg cap capsule Take 1 Cap by mouth daily for 7 days. 9/26/18 10/3/18  Joshua Soliman MD  
senna-docusate (PERICOLACE) 8.6-50 mg per tablet Take 2 Tabs by mouth daily. Take with a full glass of water. HOLD for loose stool. 9/25/18   Joshua Soliman MD  
bisacodyl (DULCOLAX, BISACODYL,) 10 mg suppository Insert 10 mg into rectum daily as needed. 9/25/18   Joshua Soliman MD  
oxyCODONE ER (OXYCONTIN) 10 mg ER tablet Take 1 Tab by mouth every twelve (12) hours. Max Daily Amount: 20 mg. 9/25/18   Joshua Soliman MD  
oxyCODONE-acetaminophen (PERCOCET) 5-325 mg per tablet Take 1 Tab by mouth every six (6) hours as needed. Max Daily Amount: 4 Tabs. 9/25/18   Joshua Soliman MD  
naloxone [facility]) 4 mg/actuation nasal spray Use 1 spray intranasally, then discard. Repeat with new spray every 2 min as needed for opioid overdose symptoms, alternating nostrils. 9/25/18   Joshua Soliman MD  
levothyroxine (SYNTHROID) 75 mcg tablet Take 1 Tab by mouth Daily (before breakfast). 8/29/18   Batsheva Barboza MD  
metoprolol tartrate (LOPRESSOR) 25 mg tablet Take 0.5 Tabs by mouth two (2) times a day. Patient taking differently: Take 25 mg by mouth two (2) times a day. 8/28/18   Batsheva Barboza MD  
 
No current facility-administered medications for this encounter. Objective:  
Vital Signs:   
Visit Vitals  /78  Pulse (!) 125  Temp 96.2 °F (35.7 °C)  Resp 26  
 Ht 5' 7\" (1.702 m)  Wt 75.8 kg (167 lb)  SpO2 99%  BMI 26.16 kg/m2 O2 Device: Nasal cannula O2 Flow Rate (L/min): 6 l/min Temp (24hrs), Av.2 °F (35.7 °C), Min:96.2 °F (35.7 °C), Max:96.2 °F (35.7 °C) Intake/Output:  
Last shift:        
 
Last 3 shifts:   
 
No intake or output data in the 24 hours ending 10/02/18 1328 Last 3 Recorded Weights in this Encounter 10/02/18 1238 Weight: 75.8 kg (167 lb) Physical Exam:  
 
General/Neurology: Alert, Awake, moderate respiratory distress. On NC. Head:   Normocephalic, without obvious abnormality, atraumatic. Eye:   EOM intact, no scleral icterus, no pallor, no cyanosis. Neck:   Supple, symmetric. No lymphadenopathy. Trachea midline Lung: Moderate air entry bilateral equal. No rales. No rhonchi. No wheezing. No stridors. No prolongded expiration. Accessory muscle use +. Chest:   Left clavicle heard mets non tender. Heart:   Regular rate & rhythm. S1 S2 present. Abdomen:  Soft. NT. ND. Extremities:  1+ b/l pitting pedal edema. No cyanosis. No clubbing. Pulses: 2+ and symmetric in DP. Capillary refill: normal 
 
 
Data:  
   
Recent Results (from the past 24 hour(s)) EKG, 12 LEAD, INITIAL Collection Time: 10/02/18  8:42 AM  
Result Value Ref Range Ventricular Rate 153 BPM  
 Atrial Rate 153 BPM  
 P-R Interval 126 ms  
 QRS Duration 68 ms Q-T Interval 132 ms QTC Calculation (Bezet) 210 ms Calculated P Axis 79 degrees Calculated R Axis 41 degrees Calculated T Axis 172 degrees Diagnosis Sinus tachycardia with premature supraventricular complexes Anterior infarct , age undetermined Abnormal ECG When compared with ECG of 16-SEP-2018 18:12, 
premature supraventricular complexes are now present Non-specific change in ST segment in Inferior leads Nonspecific T wave abnormality, worse in Inferior leads T wave inversion no longer evident in Lateral leads METABOLIC PANEL, COMPREHENSIVE Collection Time: 10/02/18  8:48 AM  
Result Value Ref Range Sodium 134 (L) 136 - 145 mmol/L  Potassium 4.8 3.5 - 5.5 mmol/L  
 Chloride 95 (L) 100 - 108 mmol/L  
 CO2 28 21 - 32 mmol/L Anion gap 11 3.0 - 18 mmol/L Glucose 102 (H) 74 - 99 mg/dL BUN 13 7.0 - 18 MG/DL Creatinine 0.75 0.6 - 1.3 MG/DL  
 BUN/Creatinine ratio 17 12 - 20 GFR est AA >60 >60 ml/min/1.73m2 GFR est non-AA >60 >60 ml/min/1.73m2 Calcium 9.7 8.5 - 10.1 MG/DL Bilirubin, total 0.6 0.2 - 1.0 MG/DL  
 ALT (SGPT) 16 13 - 56 U/L  
 AST (SGOT) 25 15 - 37 U/L Alk. phosphatase 267 (H) 45 - 117 U/L Protein, total 8.4 (H) 6.4 - 8.2 g/dL Albumin 2.0 (L) 3.4 - 5.0 g/dL Globulin 6.4 (H) 2.0 - 4.0 g/dL A-G Ratio 0.3 (L) 0.8 - 1.7    
CBC WITH AUTOMATED DIFF Collection Time: 10/02/18  8:48 AM  
Result Value Ref Range WBC 11.4 4.6 - 13.2 K/uL  
 RBC 3.78 (L) 4.20 - 5.30 M/uL HGB 9.3 (L) 12.0 - 16.0 g/dL HCT 31.8 (L) 35.0 - 45.0 % MCV 84.1 74.0 - 97.0 FL  
 MCH 24.6 24.0 - 34.0 PG  
 MCHC 29.2 (L) 31.0 - 37.0 g/dL  
 RDW 19.9 (H) 11.6 - 14.5 % PLATELET 271 044 - 484 K/uL MPV 9.1 (L) 9.2 - 11.8 FL  
 NEUTROPHILS 76 (H) 40 - 73 % LYMPHOCYTES 15 (L) 21 - 52 % MONOCYTES 7 3 - 10 % EOSINOPHILS 1 0 - 5 % BASOPHILS 1 0 - 2 %  
 ABS. NEUTROPHILS 8.7 (H) 1.8 - 8.0 K/UL  
 ABS. LYMPHOCYTES 1.7 0.9 - 3.6 K/UL  
 ABS. MONOCYTES 0.8 0.05 - 1.2 K/UL  
 ABS. EOSINOPHILS 0.1 0.0 - 0.4 K/UL  
 ABS. BASOPHILS 0.1 0.0 - 0.1 K/UL  
 DF AUTOMATED    
LACTIC ACID Collection Time: 10/02/18  8:48 AM  
Result Value Ref Range Lactic acid 2.5 (HH) 0.4 - 2.0 MMOL/L  
POC G3 Collection Time: 10/02/18 10:32 AM  
Result Value Ref Range Device: Non rebreather FIO2 (POC) 100 % pH (POC) 7.428 7.35 - 7.45    
 pCO2 (POC) 49.2 (H) 35.0 - 45.0 MMHG  
 pO2 (POC) 261 (H) 80 - 100 MMHG  
 HCO3 (POC) 32.5 (H) 22 - 26 MMOL/L  
 sO2 (POC) 100 (H) 92 - 97 % Base excess (POC) 8 mmol/L Allens test (POC) YES Total resp. rate 28 Site RIGHT BRACHIAL Specimen type (POC) ARTERIAL  Performed by Maki Ceballos   
   
 
 Chemistry Recent Labs 10/02/18 
 0848 GLU  102* NA  134* K  4.8  
CL  95* CO2  28 BUN  13  
CREA  0.75 CA  9.7 AGAP  11 BUCR  17  
AP  267* TP  8.4* ALB  2.0*  
GLOB  6.4* AGRAT  0.3* Lactic Acid Lactic acid Date Value Ref Range Status 10/02/2018 2.5 (HH) 0.4 - 2.0 MMOL/L Final  
  Comment:  
  CALLED TO AND CORRECTLY REPEATED BY: 
[address] [place] [address] 0947 ON 10/2/18 TO [facility] Recent Labs 10/02/18 
 0848 LAC  2.5* Liver Enzymes Protein, total  
Date Value Ref Range Status 10/02/2018 8.4 (H) 6.4 - 8.2 g/dL Final  
 
Albumin Date Value Ref Range Status 10/02/2018 2.0 (L) 3.4 - 5.0 g/dL Final  
 
Globulin Date Value Ref Range Status 10/02/2018 6.4 (H) 2.0 - 4.0 g/dL Final  
 
A-G Ratio Date Value Ref Range Status 10/02/2018 0.3 (L) 0.8 - 1.7   Final  
 
AST (SGOT) Date Value Ref Range Status 10/02/2018 25 15 - 37 U/L Final  
 
Alk. phosphatase Date Value Ref Range Status 10/02/2018 267 (H) 45 - 117 U/L Final  
 
Recent Labs 10/02/18 
 0848 TP  8.4* ALB  2.0*  
GLOB  6.4* AGRAT  0.3* SGOT  25  
AP  267* CBC w/Diff Recent Labs 10/02/18 
 0848 WBC  11.4 RBC  3.78* HGB  9.3* HCT  31.8*  
PLT  199 GRANS  76* LYMPH  15* EOS  1 Cardiac Enzymes No results found for: CPK, CK, CKMMB, CKMB, RCK3, CKMBT, CKNDX, CKND1, MASON, TROPT, TROIQ, KAELA, TROPT, TNIPOC, BNP, BNPP  
 
BNP No results found for: BNP, BNPP, XBNPT Coagulation No results for input(s): PTP, INR, APTT in the last 72 hours. No lab exists for component: INREXT Thyroid  Lab Results Component Value Date/Time TSH 60.00 (H) 08/17/2018 08:29 AM  
   
No results found for: T4  
 
Urinalysis Lab Results Component Value Date/Time  Color YELLOW 09/17/2018 12:30 AM  
 Appearance CLEAR 09/17/2018 12:30 AM  
 Specific gravity >1.030 (H) 09/17/2018 12:30 AM  
 pH (UA) 5.0 09/17/2018 12:30 AM  
 Protein TRACE (A) 09/17/2018 12:30 AM  
 Glucose NEGATIVE  09/17/2018 12:30 AM  
 Ketone TRACE (A) 09/17/2018 12:30 AM  
 Bilirubin NEGATIVE  09/17/2018 12:30 AM  
 Urobilinogen 1.0 09/17/2018 12:30 AM  
 Nitrites NEGATIVE  09/17/2018 12:30 AM  
 Leukocyte Esterase NEGATIVE  09/17/2018 12:30 AM  
 Epithelial cells FEW 09/17/2018 12:30 AM  
 Bacteria FEW (A) 09/17/2018 12:30 AM  
 WBC 1 to 3 09/17/2018 12:30 AM  
 RBC NEGATIVE  09/17/2018 12:30 AM  
  
 
Micro  No results for input(s): SDES, CULT in the last 72 hours. No results for input(s): CULT in the last 72 hours. ABG Recent Labs 10/02/18 
 1032 PHI  7.428 PCO2I  49.2*  
PO2I  261* HCO3I  32.5*  
FIO2I  100  
  
 
PFT Ultrasound LE Doppler 9/19/18 · Non-occlusive thrombus present in the right profunda femoral vein. · Occlusive thrombus present in the right femoral vein. · Occlusive thrombus present in the right popliteal vein. · Occlusive thrombus present in one of the right peroneal vein. · Occlusive thrombus present in one of the right posterior tibial vein. · Non-occlusive thrombus present in the contralateral common femoral, profunda femoral vein and proximal femoral vein. · Occlusive present in the left femoral vein. ECHO 9/19/18 · Estimated left ventricular ejection fraction is 61 - 65%. Normal left ventricular wall motion, no regional wall motion abnormality noted. Mild (grade 1) left ventricular diastolic dysfunction. · Mild tricuspid valve regurgitation is present. Pulmonary arterial systolic pressure is 23 mmHg. There is no evidence of pulmonary hypertension. · Trivial pericardial effusion. No indications of tamponade present. There is a left pleural effusion. CTA 9/16/18: 
1. Left main pulmonary artery pulmonary embolism extending into the left lobar 
arteries. No sugar evidence of right heart strain. Overall clot burden is 
difficult to ascertain due to suboptimal artery opacification. 2. Large right upper lobe neoplasm with metastatic involvement in the right 
hilum, mediastinum, left clavicle, and left adrenal gland. 3. Moderate-sized left pleural effusion with associated left basilar Atelectasis. CT (Most Recent) (CT chest reviewed by me) Results from Hospital Encounter encounter on 09/16/18 CT HEAD WO CONT Narrative EXAM: CT head INDICATION: Non-small cell lung cancer, staging, hypertension, anemia. COMPARISON: Correlation brain MRI 8/17/2018. TECHNIQUE: Axial CT imaging of the head  was obtained from skull base to vertex 
without intravenous contrast. Coronal and sagittal reconstructions were 
obtained. One or more dose reduction techniques were used on this CT: automated exposure 
control, adjustment of the mAs and/or kVp according to patient's size, and 
iterative reconstruction techniques. The specific techniques utilized on this CT 
exam have been documented in the patient's electronic medical record. 
 
_______________ FINDINGS: 
 
BRAIN AND POSTERIOR FOSSA: The sulci, folia, ventricles and basal cisterns are 
within normal limits for the patient?s age. There is no intracranial hemorrhage, 
mass effect, or shift of midline structures. There are no areas of abnormal 
parenchymal attenuation. EXTRA-AXIAL SPACES AND MENINGES: There are no abnormal extra-axial fluid 
collections or mass. Small benign-appearing dural calcification right side 
tentorium. CALVARIUM: No acute osseous abnormality. SINUSES: Mild mucosal thickening roof of left ethmoid sinus. Mastoids clear. OTHER: None. 
 
_______________ Impression IMPRESSION: 
 
No evidence of intracranial metastatic disease or other acute or significant 
intracranial finding. XR (Most Recent). CXR  reviewed by me and compared with previous CXR Results from Hospital Encounter encounter on 10/02/18 XR CHEST PORT  Narrative EXAM: One-view chest 
 
 CLINICAL HISTORY: Shortness of breath , 
 
COMPARISON: 9/27/2018 FINDINGS: 
 
Frontal view of the chest demonstrate stable bands of consolidation in the right 
upper lobe and left lung base. Probable small/moderate left pleural effusion 
with pleural drainage catheter in stable position. Increasing left suprahilar 
opacities, and indistinctness along the mediastinal silhouette in this location. Cardiac silhouette is normal in size and contour. No acute bony or soft tissue 
abnormality. Impression IMPRESSION: 
 
Stable consolidative changes in the right upper lobe and left lung base. Small/moderate left pleural effusion and pleural drainage catheter again noted, 
fairly stable. Slightly increased haziness/indistinctness in the left suprahilar location could 
represent developing nonspecific airspace disease. Abe Francisco MD 
10/2/2018 Statement Selected

## (undated) DEVICE — DRAPE,ANGIO,BRACH,STERILE,38X44: Brand: MEDLINE

## (undated) DEVICE — 3M™ TEGADERM™ TRANSPARENT FILM DRESSING FRAME STYLE, 1626W, 4 IN X 4-3/4 IN (10 CM X 12 CM), 50/CT 4CT/CASE: Brand: 3M™ TEGADERM™

## (undated) DEVICE — SHIELD RAD 14X16 IN W/ SCOOP ABSORBER

## (undated) DEVICE — TRAY PERICARDCNT PIGTL 6F 60CM --

## (undated) DEVICE — PROCEDURE KIT FLUID MGMT 10 FR CUST MAINFOLD

## (undated) DEVICE — TUBING PRSS MON L24IN PVC RIG NONEXPANDING M TO FEM CONN

## (undated) DEVICE — PINNACLE PRECISION ACCESS SYSTEM INTRODUCER SHEATH: Brand: PINNACLE PRECISION ACCESS SYSTEM

## (undated) DEVICE — STOPCOCK TRNSDUC 500PSI 3 W ROT M LUER LT BLU OFF HNDL R

## (undated) DEVICE — RADIFOCUS GLIDEWIRE: Brand: GLIDEWIRE

## (undated) DEVICE — PACK PROCEDURE SURG CATH CUST